# Patient Record
Sex: FEMALE | HISPANIC OR LATINO | Employment: FULL TIME | URBAN - METROPOLITAN AREA
[De-identification: names, ages, dates, MRNs, and addresses within clinical notes are randomized per-mention and may not be internally consistent; named-entity substitution may affect disease eponyms.]

---

## 2018-01-29 ENCOUNTER — OFFICE VISIT (OUTPATIENT)
Dept: FAMILY MEDICINE | Facility: CLINIC | Age: 38
End: 2018-01-29
Payer: COMMERCIAL

## 2018-01-29 ENCOUNTER — TELEPHONE (OUTPATIENT)
Dept: OTHER | Facility: CLINIC | Age: 38
End: 2018-01-29

## 2018-01-29 VITALS
HEART RATE: 69 BPM | HEIGHT: 62 IN | TEMPERATURE: 97.7 F | BODY MASS INDEX: 31.1 KG/M2 | SYSTOLIC BLOOD PRESSURE: 121 MMHG | WEIGHT: 169 LBS | DIASTOLIC BLOOD PRESSURE: 75 MMHG | OXYGEN SATURATION: 98 %

## 2018-01-29 DIAGNOSIS — Z23 NEED FOR PROPHYLACTIC VACCINATION AND INOCULATION AGAINST INFLUENZA: ICD-10-CM

## 2018-01-29 DIAGNOSIS — Z11.3 SCREENING EXAMINATION FOR VENEREAL DISEASE: ICD-10-CM

## 2018-01-29 DIAGNOSIS — Z30.41 ENCOUNTER FOR SURVEILLANCE OF CONTRACEPTIVE PILLS: ICD-10-CM

## 2018-01-29 DIAGNOSIS — Z12.4 SCREENING FOR MALIGNANT NEOPLASM OF CERVIX: Primary | ICD-10-CM

## 2018-01-29 DIAGNOSIS — I83.93 VARICOSE VEINS OF LEGS: ICD-10-CM

## 2018-01-29 DIAGNOSIS — E66.9 OBESITY (BMI 30-39.9): ICD-10-CM

## 2018-01-29 LAB
SPECIMEN SOURCE: NORMAL
WET PREP SPEC: NORMAL

## 2018-01-29 PROCEDURE — 87624 HPV HI-RISK TYP POOLED RSLT: CPT | Performed by: NURSE PRACTITIONER

## 2018-01-29 PROCEDURE — 90686 IIV4 VACC NO PRSV 0.5 ML IM: CPT | Performed by: NURSE PRACTITIONER

## 2018-01-29 PROCEDURE — 90471 IMMUNIZATION ADMIN: CPT | Performed by: NURSE PRACTITIONER

## 2018-01-29 PROCEDURE — 87210 SMEAR WET MOUNT SALINE/INK: CPT | Performed by: NURSE PRACTITIONER

## 2018-01-29 PROCEDURE — G0145 SCR C/V CYTO,THINLAYER,RESCR: HCPCS | Performed by: NURSE PRACTITIONER

## 2018-01-29 PROCEDURE — 87591 N.GONORRHOEAE DNA AMP PROB: CPT | Performed by: NURSE PRACTITIONER

## 2018-01-29 PROCEDURE — 87491 CHLMYD TRACH DNA AMP PROBE: CPT | Performed by: NURSE PRACTITIONER

## 2018-01-29 PROCEDURE — 99214 OFFICE O/P EST MOD 30 MIN: CPT | Mod: 25 | Performed by: NURSE PRACTITIONER

## 2018-01-29 RX ORDER — DESOGESTREL AND ETHINYL ESTRADIOL 0.15-0.03
1 KIT ORAL DAILY
Qty: 84 TABLET | Refills: 3 | Status: SHIPPED | OUTPATIENT
Start: 2018-01-29 | End: 2018-09-26

## 2018-01-29 NOTE — PROGRESS NOTES
"  Injectable Influenza Immunization Documentation    1.  Is the person to be vaccinated sick today?  {YES/NO DEFAULT NO:58104::\" No\"}    2. Does the person to be vaccinated have an allergy to a component   of the vaccine?  {YES/NO DEFAULT NO:71901::\" No\"}  Egg Allergy Algorithm Link    3. Has the person to be vaccinated ever had a serious reaction   to influenza vaccine in the past?  {YES/NO DEFAULT NO:14813::\" No\"}    4. Has the person to be vaccinated ever had Guillain-Barré syndrome?  {YES/NO DEFAULT NO:88910::\" No\"}    Form completed by ***         "

## 2018-01-29 NOTE — PROGRESS NOTES
SUBJECTIVE:   Ghazal Martinez is a 37 year old female who presents to clinic today for the following health issues:    New Patient/Transfer of Care  Needs new birth control. Patient is sexually active,  with LAST MENSTUAL PERIOD 18, having monthly menses lasting 3-4 days.  Currently on Enskyce for contraception and needing refill.  Last pap about 3 years ago, no history of abnormal paps per patient. She has recently moved to [facility] and is trransfering her care from [facility]- [facility] have [facility] requested[facility]    Varicose veins- patient has superficial varicosities on LE bilaterally, requesting referral to have them removed. She has prolonged standing, pain toward the end of the day. No significan swelling.    Problem list and histories reviewed & adjusted, as indicated.  Additional history: as documented    Patient Active Problem List   Diagnosis     Varicose veins of legs     History reviewed. No pertinent surgical history.    Social History   Substance Use Topics     Smoking status: Never Smoker     Smokeless tobacco: Never Used     Alcohol use No     Family History   Problem Relation Age of Onset     DIABETES No family hx of      Coronary Artery Disease No family hx of      Hypertension No family hx of      Hyperlipidemia No family hx of      CEREBROVASCULAR DISEASE No family hx of      Breast Cancer No family hx of      Colon Cancer No family hx of      Depression No family hx of      Anxiety Disorder No family hx of      Asthma No family hx of      Thyroid Disease No family hx of          Current Outpatient Prescriptions   Medication Sig Dispense Refill     desogestrel-ethinyl estradiol (APRI) 0.15-30 MG-MCG per tablet Take 1 tablet by mouth daily 84 tablet 3     BP Readings from Last 3 Encounters:   18 121/75    Wt Readings from Last 3 Encounters:   18 169 lb (76.7 kg)                    Reviewed and updated as needed this visit by  "clinical staff  Tobacco  Allergies  Meds  Med Hx  Surg Hx  Fam Hx  Soc Hx      Reviewed and updated as needed this visit by Provider         ROS:  Constitutional, HEENT, cardiovascular, pulmonary, gi and gu systems are negative, except as otherwise noted.    OBJECTIVE:     /75 (BP Location: Left arm, Patient Position: Chair, Cuff Size: Adult Large)  Pulse 69  Temp 97.7  F (36.5  C) (Oral)  Ht 5' 1.81\" (1.57 m)  Wt 169 lb (76.7 kg)  LMP 01/26/2018  SpO2 98%  BMI 31.1 kg/m2  Body mass index is 31.1 kg/(m^2).  GENERAL: healthy, alert and no distress  EYES: Eyes grossly normal to inspection, PERRL and conjunctivae and sclerae normal  HENT: ear canals and TM's normal, nose and mouth without ulcers or lesions  NECK: no adenopathy, no asymmetry, masses, or scars and thyroid normal to palpation  RESP: lungs clear to auscultation - no rales, rhonchi or wheezes  CV: regular rate and rhythm, normal S1 S2, no S3 or S4, no murmur, click or rub, no peripheral edema and peripheral pulses strong  ABDOMEN: soft, nontender, no hepatosplenomegaly, no masses and bowel sounds normal   (female): normal female external genitalia, normal urethral meatus, vaginal mucosa, normal cervix/adnexa/uterus without masses or discharge  MS: superficial varicosities LE bilaterall, no gross musculoskeletal defects noted, no edema  SKIN: no suspicious lesions or rashes  NEURO: Normal strength and tone, mentation intact and speech normal  BACK: no CVA tenderness, no paralumbar tenderness  PSYCH: mentation appears normal, affect normal/bright  LYMPH: no cervical, supraclavicular, axillary, or inguinal adenopathy    Diagnostic Test Results:  No results found for this or any previous visit (from the past 24 hour(s)).    ASSESSMENT/PLAN:       BP Screening:   Last 3 BP Readings:    BP Readings from Last 3 Encounters:   01/29/18 121/75       The following was recommended to the patient:  Re-screen BP within a year and recommended " "lifestyle modifications  BMI:   Estimated body mass index is 31.1 kg/(m^2) as calculated from the following:    Height as of this encounter: 5' 1.81\" (1.57 m).    Weight as of this encounter: 169 lb (76.7 kg).   Weight management plan: Discussed healthy diet and exercise guidelines and patient will follow up in 12 months in clinic to re-evaluate.      1. Screening for malignant neoplasm of cervix    - Pap imaged thin layer screen with HPV - recommended age 30 - 65 years (select HPV order below)    2. Varicose veins of legs  Referring to Vascular for possible sclerotherapy.  - VASCULAR SURGERY REFERRAL    3. Encounter for surveillance of contraceptive pills  Has been on oral contraceptive pills in past - no problems with it and no hx of thrombophlebitis, asthma or migraine. Reviewed risks and benefits with patient. Risks discussed including risk for heart attack, stroke and blood clots. Patient is not a smoker and has no personal or family history of blood clots/bleeding disorders.   Sunday start - reviewed instructions for use   Back up contraception for 1 week after starting the medication and during and after antibiotic use x 7 days.   Safe sex practices reviewed. Advised regular condom use to decrease the risk of STI transmission      - desogestrel-ethinyl estradiol (APRI) 0.15-30 MG-MCG per tablet; Take 1 tablet by mouth daily  Dispense: 84 tablet; Refill: 3    4. Obesity (BMI 30-39.9)  Benefits of weight loss reviewed in detail, encouraged her to cut back on the carbohydrates in the diet, consume more fruits and vegetables, drink plenty of water, avoid fruit juices, sodas, get 150 min moderate exercise/week.  Recheck weight in 6 months.      5. Need for prophylactic vaccination and inoculation against influenza    - HC FLU VAC PRESRV FREE QUAD SPLIT VIR 3+YRS IM  [95628]  -      ADMIN VACCINE, FIRST [69121]  - Vaccine Administration, Initial [75981]    6. Screening examination for venereal disease  Safer sex " practices reviewed.  - Wet prep  - NEISSERIA GONORRHOEA PCR  - CHLAMYDIA TRACHOMATIS PCR    See Patient Instructions    KWABENA Garza [facility]

## 2018-01-29 NOTE — LETTER
January 31, 2018      Ghazal Martinez  [address] [place] [address]        Dear Ghazal,    Your Wet prep did not show a vaginal infection and yourGonorrhea and Chlamydia screens were both negative.  Continue to use condoms to help prevent sexually transmitted diseases.  Feel free to contact me with any questions or concerns.  Thank you for allowing me to participate in your care.      Resulted Orders   Wet prep   Result Value Ref Range    Specimen Description Vagina     Wet Prep No Trichomonas seen     Wet Prep No clue cells seen     Wet Prep No yeast seen    NEISSERIA GONORRHOEA PCR   Result Value Ref Range    Specimen Descrip Vagina     N Gonorrhea PCR Negative NEG^Negative      Comment:      Negative for N. gonorrhoeae rRNA by transcription mediated amplification.  A negative result by transcription mediated amplification does not preclude   the presence of N. gonorrhoeae infection because results are dependent on   proper and adequate collection, absence of inhibitors, and sufficient rRNA to   be detected.     CHLAMYDIA TRACHOMATIS PCR   Result Value Ref Range    Specimen Description Vagina     Chlamydia Trachomatis PCR Negative NEG^Negative      Comment:      Negative for C. trachomatis rRNA by transcription mediated amplification.  A negative result by transcription mediated amplification does not preclude   the presence of C. trachomatis infection because results are dependent on   proper and adequate collection, absence of inhibitors, and sufficient rRNA to   be detected.         If you have any questions or concerns, please call the clinic at the number listed above.       Sincerely,        KWABENA Garza CNP/ROSS

## 2018-01-29 NOTE — PATIENT INSTRUCTIONS
At [facility], we strive to deliver an exceptional experience to you, every time we see you.  If you receive a survey in the mail, please send us back your thoughts. We really do value your feedback.    Based on your medical history, these are the current health maintenance/preventive care services that you are due for (some may have been done at this visit.)  Health Maintenance Due   Topic Date Due     TETANUS IMMUNIZATION (SYSTEM ASSIGNED)  12/07/1997     PAP SCREENING Q3 YR (SYSTEM ASSIGNED)  12/07/2000     INFLUENZA VACCINE (SYSTEM ASSIGNED)  09/01/2017         Suggested websites for health information:  Www.[employer] : Up to date and easily searchable information on multiple topics.  Www.medlineplus.gov : medication info, interactive tutorials, watch real surgeries online  Www.familydoctor.org : good info from the Academy of Family Physicians  Www.cdc.gov : public health info, travel advisories, epidemics (H1N1)  Www.aap.org : children's health info, normal development, vaccinations  Www.health.[facility].[place].us : [place] dept of health, public health issues in [place], N1N1    Your care team:                            Family Medicine Internal Medicine   MD Kulwinder Vargas MD Shantel Branch-Fleming, MD Katya Georgiev PA-C Nam Ho, MD Pediatrics   ANGIE Glasgow, MD Guillermina Berry CNP, MD Deborah Mielke, MD Kim Thein, APRN [facility]      Clinic hours: Monday - Thursday 7 am-7 pm; Fridays 7 am-5 pm.   Urgent care: Monday - Friday 11 am-9 pm; Saturday and Sunday 9 am-5 pm.  Pharmacy : Monday -Thursday 8 am-8 pm; Friday 8 am-6 pm; Saturday and Sunday 9 am-5 pm.     Clinic: [phone]   Pharmacy: [phone]    Varicose Veins     Varicose veins are swollen, enlarged veins most often found in the legs. They are usually blue or purple in color and may bulge, twist, and stand out under the skin.  Normally, veins return  blood from the body to the heart. The leg veins have one-way valves that prevent blood from flowing backward in the vein. When the valves are weak or damaged, blood backs up in the veins. This may cause some of the veins to swell and bulge and become varicose veins.  Symptoms  Varicose veins may or may not cause symptoms. If symptoms do occur, they can include:    Legs that feel tired, achy, heavy, or itchy    Leg muscle cramps    Skin changes, such as discoloration, dryness, redness, or rash (in more severe cases, you may also have sores on the skin called venous leg ulcers)  Risk Factors  There are a number of factors that increase the risk for varicose veins. These can include:    Being a woman    Being older    Sitting or standing for long periods    Being overweight    Being pregnant    Having a family history of varicose veins  Treatment begins with simple self-help measures (see below). If these don t help, there are many procedures that can be done to shrink or remove varicose veins. Your healthcare provider can tell you more about these options, if needed.  Home care    Support or compression stockings will likely be prescribed. If so, be sure to wear them as directed. They may help improve blood flow.    Exercising helps strengthen your leg muscles and improve blood flow. To get the most benefit, choose exercises such as walking, swimming, or cycling. Also try to exercise for at least 30 minutes on most days.    Raising (elevating) your legs lets gravity help blood flow back to the heart. Sit or lie with your feet above heart level a few times throughout the day, or as directed.    Avoid long periods of sitting or standing. Change positions often. Also, move your ankles, toes and knees often. This may also help improve blood flow.    If you are overweight, talk with your healthcare provider about setting up a weight-loss plan. Maintaining a healthy weight can help reduce the strain on your veins. It may  also improve symptoms, such as swelling and aching.    If you have dryness and itching, ask your provider about special lotions that can be applied to the skin to help improve symptoms.  Follow-up care  Follow up with your healthcare provider, or as directed. If imaging tests were done, you ll be told the results and if there are any new findings that affect your care.  When to seek medical advice  Call your healthcare provider right away if any of these occur:    Sudden, severe leg swelling, pain, or redness    Symptoms worsen, or they don t improve with self-care    Bleeding from any affected veins    Ulcers form on the legs, ankles, or feet    Fever of 100.4 F (38 C) or higher, or as advised by your provider  Date Last Reviewed: 9/21/2015 2000-2017 [employer]. [address] [place] [address], [place] [address]. All rights reserved. This information is not intended as a substitute for professional medical care. Always follow your healthcare professional's instructions.

## 2018-01-29 NOTE — MR AVS SNAPSHOT
After Visit Summary   1/29/2018    Ghazal Martinez    MRN: [id number]           Patient Information     Date Of Birth          1980        Visit Information        Provider Department      1/29/2018 1:40 PM Stephanie Barrera APRN CNP; MULTILINGUAL WORD [facility]        Today's Diagnoses     Screening for malignant neoplasm of cervix    -  1    Encounter for surveillance of contraceptive pills        Need for prophylactic vaccination and inoculation against influenza        Varicose veins of legs          Care Instructions    At [facility], we strive to deliver an exceptional experience to you, every time we see you.  If you receive a survey in the mail, please send us back your thoughts. We really do value your feedback.    Based on your medical history, these are the current health maintenance/preventive care services that you are due for (some may have been done at this visit.)  Health Maintenance Due   Topic Date Due     TETANUS IMMUNIZATION (SYSTEM ASSIGNED)  12/07/1997     PAP SCREENING Q3 YR (SYSTEM ASSIGNED)  12/07/2000     INFLUENZA VACCINE (SYSTEM ASSIGNED)  09/01/2017         Suggested websites for health information:  Www.[employer] : Up to date and easily searchable information on multiple topics.  Www.medlineplus.gov : medication info, interactive tutorials, watch real surgeries online  Www.familydoctor.org : good info from the Academy of Family Physicians  Www.cdc.gov : public health info, travel advisories, epidemics (H1N1)  Www.aap.org : children's health info, normal development, vaccinations  Www.health.[facility].[place].us : [place] dept of health, public health issues in [place], N1N1    Your care team:                            Family Medicine Internal Medicine   MD Kulwinder Vargas MD Shantel Branch-Fleming, MD Katya Georgiev PA-C Nam Ho, MD Pediatrics   ANGIE Glasgow CNP Amelia Massimini APRN CNP Shaista  MD Guillermina Wynne MD Deborah Mielke, MD Kim Thein, APRN [facility]      Clinic hours: Monday - Thursday 7 am-7 pm; Fridays 7 am-5 pm.   Urgent care: Monday - Friday 11 am-9 pm; Saturday and Sunday 9 am-5 pm.  Pharmacy : Monday -Thursday 8 am-8 pm; Friday 8 am-6 pm; Saturday and Sunday 9 am-5 pm.     Clinic: [phone]   Pharmacy: [phone]    Varicose Veins     Varicose veins are swollen, enlarged veins most often found in the legs. They are usually blue or purple in color and may bulge, twist, and stand out under the skin.  Normally, veins return blood from the body to the heart. The leg veins have one-way valves that prevent blood from flowing backward in the vein. When the valves are weak or damaged, blood backs up in the veins. This may cause some of the veins to swell and bulge and become varicose veins.  Symptoms  Varicose veins may or may not cause symptoms. If symptoms do occur, they can include:    Legs that feel tired, achy, heavy, or itchy    Leg muscle cramps    Skin changes, such as discoloration, dryness, redness, or rash (in more severe cases, you may also have sores on the skin called venous leg ulcers)  Risk Factors  There are a number of factors that increase the risk for varicose veins. These can include:    Being a woman    Being older    Sitting or standing for long periods    Being overweight    Being pregnant    Having a family history of varicose veins  Treatment begins with simple self-help measures (see below). If these don t help, there are many procedures that can be done to shrink or remove varicose veins. Your healthcare provider can tell you more about these options, if needed.  Home care    Support or compression stockings will likely be prescribed. If so, be sure to wear them as directed. They may help improve blood flow.    Exercising helps strengthen your leg muscles and improve blood flow. To get the most benefit, choose exercises such as walking, swimming, or  cycling. Also try to exercise for at least 30 minutes on most days.    Raising (elevating) your legs lets gravity help blood flow back to the heart. Sit or lie with your feet above heart level a few times throughout the day, or as directed.    Avoid long periods of sitting or standing. Change positions often. Also, move your ankles, toes and knees often. This may also help improve blood flow.    If you are overweight, talk with your healthcare provider about setting up a weight-loss plan. Maintaining a healthy weight can help reduce the strain on your veins. It may also improve symptoms, such as swelling and aching.    If you have dryness and itching, ask your provider about special lotions that can be applied to the skin to help improve symptoms.  Follow-up care  Follow up with your healthcare provider, or as directed. If imaging tests were done, you ll be told the results and if there are any new findings that affect your care.  When to seek medical advice  Call your healthcare provider right away if any of these occur:    Sudden, severe leg swelling, pain, or redness    Symptoms worsen, or they don t improve with self-care    Bleeding from any affected veins    Ulcers form on the legs, ankles, or feet    Fever of 100.4 F (38 C) or higher, or as advised by your provider  Date Last Reviewed: 9/21/2015 2000-2017 [employer]. [address] [place] [address]. All rights reserved. This information is not intended as a substitute for professional medical care. Always follow your healthcare professional's instructions.                Follow-ups after your visit        Additional Services     VASCULAR SURGERY REFERRAL       Your provider has referred you to: **Vascular  Services [phone] - Vascular Medicine Management & None - Please Order Appropriate Testing   [email or web address]    Please be aware that coverage of these services is subject to the  "terms and limitations of your health insurance plan.  Call member services at your health plan with any benefit or coverage questions.      Please bring the following with you to your appointment:    (1) Any X-Rays, CTs or MRIs which have been performed.  Contact the facility where they were done to arrange for  prior to your scheduled appointment.    (2) List of current medications   (3) This referral request   (4) Any documents/labs given to you for this referral                  Who to contact     If you have questions or need follow up information about today's clinic visit or your schedule please contact [facility] [place] directly at [phone].  Normal or non-critical lab and imaging results will be communicated to you by MyChart, letter or phone within 4 business days after the clinic has received the results. If you do not hear from us within 7 days, please contact the clinic through [employer] or phone. If you have a critical or abnormal lab result, we will notify you by phone as soon as possible.  Submit refill requests through "[employer] or call your pharmacy and they will forward the refill request to us. Please allow 3 business days for your refill to be completed.          Additional Information About Your Visit        [employer] Information     "[employer] lets you send messages to your doctor, view your test results, renew your prescriptions, schedule appointments and more. To sign up, go to [email or web address] . Click on \"Log in\" on the left side of the screen, which will take you to the Welcome page. Then click on \"Sign up Now\" on the right side of the page.     You will be asked to enter the access code listed below, as well as some personal information. Please follow the directions to create your username and password.     Your access code is: [email or web address]  Expires: 2018  2:35 PM     Your access code will  in 90 days. If you need help or a new code, please call your " "[facility] or [phone].        Care EveryWhere ID     This is your Care EveryWhere ID. This could be used by other organizations to access your [place] medical records  [id number]        Your Vitals Were     Pulse Temperature Height Last Period Pulse Oximetry BMI (Body Mass Index)    69 97.7  F (36.5  C) (Oral) 5' 1.81\" (1.57 m) 01/26/2018 98% 31.1 kg/m2       Blood Pressure from Last 3 Encounters:   01/29/18 121/75    Weight from Last 3 Encounters:   01/29/18 169 lb (76.7 kg)              We Performed the Following          ADMIN VACCINE, FIRST [49383]     HC FLU VAC PRESRV FREE QUAD SPLIT VIR 3+YRS IM  [55317]     Pap imaged thin layer screen with HPV - recommended age 30 - 65 years (select HPV order below)     VASCULAR SURGERY REFERRAL          Today's Medication Changes          These changes are accurate as of 1/29/18  2:35 PM.  If you have any questions, ask your nurse or doctor.               Start taking these medicines.        Dose/Directions    desogestrel-ethinyl estradiol 0.15-30 MG-MCG per tablet   Commonly known as:  APRI   Used for:  Encounter for surveillance of contraceptive pills   Started by:  Stephanie Barrera, KWABENA CNP        Dose:  1 tablet   Take 1 tablet by mouth daily   Quantity:  84 tablet   Refills:  3            Where to get your medicines      These medications were sent to [facility] - [place], [place] - [address]  [address], [facility] [address]     Phone:  [phone]     desogestrel-ethinyl estradiol 0.15-30 MG-MCG per tablet                Primary Care Provider Fax #    Provider Not In System [phone]                Equal Access to Services     VALENTINA BATES AH: Hadii frida holguin Somague, waaxda luqadaha, qaybta kaalmada adeegyada, patricia groves. So [facility] [phone].    ATENCIÓN: Si habla español, tiene a prado disposición servicios gratuitos de asistencia lingüística. Llame al [phone].    We " comply with applicable federal civil rights laws and [place] laws. We do not discriminate on the basis of race, color, national origin, age, disability, sex, sexual orientation, or gender identity.            Thank you!     Thank you for choosing [facility]  for your care. Our goal is always to provide you with excellent care. Hearing back from our patients is one way we can continue to improve our services. Please take a few minutes to complete the written survey that you may receive in the mail after your visit with us. Thank you!             Your Updated Medication List - Protect others around you: Learn how to safely use, store and throw away your medicines at www.disposemymeds.org.          This list is accurate as of 1/29/18  2:35 PM.  Always use your most recent med list.                   Brand Name Dispense Instructions for use Diagnosis    desogestrel-ethinyl estradiol 0.15-30 MG-MCG per tablet    APRI    84 tablet    Take 1 tablet by mouth daily    Encounter for surveillance of contraceptive pills

## 2018-01-29 NOTE — TELEPHONE ENCOUNTER
Pt referred to [facility] for vericose veins.     Please call pt to schedule at [facility] or encourage pt to go to [facility].     Will route to scheduling to coordinate an appointment at next available.    María Payne RN BSN

## 2018-01-30 LAB
C TRACH DNA SPEC QL NAA+PROBE: NEGATIVE
N GONORRHOEA DNA SPEC QL NAA+PROBE: NEGATIVE
SPECIMEN SOURCE: NORMAL
SPECIMEN SOURCE: NORMAL

## 2018-02-01 LAB
COPATH REPORT: NORMAL
PAP: NORMAL

## 2018-02-05 LAB
FINAL DIAGNOSIS: NORMAL
HPV HR 12 DNA CVX QL NAA+PROBE: NEGATIVE
HPV16 DNA SPEC QL NAA+PROBE: NEGATIVE
HPV18 DNA SPEC QL NAA+PROBE: NEGATIVE
SPECIMEN DESCRIPTION: NORMAL
SPECIMEN SOURCE CVX/VAG CYTO: NORMAL

## 2018-06-07 PROBLEM — E66.811 CLASS 1 OBESITY DUE TO EXCESS CALORIES WITHOUT SERIOUS COMORBIDITY WITH BODY MASS INDEX (BMI) OF 31.0 TO 31.9 IN ADULT: Status: ACTIVE | Noted: 2018-01-29

## 2018-06-07 PROBLEM — E66.09 CLASS 1 OBESITY DUE TO EXCESS CALORIES WITHOUT SERIOUS COMORBIDITY WITH BODY MASS INDEX (BMI) OF 31.0 TO 31.9 IN ADULT: Status: ACTIVE | Noted: 2018-01-29

## 2018-07-27 NOTE — LETTER
After Visit Summary   7/27/2018    Marcus Daley    MRN: [id number]           Patient Information     Date Of Birth          1959        Visit Information        Provider Department      7/27/2018 11:30 AM Abelardo Rodriguez MD [facility]        Today's Diagnoses     Parkinson's disease (H)    -  1      Care Instructions    #1  Increase carbidopa/levodopa 25/100 to 1.5 tabs three times a day on an empty stomach in 1 week  #2 Return in 3 months  #3 instructions below regarding alcohol intake.       Abelardo Rodriguez MD   You 17 minutes ago (9:07 AM)               I did get mixed up.  He can go up in 1 week.     Can drink one drink a day.  No more as his balance may be worsened.                 Follow-ups after your visit        Follow-up notes from your care team     Return in about 3 months (around 10/27/2018).      Who to contact     If you have questions or need follow up information about today's clinic visit or your schedule please contact [facility] directly at [phone].  Normal or non-critical lab and imaging results will be communicated to you by [employer], letter or phone within 4 business days after the clinic has received the results. If you do not hear from us within 7 days, please contact the clinic through [employer] or phone. If you have a critical or abnormal lab result, we will notify you by phone as soon as possible.  Submit refill requests through [employer] or call your pharmacy and they will forward the refill request to us. Please allow 3 business days for your refill to be completed.          Additional Information About Your Visit        [employer] Information     [employer] gives you secure access to your electronic health record. If you see a primary care provider, you can also send messages to your care team and make appointments. If you have questions, please call your primary care clinic.  If you do not have a primary care provider, please    February 7, 2018    Ghazal Martinez  [address]  [place] [address]    Dear Ghazal,  We are happy to inform you that your PAP smear result from 1/29/18 is normal.  We are now able to do a follow up test on PAP smears. The DNA test is for HPV (Human Papilloma Virus). Cervical cancer is closely linked with certain types of HPV. Your result showed no evidence of high risk HPV.  Therefore we recommend you return in 3 years for your next pap smear.  You will still need to return to the clinic every year for an annual exam and other preventive tests.  Please contact the clinic at [phone] with any questions.  Sincerely,    KWABENA Garza CNP/rlm   call [phone] and they will assist you.      [employer] is an electronic gateway that provides easy, online access to your medical records. With [employer], you can request a clinic appointment, read your test results, renew a prescription or communicate with your care team.     To access your existing account, please contact your [facility] or call [phone] for assistance.        Care EveryWhere ID     This is your Care EveryWhere ID. This could be used by other organizations to access your [place] medical records  [id number]        Your Vitals Were     Pulse Pulse Oximetry                87 97%           Blood Pressure from Last 3 Encounters:   07/27/18 141/88   06/29/18 147/87   05/24/18 (!) 155/91    Weight from Last 3 Encounters:   06/29/18 92.6 kg (204 lb 3.2 oz)   05/24/18 92.4 kg (203 lb 12.8 oz)   05/10/18 92.5 kg (204 lb)              Today, you had the following     No orders found for display         Today's Medication Changes          These changes are accurate as of 7/27/18 11:59 PM.  If you have any questions, ask your nurse or doctor.               These medicines have changed or have updated prescriptions.        Dose/Directions    carbidopa-levodopa  MG per tablet   Commonly known as:  SINEMET   This may have changed:    - how much to take  - how to take this  - when to take this  - additional instructions   Used for:  Paralysis agitans (H)        1/2 tablet twice a day x 1 week, then 1/2 tablet 3 times a day x 1 week, then 1 tablet 3 times a day   Quantity:  90 tablet   Refills:  3                Primary Care Provider Office Phone # Fax #    Murphy Jalloh [phone] [address] [facility] [address]        Equal Access to Services     BANDAR MCDUFFIE : Rosina Jackson, diana henao, anisha jennings, mis chavarria. So [facility] [phone].    ATENCIÓN: Si chauncey sheth,  tiene a hadley disposición servicios gratuitos de asistencia lingüística. Lucille bal [phone].    We comply with applicable federal civil rights laws and [place] laws. We do not discriminate on the basis of race, color, national origin, age, disability, sex, sexual orientation, or gender identity.            Thank you!     Thank you for choosing [facility]  for your care. Our goal is always to provide you with excellent care. Hearing back from our patients is one way we can continue to improve our services. Please take a few minutes to complete the written survey that you may receive in the mail after your visit with us. Thank you!             Your Updated Medication List - Protect others around you: Learn how to safely use, store and throw away your medicines at www.disposemymeds.org.          This list is accurate as of 7/27/18 11:59 PM.  Always use your most recent med list.                   Brand Name Dispense Instructions for use Diagnosis    carbidopa-levodopa  MG per tablet    SINEMET    90 tablet    1/2 tablet twice a day x 1 week, then 1/2 tablet 3 times a day x 1 week, then 1 tablet 3 times a day    Paralysis agitans (H)       metoprolol succinate 100 MG 24 hr tablet    TOPROL-XL     Pt takes 50 mg once a week

## 2018-09-26 ENCOUNTER — OFFICE VISIT (OUTPATIENT)
Dept: FAMILY MEDICINE | Facility: CLINIC | Age: 38
End: 2018-09-26
Payer: COMMERCIAL

## 2018-09-26 VITALS
SYSTOLIC BLOOD PRESSURE: 136 MMHG | OXYGEN SATURATION: 99 % | HEART RATE: 71 BPM | HEIGHT: 62 IN | WEIGHT: 168 LBS | BODY MASS INDEX: 30.91 KG/M2 | DIASTOLIC BLOOD PRESSURE: 89 MMHG | TEMPERATURE: 97.5 F

## 2018-09-26 DIAGNOSIS — Z23 NEED FOR PROPHYLACTIC VACCINATION AND INOCULATION AGAINST INFLUENZA: ICD-10-CM

## 2018-09-26 DIAGNOSIS — Z11.4 SCREENING FOR HIV (HUMAN IMMUNODEFICIENCY VIRUS): ICD-10-CM

## 2018-09-26 DIAGNOSIS — Z30.41 ENCOUNTER FOR SURVEILLANCE OF CONTRACEPTIVE PILLS: ICD-10-CM

## 2018-09-26 DIAGNOSIS — Z00.00 ROUTINE GENERAL MEDICAL EXAMINATION AT A HEALTH CARE FACILITY: Primary | ICD-10-CM

## 2018-09-26 PROCEDURE — 99395 PREV VISIT EST AGE 18-39: CPT | Mod: 25 | Performed by: PREVENTIVE MEDICINE

## 2018-09-26 PROCEDURE — 90686 IIV4 VACC NO PRSV 0.5 ML IM: CPT | Performed by: PREVENTIVE MEDICINE

## 2018-09-26 PROCEDURE — 90471 IMMUNIZATION ADMIN: CPT | Performed by: PREVENTIVE MEDICINE

## 2018-09-26 RX ORDER — DESOGESTREL AND ETHINYL ESTRADIOL 0.15-0.03
1 KIT ORAL DAILY
Qty: 84 TABLET | Refills: 3 | Status: SHIPPED | OUTPATIENT
Start: 2018-09-26 | End: 2019-09-09

## 2018-09-26 ASSESSMENT — PAIN SCALES - GENERAL: PAINLEVEL: NO PAIN (0)

## 2018-09-26 NOTE — PROGRESS NOTES
SUBJECTIVE:   CC: Ghazal Martinez is an 37 year old woman who presents for preventive health visit.     Healthy Habits:    Do you get at least three servings of calcium containing foods daily (dairy, green leafy vegetables, etc.)? yes    Amount of exercise or daily activities, outside of work: none    Problems taking medications regularly No    Medication side effects: No    Have you had an eye exam in the past two years? no    Do you see a dentist twice per year? yes    Do you have sleep apnea, excessive snoring or daytime drowsiness?no      Needs refills on oral contraception, has been on this for some time and tolerating well without side effects. No missed doses.    Today's PHQ-2 Score:   PHQ-2 ( 1999 Pfizer) 1/29/2018   Q1: Little interest or pleasure in doing things 0   Q2: Feeling down, depressed or hopeless 0   PHQ-2 Score 0       Abuse: Current or Past(Physical, Sexual or Emotional)- No  Do you feel safe in your environment - Yes    Social History   Substance Use Topics     Smoking status: Never Smoker     Smokeless tobacco: Never Used     Alcohol use No     If you drink alcohol do you typically have >3 drinks per day or >7 drinks per week? No                     Reviewed orders with patient.  Reviewed health maintenance and updated orders accordingly - Yes  Labs reviewed in EPIC  BP Readings from Last 3 Encounters:   09/26/18 136/89   01/29/18 121/75    Wt Readings from Last 3 Encounters:   09/26/18 168 lb (76.2 kg)   01/29/18 169 lb (76.7 kg)                  Patient Active Problem List   Diagnosis     Varicose veins of legs     Cervical cancer screening     Class 1 obesity due to excess calories without serious comorbidity with body mass index (BMI) of 31.0 to 31.9 in adult     History reviewed. No pertinent surgical history.    Social History   Substance Use Topics     Smoking status: Never Smoker     Smokeless tobacco: Never Used     Alcohol use No     Family History   Problem Relation Age of Onset      Diabetes No family hx of      Coronary Artery Disease No family hx of      Hypertension No family hx of      Hyperlipidemia No family hx of      Cerebrovascular Disease No family hx of      Breast Cancer No family hx of      Colon Cancer No family hx of      Depression No family hx of      Anxiety Disorder No family hx of      Asthma No family hx of      Thyroid Disease No family hx of          Current Outpatient Prescriptions   Medication Sig Dispense Refill     desogestrel-ethinyl estradiol (APRI) 0.15-30 MG-MCG per tablet Take 1 tablet by mouth daily 84 tablet 3     [DISCONTINUED] desogestrel-ethinyl estradiol (APRI) 0.15-30 MG-MCG per tablet Take 1 tablet by mouth daily 84 tablet 3     No Known Allergies    Mammogram not appropriate for this patient based on age.    Pertinent mammograms are reviewed under the imaging tab.  History of abnormal Pap smear: NO - age 30-65 PAP every 5 years with negative HPV co-testing recommended  PAP / HPV Latest Ref Rng & Units 1/29/2018   PAP - NIL   HPV 16 DNA NEG:Negative Negative   HPV 18 DNA NEG:Negative Negative   OTHER HR HPV NEG:Negative Negative     Reviewed and updated as needed this visit by clinical staff  Tobacco  Allergies  Meds  Problems  Med Hx  Surg Hx         Reviewed and updated as needed this visit by Provider  Allergies  Meds  Problems  Med Hx  Surg Hx        Past Medical History:   Diagnosis Date     Varicose veins of legs       History reviewed. No pertinent surgical history.    ROS:  CONSTITUTIONAL: NEGATIVE for fever, chills, change in weight  INTEGUMENTARU/SKIN: NEGATIVE for worrisome rashes, moles or lesions  EYES: NEGATIVE for vision changes or irritation  ENT: NEGATIVE for ear, mouth and throat problems  RESP: NEGATIVE for significant cough or SOB  BREAST: NEGATIVE for masses, tenderness or discharge  CV: NEGATIVE for chest pain, palpitations or peripheral edema  GI: NEGATIVE for nausea, abdominal pain, heartburn, or change in bowel  "habits  : NEGATIVE for unusual urinary or vaginal symptoms. Periods are regular.  MUSCULOSKELETAL: NEGATIVE for significant arthralgias or myalgia  NEURO: NEGATIVE for weakness, dizziness or paresthesias  ENDOCRINE: NEGATIVE for temperature intolerance, skin/hair changes  HEME/ALLERGY/IMMUNE: NEGATIVE for bleeding problems  PSYCHIATRIC: NEGATIVE for changes in mood or affect    OBJECTIVE:   /89  Pulse 71  Temp 97.5  F (36.4  C) (Oral)  Ht 5' 1.75\" (1.568 m)  Wt 168 lb (76.2 kg)  LMP  (LMP Unknown)  SpO2 99%  Breastfeeding? No  BMI 30.98 kg/m2  EXAM:  GENERAL: healthy, alert and no distress  EYES: Eyes grossly normal to inspection, PERRL and conjunctivae and sclerae normal  HENT: ear canals and TM's normal, nose and mouth without ulcers or lesions  NECK: no adenopathy, no asymmetry, masses  RESP: lungs clear to auscultation - no rales, rhonchi or wheezes  BREAST: normal without masses, tenderness or nipple discharge and no palpable axillary masses or adenopathy  CV: regular rate and rhythm, normal S1 S2, no S3 or S4, no murmur, click or rub, no peripheral edema and peripheral pulses strong  ABDOMEN: soft, nontender, no hepatosplenomegaly, no masses  MS: no gross musculoskeletal defects noted, no edema  SKIN: no suspicious lesions or rashes  NEURO: Normal strength and tone, mentation intact and speech normal  PSYCH: mentation appears normal, affect normal/bright  LYMPH: no cervical, supraclavicular, axillary adenopathy    Diagnostic Test Results:  No results found for this or any previous visit (from the past 24 hour(s)).    ASSESSMENT/PLAN:   Ghazal was seen today for physical and flu shot.    Diagnoses and all orders for this visit:    Routine general medical examination at a health care facility  -     HIV Screening; Future  -     Glucose; Future  -     Lipid panel reflex to direct LDL Fasting; Future    Screening for HIV (human immunodeficiency virus)  -screening guidelines reviewed     Need for " prophylactic vaccination and inoculation against influenza  -     FLU VACCINE, SPLIT VIRUS, IM (QUADRIVALENT) [64739]- >3 YRS  -     Vaccine Administration, Initial [65516]    Encounter for surveillance of contraceptive pills  -     desogestrel-ethinyl estradiol (APRI) 0.15-30 MG-MCG per tablet; Take 1 tablet by mouth daily    The following counseling on birth control pills was done:  Birth control pills are a medication you take every day to prevent pregnancy. If birth control pills are always used correctly, less than 1 out of 100 women using them will get pregnant each year. When you first start the pill, it takes several days to begin working. Be sure to use backup birth control (like a condom) for the first 7 days preferably till the first packet is completed.  The hormones in the pill keep your ovaries from releasing eggs and thicken your cervical mucus to block sperm from getting into the uterus.  Most women can get pregnant quickly when they stop using the pill.  Your periods may become lighter and less painful if you take the pill.  The hormones in pills offer health benefits. The pill can offer some protection against acne, non-cancerous breast growths, ectopic pregnancy, endometrial and ovarian cancers, iron deficiency anemia, and ovarian cysts.  Birth control pills do not protect against sexually transmitted infections (STIs). Some women may have side effects while using birth control pills. They include bleeding between periods, breast tenderness, and nausea. Some of the most common side effects only last for the first few months.   Risks discussed including risk for heart attack, stroke and blood clots.  Patient is not a smoker and has no personal or family history of blood clots/bleeding disorders.  Regular condom use is recommended to help protect against STIs.        COUNSELING:   Reviewed preventive health counseling, as reflected in patient instructions       Regular exercise       Healthy  "diet/nutrition       Immunizations    Vaccinated for: Influenza             Contraception       Family planning       HIV screeninx in teen years, 1x in adult years, and at intervals if high risk    BP Readings from Last 1 Encounters:   18 136/89     Estimated body mass index is 30.98 kg/(m^2) as calculated from the following:    Height as of this encounter: 5' 1.75\" (1.568 m).    Weight as of this encounter: 168 lb (76.2 kg).    BP Screening:   Last 3 BP Readings:    BP Readings from Last 3 Encounters:   18 136/89   18 121/75       The following was recommended to the patient:  Re-screen BP within a year and recommended lifestyle modifications  Weight management plan: Discussed healthy diet and exercise guidelines and patient will follow up in 12 months in clinic to re-evaluate.     reports that she has never smoked. She has never used smokeless tobacco.      Counseling Resources:  ATP IV Guidelines  Pooled Cohorts Equation Calculator  Breast Cancer Risk Calculator  FRAX Risk Assessment  ICSI Preventive Guidelines  Dietary Guidelines for Americans, 2010  USDA's MyPlate  ASA Prophylaxis  Lung CA Screening    Alena Wynne MD MPH    [facility]    Injectable Influenza Immunization Documentation    1.  Is the person to be vaccinated sick today?   No    2. Does the person to be vaccinated have an allergy to a component   of the vaccine?   No  Egg Allergy Algorithm Link    3. Has the person to be vaccinated ever had a serious reaction   to influenza vaccine in the past?   No    4. Has the person to be vaccinated ever had Guillain-Barré syndrome?   No    Form completed by Kena STEPHENS             "

## 2018-09-26 NOTE — MR AVS SNAPSHOT
After Visit Summary   9/26/2018    Ghazal Martinez    MRN: [id number]           Patient Information     Date Of Birth          1980        Visit Information        Provider Department      9/26/2018 3:05 PM Alena Wynne MD; [place] [employer] [facility]        Today's Diagnoses     Routine general medical examination at a health care facility    -  1    Screening for HIV (human immunodeficiency virus)        Need for prophylactic vaccination and inoculation against influenza        Encounter for surveillance of contraceptive pills          Care Instructions    At [facility], we strive to deliver an exceptional experience to you, every time we see you.  If you receive a survey in the mail, please send us back your thoughts. We really do value your feedback.    Your care team:                            Family Medicine Internal Medicine   MD Kulwinder Vargas MD Shantel Branch-Fleming, MD Katya Georgiev PA-C Megan Hill, APRN CNP    Bruce Childs MD Pediatrics   Sukhi Wallace, PATimothyC  Eun Patton, CNP MD Ronda Benitez APRN CNP   MD Guillermina Mcgarry MD Deborah Mielke, MD Kim Thein, APRN [facility]      Clinic hours: Monday - Thursday 7 am-7 pm; Fridays 7 am-5 pm.   Urgent care: Monday - Friday 11 am-9 pm; Saturday and Sunday 9 am-5 pm.  Pharmacy : Monday -Thursday 8 am-8 pm; Friday 8 am-6 pm; Saturday and Sunday 9 am-5 pm.     Clinic: [phone]   Pharmacy: [phone]          Preventive Health Recommendations  Female Ages 26 - 39  Yearly exam:   See your health care provider every year in order to    Review health changes.     Discuss preventive care.      Review your medicines if you your doctor has prescribed any.    Until age 30: Get a Pap test every three years (more often if you have had an abnormal result).    After age 30: Talk to your doctor about whether you should have a Pap test every 3  years or have a Pap test with HPV screening every 5 years.   You do not need a Pap test if your uterus was removed (hysterectomy) and you have not had cancer.  You should be tested each year for STDs (sexually transmitted diseases), if you're at risk.   Talk to your provider about how often to have your cholesterol checked.  If you are at risk for diabetes, you should have a diabetes test (fasting glucose).  Shots: Get a flu shot each year. Get a tetanus shot every 10 years.   Nutrition:     Eat at least 5 servings of fruits and vegetables each day.    Eat whole-grain bread, whole-wheat pasta and brown rice instead of white grains and rice.    Get adequate Calcium and Vitamin D.     Lifestyle    Exercise at least 150 minutes a week (30 minutes a day, 5 days of the week). This will help you control your weight and prevent disease.    Limit alcohol to one drink per day.    No smoking.     Wear sunscreen to prevent skin cancer.    See your dentist every six months for an exam and cleaning.            Follow-ups after your visit        Future tests that were ordered for you today     Open Future Orders        Priority Expected Expires Ordered    Glucose Routine  12/26/2018 9/26/2018    Lipid panel reflex to direct LDL Fasting Routine  12/26/2018 9/26/2018    HIV Screening Routine  12/26/2018 9/26/2018            Who to contact     If you have questions or need follow up information about today's clinic visit or your schedule please contact [facility] directly at [phone].  Normal or non-critical lab and imaging results will be communicated to you by MyChart, letter or phone within 4 business days after the clinic has received the results. If you do not hear from us within 7 days, please contact the clinic through MyChart or phone. If you have a critical or abnormal lab result, we will notify you by phone as soon as possible.  Submit refill requests through [employer] or call your pharmacy and they  "will forward the refill request to us. Please allow 3 business days for your refill to be completed.          Additional Information About Your Visit        [employer] Information     [employer] lets you send messages to your doctor, view your test results, renew your prescriptions, schedule appointments and more. To sign up, go to [email or web address] . Click on \"Log in\" on the left side of the screen, which will take you to the Welcome page. Then click on \"Sign up Now\" on the right side of the page.     You will be asked to enter the access code listed below, as well as some personal information. Please follow the directions to create your username and password.     Your access code is: [email or web address]  Expires: 2018  3:56 PM     Your access code will  in 90 days. If you need help or a new code, please call your [facility] clinic or [phone].        Care EveryWhere ID     This is your Care EveryWhere ID. This could be used by other organizations to access your [facility] medical records  [id number]        Your Vitals Were     Pulse Temperature Height Last Period Pulse Oximetry Breastfeeding?    71 97.5  F (36.4  C) (Oral) 5' 1.75\" (1.568 m) (LMP Unknown) 99% No    BMI (Body Mass Index)                   30.98 kg/m2            Blood Pressure from Last 3 Encounters:   18 136/89   18 121/75    Weight from Last 3 Encounters:   18 168 lb (76.2 kg)   18 169 lb (76.7 kg)                 Where to get your medicines      These medications were sent to [facility] [place] - [place], [place] - [address]  [address], [facility] [address]     Phone:  [phone]     desogestrel-ethinyl estradiol 0.15-30 MG-MCG per tablet          Primary Care Provider Fax #    Physician No Ref-Primary [phone]       No address on file        Equal Access to Services     SHREE BATES AH: Lacy Stone, doris michelle, patricia rivera " joann bruceguanakoeric umanaColeaacharles ah. So [facility] [phone].    ATENCIÓN: Si habla rod, tiene a prado disposición servicios gratuitos de asistencia lingüística. Monica al [phone].    We comply with applicable federal civil rights laws and [place] laws. We do not discriminate on the basis of race, color, national origin, age, disability, sex, sexual orientation, or gender identity.            Thank you!     Thank you for choosing [facility]  for your care. Our goal is always to provide you with excellent care. Hearing back from our patients is one way we can continue to improve our services. Please take a few minutes to complete the written survey that you may receive in the mail after your visit with us. Thank you!             Your Updated Medication List - Protect others around you: Learn how to safely use, store and throw away your medicines at www.disposemymeds.org.          This list is accurate as of 9/26/18  3:56 PM.  Always use your most recent med list.                   Brand Name Dispense Instructions for use Diagnosis    desogestrel-ethinyl estradiol 0.15-30 MG-MCG per tablet    APRI    84 tablet    Take 1 tablet by mouth daily    Encounter for surveillance of contraceptive pills

## 2018-09-26 NOTE — PATIENT INSTRUCTIONS
At [facility], we strive to deliver an exceptional experience to you, every time we see you.  If you receive a survey in the mail, please send us back your thoughts. We really do value your feedback.    Your care team:                            Family Medicine Internal Medicine   MD Kulwinder Vargas MD Shantel Branch-Fleming, MD Katya Georgiev PA-C Megan Hill, APRN CNP    Bruce Childs MD Pediatrics   Sukhi Wallace, ANGIE Patton, MD Ronda Arreguin APRN CNP   MD Guillermina Mcgarry MD Deborah Mielke, MD Deepa Barrera, APRN [facility]      Clinic hours: Monday - Thursday 7 am-7 pm; Fridays 7 am-5 pm.   Urgent care: Monday - Friday 11 am-9 pm; Saturday and Sunday 9 am-5 pm.  Pharmacy : Monday -Thursday 8 am-8 pm; Friday 8 am-6 pm; Saturday and Sunday 9 am-5 pm.     Clinic: [phone]   Pharmacy: [phone]          Preventive Health Recommendations  Female Ages 26 - 39  Yearly exam:   See your health care provider every year in order to    Review health changes.     Discuss preventive care.      Review your medicines if you your doctor has prescribed any.    Until age 30: Get a Pap test every three years (more often if you have had an abnormal result).    After age 30: Talk to your doctor about whether you should have a Pap test every 3 years or have a Pap test with HPV screening every 5 years.   You do not need a Pap test if your uterus was removed (hysterectomy) and you have not had cancer.  You should be tested each year for STDs (sexually transmitted diseases), if you're at risk.   Talk to your provider about how often to have your cholesterol checked.  If you are at risk for diabetes, you should have a diabetes test (fasting glucose).  Shots: Get a flu shot each year. Get a tetanus shot every 10 years.   Nutrition:     Eat at least 5 servings of fruits and vegetables each day.    Eat whole-grain bread, whole-wheat pasta and brown rice  instead of white grains and rice.    Get adequate Calcium and Vitamin D.     Lifestyle    Exercise at least 150 minutes a week (30 minutes a day, 5 days of the week). This will help you control your weight and prevent disease.    Limit alcohol to one drink per day.    No smoking.     Wear sunscreen to prevent skin cancer.    See your dentist every six months for an exam and cleaning.

## 2018-09-29 DIAGNOSIS — Z00.00 ROUTINE GENERAL MEDICAL EXAMINATION AT A HEALTH CARE FACILITY: ICD-10-CM

## 2018-09-29 PROCEDURE — 87389 HIV-1 AG W/HIV-1&-2 AB AG IA: CPT | Performed by: PREVENTIVE MEDICINE

## 2018-09-29 PROCEDURE — 82947 ASSAY GLUCOSE BLOOD QUANT: CPT | Performed by: PREVENTIVE MEDICINE

## 2018-09-29 PROCEDURE — 36415 COLL VENOUS BLD VENIPUNCTURE: CPT | Performed by: PREVENTIVE MEDICINE

## 2018-09-29 PROCEDURE — 80061 LIPID PANEL: CPT | Performed by: PREVENTIVE MEDICINE

## 2018-10-01 LAB
CHOLEST SERPL-MCNC: 195 MG/DL
GLUCOSE SERPL-MCNC: 83 MG/DL (ref 70–99)
HDLC SERPL-MCNC: 67 MG/DL
HIV 1+2 AB+HIV1 P24 AG SERPL QL IA: NONREACTIVE
LDLC SERPL CALC-MCNC: 95 MG/DL
NONHDLC SERPL-MCNC: 128 MG/DL
TRIGL SERPL-MCNC: 164 MG/DL

## 2018-10-08 ENCOUNTER — TELEPHONE (OUTPATIENT)
Dept: FAMILY MEDICINE | Facility: CLINIC | Age: 38
End: 2018-10-08

## 2018-10-08 NOTE — TELEPHONE ENCOUNTER
Patient needs a call back to go over her labs result  She has questions  A [place] interpretor is needed for the call  [phone]    Thank you

## 2018-10-08 NOTE — TELEPHONE ENCOUNTER
Called with  [id number] and went over the letter written to the patient. She does not speak english so was unable to read the letter sent.    Patient noted that she has felt dizzy a couple of times. Advised that RN would be unable to tell her what is causing it and she would need to be seen in clinic. She declined for now.    Lita Kim RN, [facility] Triage

## 2019-09-09 DIAGNOSIS — Z30.41 ENCOUNTER FOR SURVEILLANCE OF CONTRACEPTIVE PILLS: ICD-10-CM

## 2019-09-09 RX ORDER — DESOGESTREL AND ETHINYL ESTRADIOL 0.15-0.03
KIT ORAL
Qty: 28 TABLET | Refills: 0 | Status: SHIPPED | OUTPATIENT
Start: 2019-09-09 | End: 2019-09-27

## 2019-09-09 NOTE — TELEPHONE ENCOUNTER
Next 5 appointments (look out 90 days)    Sep 27, 2019  3:00 PM CDT  [facility] [address] [place] [id number] [phone]        Prescription approved per [facility] Refill Protocol.      Alvaro Bustillo RN, BSN, PHN

## 2019-09-09 NOTE — TELEPHONE ENCOUNTER
Reason for Call:  Other prescription    Detailed comments: Patient just made an appointment for 9/27/2019 and asking if she could get her refill for birth control now, because she is out of medication? Please call to advise.    Phone Number Patient can be reached at: Other phone number: [phone] (Z)    Best Time: any    Can we leave a detailed message on this number? YES    Call taken on 9/9/2019 at 3:41 PM by Darlin Corral

## 2019-09-09 NOTE — TELEPHONE ENCOUNTER
"Requested Prescriptions   Pending Prescriptions Disp Refills     JULEBER 0.15-30 MG-MCG tablet [Pharmacy Med Name: JULEBER 0.15-30MG-MCG TABS]  Last Written Prescription Date:  09/26/18  Last Fill Quantity: 84,  # refills: 3   Last Office Visit with FMCHRISTOPHE, ANGELA or [facility] prescribing provider:  09/26/18-Ricci   Future Office Visit:    84 tablet 3     Sig: TAKE ONE TABLET BY MOUTH EVERY DAY       Contraceptives Protocol Passed - 9/9/2019  9:09 AM        Passed - Patient is not a current smoker if age is 35 or older        Passed - Recent (12 mo) or future (30 days) visit within the authorizing provider's specialty     Patient had office visit in the last 12 months or has a visit in the next 30 days with authorizing provider or within the authorizing provider's specialty.  See \"Patient Info\" tab in inbasket, or \"Choose Columns\" in Meds & Orders section of the refill encounter.              Passed - Medication is active on med list        Passed - No active pregnancy on record        Passed - No positive pregnancy test in past 12 months          "

## 2019-09-27 ENCOUNTER — OFFICE VISIT (OUTPATIENT)
Dept: FAMILY MEDICINE | Facility: CLINIC | Age: 39
End: 2019-09-27
Payer: COMMERCIAL

## 2019-09-27 VITALS
OXYGEN SATURATION: 93 % | DIASTOLIC BLOOD PRESSURE: 85 MMHG | RESPIRATION RATE: 16 BRPM | BODY MASS INDEX: 31.73 KG/M2 | WEIGHT: 172.4 LBS | HEIGHT: 62 IN | SYSTOLIC BLOOD PRESSURE: 133 MMHG | HEART RATE: 82 BPM | TEMPERATURE: 97.9 F

## 2019-09-27 DIAGNOSIS — Z00.00 ROUTINE GENERAL MEDICAL EXAMINATION AT A HEALTH CARE FACILITY: Primary | ICD-10-CM

## 2019-09-27 DIAGNOSIS — Z30.41 ENCOUNTER FOR SURVEILLANCE OF CONTRACEPTIVE PILLS: ICD-10-CM

## 2019-09-27 PROCEDURE — 99395 PREV VISIT EST AGE 18-39: CPT | Performed by: PREVENTIVE MEDICINE

## 2019-09-27 RX ORDER — DESOGESTREL AND ETHINYL ESTRADIOL 0.15-0.03
1 KIT ORAL DAILY
Qty: 84 TABLET | Refills: 4 | Status: SHIPPED | OUTPATIENT
Start: 2019-09-27 | End: 2020-08-21

## 2019-09-27 ASSESSMENT — PAIN SCALES - GENERAL: PAINLEVEL: NO PAIN (0)

## 2019-09-27 ASSESSMENT — MIFFLIN-ST. JEOR: SCORE: 1407.25

## 2019-09-27 NOTE — PATIENT INSTRUCTIONS
Preventive Health Recommendations  Female Ages 26 - 39  Yearly exam:   See your health care provider every year in order to    Review health changes.     Discuss preventive care.      Review your medicines if you your doctor has prescribed any.    Until age 30: Get a Pap test every three years (more often if you have had an abnormal result).    After age 30: Talk to your doctor about whether you should have a Pap test every 3 years or have a Pap test with HPV screening every 5 years.   You do not need a Pap test if your uterus was removed (hysterectomy) and you have not had cancer.  You should be tested each year for STDs (sexually transmitted diseases), if you're at risk.   Talk to your provider about how often to have your cholesterol checked.  If you are at risk for diabetes, you should have a diabetes test (fasting glucose).  Shots: Get a flu shot each year. Get a tetanus shot every 10 years.   Nutrition:     Eat at least 5 servings of fruits and vegetables each day.    Eat whole-grain bread, whole-wheat pasta and brown rice instead of white grains and rice.    Get adequate Calcium and Vitamin D.     Lifestyle    Exercise at least 150 minutes a week (30 minutes a day, 5 days of the week). This will help you control your weight and prevent disease.    Limit alcohol to one drink per day.    No smoking.     Wear sunscreen to prevent skin cancer.    See your dentist every six months for an exam and cleaning.    At [facility], we strive to deliver an exceptional experience to you, every time we see you.  If you receive a survey in the mail, please send us back your thoughts. We really do value your feedback.    Based on your medical history, these are the current health maintenance/preventive care services that you are due for (some may have been done at this visit.)  Health Maintenance Due   Topic Date Due     PHQ-2  01/01/2019     INFLUENZA VACCINE (1) 09/01/2019     PREVENTIVE CARE VISIT   09/26/2019         Suggested websites for health information:  Www.fairview.org : Up to date and easily searchable information on multiple topics.  Www.medlineplus.gov : medication info, interactive tutorials, watch real surgeries online  Www.familydoctor.org : good info from the Academy of Family Physicians  Www.cdc.gov : public health info, travel advisories, epidemics (H1N1)  Www.aap.org : children's health info, normal development, vaccinations  Www.health.[facility].[place].us : [place] dept of health, public health issues in [place], N1N1    Your care team:                            Family Medicine Internal Medicine   MD Kulwinder Vargas MD Shantel Branch-Fleming, MD Katya Georgiev PA-C Nam Ho, MD Pediatrics   Sukhi Wallace PAARABELLA Patton, SATHISH Sandoval APRN CNP   MD Guillermina Mcgarry MD Deborah Mielke, MD Deepa Barrera, APRN CNP      Clinic hours: Monday - Thursday 7 am-7 pm; Fridays 7 am-5 pm.   Urgent care: Monday - Friday 11 am-9 pm; Saturday and Sunday 9 am-5 pm.  Pharmacy : Monday -Thursday 8 am-8 pm; Friday 8 am-6 pm; Saturday and Sunday 9 am-5 pm.     Clinic: [phone]   Pharmacy: [phone]

## 2019-09-27 NOTE — PROGRESS NOTES
SUBJECTIVE:   CC: Ghazal Martinez is an 38 year old woman who presents for preventive health visit.     Healthy Habits:    Do you get at least three servings of calcium containing foods daily (dairy, green leafy vegetables, etc.)? yes    Amount of exercise or daily activities, outside of work: 5 day(s) per week    Problems taking medications regularly No    Medication side effects: No    Have you had an eye exam in the past two years? no    Do you see a dentist twice per year? yes    Do you have sleep apnea, excessive snoring or daytime drowsiness?no    Here with German interpretor  Form completed stating she had a Physical today   Needs refills on oral contraception       Today's PHQ-2 Score:   PHQ-2 ( 1999 Pf[employer]) 9/27/2019 1/29/2018   Q1: Little interest or pleasure in doing things 0 0   Q2: Feeling down, depressed or hopeless 0 0   PHQ-2 Score 0 0       Abuse: Current or Past(Physical, Sexual or Emotional)- No  Do you feel safe in your environment? Yes    Social History     Tobacco Use     Smoking status: Never Smoker     Smokeless tobacco: Never Used   Substance Use Topics     Alcohol use: No     If you drink alcohol do you typically have >3 drinks per day or >7 drinks per week? No                     Reviewed orders with patient.  Reviewed health maintenance and updated orders accordingly - Yes  Lab work is in process  Labs reviewed in EPIC  BP Readings from Last 3 Encounters:   09/27/19 133/85   09/26/18 136/89   01/29/18 121/75    Wt Readings from Last 3 Encounters:   09/27/19 78.2 kg (172 lb 6.4 oz)   09/26/18 76.2 kg (168 lb)   01/29/18 76.7 kg (169 lb)                  Patient Active Problem List   Diagnosis     Varicose veins of legs     Cervical cancer screening     Class 1 obesity due to excess calories without serious comorbidity with body mass index (BMI) of 31.0 to 31.9 in adult     History reviewed. No pertinent surgical history.    Social History     Tobacco Use     Smoking status: Never  Smoker     Smokeless tobacco: Never Used   Substance Use Topics     Alcohol use: No     Family History   Problem Relation Age of Onset     Diabetes No family hx of      Coronary Artery Disease No family hx of      Hypertension No family hx of      Hyperlipidemia No family hx of      Cerebrovascular Disease No family hx of      Breast Cancer No family hx of      Colon Cancer No family hx of      Depression No family hx of      Anxiety Disorder No family hx of      Asthma No family hx of      Thyroid Disease No family hx of          Current Outpatient Medications   Medication Sig Dispense Refill     desogestrel-ethinyl estradiol (JULEBER) 0.15-30 MG-MCG tablet Take 1 tablet by mouth daily 84 tablet 4     No Known Allergies    Mammogram not appropriate for this patient based on age.    Pertinent mammograms are reviewed under the imaging tab.  History of abnormal Pap smear: NO - age 30-65 PAP every 5 years with negative HPV co-testing recommended  PAP / HPV Latest Ref Rng & Units 1/29/2018   PAP - NIL   HPV 16 DNA NEG:Negative Negative   HPV 18 DNA NEG:Negative Negative   OTHER HR HPV NEG:Negative Negative     Reviewed and updated as needed this visit by clinical staff  Tobacco  Allergies  Meds  Problems  Med Hx  Surg Hx  Fam Hx         Reviewed and updated as needed this visit by Provider  Tobacco  Allergies  Meds  Problems  Med Hx  Surg Hx  Fam Hx        Past Medical History:   Diagnosis Date     Varicose veins of legs       History reviewed. No pertinent surgical history.    ROS:  CONSTITUTIONAL: NEGATIVE for fever, chills, change in weight  INTEGUMENTARU/SKIN: NEGATIVE for worrisome rashes, moles or lesions  EYES: NEGATIVE for vision changes or irritation  ENT: NEGATIVE for ear, mouth and throat problems  RESP: NEGATIVE for significant cough or SOB  BREAST: NEGATIVE for masses, tenderness or discharge  CV: NEGATIVE for chest pain, palpitations or peripheral edema  GI: NEGATIVE for nausea, abdominal  "pain, heartburn, or change in bowel habits  : NEGATIVE for unusual urinary or vaginal symptoms. Periods are regular.  MUSCULOSKELETAL: NEGATIVE for significant arthralgias or myalgia  NEURO: NEGATIVE for weakness, dizziness or paresthesias  ENDOCRINE: NEGATIVE for temperature intolerance, skin/hair changes  HEME/ALLERGY/IMMUNE: NEGATIVE for bleeding problems  PSYCHIATRIC: NEGATIVE for changes in mood or affect    OBJECTIVE:   /85 (BP Location: Left arm, Patient Position: Sitting, Cuff Size: Adult Large)   Pulse 82   Temp 97.9  F (36.6  C) (Oral)   Resp 16   Ht 1.57 m (5' 1.81\")   Wt 78.2 kg (172 lb 6.4 oz)   LMP 09/03/2019 (Approximate)   SpO2 93%   Breastfeeding? No   BMI 31.73 kg/m    EXAM:  GENERAL: healthy, alert and no distress  EYES: Eyes grossly normal to inspection, PERRL and conjunctivae and sclerae normal  HENT: ear canals and TM's normal, nose and mouth without ulcers or lesions  NECK: no adenopathy, no asymmetry, masses  RESP: lungs clear to auscultation - no rales, rhonchi or wheezes  BREAST: normal without masses, tenderness or nipple discharge and no palpable axillary masses or adenopathy  CV: regular rate and rhythm, normal S1 S2, no S3 or S4, no murmur, click or rub, no peripheral edema and peripheral pulses strong  ABDOMEN: soft, nontender, no rebound or guarding   MS: no gross musculoskeletal defects noted, no edema  SKIN: no suspicious lesions or rashes  NEURO: Normal strength and tone, mentation intact and speech normal  PSYCH: mentation appears normal, affect normal/bright  LYMPH: no cervical, supraclavicular, axillary adenopathy    Diagnostic Test Results:  Labs reviewed in Epic  No results found for this or any previous visit (from the past 24 hour(s)).    ASSESSMENT/PLAN:   Ghazal was seen today for physical.    Diagnoses and all orders for this visit:    Routine general medical examination at a health care facility  -     Lipid panel reflex to direct LDL Fasting; Future  -  " "   Glucose; Future  -will return for fating labs     Encounter for surveillance of contraceptive pills  -     desogestrel-ethinyl estradiol (JULEBER) 0.15-30 MG-MCG tablet; Take 1 tablet by mouth daily    The following counseling on birth control pills was done:  Risks discussed including risk for heart attack, stroke and blood clots.  Patient is not a smoker and has no personal or family history of blood clots/bleeding disorders.  Regular condom use is recommended to help protect against STIs.      COUNSELING:   Reviewed preventive health counseling, as reflected in patient instructions       Regular exercise       Healthy diet/nutrition       Contraception       Family planning    Estimated body mass index is 31.73 kg/m  as calculated from the following:    Height as of this encounter: 1.57 m (5' 1.81\").    Weight as of this encounter: 78.2 kg (172 lb 6.4 oz).    Weight management plan: Discussed healthy diet and exercise guidelines     reports that she has never smoked. She has never used smokeless tobacco.      Counseling Resources:  ATP IV Guidelines  Pooled Cohorts Equation Calculator  Breast Cancer Risk Calculator  FRAX Risk Assessment  ICSI Preventive Guidelines  Dietary Guidelines for Americans, 2010  USDA's MyPlate  ASA Prophylaxis  Lung CA Screening    Alena Wynne MD MPH    [facility]  "

## 2019-09-28 DIAGNOSIS — Z00.00 ROUTINE GENERAL MEDICAL EXAMINATION AT A HEALTH CARE FACILITY: ICD-10-CM

## 2019-09-28 PROCEDURE — 36415 COLL VENOUS BLD VENIPUNCTURE: CPT | Performed by: PREVENTIVE MEDICINE

## 2019-09-28 PROCEDURE — 80061 LIPID PANEL: CPT | Performed by: PREVENTIVE MEDICINE

## 2019-09-28 PROCEDURE — 82947 ASSAY GLUCOSE BLOOD QUANT: CPT | Performed by: PREVENTIVE MEDICINE

## 2019-09-28 NOTE — LETTER
September 30, 2019      Ghazal Martinez  [address]  [place] [address]        Dear Ghazal Martinez,     Cholesterol is excellent. Glucose is normal, you do not have diabetes.   Please let me know if you have any questions and thank you for choosing [facility].     Regards,     Alena Wynne MD MPH/rl    Resulted Orders   Glucose   Result Value Ref Range    Glucose 91 70 - 99 mg/dL      Comment:      Fasting specimen   Lipid panel reflex to direct LDL Fasting   Result Value Ref Range    Cholesterol 193 <200 mg/dL    Triglycerides 132 <150 mg/dL      Comment:      Fasting specimen    HDL Cholesterol 72 >49 mg/dL    LDL Cholesterol Calculated 95 <100 mg/dL      Comment:      Desirable:       <100 mg/dl    Non HDL Cholesterol 121 <130 mg/dL

## 2019-09-30 LAB
CHOLEST SERPL-MCNC: 193 MG/DL
GLUCOSE SERPL-MCNC: 91 MG/DL (ref 70–99)
HDLC SERPL-MCNC: 72 MG/DL
LDLC SERPL CALC-MCNC: 95 MG/DL
NONHDLC SERPL-MCNC: 121 MG/DL
TRIGL SERPL-MCNC: 132 MG/DL

## 2019-09-30 NOTE — RESULT ENCOUNTER NOTE
Please send a letter:    Dear Ghazal Martinez,    Cholesterol is excellent. Glucose is normal, you do not have diabetes.  Please let me know if you have any questions and thank you for choosing [facility].    Regards,    Alena Wynne MD MPH

## 2020-01-20 ENCOUNTER — TELEPHONE (OUTPATIENT)
Dept: FAMILY MEDICINE | Facility: CLINIC | Age: 40
End: 2020-01-20

## 2020-01-20 ENCOUNTER — ALLIED HEALTH/NURSE VISIT (OUTPATIENT)
Dept: NURSING | Facility: CLINIC | Age: 40
End: 2020-01-20
Payer: COMMERCIAL

## 2020-01-20 ENCOUNTER — OFFICE VISIT (OUTPATIENT)
Dept: FAMILY MEDICINE | Facility: CLINIC | Age: 40
End: 2020-01-20
Payer: COMMERCIAL

## 2020-01-20 VITALS
HEART RATE: 86 BPM | WEIGHT: 171.4 LBS | OXYGEN SATURATION: 100 % | HEIGHT: 62 IN | TEMPERATURE: 97.2 F | DIASTOLIC BLOOD PRESSURE: 85 MMHG | BODY MASS INDEX: 31.54 KG/M2 | SYSTOLIC BLOOD PRESSURE: 144 MMHG | RESPIRATION RATE: 16 BRPM

## 2020-01-20 DIAGNOSIS — B35.3 TINEA PEDIS OF BOTH FEET: ICD-10-CM

## 2020-01-20 DIAGNOSIS — R09.82 POSTNASAL DRIP: ICD-10-CM

## 2020-01-20 DIAGNOSIS — Z78.9 TRIAGE ASSESSMENT CLASS 3, NONURGENT: Primary | ICD-10-CM

## 2020-01-20 DIAGNOSIS — K21.9 GASTROESOPHAGEAL REFLUX DISEASE WITHOUT ESOPHAGITIS: ICD-10-CM

## 2020-01-20 DIAGNOSIS — R73.03 PREDIABETES: ICD-10-CM

## 2020-01-20 DIAGNOSIS — D64.9 ANEMIA, UNSPECIFIED TYPE: Primary | ICD-10-CM

## 2020-01-20 DIAGNOSIS — D64.9 ANEMIA, UNSPECIFIED TYPE: ICD-10-CM

## 2020-01-20 DIAGNOSIS — R53.83 FATIGUE, UNSPECIFIED TYPE: Primary | ICD-10-CM

## 2020-01-20 LAB
BASOPHILS # BLD AUTO: 0.1 10E9/L (ref 0–0.2)
BASOPHILS NFR BLD AUTO: 1 %
DIFFERENTIAL METHOD BLD: ABNORMAL
EOSINOPHIL # BLD AUTO: 0.1 10E9/L (ref 0–0.7)
EOSINOPHIL NFR BLD AUTO: 1 %
ERYTHROCYTE [DISTWIDTH] IN BLOOD BY AUTOMATED COUNT: 18.5 % (ref 10–15)
FERRITIN SERPL-MCNC: 4 NG/ML (ref 12–150)
HBA1C MFR BLD: 5.8 % (ref 0–5.6)
HCT VFR BLD AUTO: 27.1 % (ref 35–47)
HGB BLD-MCNC: 7.9 G/DL (ref 11.7–15.7)
IRON SATN MFR SERPL: 2 % (ref 15–46)
IRON SERPL-MCNC: 11 UG/DL (ref 35–180)
LYMPHOCYTES # BLD AUTO: 1.7 10E9/L (ref 0.8–5.3)
LYMPHOCYTES NFR BLD AUTO: 26 %
MCH RBC QN AUTO: 17.7 PG (ref 26.5–33)
MCHC RBC AUTO-ENTMCNC: 29.2 G/DL (ref 31.5–36.5)
MCV RBC AUTO: 61 FL (ref 78–100)
MONOCYTES # BLD AUTO: 0.4 10E9/L (ref 0–1.3)
MONOCYTES NFR BLD AUTO: 7 %
NEUTROPHILS # BLD AUTO: 4.1 10E9/L (ref 1.6–8.3)
NEUTROPHILS NFR BLD AUTO: 65 %
PLATELET # BLD AUTO: 454 10E9/L (ref 150–450)
RBC # BLD AUTO: 4.47 10E12/L (ref 3.8–5.2)
TIBC SERPL-MCNC: 575 UG/DL (ref 240–430)
TSH SERPL DL<=0.005 MIU/L-ACNC: 1.1 MU/L (ref 0.4–4)
WBC # BLD AUTO: 6.4 10E9/L (ref 4–11)

## 2020-01-20 PROCEDURE — 83036 HEMOGLOBIN GLYCOSYLATED A1C: CPT | Performed by: NURSE PRACTITIONER

## 2020-01-20 PROCEDURE — 83540 ASSAY OF IRON: CPT | Performed by: NURSE PRACTITIONER

## 2020-01-20 PROCEDURE — 36415 COLL VENOUS BLD VENIPUNCTURE: CPT | Performed by: NURSE PRACTITIONER

## 2020-01-20 PROCEDURE — 83550 IRON BINDING TEST: CPT | Performed by: NURSE PRACTITIONER

## 2020-01-20 PROCEDURE — 82306 VITAMIN D 25 HYDROXY: CPT | Performed by: NURSE PRACTITIONER

## 2020-01-20 PROCEDURE — 82728 ASSAY OF FERRITIN: CPT | Performed by: NURSE PRACTITIONER

## 2020-01-20 PROCEDURE — 84443 ASSAY THYROID STIM HORMONE: CPT | Performed by: NURSE PRACTITIONER

## 2020-01-20 PROCEDURE — 85025 COMPLETE CBC W/AUTO DIFF WBC: CPT | Performed by: NURSE PRACTITIONER

## 2020-01-20 PROCEDURE — 99214 OFFICE O/P EST MOD 30 MIN: CPT | Performed by: NURSE PRACTITIONER

## 2020-01-20 RX ORDER — FLUTICASONE PROPIONATE 50 MCG
1 SPRAY, SUSPENSION (ML) NASAL DAILY
Qty: 11.1 ML | Refills: 3 | Status: SHIPPED | OUTPATIENT
Start: 2020-01-20 | End: 2021-03-19

## 2020-01-20 RX ORDER — DOCUSATE SODIUM 100 MG/1
100 CAPSULE, LIQUID FILLED ORAL DAILY
Qty: 30 CAPSULE | Refills: 3 | Status: SHIPPED | OUTPATIENT
Start: 2020-01-20 | End: 2021-03-19

## 2020-01-20 RX ORDER — CLOTRIMAZOLE 1 %
CREAM (GRAM) TOPICAL 2 TIMES DAILY
Qty: 60 G | Refills: 1 | Status: SHIPPED | OUTPATIENT
Start: 2020-01-20 | End: 2021-03-19

## 2020-01-20 RX ORDER — FERROUS SULFATE 325(65) MG
325 TABLET ORAL
Qty: 90 TABLET | Refills: 2 | Status: SHIPPED | OUTPATIENT
Start: 2020-01-20 | End: 2020-08-21

## 2020-01-20 ASSESSMENT — PAIN SCALES - GENERAL: PAINLEVEL: MODERATE PAIN (4)

## 2020-01-20 ASSESSMENT — MIFFLIN-ST. JEOR: SCORE: 1404.69

## 2020-01-20 NOTE — TELEPHONE ENCOUNTER
Please call patient to discuss lab results.  She is very anemic.  If she is dizzy/feels faint, or has chest pain she needs to go to the emergency room  If she does not have any of those symptoms, she should start an iron supplement three times a day with food (not with dairy products) as well as a daily stool softener to help prevent constipation as this is a side effect of iron.  I would like to see her back in clinic in a week or so to discuss causes of her anemia.  Thank you,  KWABENA Head CNP

## 2020-01-20 NOTE — PROGRESS NOTES
Subjective     Ghazal Martinez is a 39 year old female who presents to clinic today for the following health issues:    HPI   ENT Symptoms             Symptoms: cc Present Absent Comment   Fever/Chills  x     Fatigue  x     Muscle Aches   x    Eye Irritation   x    Sneezing  x     Nasal Pete/Drg  x     Sinus Pressure/Pain   x    Loss of smell   x    Dental pain   x    Sore Throat  x     Swollen Glands   x    Ear Pain/Fullness   x    Cough   x    Wheeze   x    Chest Pain   x    Shortness of breath   x    Rash   x    Other  x  States symptoms come on after eating something spicy     Symptom duration:  before [place]   Symptom severity:  4/10   Treatments tried:  advil    Contacts:  no     Feet feel hot on the bottoms and sometime itchy  Feels very tired- 3 months.  Denies headache, chest pain, dizziness, shortness of breath, weakness, or vision changes.  Continues on her OCPs, denies missing any pills.  Denies changes in her menses.  Menses not very heavy.      Patient Active Problem List   Diagnosis     Varicose veins of legs     Cervical cancer screening     Class 1 obesity due to excess calories without serious comorbidity with body mass index (BMI) of 31.0 to 31.9 in adult     History reviewed. No pertinent surgical history.    Social History     Tobacco Use     Smoking status: Never Smoker     Smokeless tobacco: Never Used   Substance Use Topics     Alcohol use: No     Family History   Problem Relation Age of Onset     No Known Problems Mother      No Known Problems Father      Diabetes No family hx of      Coronary Artery Disease No family hx of      Hypertension No family hx of      Hyperlipidemia No family hx of      Cerebrovascular Disease No family hx of      Breast Cancer No family hx of      Colon Cancer No family hx of      Depression No family hx of      Anxiety Disorder No family hx of      Asthma No family hx of      Thyroid Disease No family hx of          Current Outpatient Medications   Medication  "Sig Dispense Refill     clotrimazole (LOTRIMIN) 1 % external cream Apply topically 2 times daily To soles of feet until rash resolves 60 g 1     desogestrel-ethinyl estradiol (JULEBER) 0.15-30 MG-MCG tablet Take 1 tablet by mouth daily 84 tablet 4     fluticasone (FLONASE) 50 MCG/ACT nasal spray Spray 1 spray into both nostrils daily 11.1 mL 3     omeprazole (PRILOSEC) 20 MG DR capsule Take 1 capsule (20 mg) by mouth daily 30 capsule 3     No Known Allergies  BP Readings from Last 3 Encounters:   01/20/20 (!) 144/85   09/27/19 133/85   09/26/18 136/89    Wt Readings from Last 3 Encounters:   01/20/20 77.7 kg (171 lb 6.4 oz)   09/27/19 78.2 kg (172 lb 6.4 oz)   09/26/18 76.2 kg (168 lb)                    Reviewed and updated as needed this visit by Provider         Review of Systems   ROS COMP: Constitutional, HEENT, cardiovascular, pulmonary, GI, , musculoskeletal, neuro, skin, endocrine and psych systems are negative, except as otherwise noted.      Objective    BP (!) 144/85 (BP Location: Left arm, Patient Position: Chair, Cuff Size: Adult Regular)   Pulse 86   Temp 97.2  F (36.2  C) (Oral)   Resp 16   Ht 1.581 m (5' 2.25\")   Wt 77.7 kg (171 lb 6.4 oz)   SpO2 100%   Breastfeeding No   BMI 31.10 kg/m    There is no height or weight on file to calculate BMI.  Physical Exam   GENERAL: healthy, alert and no distress  EYES: Eyes grossly normal to inspection, PERRL and conjunctivae and sclerae normal  HENT: normal cephalic/atraumatic, ear canals and TM's normal, nasal mucosa edematous , rhinorrhea clear, oropharynx clear, oral mucous membranes moist and sinuses: not tender  NECK: no adenopathy, no asymmetry, masses, or scars and thyroid normal to palpation  RESP: lungs clear to auscultation - no rales, rhonchi or wheezes  CV: regular rate and rhythm, normal S1 S2, no S3 or S4, no murmur, click or rub, no peripheral edema and peripheral pulses strong  ABDOMEN: soft, nontender, no hepatosplenomegaly, no masses " and bowel sounds normal  MS: no gross musculoskeletal defects noted, no edema  SKIN: bottoms of feet bilaterally with mildly cracking skin and erythematous skin  PSYCH: mentation appears normal, affect normal/bright    Diagnostic Test Results:  No results found for this or any previous visit (from the past 24 hour(s)).        Assessment & Plan     1. Fatigue, unspecified type  Check labs.  Encouraged good sleep habits, exercise, hydration.   - TSH with free T4 reflex  - Vitamin D Deficiency  - CBC with platelets and differential  - Hemoglobin A1c    2. Tinea pedis of both feet  - clotrimazole (LOTRIMIN) 1 % external cream; Apply topically 2 times daily To soles of feet until rash resolves  Dispense: 60 g; Refill: 1    3. Gastroesophageal reflux disease without esophagitis  Trial of below for 2 weeks.  Follow up if symptoms do not resolve   - omeprazole (PRILOSEC) 20 MG DR capsule; Take 1 capsule (20 mg) by mouth daily  Dispense: 30 capsule; Refill: 3    4. Postnasal drip  - fluticasone (FLONASE) 50 MCG/ACT nasal spray; Spray 1 spray into both nostrils daily  Dispense: 11.1 mL; Refill: 3       Patient Instructions   -for the burning in your throat:  -take omeprazole daily on empty stomach 30 mintues prior to eating    -start flonase nasal spray for the post nasal drip that is likely causing some throat pain    -start the cream on your feet twice a day to help with the fungus.    We will do labs for your fatigue and follow up on those in a few days.      Return in about 4 weeks (around 2/17/2020), or if symptoms worsen or fail to improve.    KWABENA Head CNP  [facility]

## 2020-01-20 NOTE — TELEPHONE ENCOUNTER
This writer attempted to contact patient on 01/20/20      Reason for call results/recommendations and left message.      If patient calls back:   Registered Nurse called. Follow Triage Call workflow and Follow Redflag process        Althea Lin RN

## 2020-01-20 NOTE — PROGRESS NOTES
Per Eun Patton:  Please call patient to discuss lab results.  She is very anemic.  If she is dizzy/feels faint, or has chest pain she needs to go to the emergency room  If she does not have any of those symptoms, she should start an iron supplement three times a day with food (not with dairy products) as well as a daily stool softener to help prevent constipation as this is a side effect of iron.  I would like to see her back in clinic in a week or so to discuss causes of her anemia.  Thank you,  KWABENA Head CNP     Via Ghazal's son interpreting, patient was given results and scheduled for recheck appointment.     Next 5 appointments (look out 90 days)    Jan 20, 2020  1:00 PM CST  Nurse Only with BK RN  [facility] [address] [address]  [phone]   Jan 27, 2020 11:40 AM CST  Office Visit with KWABENA Gamboa CNP  [facility] ([facility] [address] [address]  [phone]                Alvaro Bustillo RN, BSN, PHN

## 2020-01-20 NOTE — PATIENT INSTRUCTIONS
-for the burning in your throat:  -take omeprazole daily on empty stomach 30 mintues prior to eating    -start flonase nasal spray for the post nasal drip that is likely causing some throat pain    -start the cream on your feet twice a day to help with the fungus.    We will do labs for your fatigue and follow up on those in a few days.

## 2020-01-20 NOTE — LETTER
[address]  [address]  [phone]    January 21, 2020      Gahzal Martinez  [address]  [place] [address]          [place]   In addition to your anemia, your vitamin D was low.  Low vitamin D can cause problems with absorption of calcium so it is important that it is in the normal range to maintain good bone density.  Also, low vitamin D can cause fatigue so a lot of people feel better when it is at a normal level.        I recommend taking  50,000 units of vitamin D weekly (prescription sent to your pharmacy:   [facility] [place] [address]   [address]   [facility] [address]   Phone: [phone] Fax: [phone]    )  for 12 weeks. After the 3 months, should take a daily supplement D3 of 8117-8027 units ( over the counter ) to maintain a sufficient level.       Feel free to contact me with any questions or concerns.  Thank you for allowing me to participate in your care.     KWABENA Head CNP

## 2020-01-20 NOTE — TELEPHONE ENCOUNTER
RN team had been notified of critical lab value, very low Hgb level.    Hemoglobin 7.9  Low Panic   11.7 - 15.7 g/dL Final 01/20/2020  9:36 AM      Patient was in to office today, reporting symptoms of being very tired for last 3 months.  Verified with ordering provider that she did review this level, follow instructions noted by provider below.    Tanya Benson RN  [facility]

## 2020-01-20 NOTE — TELEPHONE ENCOUNTER
Patient had results and recommendations discussed with her today 01/20/2020 in clinic with a RN.     F/U apt was made at this time,      Closing encounter as this information was discussed.    DEANA Galvan/[facility]

## 2020-01-21 DIAGNOSIS — E55.9 VITAMIN D DEFICIENCY: Primary | ICD-10-CM

## 2020-01-21 LAB — DEPRECATED CALCIDIOL+CALCIFEROL SERPL-MC: 15 UG/L (ref 20–75)

## 2020-01-21 RX ORDER — ERGOCALCIFEROL 1.25 MG/1
50000 CAPSULE, LIQUID FILLED ORAL WEEKLY
Qty: 12 CAPSULE | Refills: 0 | Status: SHIPPED | OUTPATIENT
Start: 2020-01-21 | End: 2020-04-08

## 2020-01-27 ENCOUNTER — ANCILLARY PROCEDURE (OUTPATIENT)
Dept: ULTRASOUND IMAGING | Facility: CLINIC | Age: 40
End: 2020-01-27
Attending: NURSE PRACTITIONER
Payer: COMMERCIAL

## 2020-01-27 ENCOUNTER — OFFICE VISIT (OUTPATIENT)
Dept: FAMILY MEDICINE | Facility: CLINIC | Age: 40
End: 2020-01-27
Payer: COMMERCIAL

## 2020-01-27 VITALS
TEMPERATURE: 98.2 F | HEIGHT: 62 IN | HEART RATE: 81 BPM | OXYGEN SATURATION: 97 % | WEIGHT: 173 LBS | SYSTOLIC BLOOD PRESSURE: 136 MMHG | BODY MASS INDEX: 31.83 KG/M2 | RESPIRATION RATE: 16 BRPM | DIASTOLIC BLOOD PRESSURE: 80 MMHG

## 2020-01-27 DIAGNOSIS — N92.0 MENORRHAGIA WITH REGULAR CYCLE: ICD-10-CM

## 2020-01-27 DIAGNOSIS — D50.0 IRON DEFICIENCY ANEMIA DUE TO CHRONIC BLOOD LOSS: Primary | ICD-10-CM

## 2020-01-27 DIAGNOSIS — N92.0 MENORRHAGIA WITH REGULAR CYCLE: Primary | ICD-10-CM

## 2020-01-27 DIAGNOSIS — D64.9 ANEMIA, UNSPECIFIED TYPE: ICD-10-CM

## 2020-01-27 DIAGNOSIS — D25.1 INTRAMURAL LEIOMYOMA OF UTERUS: ICD-10-CM

## 2020-01-27 DIAGNOSIS — I83.11 VARICOSE VEINS OF BOTH LOWER EXTREMITIES WITH INFLAMMATION: ICD-10-CM

## 2020-01-27 DIAGNOSIS — I83.12 VARICOSE VEINS OF BOTH LOWER EXTREMITIES WITH INFLAMMATION: ICD-10-CM

## 2020-01-27 PROCEDURE — 76830 TRANSVAGINAL US NON-OB: CPT

## 2020-01-27 PROCEDURE — 76856 US EXAM PELVIC COMPLETE: CPT

## 2020-01-27 PROCEDURE — 99214 OFFICE O/P EST MOD 30 MIN: CPT | Performed by: NURSE PRACTITIONER

## 2020-01-27 ASSESSMENT — MIFFLIN-ST. JEOR: SCORE: 1411.94

## 2020-01-27 ASSESSMENT — PAIN SCALES - GENERAL: PAINLEVEL: NO PAIN (0)

## 2020-01-27 NOTE — PROGRESS NOTES
Subjective     Ghazal Martinez is a 39 year old female who presents to clinic today for the following health issues:    HPI   Patient here to follow up on anemia.  States she does have heavy vaginal bleeding.  Menses for 7 days and passes clots.  States she has dizziness occasionally but not shortness of breath or chest pain.  She has started her iron tablets and has no side effects of constipation or abdominal pain.  We discussed an iron rich diet today as well.      No other sources of bleeding per patient.  No personal history of imaging of her uterus. No family history of fibroids that she knows of.  She does continue on her OCPs but this has not changed her heavy bleeding.       Patient Active Problem List   Diagnosis     Varicose veins of legs     Cervical cancer screening     Class 1 obesity due to excess calories without serious comorbidity with body mass index (BMI) of 31.0 to 31.9 in adult     Anemia, unspecified type     Prediabetes     Vitamin D deficiency     History reviewed. No pertinent surgical history.    Social History     Tobacco Use     Smoking status: Never Smoker     Smokeless tobacco: Never Used   Substance Use Topics     Alcohol use: No     Family History   Problem Relation Age of Onset     No Known Problems Mother      No Known Problems Father      Diabetes No family hx of      Coronary Artery Disease No family hx of      Hypertension No family hx of      Hyperlipidemia No family hx of      Cerebrovascular Disease No family hx of      Breast Cancer No family hx of      Colon Cancer No family hx of      Depression No family hx of      Anxiety Disorder No family hx of      Asthma No family hx of      Thyroid Disease No family hx of          Current Outpatient Medications   Medication Sig Dispense Refill     clotrimazole (LOTRIMIN) 1 % external cream Apply topically 2 times daily To soles of feet until rash resolves 60 g 1     desogestrel-ethinyl estradiol (JULEBER) 0.15-30 MG-MCG tablet Take  "1 tablet by mouth daily 84 tablet 4     docusate sodium (COLACE) 100 MG capsule Take 1 capsule (100 mg) by mouth daily 30 capsule 3     ferrous sulfate (FEROSUL) 325 (65 Fe) MG tablet Take 1 tablet (325 mg) by mouth 3 times daily (with meals) 90 tablet 2     fluticasone (FLONASE) 50 MCG/ACT nasal spray Spray 1 spray into both nostrils daily 11.1 mL 3     omeprazole (PRILOSEC) 20 MG DR capsule Take 1 capsule (20 mg) by mouth daily 30 capsule 3     vitamin D2 (ERGOCALCIFEROL) 00846 units (1250 mcg) capsule Take 1 capsule (50,000 Units) by mouth once a week for 12 doses 12 capsule 0     No Known Allergies  BP Readings from Last 3 Encounters:   01/27/20 136/80   01/20/20 (!) 144/85   09/27/19 133/85    Wt Readings from Last 3 Encounters:   01/27/20 78.5 kg (173 lb)   01/20/20 77.7 kg (171 lb 6.4 oz)   09/27/19 78.2 kg (172 lb 6.4 oz)                      Reviewed and updated as needed this visit by Provider         Review of Systems   ROS COMP: Constitutional, HEENT, cardiovascular, pulmonary, gi and gu systems are negative, except as otherwise noted.      Objective    /80 (BP Location: Left arm, Patient Position: Sitting, Cuff Size: Adult Large)   Pulse 81   Temp 98.2  F (36.8  C) (Oral)   Resp 16   Ht 1.581 m (5' 2.25\")   Wt 78.5 kg (173 lb)   LMP  (LMP Unknown)   SpO2 97%   Breastfeeding No   BMI 31.39 kg/m    Body mass index is 31.39 kg/m .  Physical Exam   GENERAL: healthy, alert and no distress  NECK: no adenopathy, no asymmetry, masses, or scars and thyroid normal to palpation  RESP: lungs clear to auscultation - no rales, rhonchi or wheezes  CV: regular rate and rhythm, normal S1 S2, no S3 or S4, no murmur, click or rub, no peripheral edema and peripheral pulses strong  ABDOMEN: soft, nontender, no hepatosplenomegaly, no masses and bowel sounds normal  MS: no gross musculoskeletal defects noted, no edema  SKIN: no suspicious lesions or rashes and varicose veins throughout BLE    Diagnostic Test " Results:  No results found for any visits on 01/27/20.        Assessment & Plan     1. Menorrhagia with regular cycle  Plan for below.  Follow up accordingly.    - US Pelvic Complete w Transvaginal; Future    2. Anemia, unspecified type  Likely related to heavy menses.  Follow up based on below.   - US Pelvic Complete w Transvaginal; Future    3. Varicose veins of both lower extremities with inflammation  Patient desires referral as she has already tried compression stockings.   - VASCULAR SURGERY REFERRAL; Future       Patient Instructions       Return in about 2 months (around 3/27/2020) for Lab Work (anemia f/u).    KWABENA Head [facility]

## 2020-02-13 ENCOUNTER — OFFICE VISIT (OUTPATIENT)
Dept: OBGYN | Facility: CLINIC | Age: 40
End: 2020-02-13
Payer: COMMERCIAL

## 2020-02-13 VITALS
SYSTOLIC BLOOD PRESSURE: 131 MMHG | WEIGHT: 173 LBS | BODY MASS INDEX: 31.39 KG/M2 | DIASTOLIC BLOOD PRESSURE: 83 MMHG | OXYGEN SATURATION: 99 % | HEART RATE: 70 BPM | TEMPERATURE: 97.7 F

## 2020-02-13 DIAGNOSIS — D25.1 INTRAMURAL LEIOMYOMA OF UTERUS: ICD-10-CM

## 2020-02-13 DIAGNOSIS — D50.0 IRON DEFICIENCY ANEMIA DUE TO CHRONIC BLOOD LOSS: ICD-10-CM

## 2020-02-13 DIAGNOSIS — N92.0 MENORRHAGIA WITH REGULAR CYCLE: Primary | ICD-10-CM

## 2020-02-13 LAB — HGB BLD-MCNC: 11.9 G/DL (ref 11.7–15.7)

## 2020-02-13 PROCEDURE — 99203 OFFICE O/P NEW LOW 30 MIN: CPT | Performed by: OBSTETRICS & GYNECOLOGY

## 2020-02-13 PROCEDURE — 36415 COLL VENOUS BLD VENIPUNCTURE: CPT | Performed by: OBSTETRICS & GYNECOLOGY

## 2020-02-13 PROCEDURE — 85018 HEMOGLOBIN: CPT | Performed by: OBSTETRICS & GYNECOLOGY

## 2020-02-13 NOTE — PATIENT INSTRUCTIONS
If you have any questions regarding your visit, Please contact your care team.     [facility]: [phone]  [facility] CLINIC HOURS TELEPHONE NUMBER       Jamal Arroyo M.D.      Vipul Johns-Medical Assistant    Linda-  Krystal-     Monday-[place]  8:00a.m-4:45 p.m  Tuesday-[place]  9:00a.m-4:00 p.m  Wednesday-[place] 8:00a.m-4:45 p.m.  Thursday-[place]  8:00a.m-4:45 p.m.  Friday-[place]  8:00a.m-4:45 p.m. [facility]  [address]  [place], [place] [address] [phone] ask [facility]  [phone] Fax  Imaging [phone]    [facility]  [address]  [place], [place] [address] [phone]    [facility]  [address] [facility] [place] [address] [phone] [facility]  [phone] Fax  Imaging [phone]     Urgent Care locations:    [facility] Monday-Friday  5 pm - 9 pm  Saturday and Sunday   9 am - 5 pm  Monday-Friday   11 am - 9 pm  Saturday and Sunday   9 am - 5 pm   [phone]   If you need a medication refill, please contact your pharmacy. Please allow 3 business days for your refill to be completed.  As always, Thank you for trusting us with your healthcare needs!

## 2020-02-14 NOTE — PROGRESS NOTES
OB-GYN Problem-Oriented Visit or Consultation      Ghazal Martinez is a 39 year old year old P 5 who presents with a chief complaint of ultrasound review. Referred by Eun Patton NP.  Patient's last menstrual period was 01/22/2020 (approximate).    HPI:     Menses q 28-30 days x 7 days with clots. Started on OC in 2017 after last pregnancy due to menorrhagia and for contraception. Menses are stable. Had normal exam, Pap/HRHPV, STD screening but recently had more fatigue and normal TSH but Vit D deficiency found and treated and also severe anemia with Hgb 7.9. Pelvic ultrasound done and reviewed including images: uterus posterior and small 1 cm intramural fibroid noted, thin endometrial stripe and normal ovaries. Family is complete. No history of melena, hematochezia, or ulcer. Taking po iron now. Here with .     Past medical, obstetrical, surgical, family and social history reviewed and as noted or updated in chart.     Allergies, meds and supplements are as noted or updated in chart.      ROS:   Systems reviewed include:  constitutional, gastrointestinal, genitourinary, psychological, hematologic/lymphatic and endocrine.    These systems were negative for significant symptoms except for the following additional: none; see HPI.    EXAM:  VS as noted. /83 (BP Location: Left arm, Patient Position: Chair, Cuff Size: Adult Regular)   Pulse 70   Temp 97.7  F (36.5  C) (Oral)   Wt 78.5 kg (173 lb)   LMP 01/22/2020 (Approximate)   SpO2 99%   Breastfeeding No   BMI 31.39 kg/m    Constitutionally normal.     Exam not repeated at this time.    Assessment:   Encounter Diagnoses   Name Primary?     Menorrhagia with regular cycle Yes     Intramural leiomyoma of uterus      Iron deficiency anemia due to chronic blood loss          Plan and Recommendations:   Total encounter time (physician together with patient) = 30min. Direct counseling, education and care coordination time (physician together with  patient) = 20min. This included the following:   I reviewed the condition, causes, differential diagnosis, prognosis, evaluation and management considerations and options.  Questions answered and information given. See orders.   Small incidental fibroid may be observed but discussed in detail. For menorrhagia, advise Mirena IUD and reviewed this along with alternatives of extended cycle OC, endometrial ablation, or hysterectomy. Patient will review and return to clinic if desiring IUD insertion or may contact me. Continuing current OC. Check hgb.     A/P:  Ghazal was seen today for results.    Diagnoses and all orders for this visit:    Menorrhagia with regular cycle  -     Hemoglobin    Intramural leiomyoma of uterus    Iron deficiency anemia due to chronic blood loss        Jamal Arroyo MD

## 2020-02-25 ENCOUNTER — TELEPHONE (OUTPATIENT)
Dept: FAMILY MEDICINE | Facility: CLINIC | Age: 40
End: 2020-02-25

## 2020-02-25 NOTE — TELEPHONE ENCOUNTER
Hi all this patient came in today to see if you all can please send all her prescriptions to the [facility]  [facility] on [address] [place] [phone]. She went to  her vitmans and they were not there. She would like you to send all medications refill to [facility].     Thank you

## 2020-06-30 ENCOUNTER — TELEPHONE (OUTPATIENT)
Dept: FAMILY MEDICINE | Facility: CLINIC | Age: 40
End: 2020-06-30

## 2020-06-30 DIAGNOSIS — E55.9 VITAMIN D DEFICIENCY: Primary | ICD-10-CM

## 2020-06-30 NOTE — TELEPHONE ENCOUNTER
.Reason for call:  Medication   If this is a refill request, has the caller requested the refill from the pharmacy already? No  Will the patient be using a [facility]? No  Name of the pharmacy and phone number for the current request[facility]    Name of the medication requested: vitamin D2    Other request: pt is not feeling well and would like to get this script filled.    Phone number to reach patient:  Home number on file [phone] (home)    Best Time:  anytime    Can we leave a detailed message on this number?  YES    Travel screening: Negative

## 2020-06-30 NOTE — TELEPHONE ENCOUNTER
Routing refill request to provider for review/approval because:  Drug not on the [facility] refill protocol - high dose vitamin D    Carla Naranjo RN  [facility]

## 2020-07-02 NOTE — TELEPHONE ENCOUNTER
Please have patient come to lab to repeat her vitamin D level to see if refill would be appropriate.  Thanks!  Eun

## 2020-07-06 DIAGNOSIS — E55.9 VITAMIN D DEFICIENCY: ICD-10-CM

## 2020-07-06 PROCEDURE — 82306 VITAMIN D 25 HYDROXY: CPT | Performed by: NURSE PRACTITIONER

## 2020-07-06 PROCEDURE — 36415 COLL VENOUS BLD VENIPUNCTURE: CPT | Performed by: NURSE PRACTITIONER

## 2020-07-07 LAB — DEPRECATED CALCIDIOL+CALCIFEROL SERPL-MC: 40 UG/L (ref 20–75)

## 2020-07-31 ENCOUNTER — ALLIED HEALTH/NURSE VISIT (OUTPATIENT)
Dept: NURSING | Facility: CLINIC | Age: 40
End: 2020-07-31
Payer: COMMERCIAL

## 2020-07-31 VITALS — HEART RATE: 81 BPM | SYSTOLIC BLOOD PRESSURE: 150 MMHG | DIASTOLIC BLOOD PRESSURE: 88 MMHG

## 2020-07-31 DIAGNOSIS — Z01.30 BLOOD PRESSURE CHECK: Primary | ICD-10-CM

## 2020-07-31 PROCEDURE — 99207 ZZC NO CHARGE NURSE ONLY: CPT

## 2020-07-31 NOTE — PROGRESS NOTES
Ancillary BP check    HTN Guidelines:  Age 18-59 BP range:  Less than 140/90  Age 60-85 with Diabetes:  Less than 140/90  Age 60-85 without Diabetes:  less than 150/90        Ghazal Martinez is a 39 year old female who comes in today for a Blood Pressure check.    Patient is not prescribed blood pressure medications.  Patient is not monitoring Blood Pressure at home.  Average readings if yes are NA    BP goal: < 140/90mmHg (patient 18+ years of age with or without diabetes)    BP reading today: FAILED     BP Readings from Last 1 Encounters:   07/31/20 (!) 150/88     A large cuff was used.  Pulse: 81    Vitals reviewed     Each reading recorded in vital signs section of chart: yes    Symptoms: light-headedness    Need for urgent appointment, based on criteria in ancillary BP workflow (elevated blood pressure and any of the following: dizziness, blurry vision, lightheadedness, severe shortness of breath, chest pain or visual disturbance): No       Plan: Not at goal.   Patient have future appointment with provider and was told to come in for blood pressure check prior to her appointment.      Completed by: Mirella Mccoy MA  [facility]

## 2020-08-02 ENCOUNTER — TELEPHONE (OUTPATIENT)
Dept: FAMILY MEDICINE | Facility: CLINIC | Age: 40
End: 2020-08-02

## 2020-08-03 NOTE — TELEPHONE ENCOUNTER
Blood pressure elevated at ancillary check.  Will discuss at visit on 8/10/20.  KWABENA Head CNP

## 2020-08-10 ENCOUNTER — VIRTUAL VISIT (OUTPATIENT)
Dept: FAMILY MEDICINE | Facility: CLINIC | Age: 40
End: 2020-08-10
Payer: COMMERCIAL

## 2020-08-10 DIAGNOSIS — R53.83 FATIGUE, UNSPECIFIED TYPE: ICD-10-CM

## 2020-08-10 DIAGNOSIS — R42 DIZZINESS: Primary | ICD-10-CM

## 2020-08-10 DIAGNOSIS — L65.9 HAIR LOSS: ICD-10-CM

## 2020-08-10 DIAGNOSIS — Z13.6 CARDIOVASCULAR SCREENING; LDL GOAL LESS THAN 100: ICD-10-CM

## 2020-08-10 DIAGNOSIS — D50.0 IRON DEFICIENCY ANEMIA DUE TO CHRONIC BLOOD LOSS: ICD-10-CM

## 2020-08-10 DIAGNOSIS — N92.0 MENORRHAGIA WITH REGULAR CYCLE: ICD-10-CM

## 2020-08-10 PROCEDURE — 99214 OFFICE O/P EST MOD 30 MIN: CPT | Mod: TEL | Performed by: NURSE PRACTITIONER

## 2020-08-10 PROCEDURE — T1013 SIGN LANG/ORAL INTERPRETER: HCPCS | Mod: U3

## 2020-08-10 ASSESSMENT — PAIN SCALES - GENERAL: PAINLEVEL: NO PAIN (0)

## 2020-08-10 NOTE — Clinical Note
Please call patient to schedule lab only visit for this week as well as ancillary blood pressure check.  Will need . Thanks!  Eun

## 2020-08-10 NOTE — PATIENT INSTRUCTIONS
At [facility], we strive to deliver an exceptional experience to you, every time we see you. If you receive a survey, please complete it as we do value your feedback.  If you have MyChart, you can expect to receive results automatically within 24 hours of their completion.  Your provider will send a note interpreting your results as well.   If you do not have MyChart, you should receive your results in about a week by mail.    Your care team:                            Family Medicine Internal Medicine   MD Kulwinder Vargas MD Shantel Branch-Fleming, MD Srinivasa Vaka, MD Katya Georgiev PA-C Megan Hill, APRN CNP    Bruce Childs, MD Pediatrics   Sukhi Wallace, PATimothyC  Eun Patton, CNP MD Ronda Benitez APRN CNP   MD Guillermina Mcgarry MD Deborah Mielke, MD Deepa Barrera, APRN CNP  Becka Brady, PATimothyC  Zoë Sexton, CNP  MD Heather Haskins MD Angela Wermerskirchen, MD      Clinic hours: Monday - Thursday 7 am-7 pm; Fridays 7 am-5 pm.   Urgent care: Monday - Friday 11 am-9 pm; Saturday and Sunday 9 am-5 pm.    Clinic: [phone]       [facility]: Monday - Thursday 8 am - 7 pm; Friday 8 am - 6 pm  [facility]: [phone]     Use www.[email or web address] for 24/7 diagnosis and treatment of dozens of conditions.

## 2020-08-10 NOTE — PROGRESS NOTES
"Ghazal Martinez is a 39 year old female who is being evaluated via a billable telephone visit.      The patient has been notified of following:     \"This telephone visit will be conducted via a call between you and your physician/provider. We have found that certain health care needs can be provided without the need for a physical exam.  This service lets us provide the care you need with a short phone conversation.  If a prescription is necessary we can send it directly to your pharmacy.  If lab work is needed we can place an order for that and you can then stop by our lab to have the test done at a later time.    Telephone visits are billed at different rates depending on your insurance coverage. During this emergency period, for some insurers they may be billed the same as an in-person visit.  Please reach out to your insurance provider with any questions.    If during the course of the call the physician/provider feels a telephone visit is not appropriate, you will not be charged for this service.\"    Patient has given verbal consent for Telephone visit?  Yes    What phone number would you like to be contacted at? interp     How would you like to obtain your AVS? Mail a copy    Subjective     Ghazal Martinez is a 39 year old female who presents via phone visit today for the following health issues:    HPI    Dizziness  Onset: follow up 01/20/2020     Description:   Do you feel faint:  YES  Does it feel like the surroundings (bed, room) are moving: YES  Unsteady/off balance: YES  Have you passed out or fallen: no     Intensity: mild    Progression of Symptoms:  same    Accompanying Signs & Symptoms:  Heart palpitations: no   Nausea, vomiting: no   Weakness in arms or legs: no - but knees, feet, and legs really hurt   Fatigue: YES  Vision or speech changes: YES- lights   Ringing in ears (Tinnitus): YES  Hearing Loss: no   Hair loss: YES     History:   Head trauma/concussion hx: no   Previous similar symptoms: " "YES  Recent bleeding history: no     Precipitating factors:   Worse with activity or head movement: no   Any new medications (BP?): no   Alcohol/drug abuse/withdrawal: no     Alleviating factors:   Does staying in a fixed position give relief:  YES    Therapies Tried and outcome: iron supplements  Has been losing a lot of hair for the last month.        Very fatigued.  No shortness of breath.  No chest pain    She is not taking iron for her anemia.  Took for one month.  She did feel better while taking.  Better energy, felt \"good\".  Thought it was only for one month.    Still taking OCPs.  Started 5 years.  Still has heavy menses. Was recommended to put in IUD by Dr. Arroyo with gynecology.  Patient has fear due to a cousin who had a lot of bloating with the IUD then had a lot of bleeding when taking it out.  Does not desire any future pregnancy.        Patient Active Problem List   Diagnosis     Varicose veins of legs     Cervical cancer screening     Class 1 obesity due to excess calories without serious comorbidity with body mass index (BMI) of 31.0 to 31.9 in adult     Iron deficiency anemia due to chronic blood loss     Prediabetes     Vitamin D deficiency     Menorrhagia with regular cycle     Intramural leiomyoma of uterus     Past Surgical History:   Procedure Laterality Date     NO HISTORY OF SURGERY         Social History     Tobacco Use     Smoking status: Never Smoker     Smokeless tobacco: Never Used   Substance Use Topics     Alcohol use: No     Family History   Problem Relation Age of Onset     No Known Problems Mother      No Known Problems Father      Diabetes No family hx of      Coronary Artery Disease No family hx of      Hypertension No family hx of      Hyperlipidemia No family hx of      Cerebrovascular Disease No family hx of      Breast Cancer No family hx of      Colon Cancer No family hx of      Depression No family hx of      Anxiety Disorder No family hx of      Asthma No family hx of      " Thyroid Disease No family hx of          Current Outpatient Medications   Medication Sig Dispense Refill     clotrimazole (LOTRIMIN) 1 % external cream Apply topically 2 times daily To soles of feet until rash resolves 60 g 1     desogestrel-ethinyl estradiol (JULEBER) 0.15-30 MG-MCG tablet Take 1 tablet by mouth daily 84 tablet 4     docusate sodium (COLACE) 100 MG capsule Take 1 capsule (100 mg) by mouth daily 30 capsule 3     fluticasone (FLONASE) 50 MCG/ACT nasal spray Spray 1 spray into both nostrils daily 11.1 mL 3     omeprazole (PRILOSEC) 20 MG DR capsule Take 1 capsule (20 mg) by mouth daily 30 capsule 3     ferrous sulfate (FEROSUL) 325 (65 Fe) MG tablet Take 1 tablet (325 mg) by mouth 3 times daily (with meals) (Patient not taking: Reported on 8/10/2020) 90 tablet 2     No Known Allergies  BP Readings from Last 3 Encounters:   07/31/20 (!) 150/88   02/13/20 131/83   01/27/20 136/80    Wt Readings from Last 3 Encounters:   02/13/20 78.5 kg (173 lb)   01/27/20 78.5 kg (173 lb)   01/20/20 77.7 kg (171 lb 6.4 oz)                    Reviewed and updated as needed this visit by Provider         Review of Systems   Constitutional, HEENT, cardiovascular, pulmonary, GI, , musculoskeletal, neuro, skin, endocrine and psych systems are negative, except as otherwise noted.       Objective   Reported vitals:  There were no vitals taken for this visit.   healthy, alert and no distress  PSYCH: Alert and oriented times 3; coherent speech, normal   rate and volume, able to articulate logical thoughts, able   to abstract reason, no tangential thoughts, no hallucinations   or delusions  Her affect is normal  RESP: No cough, no audible wheezing, able to talk in full sentences  Remainder of exam unable to be completed due to telephone visits    Diagnostic Test Results:  Labs reviewed in Epic        Assessment/Plan:     1. Dizziness  Checking labs.  Likely related to ongoing anemia.  Strongly recommended Mirena IUD to help  with menorrhagia.  She will consider.  Also should consider colonoscopy given degree of anemia.      2. Hair loss  Likely related to anemia though will check thyroid as well   - **TSH with free T4 reflex FUTURE anytime; Future    3. Fatigue, unspecified type  - **Comprehensive metabolic panel FUTURE anytime; Future  - **TSH with free T4 reflex FUTURE anytime; Future    4. Iron deficiency anemia due to chronic blood loss  - CBC with platelets and differential; Future  - Ferritin; Future  - Iron and iron binding capacity; Future    5. Menorrhagia with regular cycle  As above.     6. CARDIOVASCULAR SCREENING; LDL GOAL LESS THAN 100  - Lipid panel reflex to direct LDL Fasting; Future    Return in about 2 days (around 8/12/2020) for Lab Work.      Phone call duration:  24 minutes    KWABENA Head CNP

## 2020-08-16 DIAGNOSIS — Z13.6 CARDIOVASCULAR SCREENING; LDL GOAL LESS THAN 100: ICD-10-CM

## 2020-08-16 DIAGNOSIS — R53.83 FATIGUE, UNSPECIFIED TYPE: ICD-10-CM

## 2020-08-16 DIAGNOSIS — L65.9 HAIR LOSS: ICD-10-CM

## 2020-08-16 DIAGNOSIS — D50.0 IRON DEFICIENCY ANEMIA DUE TO CHRONIC BLOOD LOSS: ICD-10-CM

## 2020-08-16 LAB
BASOPHILS # BLD AUTO: 0 10E9/L (ref 0–0.2)
BASOPHILS NFR BLD AUTO: 0.2 %
DIFFERENTIAL METHOD BLD: NORMAL
EOSINOPHIL # BLD AUTO: 0.1 10E9/L (ref 0–0.7)
EOSINOPHIL NFR BLD AUTO: 0.7 %
ERYTHROCYTE [DISTWIDTH] IN BLOOD BY AUTOMATED COUNT: 12.2 % (ref 10–15)
HCT VFR BLD AUTO: 43.3 % (ref 35–47)
HGB BLD-MCNC: 14.6 G/DL (ref 11.7–15.7)
LYMPHOCYTES # BLD AUTO: 2.6 10E9/L (ref 0.8–5.3)
LYMPHOCYTES NFR BLD AUTO: 31 %
MCH RBC QN AUTO: 29.7 PG (ref 26.5–33)
MCHC RBC AUTO-ENTMCNC: 33.7 G/DL (ref 31.5–36.5)
MCV RBC AUTO: 88 FL (ref 78–100)
MONOCYTES # BLD AUTO: 0.5 10E9/L (ref 0–1.3)
MONOCYTES NFR BLD AUTO: 5.6 %
NEUTROPHILS # BLD AUTO: 5.2 10E9/L (ref 1.6–8.3)
NEUTROPHILS NFR BLD AUTO: 62.5 %
PLATELET # BLD AUTO: 317 10E9/L (ref 150–450)
RBC # BLD AUTO: 4.91 10E12/L (ref 3.8–5.2)
WBC # BLD AUTO: 8.3 10E9/L (ref 4–11)

## 2020-08-16 PROCEDURE — 36415 COLL VENOUS BLD VENIPUNCTURE: CPT | Performed by: NURSE PRACTITIONER

## 2020-08-16 PROCEDURE — 82728 ASSAY OF FERRITIN: CPT | Performed by: NURSE PRACTITIONER

## 2020-08-16 PROCEDURE — 80061 LIPID PANEL: CPT | Performed by: NURSE PRACTITIONER

## 2020-08-16 PROCEDURE — 83550 IRON BINDING TEST: CPT | Performed by: NURSE PRACTITIONER

## 2020-08-16 PROCEDURE — 83540 ASSAY OF IRON: CPT | Performed by: NURSE PRACTITIONER

## 2020-08-16 PROCEDURE — 80050 GENERAL HEALTH PANEL: CPT | Performed by: NURSE PRACTITIONER

## 2020-08-17 LAB
ALBUMIN SERPL-MCNC: 3.5 G/DL (ref 3.4–5)
ALP SERPL-CCNC: 71 U/L (ref 40–150)
ALT SERPL W P-5'-P-CCNC: 86 U/L (ref 0–50)
ANION GAP SERPL CALCULATED.3IONS-SCNC: 9 MMOL/L (ref 3–14)
AST SERPL W P-5'-P-CCNC: 31 U/L (ref 0–45)
BILIRUB SERPL-MCNC: 0.3 MG/DL (ref 0.2–1.3)
BUN SERPL-MCNC: 12 MG/DL (ref 7–30)
CALCIUM SERPL-MCNC: 8.7 MG/DL (ref 8.5–10.1)
CHLORIDE SERPL-SCNC: 108 MMOL/L (ref 94–109)
CHOLEST SERPL-MCNC: 190 MG/DL
CO2 SERPL-SCNC: 22 MMOL/L (ref 20–32)
CREAT SERPL-MCNC: 0.77 MG/DL (ref 0.52–1.04)
FERRITIN SERPL-MCNC: 40 NG/ML (ref 12–150)
GFR SERPL CREATININE-BSD FRML MDRD: >90 ML/MIN/{1.73_M2}
GLUCOSE SERPL-MCNC: 83 MG/DL (ref 70–99)
HDLC SERPL-MCNC: 64 MG/DL
IRON SATN MFR SERPL: 12 % (ref 15–46)
IRON SERPL-MCNC: 49 UG/DL (ref 35–180)
LDLC SERPL CALC-MCNC: 83 MG/DL
NONHDLC SERPL-MCNC: 126 MG/DL
POTASSIUM SERPL-SCNC: 4.1 MMOL/L (ref 3.4–5.3)
PROT SERPL-MCNC: 7.7 G/DL (ref 6.8–8.8)
SODIUM SERPL-SCNC: 139 MMOL/L (ref 133–144)
TIBC SERPL-MCNC: 403 UG/DL (ref 240–430)
TRIGL SERPL-MCNC: 216 MG/DL
TSH SERPL DL<=0.005 MIU/L-ACNC: 0.77 MU/L (ref 0.4–4)

## 2020-08-18 ENCOUNTER — TELEPHONE (OUTPATIENT)
Dept: FAMILY MEDICINE | Facility: CLINIC | Age: 40
End: 2020-08-18

## 2020-08-18 NOTE — TELEPHONE ENCOUNTER
This writer attempted to contact patient on 08/18/20      Reason for call inform message below and left message.      If patient calls back:   2nd floor [place] Care Team (MA/TC) called. Inform patient that someone from the team will contact them, document that pt called and route to care team.         Tess Chu MA

## 2020-08-18 NOTE — TELEPHONE ENCOUNTER
Eun Patton APRN CNP   8/18/2020  7:56 AM     Please CALL patient.  Her labs were all normal including her labs for anemia.  I would like her to make a face to face visit for further evaluation of her dizziness.  It should be this week.  If no face to face available, she should be seen in urgent care.       KWABENA Head CNP

## 2020-08-18 NOTE — TELEPHONE ENCOUNTER
Called informed results. Scheduled patient for 8/21/20 with Dr. Childs. Also informed if dizziness gets worse to come to urgent care.    MARGARITO Chu MA

## 2020-08-21 ENCOUNTER — OFFICE VISIT (OUTPATIENT)
Dept: FAMILY MEDICINE | Facility: CLINIC | Age: 40
End: 2020-08-21
Payer: COMMERCIAL

## 2020-08-21 VITALS
SYSTOLIC BLOOD PRESSURE: 148 MMHG | HEIGHT: 62 IN | BODY MASS INDEX: 32.87 KG/M2 | OXYGEN SATURATION: 98 % | DIASTOLIC BLOOD PRESSURE: 90 MMHG | WEIGHT: 178.6 LBS | HEART RATE: 75 BPM | TEMPERATURE: 98.9 F

## 2020-08-21 DIAGNOSIS — D50.0 IRON DEFICIENCY ANEMIA DUE TO CHRONIC BLOOD LOSS: Primary | ICD-10-CM

## 2020-08-21 DIAGNOSIS — L65.9 HAIR LOSS: ICD-10-CM

## 2020-08-21 DIAGNOSIS — R03.0 ELEVATED BLOOD PRESSURE READING WITHOUT DIAGNOSIS OF HYPERTENSION: ICD-10-CM

## 2020-08-21 DIAGNOSIS — Z30.41 ENCOUNTER FOR SURVEILLANCE OF CONTRACEPTIVE PILLS: ICD-10-CM

## 2020-08-21 PROCEDURE — 99214 OFFICE O/P EST MOD 30 MIN: CPT | Performed by: FAMILY MEDICINE

## 2020-08-21 RX ORDER — DESOGESTREL AND ETHINYL ESTRADIOL 0.15-0.03
1 KIT ORAL DAILY
Qty: 84 TABLET | Refills: 4 | Status: SHIPPED | OUTPATIENT
Start: 2020-08-21 | End: 2021-03-19

## 2020-08-21 RX ORDER — FERROUS SULFATE 325(65) MG
325 TABLET ORAL
Qty: 90 TABLET | Refills: 3 | Status: SHIPPED | OUTPATIENT
Start: 2020-08-21 | End: 2021-06-29

## 2020-08-21 ASSESSMENT — MIFFLIN-ST. JEOR: SCORE: 1437.34

## 2020-08-21 NOTE — PROGRESS NOTES
"Subjective     Ghazal Martinez is a 39 year old female who presents to clinic today for the following health issues:    HPI     Patient following up labs. Patient has history of menorrhagia and iron deficiency anemia treated with iron supplements. Labs are not wnl's. Patient feeling better. Occasionally having dizziness but not often as before.     Patient c/o excessive hair loss. Patient is concerned about this.    Patient needs ocp's refilled.      Review of Systems   Constitutional, HEENT, cardiovascular, pulmonary, GI, , musculoskeletal, neuro, skin, endocrine and psych systems are negative, except as otherwise noted.      Objective    BP (!) 148/90   Pulse 75   Temp 98.9  F (37.2  C) (Oral)   Ht 1.581 m (5' 2.25\")   Wt 81 kg (178 lb 9.6 oz)   LMP  (LMP Unknown)   SpO2 98%   Breastfeeding No   BMI 32.40 kg/m    Body mass index is 32.4 kg/m .  Physical Exam   GENERAL: healthy, alert and no distress  NECK: no adenopathy, no asymmetry, masses, or scars and thyroid normal to palpation  RESP: lungs clear to auscultation - no rales, rhonchi or wheezes  CV: regular rate and rhythm, normal S1 S2, no S3 or S4, no murmur, click or rub, no peripheral edema and peripheral pulses strong  ABDOMEN: soft, nontender, no hepatosplenomegaly, no masses and bowel sounds normal  MS: no gross musculoskeletal defects noted, no edema          Assessment & Plan     Iron deficiency anemia due to chronic blood loss  Improved. Continue iron daily.  - ferrous sulfate (FEROSUL) 325 (65 Fe) MG tablet; Take 1 tablet (325 mg) by mouth daily (with breakfast)    Elevated blood pressure reading without diagnosis of hypertension  Elevated. bp's reviewed and elevated recently. Discussed monitoring. Discussed low salt diet and trying to get weight down. If no controlled, RTC for start of antihypertensive.    Hair loss  Iron and thyroid recently tested and normal. Referred to Dermatology for further evaluation and recommendations.  - " "DERMATOLOGY ADULT REFERRAL; Future    Encounter for surveillance of contraceptive pills  Needed refills.  - desogestrel-ethinyl estradiol (JULEBER) 0.15-30 MG-MCG tablet; Take 1 tablet by mouth daily     BMI:   Estimated body mass index is 32.4 kg/m  as calculated from the following:    Height as of this encounter: 1.581 m (5' 2.25\").    Weight as of this encounter: 81 kg (178 lb 9.6 oz).           Work on weight loss  Regular exercise  See Patient Instructions    Return in about 6 months (around 2/21/2021) for Physical Exam.    Bruce Childs MD, MD  [facility]    "

## 2021-01-14 ENCOUNTER — DOCUMENTATION ONLY (OUTPATIENT)
Dept: FAMILY MEDICINE | Facility: CLINIC | Age: 41
End: 2021-01-14

## 2021-01-14 DIAGNOSIS — D50.0 IRON DEFICIENCY ANEMIA DUE TO CHRONIC BLOOD LOSS: Primary | ICD-10-CM

## 2021-01-15 NOTE — PROGRESS NOTES
I am unsure what patient is coming in for.  May need a visit.  Please call her and ask what lab visit is for.  KWABENA Head CNP

## 2021-01-15 NOTE — PROGRESS NOTES
Patient R/S lab appointment for 1/22/21 for anemia lab recheck, low iron and office appointment for a lump 1/25/21.     Ed Mata CMA

## 2021-01-25 ENCOUNTER — OFFICE VISIT (OUTPATIENT)
Dept: FAMILY MEDICINE | Facility: CLINIC | Age: 41
End: 2021-01-25
Payer: COMMERCIAL

## 2021-01-25 VITALS
WEIGHT: 178 LBS | SYSTOLIC BLOOD PRESSURE: 124 MMHG | BODY MASS INDEX: 32.76 KG/M2 | DIASTOLIC BLOOD PRESSURE: 86 MMHG | RESPIRATION RATE: 18 BRPM | HEIGHT: 62 IN | TEMPERATURE: 98.6 F | OXYGEN SATURATION: 98 % | HEART RATE: 86 BPM

## 2021-01-25 DIAGNOSIS — R76.12 POSITIVE QUANTIFERON-TB GOLD TEST: ICD-10-CM

## 2021-01-25 DIAGNOSIS — N63.25 BREAST LUMP ON LEFT SIDE AT 9 O'CLOCK POSITION: Primary | ICD-10-CM

## 2021-01-25 DIAGNOSIS — R61 NIGHT SWEATS: ICD-10-CM

## 2021-01-25 DIAGNOSIS — R21 RASH AND NONSPECIFIC SKIN ERUPTION: ICD-10-CM

## 2021-01-25 DIAGNOSIS — L91.8 SKIN TAG: ICD-10-CM

## 2021-01-25 LAB
BASOPHILS # BLD AUTO: 0 10E9/L (ref 0–0.2)
BASOPHILS NFR BLD AUTO: 0.1 %
DIFFERENTIAL METHOD BLD: NORMAL
EOSINOPHIL # BLD AUTO: 0.1 10E9/L (ref 0–0.7)
EOSINOPHIL NFR BLD AUTO: 1 %
ERYTHROCYTE [DISTWIDTH] IN BLOOD BY AUTOMATED COUNT: 12.5 % (ref 10–15)
HCT VFR BLD AUTO: 39.3 % (ref 35–47)
HGB BLD-MCNC: 14.3 G/DL (ref 11.7–15.7)
LYMPHOCYTES # BLD AUTO: 1.8 10E9/L (ref 0.8–5.3)
LYMPHOCYTES NFR BLD AUTO: 25.1 %
MCH RBC QN AUTO: 29.7 PG (ref 26.5–33)
MCHC RBC AUTO-ENTMCNC: 36.4 G/DL (ref 31.5–36.5)
MCV RBC AUTO: 82 FL (ref 78–100)
MONOCYTES # BLD AUTO: 0.4 10E9/L (ref 0–1.3)
MONOCYTES NFR BLD AUTO: 5.7 %
NEUTROPHILS # BLD AUTO: 4.8 10E9/L (ref 1.6–8.3)
NEUTROPHILS NFR BLD AUTO: 68.1 %
PLATELET # BLD AUTO: 292 10E9/L (ref 150–450)
RBC # BLD AUTO: 4.82 10E12/L (ref 3.8–5.2)
TSH SERPL DL<=0.005 MIU/L-ACNC: 0.54 MU/L (ref 0.4–4)
WBC # BLD AUTO: 7.1 10E9/L (ref 4–11)

## 2021-01-25 PROCEDURE — 84443 ASSAY THYROID STIM HORMONE: CPT | Performed by: NURSE PRACTITIONER

## 2021-01-25 PROCEDURE — 36415 COLL VENOUS BLD VENIPUNCTURE: CPT | Performed by: NURSE PRACTITIONER

## 2021-01-25 PROCEDURE — 85025 COMPLETE CBC W/AUTO DIFF WBC: CPT | Performed by: NURSE PRACTITIONER

## 2021-01-25 PROCEDURE — 86481 TB AG RESPONSE T-CELL SUSP: CPT | Performed by: NURSE PRACTITIONER

## 2021-01-25 PROCEDURE — 99214 OFFICE O/P EST MOD 30 MIN: CPT | Performed by: NURSE PRACTITIONER

## 2021-01-25 RX ORDER — TRETINOIN 0.1 MG/G
GEL TOPICAL AT BEDTIME
Qty: 45 G | Refills: 1 | Status: SHIPPED | OUTPATIENT
Start: 2021-01-25 | End: 2021-03-19

## 2021-01-25 ASSESSMENT — PAIN SCALES - GENERAL: PAINLEVEL: NO PAIN (0)

## 2021-01-25 ASSESSMENT — MIFFLIN-ST. JEOR: SCORE: 1429.62

## 2021-01-25 NOTE — PATIENT INSTRUCTIONS
-wilda la jose por la mammograma:  [phone] ([place])    -trata la pulmada en la noche por un mes.  si no funciona, regresa

## 2021-01-25 NOTE — PROGRESS NOTES
Assessment & Plan     Breast lump on left side at 9 o'clock position  Plan for imaging below.   - MA Diagnostic Digital Left; Future  - US Breast Left Complete 4 Quadrants; Future    Rash and nonspecific skin eruption  On face/chin.  Trial of below.    - tretinoin (RETIN-A) 0.01 % external gel; Apply topically At Bedtime Followed by moisturizer    Skin tag  Patient can make follow up appointment for removal.     Night sweats  Labs as below.   - Quantiferon TB Gold Plus  - CBC with platelets and differential  - TSH with free T4 reflex  - Quantiferon TB Gold Plus; Future  - XR Chest 3 Views w Apical Lordotic; Future    Positive QuantiFERON-TB Gold test  Follow up based on results of xray.    - Quantiferon TB Gold Plus; Future  - XR Chest 3 Views w Apical Lordotic; Future    Review of the result(s) of each unique test - labs, imaging          25 minutes spent on the date of the encounter doing chart review, history and exam, documentation and further activities as noted above           Patient Instructions   -wilda la jose por la mammograma:  [phone] ([place])    -trata la pulmada en la noche por un mes.  si no funciona, regresa          Return in about 4 weeks (around 2/22/2021), or if symptoms worsen or fail to improve.    KWABENA Head CNP  [facility]    Tesha Harman is a 40 year old who presents to clinic today for the following health issues  accompanied by her son:    HPI       Breast Concern  Onset/Duration: 2 weeks  Description:   Location: upper left breast  Pain or tenderness: no  Redness: no  Intensity: severe  Progression of Symptoms: worsening  Accompanying Signs & Symptoms:  Any lumps in axillary region: YES grown in size  Movable: no  Nipple discharge: none  Changes in the skin or nipple: none  On Hormone therapy: no  Does it change with menstrual cycle: no  Other: YES itchy  Previous history of similar problem: none  First degree relative with  "breast cancer: a negative family history for breast cancer.  Precipitating factors:           Worsened by: scratching  Alleviating factors:            Improved by: none  Therapies tried and outcome: None  Patient's last menstrual period was 12/25/2020 (exact date).        Review of Systems   Constitutional, HEENT, cardiovascular, pulmonary, GI, , musculoskeletal, neuro, skin, endocrine and psych systems are negative, except as otherwise noted.      Objective    /86   Pulse 86   Temp 98.6  F (37  C) (Tympanic)   Resp 18   Ht 1.581 m (5' 2.25\")   Wt 80.7 kg (178 lb)   LMP 12/25/2020 (Exact Date)   SpO2 98%   BMI 32.30 kg/m    Body mass index is 32.3 kg/m .  Physical Exam   GENERAL: healthy, alert and no distress  EYES: Eyes grossly normal to inspection, PERRL and conjunctivae and sclerae normal  HENT: ear canals and TM's normal, nose and mouth without ulcers or lesions  NECK: no adenopathy, no asymmetry, masses, or scars and thyroid normal to palpation  RESP: lungs clear to auscultation - no rales, rhonchi or wheezes  CV: regular rate and rhythm, normal S1 S2, no S3 or S4, no murmur, click or rub, no peripheral edema and peripheral pulses strong  ABDOMEN: soft, nontender, no hepatosplenomegaly, no masses and bowel sounds normal  MS: no gross musculoskeletal defects noted, no edema  PSYCH: mentation appears normal, affect normal/bright    Results for orders placed or performed in visit on 01/25/21   CBC with platelets and differential     Status: None   Result Value Ref Range    WBC 7.1 4.0 - 11.0 10e9/L    RBC Count 4.82 3.8 - 5.2 10e12/L    Hemoglobin 14.3 11.7 - 15.7 g/dL    Hematocrit 39.3 35.0 - 47.0 %    MCV 82 78 - 100 fl    MCH 29.7 26.5 - 33.0 pg    MCHC 36.4 31.5 - 36.5 g/dL    RDW 12.5 10.0 - 15.0 %    Platelet Count 292 150 - 450 10e9/L    % Neutrophils 68.1 %    % Lymphocytes 25.1 %    % Monocytes 5.7 %    % Eosinophils 1.0 %    % Basophils 0.1 %    Absolute Neutrophil 4.8 1.6 - 8.3 10e9/L "    Absolute Lymphocytes 1.8 0.8 - 5.3 10e9/L    Absolute Monocytes 0.4 0.0 - 1.3 10e9/L    Absolute Eosinophils 0.1 0.0 - 0.7 10e9/L    Absolute Basophils 0.0 0.0 - 0.2 10e9/L    Diff Method Automated Method    TSH with free T4 reflex     Status: None   Result Value Ref Range    TSH 0.54 0.40 - 4.00 mU/L   Quantiferon TB Gold Plus     Status: Abnormal    Specimen: Blood   Result Value Ref Range    MTB Quantiferon Result Positive (A) NEG^Negative    TB1 Ag minus Nil 0.01 IU/mL    TB2 Ag minus Nil 0.74 IU/mL    Mitogen minus Nil 9.94 IU/mL    NIL Result 0.06 IU/mL

## 2021-01-27 LAB
GAMMA INTERFERON BACKGROUND BLD IA-ACNC: 0.06 IU/ML
M TB IFN-G CD4+ BCKGRND COR BLD-ACNC: 9.94 IU/ML
M TB TUBERC IFN-G BLD QL: POSITIVE
MITOGEN IGNF BCKGRD COR BLD-ACNC: 0.01 IU/ML
MITOGEN IGNF BCKGRD COR BLD-ACNC: 0.74 IU/ML

## 2021-01-28 ENCOUNTER — TELEPHONE (OUTPATIENT)
Dept: FAMILY MEDICINE | Facility: CLINIC | Age: 41
End: 2021-01-28

## 2021-01-28 NOTE — TELEPHONE ENCOUNTER
Spoke with Ghazal via . Questions answered about tuberculosis and necessity of further testing, patient verbalized understanding. Able to transfer to scheduling line to schedule repeat lab and chest xray, preferably back to back. Patient provided number in case disconnected.       Libia Winkler RN, BSN, [facility]        Message:   Please call patient to discuss lab results.  Her thyroid and complete blood count are normal.  However, her screening for tuberculosis (which I did because of her night sweats) came back positive.  This can sometimes be a false positive so I'd like her to come in and repeat that blood sample as well as do a chest xray.  This can be done this week or early next week.  KWABENA Head CNP

## 2021-02-01 ENCOUNTER — ANCILLARY PROCEDURE (OUTPATIENT)
Dept: GENERAL RADIOLOGY | Facility: CLINIC | Age: 41
End: 2021-02-01
Payer: COMMERCIAL

## 2021-02-01 DIAGNOSIS — D50.0 IRON DEFICIENCY ANEMIA DUE TO CHRONIC BLOOD LOSS: ICD-10-CM

## 2021-02-01 DIAGNOSIS — R61 NIGHT SWEATS: ICD-10-CM

## 2021-02-01 DIAGNOSIS — R76.12 POSITIVE QUANTIFERON-TB GOLD TEST: ICD-10-CM

## 2021-02-01 LAB
BASOPHILS # BLD AUTO: 0 10E9/L (ref 0–0.2)
BASOPHILS NFR BLD AUTO: 0.2 %
DIFFERENTIAL METHOD BLD: NORMAL
EOSINOPHIL # BLD AUTO: 0.1 10E9/L (ref 0–0.7)
EOSINOPHIL NFR BLD AUTO: 1.2 %
ERYTHROCYTE [DISTWIDTH] IN BLOOD BY AUTOMATED COUNT: 12.8 % (ref 10–15)
FERRITIN SERPL-MCNC: 36 NG/ML (ref 12–150)
HCT VFR BLD AUTO: 41.4 % (ref 35–47)
HGB BLD-MCNC: 13.8 G/DL (ref 11.7–15.7)
LYMPHOCYTES # BLD AUTO: 2.9 10E9/L (ref 0.8–5.3)
LYMPHOCYTES NFR BLD AUTO: 32 %
MCH RBC QN AUTO: 29.6 PG (ref 26.5–33)
MCHC RBC AUTO-ENTMCNC: 33.3 G/DL (ref 31.5–36.5)
MCV RBC AUTO: 89 FL (ref 78–100)
MONOCYTES # BLD AUTO: 0.5 10E9/L (ref 0–1.3)
MONOCYTES NFR BLD AUTO: 5.2 %
NEUTROPHILS # BLD AUTO: 5.5 10E9/L (ref 1.6–8.3)
NEUTROPHILS NFR BLD AUTO: 61.4 %
PLATELET # BLD AUTO: 347 10E9/L (ref 150–450)
RBC # BLD AUTO: 4.66 10E12/L (ref 3.8–5.2)
WBC # BLD AUTO: 9 10E9/L (ref 4–11)

## 2021-02-01 PROCEDURE — 36415 COLL VENOUS BLD VENIPUNCTURE: CPT | Performed by: NURSE PRACTITIONER

## 2021-02-01 PROCEDURE — 86481 TB AG RESPONSE T-CELL SUSP: CPT | Performed by: NURSE PRACTITIONER

## 2021-02-01 PROCEDURE — 85025 COMPLETE CBC W/AUTO DIFF WBC: CPT | Performed by: NURSE PRACTITIONER

## 2021-02-01 PROCEDURE — 71047 X-RAY EXAM CHEST 3 VIEWS: CPT | Performed by: RADIOLOGY

## 2021-02-01 PROCEDURE — 82728 ASSAY OF FERRITIN: CPT | Performed by: NURSE PRACTITIONER

## 2021-02-03 LAB
GAMMA INTERFERON BACKGROUND BLD IA-ACNC: 0.34 IU/ML
M TB IFN-G CD4+ BCKGRND COR BLD-ACNC: 9.66 IU/ML
M TB TUBERC IFN-G BLD QL: NEGATIVE
MITOGEN IGNF BCKGRD COR BLD-ACNC: 0 IU/ML
MITOGEN IGNF BCKGRD COR BLD-ACNC: 0 IU/ML

## 2021-02-12 ENCOUNTER — ANCILLARY PROCEDURE (OUTPATIENT)
Dept: ULTRASOUND IMAGING | Facility: CLINIC | Age: 41
End: 2021-02-12
Attending: NURSE PRACTITIONER
Payer: COMMERCIAL

## 2021-02-12 ENCOUNTER — ANCILLARY PROCEDURE (OUTPATIENT)
Dept: MAMMOGRAPHY | Facility: CLINIC | Age: 41
End: 2021-02-12
Attending: NURSE PRACTITIONER
Payer: COMMERCIAL

## 2021-02-12 DIAGNOSIS — Z11.59 ENCOUNTER FOR SCREENING FOR OTHER VIRAL DISEASES: ICD-10-CM

## 2021-02-12 DIAGNOSIS — N63.25 BREAST LUMP ON LEFT SIDE AT 9 O'CLOCK POSITION: ICD-10-CM

## 2021-02-12 PROCEDURE — 77066 DX MAMMO INCL CAD BI: CPT | Performed by: RADIOLOGY

## 2021-02-12 PROCEDURE — G0279 TOMOSYNTHESIS, MAMMO: HCPCS | Performed by: RADIOLOGY

## 2021-02-12 PROCEDURE — 76642 ULTRASOUND BREAST LIMITED: CPT | Mod: LT | Performed by: RADIOLOGY

## 2021-02-15 ENCOUNTER — APPOINTMENT (OUTPATIENT)
Dept: INTERPRETER SERVICES | Facility: CLINIC | Age: 41
End: 2021-02-15
Payer: COMMERCIAL

## 2021-02-20 DIAGNOSIS — Z11.59 ENCOUNTER FOR SCREENING FOR OTHER VIRAL DISEASES: ICD-10-CM

## 2021-02-20 LAB
SARS-COV-2 RNA RESP QL NAA+PROBE: NORMAL
SPECIMEN SOURCE: NORMAL

## 2021-02-20 PROCEDURE — U0003 INFECTIOUS AGENT DETECTION BY NUCLEIC ACID (DNA OR RNA); SEVERE ACUTE RESPIRATORY SYNDROME CORONAVIRUS 2 (SARS-COV-2) (CORONAVIRUS DISEASE [COVID-19]), AMPLIFIED PROBE TECHNIQUE, MAKING USE OF HIGH THROUGHPUT TECHNOLOGIES AS DESCRIBED BY CMS-2020-01-R: HCPCS | Performed by: NURSE PRACTITIONER

## 2021-02-20 PROCEDURE — U0005 INFEC AGEN DETEC AMPLI PROBE: HCPCS | Performed by: NURSE PRACTITIONER

## 2021-02-21 LAB
LABORATORY COMMENT REPORT: NORMAL
SARS-COV-2 RNA RESP QL NAA+PROBE: NEGATIVE
SPECIMEN SOURCE: NORMAL

## 2021-02-23 ENCOUNTER — ANCILLARY PROCEDURE (OUTPATIENT)
Dept: CT IMAGING | Facility: CLINIC | Age: 41
End: 2021-02-23
Attending: NURSE PRACTITIONER
Payer: COMMERCIAL

## 2021-02-23 ENCOUNTER — ANCILLARY PROCEDURE (OUTPATIENT)
Dept: MAMMOGRAPHY | Facility: CLINIC | Age: 41
End: 2021-02-23
Attending: NURSE PRACTITIONER
Payer: COMMERCIAL

## 2021-02-23 ENCOUNTER — TRANSFERRED RECORDS (OUTPATIENT)
Dept: HEALTH INFORMATION MANAGEMENT | Facility: CLINIC | Age: 41
End: 2021-02-23

## 2021-02-23 ENCOUNTER — ANCILLARY PROCEDURE (OUTPATIENT)
Dept: ULTRASOUND IMAGING | Facility: CLINIC | Age: 41
End: 2021-02-23
Attending: NURSE PRACTITIONER
Payer: COMMERCIAL

## 2021-02-23 DIAGNOSIS — N63.25 BREAST LUMP ON LEFT SIDE AT 9 O'CLOCK POSITION: ICD-10-CM

## 2021-02-23 DIAGNOSIS — R92.8 ABNORMAL FINDING ON BREAST IMAGING: Primary | ICD-10-CM

## 2021-02-23 PROCEDURE — 88305 TISSUE EXAM BY PATHOLOGIST: CPT

## 2021-02-23 PROCEDURE — 19083 BX BREAST 1ST LESION US IMAG: CPT | Mod: LT

## 2021-02-23 PROCEDURE — 99N1020 PR STATISTIC H-SEND OUTS PREP

## 2021-02-23 PROCEDURE — 99N1019 PR STATISTIC H-FISH PROCESS B/S

## 2021-02-23 PROCEDURE — 77066 DX MAMMO INCL CAD BI: CPT | Mod: GC

## 2021-02-23 PROCEDURE — T1013 SIGN LANG/ORAL INTERPRETER: HCPCS | Mod: U4

## 2021-02-23 PROCEDURE — 88377 M/PHMTRC ALYS ISHQUANT/SEMIQ: CPT

## 2021-02-23 PROCEDURE — 88360 TUMOR IMMUNOHISTOCHEM/MANUAL: CPT

## 2021-02-23 RX ORDER — IOPAMIDOL 755 MG/ML
100 INJECTION, SOLUTION INTRAVASCULAR ONCE
Status: COMPLETED | OUTPATIENT
Start: 2021-02-23 | End: 2021-02-23

## 2021-02-23 RX ADMIN — IOPAMIDOL 100 ML: 755 INJECTION, SOLUTION INTRAVASCULAR at 09:59

## 2021-02-25 ENCOUNTER — TELEPHONE (OUTPATIENT)
Dept: GENERAL RADIOLOGY | Facility: CLINIC | Age: 41
End: 2021-02-25

## 2021-02-25 ENCOUNTER — APPOINTMENT (OUTPATIENT)
Dept: INTERPRETER SERVICES | Facility: CLINIC | Age: 41
End: 2021-02-25
Payer: COMMERCIAL

## 2021-02-25 DIAGNOSIS — C50.919 BREAST CANCER (H): Primary | ICD-10-CM

## 2021-02-25 NOTE — TELEPHONE ENCOUNTER
Spoke with patient with the assistance of  services regarding left breast biopsy results, which indicate invasive ductal carcinoma. Notified patient that the radiologist's recommendation is surgical and oncologic consultations. Patient verbalized understanding of these results and all questions answered to her satisfaction. Referral placed for consultation scheduling.

## 2021-02-26 ENCOUNTER — APPOINTMENT (OUTPATIENT)
Dept: INTERPRETER SERVICES | Facility: CLINIC | Age: 41
End: 2021-02-26
Payer: COMMERCIAL

## 2021-02-27 LAB — COPATH REPORT: NORMAL

## 2021-03-02 LAB — COPATH REPORT: NORMAL

## 2021-03-09 ENCOUNTER — TELEPHONE (OUTPATIENT)
Dept: SURGERY | Facility: CLINIC | Age: 41
End: 2021-03-09

## 2021-03-09 NOTE — TELEPHONE ENCOUNTER
PREVISIT INFORMATION                                                    Ghazal Martinez is scheduled for future visit with Dr. Fonseca on 3/11/21 at the [facility] specialty clinics.    Reason for visit: invasive ductal carcinoma  Referring provider: Radiology referral  Have images been done?: Yes   Location: [facility]   Date: 2/1/21, 2/12/21, 2/23/21  Previous pathology reports?:   Have previous office records been requested?:       REVIEW                                                      New patient packet mailed to patient: No    PLAN/FOLLOW-UP NEEDED                                                      Previsit review complete.  Patient will see provider at future scheduled appointment.     Patient Reminders Given:    Informed patient to bring an updated list of allergies, medications, pharmacy details and insurance information. Directed patient to come to the 2nd floor, check-in D for their appointment. Informed patient to call back if appointment needs to be cancelled or rescheduled at [phone].    Reminded patient to bring any outside records regarding this appointment or have them faxed to clinic at [phone].    Annabella Choe LPN

## 2021-03-11 ENCOUNTER — OFFICE VISIT (OUTPATIENT)
Dept: SURGERY | Facility: CLINIC | Age: 41
End: 2021-03-11
Attending: NURSE PRACTITIONER
Payer: COMMERCIAL

## 2021-03-11 ENCOUNTER — HOSPITAL ENCOUNTER (OUTPATIENT)
Facility: AMBULATORY SURGERY CENTER | Age: 41
End: 2021-03-11
Attending: SURGERY
Payer: COMMERCIAL

## 2021-03-11 VITALS — WEIGHT: 179.7 LBS | HEIGHT: 65 IN | BODY MASS INDEX: 29.94 KG/M2

## 2021-03-11 DIAGNOSIS — Z17.0 MALIGNANT NEOPLASM OF CENTRAL PORTION OF LEFT BREAST IN FEMALE, ESTROGEN RECEPTOR POSITIVE (H): ICD-10-CM

## 2021-03-11 DIAGNOSIS — C50.112 MALIGNANT NEOPLASM OF CENTRAL PORTION OF LEFT BREAST IN FEMALE, ESTROGEN RECEPTOR POSITIVE (H): ICD-10-CM

## 2021-03-11 PROCEDURE — 99205 OFFICE O/P NEW HI 60 MIN: CPT | Performed by: SURGERY

## 2021-03-11 PROCEDURE — T1013 SIGN LANG/ORAL INTERPRETER: HCPCS | Mod: U4 | Performed by: SURGERY

## 2021-03-11 ASSESSMENT — MIFFLIN-ST. JEOR: SCORE: 1484.95

## 2021-03-11 NOTE — PROGRESS NOTES
[facility] Breast Surgery Consultation    HPI:   Ghazal Martinez is a 40 year old female who is seen in consultation at the request of Dr. Patton for evaluation of newly diagnosed left breast grade 3 invasive ductal carcinoma, ER 95% positive, KY 70% positive, HER 2 negative (equivocal by FISH and IHC) at 10:00, 3cm FN measuring 2.2cm.     Ghazal presented for diagnostic imaging on 2/12/2021 due to a palpable lump in her left breast. Imaging revealed a 2.2cm irregular hypoechoic mass at 10:00, 3cm FN with angular margins on US. Mammogram revealed a 1.5cm irregular mass. No areas of concern on the right breast. No axillary adenopathy. Contrast enhanced mammogram revealed the mass enhances and measures 1.7cm.     She reports she first noticed a burning sensation on her skin on the left upper inner breast and then felt a firm mass about one month ago. She has not had prior breast imaging or biopsies or surgeries.       Hormonal history:  menarche 10, 5 children (ages 16, 14, 12, 11, 5), 1st at age 23,  Pre-menopausal, current OCP use, no HRT, no fertility treatment.     Family history of breast cancer: No  Family history of ovarian cancer:  No  Family history of colon cancer: No  Family history of prostate cancer: No      Past Medical History:   has a past medical history of Varicose veins of legs.      Current Outpatient Medications:      clotrimazole (LOTRIMIN) 1 % external cream, Apply topically 2 times daily To soles of feet until rash resolves (Patient not taking: Reported on 1/25/2021), Disp: 60 g, Rfl: 1     desogestrel-ethinyl estradiol (JULEBER) 0.15-30 MG-MCG tablet, Take 1 tablet by mouth daily, Disp: 84 tablet, Rfl: 4     docusate sodium (COLACE) 100 MG capsule, Take 1 capsule (100 mg) by mouth daily (Patient not taking: Reported on 1/25/2021), Disp: 30 capsule, Rfl: 3     ferrous sulfate (FEROSUL) 325 (65 Fe) MG tablet, Take 1 tablet (325 mg) by mouth daily (with breakfast) (Patient not taking:  Reported on 1/25/2021), Disp: 90 tablet, Rfl: 3     fluticasone (FLONASE) 50 MCG/ACT nasal spray, Spray 1 spray into both nostrils daily (Patient not taking: Reported on 1/25/2021), Disp: 11.1 mL, Rfl: 3     omeprazole (PRILOSEC) 20 MG DR capsule, Take 1 capsule (20 mg) by mouth daily (Patient not taking: Reported on 1/25/2021), Disp: 30 capsule, Rfl: 3     tretinoin (RETIN-A) 0.01 % external gel, Apply topically At Bedtime Followed by moisturizer, Disp: 45 g, Rfl: 1    Past Surgical History:  Past Surgical History:   Procedure Laterality Date     NO HISTORY OF SURGERY           No Known Allergies     Social History:  Social History     Socioeconomic History     Marital status:      Spouse name: Familia     Number of children: 5     Years of education: Not on file     Highest education level: Not on file   Occupational History     Occupation: homemaker   Social Needs     Financial resource strain: Not on file     Food insecurity     Worry: Not on file     Inability: Not on file     Transportation needs     Medical: Not on file     Non-medical: Not on file   Tobacco Use     Smoking status: Never Smoker     Smokeless tobacco: Never Used   Substance and Sexual Activity     Alcohol use: No     Drug use: No     Sexual activity: Yes     Partners: Male     Birth control/protection: Pill   Lifestyle     Physical activity     Days per week: Not on file     Minutes per session: Not on file     Stress: Not on file   Relationships     Social connections     Talks on phone: Not on file     Gets together: Not on file     Attends Mandaeism service: Not on file     Active member of club or organization: Not on file     Attends meetings of clubs or organizations: Not on file     Relationship status: Not on file     Intimate partner violence     Fear of current or ex partner: Not on file     Emotionally abused: Not on file     Physically abused: Not on file     Forced sexual activity: Not on file   Other Topics Concern     Not  "on file   Social History Narrative    From [place] to [place] in [place].         ROS:  The 10 point review of systems is negative other than noted in the HPI and above.    PE:  Vitals: Ht 1.657 m (5' 5.25\")   Wt 81.5 kg (179 lb 11.2 oz)   BMI 29.67 kg/m    General appearance: well-nourished, sitting comfortably, no apparent distress  Psych: normal affect, pleasant  HEENT:  Head normocephalic and atraumatic, pupils equal and round, conjunctivae clear, mucous membranes moist, external ears and nose normal  Neck: Supple without thyromegaly or masses  Lungs: Respirations unlabored  Lymphatic: No cervical, or supraclavicular lymphadenopathy  Extremities: Without edema  Musculoskeletal:  Normal station and gait  Neurologic: nonfocal, grossly intact times four extremities, alert and oriented times three  Psychiatric: Mood and affect are appropriate  Skin: Without lesions or rashes    Breast:  A bilateral breast exam was performed in the supine position.. Bilateral breasts were palpated in a circumferential clockwise fashion including the supraclavicular and axillary areas.   Breasts are symmetric. There is ecchymosis along the left areola medially. The nipples are everted bilaterally. There is a firm 2cm mass along the edge of the areola with likely surrounding hematoma. This is easily palpable with fairly discrete margins. There are no other masses in either breast.     Lymph:       No supraclavicular/infraclavicular adenopathy.   Axillary adenopathy: none    Assessment: Left breast grade 3 invasive ductal carcinoma, ER 95% positive, MS 70% positive, HER 2 negative (equivocal by FISH and IHC) at 10:00, 3cm FN measuring 2.2cm.       Plan:   Ghazal Martinez is a 40 year old female has newly diagnosed left breast cancer.  I reviewed the imaging and pathology reports with her and her  and explained the findings using a phone interpretor.  We talked about the fact that this is invasive ductal carcinoma  that is 2.2cm in " size.    We discussed the receptor status of strongly ER/MO positive and HER 2 negative. We discussed that breast cancer is treated in a multidisciplinary fashion and she will also meet with oncology as well as radiation oncology pending surgical decision making and final pathology results. We discussed the role of breast MRI given her breast density and scattered enhancement on contrast enhanced mammography.     We also discussed that as this is hormone positive breast cancer, she should stop her oral contraceptive and use alternative means of contraception.     We next discussed the surgical options for treatment.  I described the procedures for lumpectomy with sentinel lymph node biopsy and mastectomy with sentinel lymph node biopsy, possible axillary node dissection including the details of the procedures, the risks, anesthesia and expected recovery.  Breast MRI will be helpful to ensure lumpectomy is a reasonable option.     I reviewed the data regarding lumpectomy and radiation vs mastectomy that shows that the local recurrence risk is slightly higher for lumpectomy and radiation vs mastectomy (3-5% vs. 1-2%), but the survival at 20 years is the same.  I advised that lymph node biopsy is recommended whenever we are dealing with invasive breast cancer and described the procedure for sentinel lymph node biopsy.  We talked about the risk for lymphedema which is small with removal of only a few nodes, but certainly not zero.  No need for localization given it is easily palpable. Would plan to excise an ellipse of skin over the cancer as it is superficial on exam.     We talked about post-lumpectomy radiation, the course and usual side effects. We discussed that with lumpectomy, radiation is typically recommended to decrease risk of recurrence. It may be necessary following mastectomy depending on final pathology and if cody involvement is present.     We discussed the risk of margin positivity following partial  mastectomy surgery and need for possible return to the operating room for additional procedures if margins are positive.     We also talked about post-mastectomy reconstruction and the stages involved. We also discussed the various types of mastectomy, including total, skin-sparing, and nipple-sparing mastectomy.  We reviewed that the nipple-sparing technique is cosmetic; sensation and contractility will likely be lost.  Ghazal  is a candidate for nipple-sparing mastectomy from an oncologic perspective but again, MRI will be helpful to further assess.  The option of having immediate versus delayed reconstruction was also discussed.   We reviewed that the advantages of immediate reconstruction includes superior cosmetics, as the skin is preserved.      In addition, I have recommended genetic counseling.  She would be a candidate in that situation based on her young age at time of diagnosis of cancer.  The natural history of BRCA mutations and breast cancer were discussed with the patient. Should a deleterious mutation be identified, she would no longer be a good candidate for breast conservation.  We also reviewed the risk reduction benefits of a prophylactic mastectomy in this situation.     We discussed the role of oncotype for hormone positive, HER 2 negative cancers with negative sentinel nodes or 1-3 positive nodes.     Plan:   Breast MRI  Genetics referral  She is leaning toward lumpectomy with SLNB. I will have Renetta tentatively schedule this pending MRI results. Surgery can be at either [facility] or [facility] locations - pt can decide based on convenience/preference.     Time spent with the patient with greater that 50% of the time in discussion was 60 minutes.    Una Fonseca MD      Please route or send letter to:  Primary Care Provider (PCP) and Referring Provider

## 2021-03-11 NOTE — NURSING NOTE
"Ghazal Martinez's goals for this visit include:   Chief Complaint   Patient presents with     Consult     breast cancer       She requests these members of her care team be copied on today's visit information: no    PCP: [facility]    Referring Provider:  Eun Patton, KWABENA CNP  [address] [place] [address]    Ht 1.657 m (5' 5.25\")   Wt 81.5 kg (179 lb 11.2 oz)   BMI 29.67 kg/m      Do you need any medication refills at today's visit? No    Annabella Choe LPN      "

## 2021-03-11 NOTE — LETTER
3/11/2021         RE: Ghazal Martinez  [address]  [place] [address]        Dear Colleague,    Thank you for referring your patient, Ghazal Martinez, to the [facility]. Please see a copy of my visit note below.    [facility] Breast Surgery Consultation    HPI:   Ghazal Martinez is a 40 year old female who is seen in consultation at the request of Dr. 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     Ghazal presented for diagnostic imaging on 2/12/2021 due to a palpable lump in her left breast. Imaging revealed a 2.2cm irregular hypoechoic mass at 10:00, 3cm FN with angular margins on US. Mammogram revealed a 1.5cm irregular mass. No areas of concern on the right breast. No axillary adenopathy. Contrast enhanced mammogram revealed the mass enhances and measures 1.7cm.     She reports she first noticed a burning sensation on her skin on the left upper inner breast and then felt a firm mass about one month ago. She has not had prior breast imaging or biopsies or surgeries.       Hormonal history:  menarche 10, 5 children (ages 16, 14, 12, 11, 5), 1st at age 23,  Pre-menopausal, current OCP use, no HRT, no fertility treatment.     Family history of breast cancer: No  Family history of ovarian cancer:  No  Family history of colon cancer: No  Family history of prostate cancer: No      Past Medical History:   has a past medical history of Varicose veins of legs.      Current Outpatient Medications:      clotrimazole (LOTRIMIN) 1 % external cream, Apply topically 2 times daily To soles of feet until rash resolves (Patient not taking: Reported on 1/25/2021), Disp: 60 g, Rfl: 1     desogestrel-ethinyl estradiol (JULEBER) 0.15-30 MG-MCG tablet, Take 1 tablet by mouth daily, Disp: 84 tablet, Rfl: 4     docusate sodium (COLACE)  100 MG capsule, Take 1 capsule (100 mg) by mouth daily (Patient not taking: Reported on 1/25/2021), Disp: 30 capsule, Rfl: 3     ferrous sulfate (FEROSUL) 325 (65 Fe) MG tablet, Take 1 tablet (325 mg) by mouth daily (with breakfast) (Patient not taking: Reported on 1/25/2021), Disp: 90 tablet, Rfl: 3     fluticasone (FLONASE) 50 MCG/ACT nasal spray, Spray 1 spray into both nostrils daily (Patient not taking: Reported on 1/25/2021), Disp: 11.1 mL, Rfl: 3     omeprazole (PRILOSEC) 20 MG DR capsule, Take 1 capsule (20 mg) by mouth daily (Patient not taking: Reported on 1/25/2021), Disp: 30 capsule, Rfl: 3     tretinoin (RETIN-A) 0.01 % external gel, Apply topically At Bedtime Followed by moisturizer, Disp: 45 g, Rfl: 1    Past Surgical History:  Past Surgical History:   Procedure Laterality Date     NO HISTORY OF SURGERY           No Known Allergies     Social History:  Social History     Socioeconomic History     Marital status:      Spouse name: Familia     Number of children: 5     Years of education: Not on file     Highest education level: Not on file   Occupational History     Occupation: homemaker   Social Needs     Financial resource strain: Not on file     Food insecurity     Worry: Not on file     Inability: Not on file     Transportation needs     Medical: Not on file     Non-medical: Not on file   Tobacco Use     Smoking status: Never Smoker     Smokeless tobacco: Never Used   Substance and Sexual Activity     Alcohol use: No     Drug use: No     Sexual activity: Yes     Partners: Male     Birth control/protection: Pill   Lifestyle     Physical activity     Days per week: Not on file     Minutes per session: Not on file     Stress: Not on file   Relationships     Social connections     Talks on phone: Not on file     Gets together: Not on file     Attends Restorationist service: Not on file     Active member of club or organization: Not on file     Attends meetings of clubs or organizations: Not on file  "    Relationship status: Not on file     Intimate partner violence     Fear of current or ex partner: Not on file     Emotionally abused: Not on file     Physically abused: Not on file     Forced sexual activity: Not on file   Other Topics Concern     Not on file   Social History Narrative    From [place] to [place] in [place].         ROS:  The 10 point review of systems is negative other than noted in the HPI and above.    PE:  Vitals: Ht 1.657 m (5' 5.25\")   Wt 81.5 kg (179 lb 11.2 oz)   BMI 29.67 kg/m    General appearance: well-nourished, sitting comfortably, no apparent distress  Psych: normal affect, pleasant  HEENT:  Head normocephalic and atraumatic, pupils equal and round, conjunctivae clear, mucous membranes moist, external ears and nose normal  Neck: Supple without thyromegaly or masses  Lungs: Respirations unlabored  Lymphatic: No cervical, or supraclavicular lymphadenopathy  Extremities: Without edema  Musculoskeletal:  Normal station and gait  Neurologic: nonfocal, grossly intact times four extremities, alert and oriented times three  Psychiatric: Mood and affect are appropriate  Skin: Without lesions or rashes    Breast:  A bilateral breast exam was performed in the supine position.. Bilateral breasts were palpated in a circumferential clockwise fashion including the supraclavicular and axillary areas.   Breasts are symmetric. There is ecchymosis along the left areola medially. The nipples are everted bilaterally. There is a firm 2cm mass along the edge of the areola with likely surrounding hematoma. This is easily palpable with fairly discrete margins. There are no other masses in either breast.     Lymph:       No supraclavicular/infraclavicular adenopathy.   Axillary adenopathy: none    Assessment: Left breast grade 3 invasive ductal carcinoma, ER 95% positive, [place] 70% positive, HER 2 negative (equivocal by FISH and IHC) at 10:00, 3cm FN measuring 2.2cm.       Plan:   Ghazal Martinez is a 40 year old " female has newly diagnosed left breast cancer.  I reviewed the imaging and pathology reports with her and her  and explained the findings using a phone interpretor.  We talked about the fact that this is invasive ductal carcinoma  that is 2.2cm in size.    We discussed the receptor status of strongly ER/KS positive and HER 2 negative. We discussed that breast cancer is treated in a multidisciplinary fashion and she will also meet with oncology as well as radiation oncology pending surgical decision making and final pathology results. We discussed the role of breast MRI given her breast density and scattered enhancement on contrast enhanced mammography.     We also discussed that as this is hormone positive breast cancer, she should stop her oral contraceptive and use alternative means of contraception.     We next discussed the surgical options for treatment.  I described the procedures for lumpectomy with sentinel lymph node biopsy and mastectomy with sentinel lymph node biopsy, possible axillary node dissection including the details of the procedures, the risks, anesthesia and expected recovery.  Breast MRI will be helpful to ensure lumpectomy is a reasonable option.     I reviewed the data regarding lumpectomy and radiation vs mastectomy that shows that the local recurrence risk is slightly higher for lumpectomy and radiation vs mastectomy (3-5% vs. 1-2%), but the survival at 20 years is the same.  I advised that lymph node biopsy is recommended whenever we are dealing with invasive breast cancer and described the procedure for sentinel lymph node biopsy.  We talked about the risk for lymphedema which is small with removal of only a few nodes, but certainly not zero.  No need for localization given it is easily palpable. Would plan to excise an ellipse of skin over the cancer as it is superficial on exam.     We talked about post-lumpectomy radiation, the course and usual side effects. We discussed that  with lumpectomy, radiation is typically recommended to decrease risk of recurrence. It may be necessary following mastectomy depending on final pathology and if cody involvement is present.     We discussed the risk of margin positivity following partial mastectomy surgery and need for possible return to the operating room for additional procedures if margins are positive.     We also talked about post-mastectomy reconstruction and the stages involved. We also discussed the various types of mastectomy, including total, skin-sparing, and nipple-sparing mastectomy.  We reviewed that the nipple-sparing technique is cosmetic; sensation and contractility will likely be lost.  Ghazal  is a candidate for nipple-sparing mastectomy from an oncologic perspective but again, MRI will be helpful to further assess.  The option of having immediate versus delayed reconstruction was also discussed.   We reviewed that the advantages of immediate reconstruction includes superior cosmetics, as the skin is preserved.      In addition, I have recommended genetic counseling.  She would be a candidate in that situation based on her young age at time of diagnosis of cancer.  The natural history of BRCA mutations and breast cancer were discussed with the patient. Should a deleterious mutation be identified, she would no longer be a good candidate for breast conservation.  We also reviewed the risk reduction benefits of a prophylactic mastectomy in this situation.     We discussed the role of oncotype for hormone positive, HER 2 negative cancers with negative sentinel nodes or 1-3 positive nodes.     Plan:   Breast MRI  Genetics referral  She is leaning toward lumpectomy with SLNB. I will have Renetta tentatively schedule this pending MRI results. Surgery can be at either [facility] or [facility] locations - pt can decide based on convenience/preference.     Time spent with the patient with greater that 50% of the time in discussion was 60 minutes.    Una  MD Marian      Please route or send letter to:  Primary Care Provider (PCP) and Referring Provider           Again, thank you for allowing me to participate in the care of your patient.        Sincerely,        Una Fonseca MD

## 2021-03-12 ENCOUNTER — TELEPHONE (OUTPATIENT)
Dept: SURGERY | Facility: PHYSICIAN GROUP | Age: 41
End: 2021-03-12

## 2021-03-12 ENCOUNTER — ANCILLARY PROCEDURE (OUTPATIENT)
Dept: MRI IMAGING | Facility: CLINIC | Age: 41
End: 2021-03-12
Attending: SURGERY
Payer: COMMERCIAL

## 2021-03-12 DIAGNOSIS — C50.112 MALIGNANT NEOPLASM OF CENTRAL PORTION OF LEFT BREAST IN FEMALE, ESTROGEN RECEPTOR POSITIVE (H): ICD-10-CM

## 2021-03-12 DIAGNOSIS — Z17.0 MALIGNANT NEOPLASM OF CENTRAL PORTION OF LEFT BREAST IN FEMALE, ESTROGEN RECEPTOR POSITIVE (H): Primary | ICD-10-CM

## 2021-03-12 DIAGNOSIS — C50.112 MALIGNANT NEOPLASM OF CENTRAL PORTION OF LEFT BREAST IN FEMALE, ESTROGEN RECEPTOR POSITIVE (H): Primary | ICD-10-CM

## 2021-03-12 DIAGNOSIS — Z11.59 ENCOUNTER FOR SCREENING FOR OTHER VIRAL DISEASES: ICD-10-CM

## 2021-03-12 DIAGNOSIS — Z17.0 MALIGNANT NEOPLASM OF CENTRAL PORTION OF LEFT BREAST IN FEMALE, ESTROGEN RECEPTOR POSITIVE (H): ICD-10-CM

## 2021-03-12 PROCEDURE — 77049 MRI BREAST C-+ W/CAD BI: CPT | Performed by: RADIOLOGY

## 2021-03-12 PROCEDURE — A9585 GADOBUTROL INJECTION: HCPCS | Performed by: RADIOLOGY

## 2021-03-12 RX ORDER — GADOBUTROL 604.72 MG/ML
10 INJECTION INTRAVENOUS ONCE
Status: COMPLETED | OUTPATIENT
Start: 2021-03-12 | End: 2021-03-12

## 2021-03-12 RX ADMIN — GADOBUTROL 8 ML: 604.72 INJECTION INTRAVENOUS at 12:53

## 2021-03-12 NOTE — TELEPHONE ENCOUNTER
Type of surgery: Left breast lumpectomy with left sentinel lymph node biopsy  Location of surgery: [facility]  Date and time of surgery: 3/25/21 at 1:45pm  Surgeon: Dr. Una Fonseca  Pre-Op Appt Date: Patient to schedule  Post-Op Appt Date: Patient to schedule   Packet sent out: Yes  Pre-cert/Authorization completed:  Not Applicable  Date: 3/12/21

## 2021-03-12 NOTE — TELEPHONE ENCOUNTER
Oncology/Surgical Oncology Referral Request:     Specialty Requested: Medical Oncology     Referring Provider: Una Fonseca MD     Referring Clinic/Organization: [facility]    Records location: [facility]     Requested Provider (if specified): Not Specified

## 2021-03-15 ENCOUNTER — CARE COORDINATION (OUTPATIENT)
Dept: SURGERY | Facility: CLINIC | Age: 41
End: 2021-03-15

## 2021-03-15 ENCOUNTER — TELEPHONE (OUTPATIENT)
Dept: SURGERY | Facility: CLINIC | Age: 41
End: 2021-03-15

## 2021-03-15 ENCOUNTER — VIRTUAL VISIT (OUTPATIENT)
Dept: ONCOLOGY | Facility: CLINIC | Age: 41
End: 2021-03-15
Attending: SURGERY
Payer: COMMERCIAL

## 2021-03-15 ENCOUNTER — PRE VISIT (OUTPATIENT)
Dept: ONCOLOGY | Facility: CLINIC | Age: 41
End: 2021-03-15

## 2021-03-15 DIAGNOSIS — Z17.0 MALIGNANT NEOPLASM OF LEFT BREAST IN FEMALE, ESTROGEN RECEPTOR POSITIVE, UNSPECIFIED SITE OF BREAST (H): Primary | ICD-10-CM

## 2021-03-15 DIAGNOSIS — R92.8 ABNORMAL MAGNETIC RESONANCE IMAGING OF LEFT BREAST: Primary | ICD-10-CM

## 2021-03-15 DIAGNOSIS — C50.112 MALIGNANT NEOPLASM OF CENTRAL PORTION OF LEFT BREAST IN FEMALE, ESTROGEN RECEPTOR POSITIVE (H): ICD-10-CM

## 2021-03-15 DIAGNOSIS — C50.912 MALIGNANT NEOPLASM OF LEFT BREAST IN FEMALE, ESTROGEN RECEPTOR POSITIVE, UNSPECIFIED SITE OF BREAST (H): Primary | ICD-10-CM

## 2021-03-15 DIAGNOSIS — Z17.0 MALIGNANT NEOPLASM OF CENTRAL PORTION OF LEFT BREAST IN FEMALE, ESTROGEN RECEPTOR POSITIVE (H): ICD-10-CM

## 2021-03-15 DIAGNOSIS — Z80.49 FAMILY HISTORY OF UTERINE CANCER: ICD-10-CM

## 2021-03-15 PROCEDURE — 96040 HC GENETIC COUNSELING, EACH 30 MINUTES: CPT | Mod: TEL | Performed by: GENETIC COUNSELOR, MS

## 2021-03-15 NOTE — LETTER
Date:October 23, 2021      Provider requested that no letter be sent. Do not send.       [facility]

## 2021-03-15 NOTE — PROGRESS NOTES
Breast MRI results called to patient using  services. Ghazal notified of Breast MRI results performed on 3/12/2021 revealed: biopsy proven malignancy measuring 22 mm. 2 potential satellite lesions near biopsied lesion. Slightly enlarged internal mammary lymph node. Right breast is unremarkable. Recommendation to proceed with ultrasound left breast MRI follow-up and possible biopsy. Referral placed for medical oncology. Ghazal verbalized understanding. Will follow-up with Ghazal with appointment details.     Ivette Fowler RN, BSN, OCN  Breast Nurse Navigator  [facility]  [facility]  Phone: [phone]

## 2021-03-15 NOTE — PROGRESS NOTES
3/15/2021    Ghazal is a 40 year old who is being evaluated via a billable telephone visit.      What phone number would you like to be contacted at? [phone]  How would you like to obtain your AVS? Jeremiah    Phone call duration: 75 minutes    Referring Provider: Una Fonseca MD    Presenting Information:   Given concerns regarding the potential for COVID-19 exposure during a clinic visit, Ghazal elected for a telephone genetic counseling visit through the Cancer Risk Management Program to discuss her personal history of breast cancer. We reviewed this history, cancer screening recommendations, and available genetic testing options. Of note, a  was present by phone for the entire visit.    Personal History:  Ghazal is a 40 year old female. She was diagnosed with invasive ductal carcinoma of her left breast at age 40; the tumor is ER/MO+ and Her2-. Treatment is currently being planned, but will likely include a lumpectomy on 3/25/21.    Ghazal has her ovaries, fallopian tubes and uterus in place. She had a transvaginal ultrasound in January 2020 to address abnormal bleeding, which identified a fibroid.     After self-palpating a left breast lump, Ghazal had a mammogram in February 2021 that identified this cancer. Ghazal has not had a colonoscopy and does not regularly do any other cancer screening at this time.    Family History: (Please see scanned pedigree for detailed family history information)    One maternal first cousin once removed (the son of her maternal grandmother's brother) was diagnosed with an unknown cancer in his 60's and passed away.     One paternal first cousin was diagnosed with uterine cancer at age 30.    Ghazal reports that she does not otherwise have a family history of cancer.    Her maternal and paternal families are from [place]. There is no known Ashkenazi Catholic ancestry on either side of her family.    Discussion:    Ghazal's personal history of breast cancer  may be suggestive of a hereditary cancer syndrome.    We reviewed the features of sporadic, familial, and hereditary cancers. In looking at Ghazal's family history, it is possible that a cancer susceptibility gene is present as Ghazal has been diagnosed with cancer under age 50 and she has one close relative (her paternal cousin) diagnosed with a potentially related cancer (also under age 50). That being said, Ghazal otherwise has multiple relatives on both sides of her cancer that have never had a cancer. This likely reduces, but does not eliminate, the chance for a hereditary cancer syndrome in her family.    We discussed the natural history and genetics of several hereditary cancer syndromes, including Hereditary Breast and Ovarian Cancer (HBOC) syndrome.     We reviewed that the most common cause of hereditary breast cancer is HBOC syndrome, which is caused by mutations in the BRCA1 and BRCA2 genes. Individuals with HBOC syndrome are at increased risk for several different cancers, including breast, ovarian, male breast, prostate, melanoma, and pancreatic cancer.    We discussed that there are additional genes that could cause increased risk for breast, uterine, and related cancers. As many of these genes present with overlapping features in a family and accurate cancer risk cannot always be established based upon the pedigree analysis alone, it would be reasonable for Ghazal to consider panel genetic testing to analyze multiple genes at once.    Based on her personal history, Ghazal meets current National Comprehensive Cancer Network (NCCN) criteria for genetic testing of the BRCA1, BRCA2, and other high penetrance breast cancer genes (i.e. FLEX, CHEK2, CDH1, PALB2, PTEN, TP53, etc.).     A detailed handout regarding these genes/syndromes and the information we discussed will be mailed to Ghazal and can be found in the after visit summary. Topics included: inheritance pattern, cancer risks, cancer screening  recommendations, and also risks, benefits and limitations of testing.    We reviewed genetic testing options for hereditary breast and gynecologic cancers: actionable breast and gynecologic cancers panel (Breast and Gyn Cancers Guidelines-Based Panel, 19 genes) and expanded multi-cancer panel (Common Hereditary Cancers Panel, 47 genes). Ghazal expressed interest in only the actionable genes. She opted for the Breast and Gyn Cancers Guidelines-Based Panel.     The [employer] Breast and Gyn Cancers Guidelines-Based Panel includes the following 19 genes: FLEX, BRCA1, BRCA2, BRIP1, CDH1, CHEK2, EPCAM, MLH1, MSH2, MSH6, NBN, NF1, PALB2, PMS2, PTEN, RAD51C, RAD51D, STK11, and TP53.    Many of the genes on this panel are associated with specific hereditary cancer syndromes and published management guidelines: Hereditary Breast and Ovarian Cancer syndrome (BRCA1, BRCA2), Salazar syndrome (EPCAM, MLH1, MSH2, MSH6, PMS2), Hereditary Diffuse Gastric Cancer (CDH1), Cowden Syndrome (PTEN), Peutz-Jeghers syndrome, Neurofibromatosis type 1 (NF1), and Li Fraumeni syndrome (TP53).       Risk-reducing salpingo-oophorectomy can be considered in women with mutations in BRIP1, RAD51C, or RAD51D. FLEX, CHEK2, NBN, and PALB2 are associated with breast cancer risk and have published screening guidelines.    Consent was obtained over the phone, with the aid of the . Ghazal elected to have her blood drawn during her upcoming pre-op physical; she verbalized understanding that results will then not be available prior to her surgery on 3/25. Once her blood is drawn, genetic testing using the Breast and Gyn Cancers Guidelines-Based Panel will be sent to [facility]. Turn around time: 2-3 weeks after [facility] receives her blood sample.    Medical Management: For Ghazal, we reviewed that the information from genetic testing may determine:    additional cancer screening for which Ghazal may qualify (i.e. mammogram and breast MRI,  more frequent colonoscopies, more frequent dermatologic exams, etc.),    options for risk reducing surgeries Ghazal could consider (i.e. bilateral mastectomy, surgery to remove her ovaries and/or uterus, etc.),      and targeted chemotherapies for Ghazal's active cancer, or if she were to develop certain cancers in the future (i.e. immunotherapy for individuals with Salazar syndrome, PARP inhibitors, etc.).     These recommendations and possible targeted chemotherapies will be discussed in detail once genetic testing is completed.     Plan:  1) Today Ghazal elected to proceed with genetic testing using the Breast and Gyn Cancers Guidelines-Based Panel offered by [facility]. Her blood will be drawn during her upcoming pre-op physical.  2) The results should be available in 2-3 weeks, after [facility] receives her blood sample.  3) Ghazal will be called to discuss the results.    Mariam Cardona MS, [facility]  Licensed Genetic Counselor  Office: [phone]  Pager: [phone]

## 2021-03-15 NOTE — TELEPHONE ENCOUNTER
Call returned to patient using  services. Ghazal notified of the followin. Ultrasound left breast MRI follow-up, possible left breast biopsy x 2 on 3/22 checking in at 7:50 am at [facility].    2. Reschedule Breast surgery pending imaging and pathology results from left breast biopsy.     3. Cancel covid testing on 3/23.     4. Await call from new patient scheduling regarding consult with medical oncology. Ghazal does prefer  location.

## 2021-03-15 NOTE — LETTER
3/15/2021         RE: Ghazal Martinez  [address]  [place] [address]        Dear Colleague,    Thank you for referring your patient, Ghazal Martinez, to the [facility]. Please see a copy of my visit note below.    3/15/2021    Ghazal is a 40 year old who is being evaluated via a billable telephone visit.      What phone number would you like to be contacted at? [phone]  How would you like to obtain your AVS? [employer]hart    Phone call duration: 75 minutes    Referring Provider: Una Fonseca MD    Presenting Information:   Given concerns regarding the potential for COVID-19 exposure during a clinic visit, Ghazal elected for a telephone genetic counseling visit through the Cancer Risk Management Program to discuss her personal history of breast cancer. We reviewed this history, cancer screening recommendations, and available genetic testing options. Of note, a  was present by phone for the entire visit.    Personal History:  Ghazal is a 40 year old female. She was diagnosed with invasive ductal carcinoma of her left breast at age 40; the tumor is ER/SD+ and Her2-. Treatment is currently being planned, but will likely include a lumpectomy on 3/25/21.    Ghazal has her ovaries, fallopian tubes and uterus in place. She had a transvaginal ultrasound in January 2020 to address abnormal bleeding, which identified a fibroid.     After self-palpating a left breast lump, Ghazal had a mammogram in February 2021 that identified this cancer. Ghazal has not had a colonoscopy and does not regularly do any other cancer screening at this time.    Family History: (Please see scanned pedigree for detailed family history information)    One maternal first cousin once removed (the son of her maternal grandmother's brother) was diagnosed with an unknown cancer in his 60's and passed away.     One paternal first cousin was diagnosed with uterine cancer at age 30.    Ghazal  reports that she does not otherwise have a family history of cancer.    Her maternal and paternal families are from [place]. There is no known Ashkenazi Voodoo ancestry on either side of her family.    Discussion:    Ghazal's personal history of breast cancer may be suggestive of a hereditary cancer syndrome.    We reviewed the features of sporadic, familial, and hereditary cancers. In looking at Ghazal's family history, it is possible that a cancer susceptibility gene is present as Ghazal has been diagnosed with cancer under age 50 and she has one close relative (her paternal cousin) diagnosed with a potentially related cancer (also under age 50). That being said, Ghazal otherwise has multiple relatives on both sides of her cancer that have never had a cancer. This likely reduces, but does not eliminate, the chance for a hereditary cancer syndrome in her family.    We discussed the natural history and genetics of several hereditary cancer syndromes, including Hereditary Breast and Ovarian Cancer (HBOC) syndrome.     We reviewed that the most common cause of hereditary breast cancer is HBOC syndrome, which is caused by mutations in the BRCA1 and BRCA2 genes. Individuals with HBOC syndrome are at increased risk for several different cancers, including breast, ovarian, male breast, prostate, melanoma, and pancreatic cancer.    We discussed that there are additional genes that could cause increased risk for breast, uterine, and related cancers. As many of these genes present with overlapping features in a family and accurate cancer risk cannot always be established based upon the pedigree analysis alone, it would be reasonable for Ghazal to consider panel genetic testing to analyze multiple genes at once.    Based on her personal history, Ghazal meets current National Comprehensive Cancer Network (NCCN) criteria for genetic testing of the BRCA1, BRCA2, and other high penetrance breast cancer genes (i.e. FLEX, CHEK2,  CDH1, PALB2, PTEN, TP53, etc.).     A detailed handout regarding these genes/syndromes and the information we discussed will be mailed to Ghazal and can be found in the after visit summary. Topics included: inheritance pattern, cancer risks, cancer screening recommendations, and also risks, benefits and limitations of testing.    We reviewed genetic testing options for hereditary breast and gynecologic cancers: actionable breast and gynecologic cancers panel (Breast and Gyn Cancers Guidelines-Based Panel, 19 genes) and expanded multi-cancer panel (Common Hereditary Cancers Panel, 47 genes). Ghazal expressed interest in only the actionable genes. She opted for the Breast and Gyn Cancers Guidelines-Based Panel.     The [employer] Breast and Gyn Cancers Guidelines-Based Panel includes the following 19 genes: FLEX, BRCA1, BRCA2, BRIP1, CDH1, CHEK2, EPCAM, MLH1, MSH2, MSH6, NBN, NF1, PALB2, PMS2, PTEN, RAD51C, RAD51D, STK11, and TP53.    Many of the genes on this panel are associated with specific hereditary cancer syndromes and published management guidelines: Hereditary Breast and Ovarian Cancer syndrome (BRCA1, BRCA2), Salazar syndrome (EPCAM, MLH1, MSH2, MSH6, PMS2), Hereditary Diffuse Gastric Cancer (CDH1), Cowden Syndrome (PTEN), Peutz-Jeghers syndrome, Neurofibromatosis type 1 (NF1), and Li Fraumeni syndrome (TP53).       Risk-reducing salpingo-oophorectomy can be considered in women with mutations in BRIP1, RAD51C, or RAD51D. FLEX, CHEK2, NBN, and PALB2 are associated with breast cancer risk and have published screening guidelines.    Consent was obtained over the phone, with the aid of the . Ghazal elected to have her blood drawn during her upcoming pre-op physical; she verbalized understanding that results will then not be available prior to her surgery on 3/25. Once her blood is drawn, genetic testing using the Breast and Gyn Cancers Guidelines-Based Panel will be sent to [facility]. Turn  around time: 2-3 weeks after Evelyn receives her blood sample.    Medical Management: For Ghazal, we reviewed that the information from genetic testing may determine:    additional cancer screening for which Ghazal may qualify (i.e. mammogram and breast MRI, more frequent colonoscopies, more frequent dermatologic exams, etc.),    options for risk reducing surgeries Ghazal could consider (i.e. bilateral mastectomy, surgery to remove her ovaries and/or uterus, etc.),      and targeted chemotherapies for Ghazal's active cancer, or if she were to develop certain cancers in the future (i.e. immunotherapy for individuals with Salazar syndrome, PARP inhibitors, etc.).     These recommendations and possible targeted chemotherapies will be discussed in detail once genetic testing is completed.     Plan:  1) Today Ghazal elected to proceed with genetic testing using the Breast and Gyn Cancers Guidelines-Based Panel offered by [employer]. Her blood will be drawn during her upcoming pre-op physical.  2) The results should be available in 2-3 weeks, after Evelyn receives her blood sample.  3) Ghazal will be called to discuss the results.    Mariam Cardona MS, [facility]  Licensed Genetic Counselor  Office: [phone]  Pager: [phone]      Again, thank you for allowing me to participate in the care of your patient.        Sincerely,        Mariam Cardona GC

## 2021-03-16 ENCOUNTER — APPOINTMENT (OUTPATIENT)
Dept: INTERPRETER SERVICES | Facility: CLINIC | Age: 41
End: 2021-03-16
Payer: COMMERCIAL

## 2021-03-16 ENCOUNTER — TELEPHONE (OUTPATIENT)
Dept: SURGERY | Facility: CLINIC | Age: 41
End: 2021-03-16

## 2021-03-16 NOTE — TELEPHONE ENCOUNTER
RECORDS STATUS - BREAST    RECORDS REQUESTED FROM: [facility]   DATE REQUESTED: 3/19/2021   NOTES DETAILS STATUS   OFFICE NOTE from referring provider Complete referred by Una Fonseca MD   OFFICE NOTE from medical oncologist Complete 3/16/2021 Malignant Neoplasm of left breast in female, estrogen receptor positive    OFFICE NOTE from surgeon Complete 3/25/2021 Left Breast lumpectomy    OFFICE NOTE from radiation oncologist     DISCHARGE SUMMARY from hospital NA    DISCHARGE REPORT from the ER     OPERATIVE REPORT Complete Case Request- 3/11/2021 Left Breast Lumpectomy with left sentinel lymph node     See Breast Biopsy Below    MEDICATION LIST Complete [facility]   CLINICAL TRIAL TREATMENTS TO DATE     LABS     PATHOLOGY REPORTS  (Tissue diagnosis, Stage, ER/DE percentage positive and intensity of staining, HER2 IHC, FISH, and all biopsies from breast and any distant metastasis)                 Complete  2/23/2021 Surgical Pathology    GENONOMIC TESTING     TYPE:   (Next Generation Sequencing, including Foundation One testing, and Oncotype score) Complete 2/23/2021 Her 2 Josette Fish    IMAGING (NEED IMAGES & REPORT)     CT SCANS     MRI Complete MRI Breast Bilateral 3/12/2021   MAMMO Complete MA Post Procedure Left 3/22/2021    MA Post Procedure Left 2/23/2021    MA Contrast Enhanced 2/23/2021   ULTRASOUND Complete US Breast 3/22/2021    US Breast Biopsy Core Needle 3/22/2021    US Breast Biopsy 2/23/2021   PET     NM Lymph Complete 3/25/2021   BONE SCAN     BRAIN MRI       Action    Action Taken

## 2021-03-16 NOTE — TELEPHONE ENCOUNTER
Ghazal is scheduled for return visit with Dr. Fonseca on 3/18 at 11:45 am to discuss surgical options.     Above communicated to patient via  services.    Ivette Fowler RN, BSN, OCN  Breast Nurse Navigator  [facility]  [facility]  Phone: [phone]

## 2021-03-18 ENCOUNTER — OFFICE VISIT (OUTPATIENT)
Dept: SURGERY | Facility: CLINIC | Age: 41
End: 2021-03-18
Payer: COMMERCIAL

## 2021-03-18 ENCOUNTER — PATIENT OUTREACH (OUTPATIENT)
Dept: ONCOLOGY | Facility: CLINIC | Age: 41
End: 2021-03-18

## 2021-03-18 ENCOUNTER — TELEPHONE (OUTPATIENT)
Dept: SURGERY | Facility: CLINIC | Age: 41
End: 2021-03-18

## 2021-03-18 ENCOUNTER — TELEPHONE (OUTPATIENT)
Dept: SURGERY | Facility: PHYSICIAN GROUP | Age: 41
End: 2021-03-18

## 2021-03-18 DIAGNOSIS — R92.8 ABNORMAL FINDING ON BREAST IMAGING: ICD-10-CM

## 2021-03-18 DIAGNOSIS — C50.112 MALIGNANT NEOPLASM OF CENTRAL PORTION OF LEFT BREAST IN FEMALE, ESTROGEN RECEPTOR POSITIVE (H): Primary | ICD-10-CM

## 2021-03-18 DIAGNOSIS — R92.8 ABNORMAL MAGNETIC RESONANCE IMAGING OF LEFT BREAST: ICD-10-CM

## 2021-03-18 DIAGNOSIS — Z17.0 MALIGNANT NEOPLASM OF CENTRAL PORTION OF LEFT BREAST IN FEMALE, ESTROGEN RECEPTOR POSITIVE (H): Primary | ICD-10-CM

## 2021-03-18 LAB
SARS-COV-2 RNA RESP QL NAA+PROBE: NORMAL
SPECIMEN SOURCE: NORMAL

## 2021-03-18 PROCEDURE — 99214 OFFICE O/P EST MOD 30 MIN: CPT | Performed by: SURGERY

## 2021-03-18 PROCEDURE — U0005 INFEC AGEN DETEC AMPLI PROBE: HCPCS | Performed by: SURGERY

## 2021-03-18 PROCEDURE — U0003 INFECTIOUS AGENT DETECTION BY NUCLEIC ACID (DNA OR RNA); SEVERE ACUTE RESPIRATORY SYNDROME CORONAVIRUS 2 (SARS-COV-2) (CORONAVIRUS DISEASE [COVID-19]), AMPLIFIED PROBE TECHNIQUE, MAKING USE OF HIGH THROUGHPUT TECHNOLOGIES AS DESCRIBED BY CMS-2020-01-R: HCPCS | Performed by: SURGERY

## 2021-03-18 NOTE — PROGRESS NOTES
[facility] Follow Up Note    CHIEF COMPLAINT:  Left breast cancer    HISTORY OF PRESENT ILLNESS:  Ghazal Martinez is a 40 year old female who is seen in follow up for newly diagnosed left breast grade 3 invasive ductal carcinoma, ER 95% positive, MA 70% positive, HER 2 negative (equivocal by FISH and IHC) at 10:00, 3cm FN measuring 2.2cm.      Ghazal presented for diagnostic imaging on 2/12/2021 due to a palpable lump in her left breast. Imaging revealed a 2.2cm irregular hypoechoic mass at 10:00, 3cm FN with angular margins on US. Mammogram revealed a 1.5cm irregular mass. No areas of concern on the right breast. No axillary adenopathy. Contrast enhanced mammogram revealed the mass enhances and measures 1.7cm.    I saw her on 3/11 to discuss the above pathology and imaging. She has since had a breast MRI which revealed the left breast index cancer measuring 2.2cm with 2 potential satellite lesions at 11:00, one posterior measuring 8mm and the other mid breast measuring 7mm with the distance from the posterior lesion measuring 10cm. There was a mildly enlarged left internal mammary node as well as 2 equivocal abnormal nodes in the axilla.     She was seen today with a phone interpretor.     REVIEW OF SYSTEMS:  Constitutional:  Negative for chills, fatigue, fever and weight change.  Eyes:  Negative for blurred vision, eye pain and photophobia.  ENT:  Negative for hearing problems, ENT pain, congestion, rhinorrhea, epistaxis, hoarseness and dental problems.  Cardiovascular:  Negative for chest pain, palpitations, tachycardia, orthopnea and edema.  Respiratory:  Negative for cough, dyspnea and hemoptysis.  Gastrointestinal:  Negative for abdominal pain, heartburn, constipation, diarrhea and stool changes.  Musculoskeletal:  Negative for arthralgias, back pain and myalgias.  Integumentary/Breast:  See HPI.    Past Medical History:   Diagnosis Date     Varicose veins of legs        Past Surgical  History:   Procedure Laterality Date     NO HISTORY OF SURGERY         Family History   Problem Relation Age of Onset     No Known Problems Mother      No Known Problems Father      Diabetes No family hx of      Coronary Artery Disease No family hx of      Hypertension No family hx of      Hyperlipidemia No family hx of      Cerebrovascular Disease No family hx of      Breast Cancer No family hx of      Colon Cancer No family hx of      Depression No family hx of      Anxiety Disorder No family hx of      Asthma No family hx of      Thyroid Disease No family hx of        Social History     Tobacco Use     Smoking status: Never Smoker     Smokeless tobacco: Never Used   Substance Use Topics     Alcohol use: No       Patient Active Problem List   Diagnosis     Varicose veins of legs     Cervical cancer screening     Class 1 obesity due to excess calories without serious comorbidity with body mass index (BMI) of 31.0 to 31.9 in adult     Iron deficiency anemia due to chronic blood loss     Prediabetes     Vitamin D deficiency     Menorrhagia with regular cycle     Intramural leiomyoma of uterus     Malignant neoplasm of central portion of left breast in female, estrogen receptor positive (H)     No Known Allergies  Current Outpatient Medications   Medication Sig Dispense Refill     clotrimazole (LOTRIMIN) 1 % external cream Apply topically 2 times daily To soles of feet until rash resolves (Patient not taking: Reported on 1/25/2021) 60 g 1     desogestrel-ethinyl estradiol (JULEBER) 0.15-30 MG-MCG tablet Take 1 tablet by mouth daily 84 tablet 4     docusate sodium (COLACE) 100 MG capsule Take 1 capsule (100 mg) by mouth daily (Patient not taking: Reported on 1/25/2021) 30 capsule 3     ferrous sulfate (FEROSUL) 325 (65 Fe) MG tablet Take 1 tablet (325 mg) by mouth daily (with breakfast) (Patient not taking: Reported on 1/25/2021) 90 tablet 3     fluticasone (FLONASE) 50 MCG/ACT nasal spray Spray 1 spray into both  nostrils daily (Patient not taking: Reported on 1/25/2021) 11.1 mL 3     omeprazole (PRILOSEC) 20 MG DR capsule Take 1 capsule (20 mg) by mouth daily (Patient not taking: Reported on 1/25/2021) 30 capsule 3     tretinoin (RETIN-A) 0.01 % external gel Apply topically At Bedtime Followed by moisturizer 45 g 1       ASSESSMENT/PLAN:  Ghazal Martinez is a 40yof with left breast a 2.2cm grade 3 IDC, ER/OK positive, HER 2 negative with MR revealing concern for satellite lesions in the left breast quite a distance away from the primary tumor. She is scheduled for left breast US and biopsy of these next week. If positive, I would recommend mastectomy with or without reconstruction. Her MRI also revealed concern for equivocal left axillary nodes and I will ask radiology to also perform axillary ultrasound and possible biopsy of these nodes as well. She will see Dr. Fine tomorrow to discuss if there is need for any additional imaging given the internal mammary node being mildly enlarged. At this time, I do feel that surgery 1st is a good option given the strongly hormone positive nature of this cancer and overall small size  but she will discuss with Dr. Fine as well. Consideration of oncotype prior to surgery is also an option to see if role of neoadjuvant chemotherapy. We did discuss the details further regarding mastectomy. She is not a candidate for nipple sparing given cancer proximity to nipple. We discussed reconstruction options and she  is interested in meeting with plastic surgery. A referral was placed and Renetta will call to discuss further.   - next steps:  -- Dr. Fine appt tomorrow  -- left breast US and biopsy, axillary US possible biopsy next week  -- Plastic surgery consult next week  -- determine surgery 1st vs chemotherapy 1st plan and schedule accordingly    Una Fonseca MD  [facility]  [phone]        Please route or send letter to:  Primary Care Provider (PCP) and  Referring Provider

## 2021-03-18 NOTE — TELEPHONE ENCOUNTER
Call returned by breast center staff at . Spoke to Annalisa regarding updated Breast Imaging Orders.    Per Dr. Fonseca, at time of ultrasound MRI follow-up LEFT breast (1) please biopsy the most posterior lesion to confirm extent of disease (2) left axillary ultrasound and possible biopsy of lymph node.     Annalisa verbalized understanding. Orders updated.     Ivette Fowler RN, BSN, OCN  Breast Nurse Navigator  [facility]  [facility]  Phone: [phone]

## 2021-03-18 NOTE — LETTER
3/18/2021         RE: Ghazal Martinez  [address]  [place] [address]        Dear Colleague,    Thank you for referring your patient, Ghazal Martinez, to the [facility]. Please see a copy of my visit note below.    [facility] Follow Up Note    CHIEF COMPLAINT:  Left breast cancer    HISTORY OF PRESENT ILLNESS:  Ghazal Martinez is a 40 year old female who is seen in follow up for newly diagnosed left breast grade 3 invasive ductal carcinoma, ER 95% positive, SC 70% positive, HER 2 negative (equivocal by FISH and IHC) at 10:00, 3cm FN measuring 2.2cm.      Ghazal presented for diagnostic imaging on 2/12/2021 due to a palpable lump in her left breast. Imaging revealed a 2.2cm irregular hypoechoic mass at 10:00, 3cm FN with angular margins on US. Mammogram revealed a 1.5cm irregular mass. No areas of concern on the right breast. No axillary adenopathy. Contrast enhanced mammogram revealed the mass enhances and measures 1.7cm.    I saw her on 3/11 to discuss the above pathology and imaging. She has since had a breast MRI which revealed the left breast index cancer measuring 2.2cm with 2 potential satellite lesions at 11:00, one posterior measuring 8mm and the other mid breast measuring 7mm with the distance from the posterior lesion measuring 10cm. There was a mildly enlarged left internal mammary node as well as 2 equivocal abnormal nodes in the axilla.     She was seen today with a phone interpretor.     REVIEW OF SYSTEMS:  Constitutional:  Negative for chills, fatigue, fever and weight change.  Eyes:  Negative for blurred vision, eye pain and photophobia.  ENT:  Negative for hearing problems, ENT pain, congestion, rhinorrhea, epistaxis, hoarseness and dental problems.  Cardiovascular:  Negative for chest pain, palpitations, tachycardia, orthopnea and edema.  Respiratory:  Negative for cough, dyspnea and hemoptysis.  Gastrointestinal:  Negative for abdominal  pain, heartburn, constipation, diarrhea and stool changes.  Musculoskeletal:  Negative for arthralgias, back pain and myalgias.  Integumentary/Breast:  See HPI.    Past Medical History:   Diagnosis Date     Varicose veins of legs        Past Surgical History:   Procedure Laterality Date     NO HISTORY OF SURGERY         Family History   Problem Relation Age of Onset     No Known Problems Mother      No Known Problems Father      Diabetes No family hx of      Coronary Artery Disease No family hx of      Hypertension No family hx of      Hyperlipidemia No family hx of      Cerebrovascular Disease No family hx of      Breast Cancer No family hx of      Colon Cancer No family hx of      Depression No family hx of      Anxiety Disorder No family hx of      Asthma No family hx of      Thyroid Disease No family hx of        Social History     Tobacco Use     Smoking status: Never Smoker     Smokeless tobacco: Never Used   Substance Use Topics     Alcohol use: No       Patient Active Problem List   Diagnosis     Varicose veins of legs     Cervical cancer screening     Class 1 obesity due to excess calories without serious comorbidity with body mass index (BMI) of 31.0 to 31.9 in adult     Iron deficiency anemia due to chronic blood loss     Prediabetes     Vitamin D deficiency     Menorrhagia with regular cycle     Intramural leiomyoma of uterus     Malignant neoplasm of central portion of left breast in female, estrogen receptor positive (H)     No Known Allergies  Current Outpatient Medications   Medication Sig Dispense Refill     clotrimazole (LOTRIMIN) 1 % external cream Apply topically 2 times daily To soles of feet until rash resolves (Patient not taking: Reported on 1/25/2021) 60 g 1     desogestrel-ethinyl estradiol (JULEBER) 0.15-30 MG-MCG tablet Take 1 tablet by mouth daily 84 tablet 4     docusate sodium (COLACE) 100 MG capsule Take 1 capsule (100 mg) by mouth daily (Patient not taking: Reported on 1/25/2021) 30  capsule 3     ferrous sulfate (FEROSUL) 325 (65 Fe) MG tablet Take 1 tablet (325 mg) by mouth daily (with breakfast) (Patient not taking: Reported on 1/25/2021) 90 tablet 3     fluticasone (FLONASE) 50 MCG/ACT nasal spray Spray 1 spray into both nostrils daily (Patient not taking: Reported on 1/25/2021) 11.1 mL 3     omeprazole (PRILOSEC) 20 MG DR capsule Take 1 capsule (20 mg) by mouth daily (Patient not taking: Reported on 1/25/2021) 30 capsule 3     tretinoin (RETIN-A) 0.01 % external gel Apply topically At Bedtime Followed by moisturizer 45 g 1       ASSESSMENT/PLAN:  Ghazal Martinez is a 40yof with left breast a 2.2cm grade 3 IDC, ER/AK positive, HER 2 negative with MR revealing concern for satellite lesions in the left breast quite a distance away from the primary tumor. She is scheduled for left breast US and biopsy of these next week. If positive, I would recommend mastectomy with or without reconstruction. Her MRI also revealed concern for equivocal left axillary nodes and I will ask radiology to also perform axillary ultrasound and possible biopsy of these nodes as well. She will see Dr. Fine tomorrow to discuss if there is need for any additional imaging given the internal mammary node being mildly enlarged. At this time, I do feel that surgery 1st is a good option given the strongly hormone positive nature of this cancer and overall small size  but she will discuss with Dr. Fine as well. Consideration of oncotype prior to surgery is also an option to see if role of neoadjuvant chemotherapy. We did discuss the details further regarding mastectomy. She is not a candidate for nipple sparing given cancer proximity to nipple. We discussed reconstruction options and she  is interested in meeting with plastic surgery. A referral was placed and Renetta will call to discuss further.   - next steps:  -- Dr. Fine appt tomorrow  -- left breast US and biopsy, axillary US possible biopsy next week  --  Plastic surgery consult next week  -- determine surgery 1st vs chemotherapy 1st plan and schedule accordingly    Una Fonseca MD  [facility]  [phone]        Please route or send letter to:  Primary Care Provider (PCP) and Referring Provider          Again, thank you for allowing me to participate in the care of your patient.        Sincerely,        Una Fonseca MD

## 2021-03-18 NOTE — NURSING NOTE
Ghazal Martinez's goals for this visit include:   Chief Complaint   Patient presents with     RECHECK     discuss surgical options       She requests these members of her care team be copied on today's visit information: no    PCP: No Ref-Primary, Physician    Referring Provider:  No referring provider defined for this encounter.    There were no vitals taken for this visit.    Do you need any medication refills at today's visit? No    Annabella Choe LPN

## 2021-03-18 NOTE — PROGRESS NOTES
Hematology Consultation:  Date on this visit: 3/19/2021    Ghazal Martinez  is referred by Dr.Sara Fonseca for a hematology consultation. She requires evaluation for new diagnosis of breast cancer.    PCP: Eun Patton  Breast surgeon: Dr. Fonseca  History Of Present Illness:  Ms. Martinez is a 40 year old female who presents with new diagnosis of breast cancer.  She presented in Feb 2021 with L breast lump and itching x 1 month.  B/l diagnostic mammogram with tomosynthesis on 2/12/2021 showed in the area of left breast lump, there is an irregular mass measuringabout 1.5 cm. No suspicious mammographic findings in the right breast. L breast US showed in the left breast at 10:00, 3 cm from the nipple, there was an irregular hypoechoic mass with angular margins measuring 2.2 x 1.4 x 1 cm. There was no lymphadenopathy in the left axilla. Contrast enhanced mammogram on 2/23/2021 showed a mass at the 10:00 position in left breast anterior depth measuring 1.7 cm. US guided core needle biopsy on 2/23/2021 showed no decompensating invasive ductal carcinoma, estrogen receptor positive (95%, strong) and progesterone receptor positive by immunohistochemistry (70%). By FISH HER2/MIO 17 ratio:  1.7, IHC 2+, additional 20 cells from areas corresponding to the most intense IHC staining were evaluated by FISH. The results obtained from these 20   additional cells also fall into Group 4. Taken together, the FISH and IHC   results are interpreted as HER2 negative.  FISH and IHC results ultimately interpreted as HER2 negative.  She proceeded to undergo b/l breast MRI on 3/12/2021 which showed:  The index cancer noted at 10:00 3 cm from the nipple on the  left on ultrasound exam 2/23/2021 has a longest diameter of 22 mm.   2 potential satellite lesions are both present in the same quadrant at  11:00. One is posteriorly situated has a longest diameter of 8 mm on  sagittal imaging, the other is at the junction of the mid  and  posterior breast measuring 7 mm in longest diameter on sagittal  imaging.   Distance from anterior aspect of the index lesion to the posterior  aspect of the closer potential satellite lesion is 7 cm, and to the  more distant potential satellite lesion is 10 cm.   As far as regional lymph nodes, there was a single internal mammary  chain node that's mildly enlarged between the first and second left  costal cartilages. As far as left axillary lymph nodes, at 2:00  posteriorly, there are 2 equivocally abnormal nodes.  The right breast was unremarkable.  An ultrasound guided biopsy of 11:00 lesion is planned.  Ghazal is premenopausal.  She is originally from [place].  History was obtained with the help of a .  She has 5 children ages 5-16.  She is not planning to have any more children.  She has been menstruating regularly.  She feels the breast mass in her left breast, stable for about a month.  It still itches.  She offers no other health related complaints. In addition, a complete 12 point  review of systems is negative.    Past Medical/Surgical History:  Past Medical History:   Diagnosis Date     Varicose veins of legs      Past Surgical History:   Procedure Laterality Date     NO HISTORY OF SURGERY       Allergies:  Allergies as of 03/19/2021     (No Known Allergies)     Current Medications:  Current Outpatient Medications   Medication Sig Dispense Refill     clotrimazole (LOTRIMIN) 1 % external cream Apply topically 2 times daily To soles of feet until rash resolves (Patient not taking: Reported on 1/25/2021) 60 g 1     desogestrel-ethinyl estradiol (JULEBER) 0.15-30 MG-MCG tablet Take 1 tablet by mouth daily 84 tablet 4     docusate sodium (COLACE) 100 MG capsule Take 1 capsule (100 mg) by mouth daily (Patient not taking: Reported on 1/25/2021) 30 capsule 3     ferrous sulfate (FEROSUL) 325 (65 Fe) MG tablet Take 1 tablet (325 mg) by mouth daily (with breakfast) (Patient not taking:  "Reported on 1/25/2021) 90 tablet 3     fluticasone (FLONASE) 50 MCG/ACT nasal spray Spray 1 spray into both nostrils daily (Patient not taking: Reported on 1/25/2021) 11.1 mL 3     omeprazole (PRILOSEC) 20 MG DR capsule Take 1 capsule (20 mg) by mouth daily (Patient not taking: Reported on 1/25/2021) 30 capsule 3     tretinoin (RETIN-A) 0.01 % external gel Apply topically At Bedtime Followed by moisturizer 45 g 1      Family History:  Family History   Problem Relation Age of Onset     No Known Problems Mother      No Known Problems Father     There is no family history of breast or ovarian cancer.  Cousin had uterine cancer.    Social History:  Social History     Socioeconomic History     Marital status:      Spouse name: Familia     Number of children: 5   Occupational History     Occupation: homemaker   Tobacco Use     Smoking status: Never Smoker     Smokeless tobacco: Never Used   Substance and Sexual Activity     Alcohol use: No     Drug use: No     Sexual activity: Yes     Partners: Male     Birth control/protection: Pill   Social History Narrative    From [place] to [place] in [place].      Physical Exam:  /88 (BP Location: Right arm)   Pulse 76   Temp 97.8  F (36.6  C) (Oral)   Resp 14   Ht 1.657 m (5' 5.25\")   Wt 80.4 kg (177 lb 3.2 oz)   LMP 03/16/2021   SpO2 99%   Breastfeeding No   BMI 29.26 kg/m          GENERAL APPEARANCE: healthy, alert and no distress     HENT: PEERLA     NECK: no adenopathy, no asymmetry or masses     LYMPHATICS: No cervical, supraclavicular, axillary  lymphadenopathy     RESP: lungs clear to auscultation - no rales, rhonchi or wheezes     CARDIOVASCULAR: regular rates and rhythm, normal S1 S2, no S3 or S4 and no murmur.     ABDOMEN:  soft, nontender, no HSM or masses and bowel sounds normal     MUSCULOSKELETAL: extremities normal- no gross deformities noted, no evidence of inflammation in joints, FROM in all extremities. No edema b/l LE.     SKIN: no suspicious " lesions or rashes     PSYCHIATRIC: mentation appears normal and affect normal  Breast: 2 cm palpable, movable, left breast mass at 10 o'clock position, superomedial to the left nipple areolar complex.  No other breast masses bilaterally.  No axillary lymphadenopathy bilaterally.    Laboratory/Imaging Studies  Labs reviewed and documented in the EMR.    ASSESSMENT/PLAN:  Ghazal is a very pleasant 40-year-old Indonesian-speaking woman, originally from [place], with new diagnosis of cT2Nx Gore grade 3, ER positive, OR positive HER-2/brayden negative invasive ductal carcinoma of left breast.  She has 2 additional lesions in the left breast noted on breast MRI, at the 11 o'clock position, 7 and 10 cm from the nipple, but remote from her biopsy-proven cancer at 10 o'clock position 3 cm from the nipple.  She also has 2 equivocal left axillary lymph nodes and 1 mildly enlarged internal mammary lymph node on breast MRI.  I have discussed her situation with Dr. Fonseca.  I favor proceeding with PET CT scan for further evaluation of the extent of her disease, including local regional metastasis and to rule out distant metastasis.  The mildly enlarged internal mammary lymph node may be reactive, possibly due to recent core needle biopsy, and if it is negative by PET, then the plan would be for the patient to undergo left mastectomy, axillary sentinel lymph node biopsy and reconstruction, given two additional suspicious breast lesions (biopsy planned for next week following PET/CT).  The core from the needle biopsy of her biopsy-proven left breast cancer was 12 millimeters in length, and we will obtain Oncotype DX on the core needle biopsy specimen to help us guide adjuvant systemic therapy decisions.  All of the above was reviewed with the patient.  She is agreeable with the plan.  We will also obtain baseline CBC differential, CMP today.  She reports she has already met with genetic counselor.  We will proceed with genetic  testing today.  We'll inform the patient of the results and recommendations and  f/up plan based on the results.   It was my pleasure to meet with Ghazal.  At the end of our visit patient verbalized understanding and concurred with the plan.    Addendum:  PET CT scan on March 22, 2021 showed:  1. FDG avid mass in the medial upper quadrant of the left breast  measuring 1.7 x 1.1 cm and SUV max 8.3, corresponding to the  previously biopsied lesion.   1a. Additional mildly FDG avid 0.8 cm lesion in the same quadrant.  5  mm nodule superior-medial to this, below the threshold of PET.  2. No suspicious axillary lymphadenopathy or hypermetabolic lesions in  the right breast.  3. Mildly FDG avid hypoattenuating nodule in the left thyroid lobe  measuring 1.2 x 0.7 cm and SUV max 4.6. Recommend thyroid ultrasound  to further evaluate.  4. Perifissural solid 7 mm pulmonary nodule in the right upper lobe  likely represents a lymph node. Attention on follow-up.  5. No suspicious FDG uptake in the abdomen or pelvis.  . Additional normal-appearing non-FDG avid intramammary  lymph nodes on the left.  We will refer to endocrinology for evaluation of thyroid nodule.  We will follow follow-up with CT chest in the future and pulmonary nodule.  Left breast and axillary lymph node biopsy is planned for March 29.    Addendum: per communication with Dr. Fonseca- since the 8mm satellite lesion is pet avid, cancel L breast biopsy of this lesion. Plan to proceed with b/l skin sparing mastectomy and L axillary SLN biopsy on 4/5/2021.  She is seeing Dr. Gaming from endocrinology on 4/13/2021.  We'll see her 2-3 weeks after mastectomy to discuss plan.    Addendum: OncotypeDx returned at 23, c/w intermediate risk, 9 percent of distant disease recurrence with the use of systemic endocrine therapy and  6.5%  benefit of adjuvant chemotherapy given young age, premenopausal. We'll be recommending adjuvant chemotherapy. Given grade 3 disease,  young age, leaning toward weekly Taxol followed by dose dense AC vs. TC every 3 weeks x4. Will make final deternination after path from definitive surgery is back and after discussion of pros and cons with the patient. Will obtain MUGA scan for LVEF baseline.

## 2021-03-19 ENCOUNTER — ONCOLOGY VISIT (OUTPATIENT)
Dept: ONCOLOGY | Facility: CLINIC | Age: 41
End: 2021-03-19
Attending: SURGERY
Payer: COMMERCIAL

## 2021-03-19 ENCOUNTER — PRE VISIT (OUTPATIENT)
Dept: ONCOLOGY | Facility: CLINIC | Age: 41
End: 2021-03-19

## 2021-03-19 VITALS
OXYGEN SATURATION: 99 % | BODY MASS INDEX: 29.52 KG/M2 | HEART RATE: 76 BPM | RESPIRATION RATE: 14 BRPM | DIASTOLIC BLOOD PRESSURE: 88 MMHG | SYSTOLIC BLOOD PRESSURE: 138 MMHG | WEIGHT: 177.2 LBS | HEIGHT: 65 IN | TEMPERATURE: 97.8 F

## 2021-03-19 DIAGNOSIS — Z80.49 FAMILY HISTORY OF UTERINE CANCER: ICD-10-CM

## 2021-03-19 DIAGNOSIS — C77.1: ICD-10-CM

## 2021-03-19 DIAGNOSIS — E04.1 THYROID NODULE: ICD-10-CM

## 2021-03-19 DIAGNOSIS — Z51.81 ENCOUNTER FOR THERAPEUTIC DRUG MONITORING: ICD-10-CM

## 2021-03-19 DIAGNOSIS — C50.912 INVASIVE DUCTAL CARCINOMA OF BREAST, FEMALE, LEFT (H): Primary | ICD-10-CM

## 2021-03-19 DIAGNOSIS — R92.8 ABNORMAL MAGNETIC RESONANCE IMAGING OF LEFT BREAST: ICD-10-CM

## 2021-03-19 DIAGNOSIS — C50.112 MALIGNANT NEOPLASM OF CENTRAL PORTION OF LEFT BREAST IN FEMALE, ESTROGEN RECEPTOR POSITIVE (H): ICD-10-CM

## 2021-03-19 DIAGNOSIS — Z79.899 HIGH RISK MEDICATION USE: ICD-10-CM

## 2021-03-19 DIAGNOSIS — C50.912 MALIGNANT NEOPLASM OF LEFT BREAST IN FEMALE, ESTROGEN RECEPTOR POSITIVE, UNSPECIFIED SITE OF BREAST (H): ICD-10-CM

## 2021-03-19 DIAGNOSIS — Z17.0 MALIGNANT NEOPLASM OF CENTRAL PORTION OF LEFT BREAST IN FEMALE, ESTROGEN RECEPTOR POSITIVE (H): ICD-10-CM

## 2021-03-19 DIAGNOSIS — C50.919: ICD-10-CM

## 2021-03-19 DIAGNOSIS — C77.3: ICD-10-CM

## 2021-03-19 DIAGNOSIS — Z17.0 MALIGNANT NEOPLASM OF LEFT BREAST IN FEMALE, ESTROGEN RECEPTOR POSITIVE, UNSPECIFIED SITE OF BREAST (H): ICD-10-CM

## 2021-03-19 DIAGNOSIS — R59.0 AXILLARY LYMPHADENOPATHY: ICD-10-CM

## 2021-03-19 LAB
ALBUMIN SERPL-MCNC: 3.4 G/DL (ref 3.4–5)
ALP SERPL-CCNC: 71 U/L (ref 40–150)
ALT SERPL W P-5'-P-CCNC: 112 U/L (ref 0–50)
ANION GAP SERPL CALCULATED.3IONS-SCNC: 5 MMOL/L (ref 3–14)
AST SERPL W P-5'-P-CCNC: 59 U/L (ref 0–45)
BASOPHILS # BLD AUTO: 0 10E9/L (ref 0–0.2)
BASOPHILS NFR BLD AUTO: 0.5 %
BILIRUB SERPL-MCNC: 0.1 MG/DL (ref 0.2–1.3)
BUN SERPL-MCNC: 12 MG/DL (ref 7–30)
CALCIUM SERPL-MCNC: 8.8 MG/DL (ref 8.5–10.1)
CHLORIDE SERPL-SCNC: 111 MMOL/L (ref 94–109)
CO2 SERPL-SCNC: 24 MMOL/L (ref 20–32)
CREAT SERPL-MCNC: 0.63 MG/DL (ref 0.52–1.04)
DIFFERENTIAL METHOD BLD: NORMAL
EOSINOPHIL # BLD AUTO: 0.1 10E9/L (ref 0–0.7)
EOSINOPHIL NFR BLD AUTO: 0.9 %
ERYTHROCYTE [DISTWIDTH] IN BLOOD BY AUTOMATED COUNT: 12.6 % (ref 10–15)
GFR SERPL CREATININE-BSD FRML MDRD: >90 ML/MIN/{1.73_M2}
GLUCOSE SERPL-MCNC: 88 MG/DL (ref 70–99)
HCT VFR BLD AUTO: 42.3 % (ref 35–47)
HGB BLD-MCNC: 14 G/DL (ref 11.7–15.7)
IMM GRANULOCYTES # BLD: 0 10E9/L (ref 0–0.4)
IMM GRANULOCYTES NFR BLD: 0.2 %
LABORATORY COMMENT REPORT: NORMAL
LYMPHOCYTES # BLD AUTO: 1.7 10E9/L (ref 0.8–5.3)
LYMPHOCYTES NFR BLD AUTO: 25.7 %
MCH RBC QN AUTO: 28.9 PG (ref 26.5–33)
MCHC RBC AUTO-ENTMCNC: 33.1 G/DL (ref 31.5–36.5)
MCV RBC AUTO: 87 FL (ref 78–100)
MISCELLANEOUS TEST: NORMAL
MONOCYTES # BLD AUTO: 0.5 10E9/L (ref 0–1.3)
MONOCYTES NFR BLD AUTO: 7 %
NEUTROPHILS # BLD AUTO: 4.2 10E9/L (ref 1.6–8.3)
NEUTROPHILS NFR BLD AUTO: 65.7 %
PLATELET # BLD AUTO: 300 10E9/L (ref 150–450)
POTASSIUM SERPL-SCNC: 4 MMOL/L (ref 3.4–5.3)
PROT SERPL-MCNC: 7.7 G/DL (ref 6.8–8.8)
RBC # BLD AUTO: 4.84 10E12/L (ref 3.8–5.2)
SARS-COV-2 RNA RESP QL NAA+PROBE: NEGATIVE
SODIUM SERPL-SCNC: 140 MMOL/L (ref 133–144)
SPECIMEN SOURCE: NORMAL
WBC # BLD AUTO: 6.5 10E9/L (ref 4–11)

## 2021-03-19 PROCEDURE — 85025 COMPLETE CBC W/AUTO DIFF WBC: CPT | Performed by: INTERNAL MEDICINE

## 2021-03-19 PROCEDURE — 36415 COLL VENOUS BLD VENIPUNCTURE: CPT | Performed by: INTERNAL MEDICINE

## 2021-03-19 PROCEDURE — 99205 OFFICE O/P NEW HI 60 MIN: CPT | Performed by: INTERNAL MEDICINE

## 2021-03-19 PROCEDURE — 80053 COMPREHEN METABOLIC PANEL: CPT | Performed by: INTERNAL MEDICINE

## 2021-03-19 ASSESSMENT — MIFFLIN-ST. JEOR: SCORE: 1473.61

## 2021-03-19 ASSESSMENT — PAIN SCALES - GENERAL: PAINLEVEL: MILD PAIN (2)

## 2021-03-19 NOTE — NURSING NOTE
"Oncology Rooming Note    March 19, 2021 9:41 AM   Ghazal Martinez is a 40 year old female who presents for:    Chief Complaint   Patient presents with     Oncology Clinic Visit     New Patient     Initial Vitals: /88 (BP Location: Right arm)   Pulse 76   Temp 97.8  F (36.6  C) (Oral)   Resp 14   Ht 1.657 m (5' 5.25\")   Wt 80.4 kg (177 lb 3.2 oz)   SpO2 99%   BMI 29.26 kg/m   Estimated body mass index is 29.26 kg/m  as calculated from the following:    Height as of this encounter: 1.657 m (5' 5.25\").    Weight as of this encounter: 80.4 kg (177 lb 3.2 oz). Body surface area is 1.92 meters squared.  Mild Pain (2) Comment: Data Unavailable   No LMP recorded.  Allergies reviewed: Yes  Medications reviewed: Yes    Medications: Medication refills not needed today.  Pharmacy name entered into [facility]:    [facility] - [place], [place] - [address] [id number] - [place], [place] - [address]    Clinical concerns: None       Catie Fischer CMA            "

## 2021-03-19 NOTE — LETTER
3/19/2021         RE: Ghazal Martinez  [address]  [place] [address]        Dear Colleague,    Thank you for referring your patient, Ghazal Martinez, to the [facility]. Please see a copy of my visit note below.    Hematology Consultation:  Date on this visit: 3/19/2021    Ghazal Martinez  is referred by Dr.Sara Fonseca for a hematology consultation. She requires evaluation for new diagnosis of breast cancer.    PCP: Eun Patton  Breast surgeon: Dr. Fonseca  History Of Present Illness:  Ms. Martinez is a 40 year old female who presents with new diagnosis of breast cancer.  She presented in Feb 2021 with L breast lump and itching x 1 month.  B/l diagnostic mammogram with tomosynthesis on 2/12/2021 showed in the area of left breast lump, there is an irregular mass measuringabout 1.5 cm. No suspicious mammographic findings in the right breast. L breast US showed in the left breast at 10:00, 3 cm from the nipple, there was an irregular hypoechoic mass with angular margins measuring 2.2 x 1.4 x 1 cm. There was no lymphadenopathy in the left axilla. Contrast enhanced mammogram on 2/23/2021 showed a mass at the 10:00 position in left breast anterior depth measuring 1.7 cm. US guided core needle biopsy on 2/23/2021 showed no decompensating invasive ductal carcinoma, estrogen receptor positive (95%, strong) and progesterone receptor positive by immunohistochemistry (70%). By FISH HER2/MIO 17 ratio:  1.7, IHC 2+, additional 20 cells from areas corresponding to the most intense IHC staining were evaluated by FISH. The results obtained from these 20   additional cells also fall into Group 4. Taken together, the FISH and IHC   results are interpreted as HER2 negative.  FISH and IHC results ultimately interpreted as HER2 negative.  She proceeded to undergo b/l breast MRI on 3/12/2021 which showed:  The index cancer noted at 10:00 3 cm from the nipple on the  left on  ultrasound exam 2/23/2021 has a longest diameter of 22 mm.   2 potential satellite lesions are both present in the same quadrant at  11:00. One is posteriorly situated has a longest diameter of 8 mm on  sagittal imaging, the other is at the junction of the mid and  posterior breast measuring 7 mm in longest diameter on sagittal  imaging.   Distance from anterior aspect of the index lesion to the posterior  aspect of the closer potential satellite lesion is 7 cm, and to the  more distant potential satellite lesion is 10 cm.   As far as regional lymph nodes, there was a single internal mammary  chain node that's mildly enlarged between the first and second left  costal cartilages. As far as left axillary lymph nodes, at 2:00  posteriorly, there are 2 equivocally abnormal nodes.  The right breast was unremarkable.  An ultrasound guided biopsy of 11:00 lesion is planned.  Ghazal is premenopausal.  She is originally from [place].  History was obtained with the help of a .  She has 5 children ages 5-16.  She is not planning to have any more children.  She has been menstruating regularly.  She feels the breast mass in her left breast, stable for about a month.  It still itches.  She offers no other health related complaints. In addition, a complete 12 point  review of systems is negative.    Past Medical/Surgical History:  Past Medical History:   Diagnosis Date     Varicose veins of legs      Past Surgical History:   Procedure Laterality Date     NO HISTORY OF SURGERY       Allergies:  Allergies as of 03/19/2021     (No Known Allergies)     Current Medications:  Current Outpatient Medications   Medication Sig Dispense Refill     clotrimazole (LOTRIMIN) 1 % external cream Apply topically 2 times daily To soles of feet until rash resolves (Patient not taking: Reported on 1/25/2021) 60 g 1     desogestrel-ethinyl estradiol (JULEBER) 0.15-30 MG-MCG tablet Take 1 tablet by mouth daily 84 tablet 4     docusate  "sodium (COLACE) 100 MG capsule Take 1 capsule (100 mg) by mouth daily (Patient not taking: Reported on 1/25/2021) 30 capsule 3     ferrous sulfate (FEROSUL) 325 (65 Fe) MG tablet Take 1 tablet (325 mg) by mouth daily (with breakfast) (Patient not taking: Reported on 1/25/2021) 90 tablet 3     fluticasone (FLONASE) 50 MCG/ACT nasal spray Spray 1 spray into both nostrils daily (Patient not taking: Reported on 1/25/2021) 11.1 mL 3     omeprazole (PRILOSEC) 20 MG DR capsule Take 1 capsule (20 mg) by mouth daily (Patient not taking: Reported on 1/25/2021) 30 capsule 3     tretinoin (RETIN-A) 0.01 % external gel Apply topically At Bedtime Followed by moisturizer 45 g 1      Family History:  Family History   Problem Relation Age of Onset     No Known Problems Mother      No Known Problems Father     There is no family history of breast or ovarian cancer.  Cousin had uterine cancer.    Social History:  Social History     Socioeconomic History     Marital status:      Spouse name: Familia     Number of children: 5   Occupational History     Occupation: homemaker   Tobacco Use     Smoking status: Never Smoker     Smokeless tobacco: Never Used   Substance and Sexual Activity     Alcohol use: No     Drug use: No     Sexual activity: Yes     Partners: Male     Birth control/protection: Pill   Social History Narrative    From [place] to [place] in [place].      Physical Exam:  /88 (BP Location: Right arm)   Pulse 76   Temp 97.8  F (36.6  C) (Oral)   Resp 14   Ht 1.657 m (5' 5.25\")   Wt 80.4 kg (177 lb 3.2 oz)   LMP 03/16/2021   SpO2 99%   Breastfeeding No   BMI 29.26 kg/m          GENERAL APPEARANCE: healthy, alert and no distress     HENT: PEERLA     NECK: no adenopathy, no asymmetry or masses     LYMPHATICS: No cervical, supraclavicular, axillary  lymphadenopathy     RESP: lungs clear to auscultation - no rales, rhonchi or wheezes     CARDIOVASCULAR: regular rates and rhythm, normal S1 S2, no S3 or S4 and no " murmur.     ABDOMEN:  soft, nontender, no HSM or masses and bowel sounds normal     MUSCULOSKELETAL: extremities normal- no gross deformities noted, no evidence of inflammation in joints, FROM in all extremities. No edema b/l LE.     SKIN: no suspicious lesions or rashes     PSYCHIATRIC: mentation appears normal and affect normal  Breast: 2 cm palpable, movable, left breast mass at 10 o'clock position, superomedial to the left nipple areolar complex.  No other breast masses bilaterally.  No axillary lymphadenopathy bilaterally.    Laboratory/Imaging Studies  Labs reviewed and documented in the EMR.    ASSESSMENT/PLAN:  Ghazal is a very pleasant 40-year-old Montenegrin-speaking woman, originally from [place], with new diagnosis of cT2Nx Eligio grade 3, ER positive, WA positive HER-2/brayden negative invasive ductal carcinoma of left breast.  She has 2 additional lesions in the left breast noted on breast MRI, at the 11 o'clock position, 7 and 10 cm from the nipple, but remote from her biopsy-proven cancer at 10 o'clock position 3 cm from the nipple.  She also has 2 equivocal left axillary lymph nodes and 1 mildly enlarged internal mammary lymph node on breast MRI.  I have discussed her situation with Dr. Fonseca.  I favor proceeding with PET CT scan for further evaluation of the extent of her disease, including local regional metastasis and to rule out distant metastasis.  The mildly enlarged internal mammary lymph node may be reactive, possibly due to recent core needle biopsy, and if it is negative by PET, then the plan would be for the patient to undergo left mastectomy, axillary sentinel lymph node biopsy and reconstruction, given two additional suspicious breast lesions (biopsy planned for next week following PET/CT).  The core from the needle biopsy of her biopsy-proven left breast cancer was 12 millimeters in length, and we will obtain Oncotype DX on the core needle biopsy specimen to help us guide adjuvant  systemic therapy decisions.  All of the above was reviewed with the patient.  She is agreeable with the plan.  We will also obtain baseline CBC differential, CMP today.  She reports she has already met with genetic counselor.  We will proceed with genetic testing today.  We'll inform the patient of the results and recommendations and  f/up plan based on the results.   It was my pleasure to meet with Ghazal.  At the end of our visit patient verbalized understanding and concurred with the plan.            Again, thank you for allowing me to participate in the care of your patient.        Sincerely,        Mallory Fine MD, MD

## 2021-03-22 ENCOUNTER — HOSPITAL ENCOUNTER (OUTPATIENT)
Dept: PET IMAGING | Facility: CLINIC | Age: 41
Discharge: HOME | End: 2021-03-22
Attending: INTERNAL MEDICINE | Admitting: INTERNAL MEDICINE
Payer: COMMERCIAL

## 2021-03-22 DIAGNOSIS — R59.0 AXILLARY LYMPHADENOPATHY: ICD-10-CM

## 2021-03-22 DIAGNOSIS — C77.1: ICD-10-CM

## 2021-03-22 DIAGNOSIS — C50.912 INVASIVE DUCTAL CARCINOMA OF BREAST, FEMALE, LEFT (H): ICD-10-CM

## 2021-03-22 DIAGNOSIS — C50.919: ICD-10-CM

## 2021-03-22 DIAGNOSIS — C77.3: ICD-10-CM

## 2021-03-22 PROCEDURE — 74177 CT ABD & PELVIS W/CONTRAST: CPT | Mod: 26 | Performed by: RADIOLOGY

## 2021-03-22 PROCEDURE — 71260 CT THORAX DX C+: CPT | Mod: 26 | Performed by: RADIOLOGY

## 2021-03-22 PROCEDURE — 78816 PET IMAGE W/CT FULL BODY: CPT | Mod: 26 | Performed by: RADIOLOGY

## 2021-03-22 PROCEDURE — 343N000001 HC RX 343: Performed by: INTERNAL MEDICINE

## 2021-03-22 PROCEDURE — 250N000011 HC RX IP 250 OP 636: Performed by: INTERNAL MEDICINE

## 2021-03-22 PROCEDURE — 78816 PET IMAGE W/CT FULL BODY: CPT | Mod: PI

## 2021-03-22 PROCEDURE — A9552 F18 FDG: HCPCS | Performed by: INTERNAL MEDICINE

## 2021-03-22 PROCEDURE — 71260 CT THORAX DX C+: CPT

## 2021-03-22 RX ORDER — IOPAMIDOL 755 MG/ML
10-135 INJECTION, SOLUTION INTRAVASCULAR ONCE
Status: COMPLETED | OUTPATIENT
Start: 2021-03-22 | End: 2021-03-22

## 2021-03-22 RX ADMIN — FLUDEOXYGLUCOSE F-18 11.17 MCI.: 500 INJECTION, SOLUTION INTRAVENOUS at 14:04

## 2021-03-22 RX ADMIN — IOPAMIDOL 109 ML: 755 INJECTION, SOLUTION INTRAVENOUS at 14:29

## 2021-03-22 NOTE — PATIENT INSTRUCTIONS
Assessing Cancer Risk  Only about 5-10% of cancers are thought to be due to an inherited cancer susceptibility gene.    These families often have:    Several people with the same or related types of cancer    Cancers diagnosed at a young age (before age 50)    Individuals with more than one primary cancer    Multiple generations of the family affected with cancer    Some people may be candidates for genetic testing of more than one gene.  For these families, genetic testing using a cancer panel may be offered.  These panels will test different genes known to increase the risk for breast, ovarian, uterine, and/or other cancers. All of the genes discussed below have published clinical management guidelines for individuals who are found to carry a mutation. The purpose of this handout is to serve as a brief summary of the genes analyzed by the panels used to inquire about hereditary breast and gynecologic cancer:  FLEX, BRCA1, BRCA2, BRIP1, CDH1, CHEK2, MLH1, MSH2, MSH6, PMS2, EPCAM, PTEN, PALB2, RAD51C, RAD51D, and TP53.  ______________________________________________________________________________  Hereditary Breast and Ovarian Cancer Syndrome   (BRCA1 and BRCA2)  A single mutation in one of the copies of BRCA1 or BRCA2 increases the risk for breast and ovarian cancer, among others.  The risk for pancreatic cancer and melanoma may also be slightly increased in some families.  The chart below shows the chance that someone with a BRCA mutation would develop cancer in his or her lifetime1,2,3,4.        A person s ethnic background is also important to consider, as individuals of Ashkenazi Anglican ancestry have a higher chance of having a BRCA gene mutation.  There are three BRCA mutations that occur more frequently in this population.    Salazar Syndrome   (MLH1, MSH2, MSH6, PMS2, and EPCAM)  Currently five genes are known to cause Salazar Syndrome: MLH1, MSH2, MSH6, PMS2, and EPCAM.  A single mutation in one of the  Salazar Syndrome genes increases the risk for colon, endometrial, ovarian, and stomach cancers.  Other cancers that occur less commonly in Salazar Syndrome include urinary tract, skin, and brain cancers.  The chart below shows the chance that a person with Salazar syndrome would develop cancer in his or her lifetime5.      *Cancer risk varies depending on Salazar syndrome gene found    Cowden Syndrome   (PTEN)  Cowden syndrome is a hereditary condition that increases the risk for breast, thyroid, endometrial, colon, and kidney cancer.  Cowden syndrome is caused by a mutation in the PTEN gene.  A single mutation in one of the copies of PTEN causes Cowden syndrome and increases cancer risk.  The chart below shows the chance that someone with a PTEN mutation would develop cancer in their lifetime6,7.  Other benign features seen in some individuals with Cowden syndrome include benign skin lesions (facial papules, keratoses, lipomas), learning disability, autism, thyroid nodules, colon polyps, and larger head size.      *One recent study found breast cancer risk to be increased to 85%    Li-Fraumeni Syndrome   (TP53)  Li-Fraumeni Syndrome (LFS) is a cancer predisposition syndrome caused by a mutation in the TP53 gene. A single mutation in one of the copies of TP53 increases the risk for multiple cancers. Individuals with LFS are at an increased risk for developing cancer at a young age. The lifetime risk for development of a LFS-associated cancer is 50% by age 30 and 90% by age 60.   Core Cancers: Sarcomas, Breast, Brain, Lung, Leukemias/Lymphomas, Adrenocortical carcinomas  Other Cancers: Gastrointestinal, Thyroid, Skin, Genitourinary    Hereditary Diffuse Gastric Cancer   (CDH1)  Currently, one gene is known to cause hereditary diffuse gastric cancer (HDGC): CDH1.  Individuals with HDGC are at increased risk for diffuse gastric cancer and lobular breast cancer. Of people diagnosed with HDGC, 30-50% have a mutation in the CDH1  gene.  This suggests there are likely other genes that may cause HDGC that have not been identified yet.      Lifetime Cancer Risks    General Population HDGC    Diffuse Gastric  <1% ~80%   Breast 12% 39-52%         Additional Genes  FLEX  FLEX is a moderate-risk breast cancer gene. Women who have a mutation in FLEX can have between a 2-4 fold increased risk for breast cancer compared to the general population8. FLEX mutations have also been associated with increased risk for pancreatic cancer, however an estimate of this cancer risk is not well understood9. Individuals who inherit two FLEX mutations have a condition called ataxia-telangiectasia (AT).  This rare autosomal recessive condition affects the nervous system and immune system, and is associated with progressive cerebellar ataxia beginning in childhood.  Individuals with ataxia-telangiectasia often have a weakened immune system and have an increased risk for childhood cancers.    PALB2  Mutations in PALB2 have been shown to increase the risk of breast cancer up to 33-58% in some families; where individuals fall within this risk range is dependent upon family [id number]. PALB2 mutations have also been associated with increased risk for pancreatic cancer, although this risk has not been quantified yet.  Individuals who inherit two PALB2 mutations--one from their mother and one from their father--have a condition called Fanconi Anemia.  This rare autosomal recessive condition is associated with short stature, developmental delay, bone marrow failure, and increased risk for childhood cancers.    CHEK2   CHEK2 is a moderate-risk breast cancer gene.  Women who have a mutation in CHEK2 have around a 2-fold increased risk for breast cancer compared to the general population, and this risk may be higher depending upon family history.11,12,13 Mutations in CHEK2 have also been shown to increase the risk of a number of other cancers, including colon and prostate, however  these cancer risks are currently not well understood.    BRIP1, RAD51C and RAD51D  Mutations in BRIP1, RAD51C, and RAD51D have been shown to increase the risk of ovarian cancer and possibly female breast cancer as well14,15 .       Lifetime Cancer Risk    General Population BRIP1 RAD51C RAD51D   Ovarian 1-2% ~5-8% ~5-9% ~7-15%           Inheritance  All of the cancer syndromes reviewed above are inherited in an autosomal dominant pattern.  This means that if a parent has a mutation, each of his or her children will have a 50% chance of inheriting that same mutation.  Therefore, each child--male or female--would have a 50% chance of being at increased risk for developing cancer.      Image obtained from Genetics Home Reference, 2013     Mutations in some genes can occur de shameka, which means that a person s mutation occurred for the first time in them and was not inherited from a parent.  Now that they have the mutation, however, it can be passed on to future generations.    Genetic Testing  Genetic testing involves a blood test and will look at the genetic information in the FLEX, BRCA1, BRCA2, BRIP1, CDH1, CHEK2, MLH1, MSH2, MSH6, PMS2, EPCAM, PTEN, PALB2, RAD51C, RAD51D, and TP53 genes for any harmful mutations that are associated with increased cancer risk.  If possible, it is recommended that the person(s) who has had cancer be tested before other family members.  That person will give us the most useful information about whether or not a specific gene is associated with the cancer in the family.    Results  There are three possible results of genetic testing:    Positive--a harmful mutation was identified in one or more of the genes    Negative--no mutation was identified in any of the genes on this panel    Variant of unknown significance--a variation in one of the genes was identified, but it is unclear how this impacts cancer risk in the family    Advantages and Disadvantages   There are advantages and  disadvantages to genetic testing.    Advantages    May clarify your cancer risk    Can help you make medical decisions    May explain the cancers in your family    May give useful information to your family members (if you share your results)    Disadvantages    Possible negative emotional impact of learning about inherited cancer risk    Uncertainty in interpreting a negative test result in some situations    Possible genetic discrimination concerns (see below)    Genetic Information Nondiscrimination Act (TONIA)  TONIA is a federal law that protects individuals from health insurance or employment discrimination based on a genetic test result alone.  Although rare, there are currently no legal discrimination protections in terms of life insurance, long term care, or disability insurances.  Visit the [employer] website to learn more.    Reducing Cancer Risk  All of the genes described above have nationally recognized cancer screening guidelines that would be recommended for individuals who test positive.  In addition to increased cancer screening, surgeries may be offered or recommended to reduce cancer risk.  Recommendations are based upon an individual s genetic test result as well as their personal and family history of cancer.    Questions to Think About Regarding Genetic Testing:    What effect will the test result have on me and my relationship with my family members if I have an inherited gene mutation?  If I don t have a gene mutation?    Should I share my test results, and how will my family react to this news, which may also affect them?    Are my children ready to learn new information that may one day affect their own health?    Hereditary Cancer Resources    FORCE: Facing Our Risk of Cancer Empowered facingourrisk.org   Bright Pink bebrightpink.org   Li-Fraumeni Syndrome Association lfsassociation.org   PTEN World PTENworld.com   No stomach for cancer, Inc.  nostomachforcancer.org   Stomach cancer relief network Scrnet.org   Collaborative Group of the Americas on Inherited Colorectal Cancer (CGA) cgaicc.com    Cancer Care cancercare.org   American Cancer Society (ACS) cancer.org   National Cancer [place] (NCI) cancer.gov     Please call us if you have any questions or concerns.   Cancer Risk Management Program [phone] ([phone])  ? Rajat Fishman, MS, [facility] [phone]  ? Jaquelin Gray, MS, [facility]  [phone]  ? Jessi Elizondo, MS, [facility]  [phone]  ? Mariam Cardona, MS, [facility] [phone]  ? Salome Jojo, MS, [facility] [phone]  ? Kerry Vallejo, MS, [facility]  [phone]    References  1. Marito A, Lakesha PDP, Angella S, Lady RAGSDALE, Júnior JE, Ti JL, Bibi N, Eric H, Tor O, Evangelina A, Dre B, Donnell P, Mannydia S, Oma DM, Smith N, Zulema E, Kristina H, Bola E, Royal J, Gronza J, Li B, Cate H, Thorlacius S, Eerola H, Tay H, Lindsay K, Shantal OP. Average risks of breast and ovarian cancer associated with BRCA1 or BRCA2 mutations detected in case series unselected for family history: a combined analysis of 222 studies. Am J Hum Valeria. 2003;72:1117-30.  2. Domitila DAVID, Lana M, Gwen G.  BRCA1 and BRCA2 Hereditary Breast and Ovarian Cancer. Gene Reviews online. 2013.  3. Larry YC, Matheus S, Siri G, Sigala S. Breast cancer risk among male BRCA1 and BRCA2 mutation carriers. J Natl Cancer Inst. 2007;99:1811-4.  4. Vlad LANDRY, Geovanna I, Barry J, Bozena E, Meek ER, Margaret F. Risk of breast cancer in male BRCA2 carriers. J Med Valeria. 2010;47:710-1.  5. National Comprehensive Cancer Network. Clinical practice guidelines in oncology, colorectal cancer screening. Available online (registration required). 2015.  6. Terry FARAH, Rani J, Francisca J, Trish LA, Mally DELGADO, Shanna C. Lifetime cancer risks in individuals with germline PTEN mutations. Clin Cancer Res. 2012;18:400-7.  7. Maksim RAYMUNDO. Cowden Syndrome: A Critical Review of the  Clinical Literature. J Valeria . 2009:18:13-27.  8. Ferny A, Jan D, Gonzalo S, Zakia P, Sola T, Jordin M, Cameron B, Michoacano H, Marlon R, Saleem K, Boy L, Vlad DG, Oma D, Darin DF, Kim MR, The Breast Cancer Susceptibility Collaboration () & Vladislav DAVID. FLEX mutations that cause ataxia-telangiectasia are breast cancer susceptibility alleles. Nature Genetics. 2006;38:873-875  9. Santiago N , Lilian Y, Dilia J, Danny L, Rhona GM , Rebeca ML, Gallinger S, Lynch AG, Syngal S, Emperatriz ML, Tavia J , Joaquín R, Charito SZ, Mane JR, Jeane VE, Harjeet M, Vosakina B, Manuel N, Anson RH, Merari KW, and Gina AP. FLEX mutations in patients with hereditary pancreatic cancer. Cancer Discover. 2012;2:41-46  10. Marito LUCERO, et al. Breast-Cancer Risk in Families with Mutations in PALB2. NEJM. 2014; 371(6):497-506.  11. CHEK2 Breast Cancer Case-Control Consortium. CHEK2*1100delC and susceptibility to breast cancer: A collaborative analysis involving 10,860 breast cancer cases and 9,065 controls from 10 studies. Am J Hum Valeria, 74 (2004), pp. 1754-1795  12. Katy T, Duane S, Kim K, et al. Spectrum of Mutations in BRCA1, BRCA2, CHEK2, and TP53 in Families at High Risk of Breast Cancer. HAILEE. 2006;295(12):2368-2142.   13. Opal C, Jayjay D, Kwabena A, et al. Risk of breast cancer in women with a CHEK2 mutation with and without a family history of breast cancer. J Clin Oncol. 2011;29:0796-7706.  14. Jose H, Terri E, Dontrell SJ, et al. Contribution of germline mutations in the RAD51B, RAD51C, and RAD51D genes to ovarian cancer in the population. J Clin Oncol. 2015;33(26):2993-4912. Doi:10.1200/JCO.2015.61.2408.  15. Desirae T, Regulo MEJIA, Jeny P, et al. Mutations in BRIP1 confer high risk of ovarian cancer. Liz Valeria. 2011;43(11):4088-0069. doi:10.1038/ng.955.

## 2021-03-23 ENCOUNTER — PATIENT OUTREACH (OUTPATIENT)
Dept: ONCOLOGY | Facility: CLINIC | Age: 41
End: 2021-03-23

## 2021-03-23 NOTE — PROGRESS NOTES
Received fax from [employer] requesting additional records for prior authorization. Recent visit notes with Dr. Fine and surgery faxed to [phone].

## 2021-03-24 ENCOUNTER — PREP FOR PROCEDURE (OUTPATIENT)
Dept: SURGERY | Facility: CLINIC | Age: 41
End: 2021-03-24

## 2021-03-24 ENCOUNTER — APPOINTMENT (OUTPATIENT)
Dept: INTERPRETER SERVICES | Facility: CLINIC | Age: 41
End: 2021-03-24
Payer: COMMERCIAL

## 2021-03-24 DIAGNOSIS — C50.912 MALIGNANT NEOPLASM OF LEFT BREAST (H): Primary | ICD-10-CM

## 2021-03-26 ENCOUNTER — PATIENT OUTREACH (OUTPATIENT)
Dept: ONCOLOGY | Facility: CLINIC | Age: 41
End: 2021-03-26

## 2021-03-26 LAB — LAB SCANNED RESULT: NORMAL

## 2021-03-26 NOTE — PROGRESS NOTES
Received Oncotype DX report with Recurrence Score of 23. Copy sent to scanning and copy given to Dr. Fine.

## 2021-03-29 ENCOUNTER — TELEPHONE (OUTPATIENT)
Dept: ONCOLOGY | Facility: CLINIC | Age: 41
End: 2021-03-29

## 2021-03-29 DIAGNOSIS — Z11.59 ENCOUNTER FOR SCREENING FOR OTHER VIRAL DISEASES: ICD-10-CM

## 2021-03-29 NOTE — TELEPHONE ENCOUNTER
"3/29/2021    Referring Provider: Una Fonseca MD    Presenting Information:  I spoke to Ghazal by phone today, with the aid of a , to discuss her genetic testing results. We first met on 3/15/2021 and her blood was drawn on 3/19/2021. The [facility] Breast and G[facility] Cancers Guidelines-Based Panel was ordered from [employer]. This testing was done because of Ghazal's personal and family history of breast and uterine cancers.    Genetic Testing Result: NEGATIVE  Ghazal is negative for mutations in the FLEX, BRCA1, BRCA2, BRIP1, CDH1, CHEK2, EPCAM, MLH1, MSH2, MSH6, NBN, NF1, PALB2, PMS2, PTEN, RAD51C, RAD51D, STK11, and TP53 genes. No mutations were found in any of the 19 genes analyzed. This test involved sequencing and deletion/duplication analysis of all genes with the exception of EPCAM (deletions/duplications only).    Testing did not detect an identifiable mutation associated with Hereditary Breast and Ovarian Cancer syndrome (BRCA1, BRCA2), Hereditary Diffuse Gastric Cancer (CDH1), Salazar syndrome (EPCAM, MLH1, MSH2, MSH6, PMS2), Li Fraumeni syndrome (TP53), Peutz-Jeghers syndrome (STK11), Neurofibromatosis type 1 (NF1), or Cowden syndrome (PTEN).    A copy of the test report can be found in the Laboratory tab, dated 3/19/2021, and named \"[facility]\". The report is scanned in as a linked document.    Interpretation:  We discussed several different interpretations of this negative test result.    1. One explanation may be that there is a different gene or combination of genes and environment that are associated with the cancers in this family that are not identifiable using this test. As such, Ghazal is encouraged to contact me regularly to review any new genetic testing options that may be appropriate for her.  2. Another explanation may be that her paternal cousin with early-onset uterine cancer does have a mutation in one of these 19 genes and Ghazal did not inherit it. If " that is the case, Kurtiss breast cancer may be sporadic and caused by random cellular changes.  3. There is also a small possibility that there is a mutation in one of these genes, and the testing laboratory could not find it with their current testing methods.       Screening:  Based on this negative test result, it is important for Ghazal and her relatives to refer back to the family history for appropriate cancer screening.      Ghazal should continue to follow all breast cancer treatment and future follow-up recommendations as made by her medical providers. We specifically discussed that Ghazal should discuss these negative results with Dr. Fonseca and how these results may influence final surgery decisions.    Ghazal s close female relatives remain at increased risk for breast cancer given their family history. Breast cancer screening is generally recommended to begin approximately 10 years younger than the earliest age of breast cancer diagnosis in the family, or at age 40, whichever comes first. In this family, screening may begin at age 31. Breast screening options should be discussed with an individual's primary care provider and a genetic counselor, to determine at what age to begin screening, what screening is appropriate, and if additional screening (such as breast MRI) is necessary based on personal/family history factors.    Other population cancer screening options, such as those recommended by the American Cancer Society and the National Comprehensive Cancer Network (NCCN), are also appropriate for Ghazal and her family. These screening recommendations may change if there are changes to Ghazal's personal and/or family history of cancer. Final screening recommendations should be made by each individual's managing physician.      Inheritance:  We reviewed the autosomal dominant inheritance of mutations in these 19 genes. We discussed that Ghazal did not pass on an identifiable mutation in these  genes to her children based on this test result. Mutations in these genes do not skip generations.      Additional Testing Considerations:  We also reviewed that it is still possible Ghazal does carry a currently identifiable gene or combination of genes and environment that increase her risk for breast and related cancers. We again reviewed the option of larger gene panels covering more moderate risk genes associated with hereditary cancer. Ghazal is encouraged to contact me if she wishes to readdress these larger gene panel options.     Also, although Ghazal's genetic testing result was negative, other relatives may still carry a gene mutation associated with hereditary cancer. Genetic counseling is recommended for her paternal cousin with early-onset uterine cancer to discuss her genetic testing options. If this cousin or any other relative does pursue genetic testing, Ghazal is encouraged to contact me so that we may review the impact of their test results on her.    Summary:  We do not have an explanation for Ghazal's breast cancer. While no genetic changes were identified, Ghazal may still be at risk for certain cancers due to family history, environmental factors, or other genetic causes not identified by this test. Because of that, it is important that she continue with cancer screening based on her personal and family history as discussed above.    Genetic testing is rapidly advancing, and new cancer susceptibility genes will most likely be identified in the future. Therefore, I encouraged Ghazal to contact me regularly or if there are changes in her personal or family history. This may change how we assess her cancer risk, screening, and the testing we would offer.    Plan:  1. Ghazal will be mailed a copy of her test results and this results letter (translated into [place]).  2. She plans to follow-up with her medical providers, as recommended.  3. She should contact me regular or sooner if her family  history changes.    If Ghazal has any further questions, I encouraged her to contact me at [phone].    Mariam Cardona MS, [facility]  Licensed Genetic Counselor  Office: [phone]  Pager: [phone]

## 2021-03-29 NOTE — LETTER
Cancer Risk Management  Program Locations    [facility]  [facility]  [facility]  Mailing Address  Cancer Risk Management Program  [address]  [place] [address]    New patient appointments  [phone]  April 6, 2021    Ghazal Martinez  [address]  [place] [address]      Dear Ghazal,    It was a pleasure speaking with you over the phone recently regarding your genetic testing results. Here is a copy of the progress note summarizing our conversation. If you have any additional questions, please feel free to call.    3/29/2021    Referring Provider: Una Fonseca MD    Presenting Information:  I spoke to Ghazal by phone today, with the aid of a , to discuss her genetic testing results. We first met on 3/15/2021 and her blood was drawn on 3/19/2021. The [facility] Breast [facility] G[facility] Cancers Guidelines-Based Panel was ordered from [facility]. This testing was done because of Ghazal's personal and family history of breast and uterine cancers.    Genetic Testing Result: NEGATIVE  Ghazal is negative for mutations in the FLEX, BRCA1, BRCA2, BRIP1, CDH1, CHEK2, EPCAM, MLH1, MSH2, MSH6, NBN, NF1, PALB2, PMS2, PTEN, RAD51C, RAD51D, STK11, and TP53 genes. No mutations were found in any of the 19 genes analyzed. This test involved sequencing and deletion/duplication analysis of all genes with the exception of EPCAM (deletions/duplications only).    Testing did not detect an identifiable mutation associated with Hereditary Breast and Ovarian Cancer syndrome (BRCA1, BRCA2), Hereditary Diffuse Gastric Cancer (CDH1), Salazar syndrome (EPCAM, MLH1, MSH2, MSH6, PMS2), Li Fraumeni syndrome (TP53), Peutz-Jeghers syndrome (STK11), Neurofibromatosis type 1 (NF1), or Cowden syndrome (PTEN).    A copy of the test  "report can be found in the Laboratory tab, dated 3/19/2021, and named \"SEND OUTS [facility] TEST\". The report is scanned in as a linked document.    Interpretation:  We discussed several different interpretations of this negative test result.    1. One explanation may be that there is a different gene or combination of genes and environment that are associated with the cancers in this family that are not identifiable using this test. As such, Ghazal is encouraged to contact me regularly to review any new genetic testing options that may be appropriate for her.  2. Another explanation may be that her paternal cousin with early-onset uterine cancer does have a mutation in one of these 19 genes and Ghazal did not inherit it. If that is the case, Kurtiss breast cancer may be sporadic and caused by random cellular changes.  3. There is also a small possibility that there is a mutation in one of these genes, and the testing laboratory could not find it with their current testing methods.       Screening:  Based on this negative test result, it is important for Ghazal and her relatives to refer back to the family history for appropriate cancer screening.      Ghazal should continue to follow all breast cancer treatment and future follow-up recommendations as made by her medical providers. We specifically discussed that Ghazal should discuss these negative results with Dr. Fonseca and how these results may influence final surgery decisions.    Ghazal s close female relatives remain at increased risk for breast cancer given their family history. Breast cancer screening is generally recommended to begin approximately 10 years younger than the earliest age of breast cancer diagnosis in the family, or at age 40, whichever comes first. In this family, screening may begin at age 31. Breast screening options should be discussed with an individual's primary care provider and a genetic counselor, to determine at what age to begin " screening, what screening is appropriate, and if additional screening (such as breast MRI) is necessary based on personal/family history factors.    Other population cancer screening options, such as those recommended by the American Cancer Society and the National Comprehensive Cancer Network (NCCN), are also appropriate for Ghazal and her family. These screening recommendations may change if there are changes to Ghazal's personal and/or family history of cancer. Final screening recommendations should be made by each individual's managing physician.      Inheritance:  We reviewed the autosomal dominant inheritance of mutations in these 19 genes. We discussed that Ghazal did not pass on an identifiable mutation in these genes to her children based on this test result. Mutations in these genes do not skip generations.      Additional Testing Considerations:  We also reviewed that it is still possible Ghazal does carry a currently identifiable gene or combination of genes and environment that increase her risk for breast and related cancers. We again reviewed the option of larger gene panels covering more moderate risk genes associated with hereditary cancer. Ghazal is encouraged to contact me if she wishes to readdress these larger gene panel options.     Also, although Ghazal's genetic testing result was negative, other relatives may still carry a gene mutation associated with hereditary cancer. Genetic counseling is recommended for her paternal cousin with early-onset uterine cancer to discuss her genetic testing options. If this cousin or any other relative does pursue genetic testing, Ghazal is encouraged to contact me so that we may review the impact of their test results on her.    Summary:  We do not have an explanation for Kurtiss breast cancer. While no genetic changes were identified, Ghazal may still be at risk for certain cancers due to family history, environmental factors, or other genetic causes not  identified by this test. Because of that, it is important that she continue with cancer screening based on her personal and family history as discussed above.    Genetic testing is rapidly advancing, and new cancer susceptibility genes will most likely be identified in the future. Therefore, I encouraged Ghazal to contact me regularly or if there are changes in her personal or family history. This may change how we assess her cancer risk, screening, and the testing we would offer.    Plan:  1. Ghazal will be mailed a copy of her test results and this results letter (translated into Bengali).  2. She plans to follow-up with her medical providers, as recommended.  3. She should contact me regular or sooner if her family history changes.    If Ghazal has any further questions, I encouraged her to contact me at [phone].    Mariam Cardona MS, [facility]  Licensed Genetic Counselor  Office: [phone]  Pager: [phone]

## 2021-03-29 NOTE — Clinical Note
"Please print and send a copy of this letter and the patient's genetic testing report to the patient.    Please enclose test report: \"Send outs misc test Order: [id number]\"  "

## 2021-03-30 ENCOUNTER — TELEPHONE (OUTPATIENT)
Dept: SURGERY | Facility: CLINIC | Age: 41
End: 2021-03-30

## 2021-03-30 ENCOUNTER — PREP FOR PROCEDURE (OUTPATIENT)
Dept: SURGERY | Facility: CLINIC | Age: 41
End: 2021-03-30

## 2021-03-30 LAB — LAB SCANNED RESULT: NORMAL

## 2021-03-30 NOTE — TELEPHONE ENCOUNTER
Call placed to Ghazal via  services to discuss final surgical decision. Ghazal would like to proceed with bilateral mastectomies, left SLNB, reconstruction per Dr. Fu. All Gahzal's questions answered appropriately and thoroughly.     Ghazal will await call from surgery scheduler, Renetta, to confirm surgery date. Ghazal verbalized understanding.    Ivette Fowler RN, BSN, OCN  Breast Nurse Navigator  [facility]  [facility]  Phone: [phone]

## 2021-03-31 ENCOUNTER — TELEPHONE (OUTPATIENT)
Dept: SURGERY | Facility: CLINIC | Age: 41
End: 2021-03-31

## 2021-03-31 NOTE — TELEPHONE ENCOUNTER
Type of surgery: Bilateral skin sparing mastectomy with left sentinel lymph node biopsy and port placement  Location of surgery: [facility]  Date and time of surgery: 4/5/21 at 9:45am  Surgeon: Dr. Una Fonseca  Pre-Op Appt Date: Patient to schedule  Post-Op Appt Date: Patient to schedule   Packet sent out: Yes  Pre-cert/Authorization completed:  Not Applicable  Date: 3/31/21

## 2021-04-01 ENCOUNTER — APPOINTMENT (OUTPATIENT)
Dept: INTERPRETER SERVICES | Facility: CLINIC | Age: 41
End: 2021-04-01
Payer: COMMERCIAL

## 2021-04-01 ENCOUNTER — PATIENT OUTREACH (OUTPATIENT)
Dept: ONCOLOGY | Facility: CLINIC | Age: 41
End: 2021-04-01

## 2021-04-01 NOTE — PROGRESS NOTES
Call placed to patient through interpretor services at the request of Dr. Fine to communicate that we tested her tumor for tumor genes and based on the result we will be recommending chemotherapy after her surgery to prevent the cancer from coming back. Depending on the results from the surgery we will decide on the regimen- it could be TC- every 3 weeks for four rounds (total of 12 weeks) or weekly Taxol x12 followed by dose dense ACx2 (total of 20 weeks). We'll also need her to get A MUGA scan prior to chemotherapy to make sure heart function at baseline is normal. To administer chemotherapy, Dr. Fonseca will place a port during surgery and we will discuss more in detail at her next visit after her surgery what the final recommendations will be.  Chemotherapy education on Taxol, Taxotere, Cytoxan and Adriamycin printed in Slovak mailed to patient per her request.

## 2021-04-02 ENCOUNTER — APPOINTMENT (OUTPATIENT)
Dept: INTERPRETER SERVICES | Facility: CLINIC | Age: 41
End: 2021-04-02
Payer: COMMERCIAL

## 2021-04-02 ENCOUNTER — TELEPHONE (OUTPATIENT)
Dept: SURGERY | Facility: CLINIC | Age: 41
End: 2021-04-02

## 2021-04-02 DIAGNOSIS — Z11.59 ENCOUNTER FOR SCREENING FOR OTHER VIRAL DISEASES: ICD-10-CM

## 2021-04-02 LAB
LABORATORY COMMENT REPORT: NORMAL
SARS-COV-2 RNA RESP QL NAA+PROBE: NEGATIVE
SARS-COV-2 RNA RESP QL NAA+PROBE: NORMAL
SPECIMEN SOURCE: NORMAL
SPECIMEN SOURCE: NORMAL

## 2021-04-02 PROCEDURE — U0003 INFECTIOUS AGENT DETECTION BY NUCLEIC ACID (DNA OR RNA); SEVERE ACUTE RESPIRATORY SYNDROME CORONAVIRUS 2 (SARS-COV-2) (CORONAVIRUS DISEASE [COVID-19]), AMPLIFIED PROBE TECHNIQUE, MAKING USE OF HIGH THROUGHPUT TECHNOLOGIES AS DESCRIBED BY CMS-2020-01-R: HCPCS | Performed by: SURGERY

## 2021-04-02 PROCEDURE — U0005 INFEC AGEN DETEC AMPLI PROBE: HCPCS | Performed by: SURGERY

## 2021-04-02 NOTE — TELEPHONE ENCOUNTER
Call placed to Ghazal via  services:    Reviewed the following with Ghazal:   -One visitor is allowed for hospital visits, clinic appointments, and emergency department visits. It must be the same individual for the patient s entire stay.  -Adult surgical patients can have one visitor only before surgery.  -One or two visitors may attend a patient discharge meeting for hands-on training.  -Visitation hours for adult inpatients are 8 a.m. to 8:30 p.m.  -Please wear a mask to your appointment.   -Please arrive at 7:45 am at [facility] Welcome Desk.     Post op appointment scheduled with Dr. Fonseca on 4/15 arriving at 10 am in [place].    Ghazal verbalized understanding.    Ivette Fowler RN, BSN, OCN  Breast Nurse Navigator  [facility]  [facility]  Phone: [phone]

## 2021-04-02 NOTE — PROGRESS NOTES
PTA medications updated by Medication Scribe prior to surgery via phone call with patient        Comments:    Medication history sources: Patient, Surescripts and H&P  Medication history source reliability: Good  Adherence assessment: N/A Not Observed    Significant changes made to the medication list:  Patient reports no longer taking the following meds (med scribe removed from PTA med list): Osiel Perestinoin      Additional medication history information:   None        Prior to Admission medications    Medication Sig Last Dose Taking? Auth Provider   ferrous sulfate (FEROSUL) 325 (65 Fe) MG tablet Take 1 tablet (325 mg) by mouth daily (with breakfast) 3/28/2021 at AM Yes Bruce Childs MD

## 2021-04-04 ENCOUNTER — ANESTHESIA EVENT (OUTPATIENT)
Dept: SURGERY | Facility: CLINIC | Age: 41
End: 2021-04-04
Payer: COMMERCIAL

## 2021-04-05 ENCOUNTER — ANESTHESIA (OUTPATIENT)
Dept: SURGERY | Facility: CLINIC | Age: 41
End: 2021-04-05
Payer: COMMERCIAL

## 2021-04-05 ENCOUNTER — APPOINTMENT (OUTPATIENT)
Dept: SURGERY | Facility: PHYSICIAN GROUP | Age: 41
End: 2021-04-05
Payer: COMMERCIAL

## 2021-04-05 ENCOUNTER — HOSPITAL ENCOUNTER (OUTPATIENT)
Dept: NUCLEAR MEDICINE | Facility: CLINIC | Age: 41
Setting detail: NUCLEAR MEDICINE
Discharge: HOME | End: 2021-04-05
Attending: SURGERY | Admitting: SURGERY
Payer: COMMERCIAL

## 2021-04-05 ENCOUNTER — APPOINTMENT (OUTPATIENT)
Dept: GENERAL RADIOLOGY | Facility: CLINIC | Age: 41
End: 2021-04-05
Attending: SURGERY
Payer: COMMERCIAL

## 2021-04-05 ENCOUNTER — HOSPITAL ENCOUNTER (OUTPATIENT)
Facility: CLINIC | Age: 41
Discharge: HOME | End: 2021-04-06
Attending: SURGERY | Admitting: SURGERY
Payer: COMMERCIAL

## 2021-04-05 DIAGNOSIS — Z17.0 MALIGNANT NEOPLASM OF CENTRAL PORTION OF LEFT BREAST IN FEMALE, ESTROGEN RECEPTOR POSITIVE (H): Primary | ICD-10-CM

## 2021-04-05 DIAGNOSIS — Z17.0 MALIGNANT NEOPLASM OF CENTRAL PORTION OF LEFT BREAST IN FEMALE, ESTROGEN RECEPTOR POSITIVE (H): ICD-10-CM

## 2021-04-05 DIAGNOSIS — C50.912 MALIGNANT NEOPLASM OF LEFT BREAST (H): ICD-10-CM

## 2021-04-05 DIAGNOSIS — C50.112 MALIGNANT NEOPLASM OF CENTRAL PORTION OF LEFT BREAST IN FEMALE, ESTROGEN RECEPTOR POSITIVE (H): ICD-10-CM

## 2021-04-05 DIAGNOSIS — C50.112 MALIGNANT NEOPLASM OF CENTRAL PORTION OF LEFT BREAST IN FEMALE, ESTROGEN RECEPTOR POSITIVE (H): Primary | ICD-10-CM

## 2021-04-05 LAB — HCG UR QL: NEGATIVE

## 2021-04-05 PROCEDURE — 258N000003 HC RX IP 258 OP 636: Performed by: NURSE ANESTHETIST, CERTIFIED REGISTERED

## 2021-04-05 PROCEDURE — 250N000009 HC RX 250: Performed by: PLASTIC SURGERY

## 2021-04-05 PROCEDURE — 999N000179 XR SURGERY CARM FLUORO LESS THAN 5 MIN W STILLS

## 2021-04-05 PROCEDURE — 88332 PATH CONSLTJ SURG EA ADD BLK: CPT | Mod: TC | Performed by: SURGERY

## 2021-04-05 PROCEDURE — 250N000011 HC RX IP 250 OP 636: Performed by: SURGERY

## 2021-04-05 PROCEDURE — 250N000009 HC RX 250: Performed by: NURSE ANESTHETIST, CERTIFIED REGISTERED

## 2021-04-05 PROCEDURE — 999N000141 HC STATISTIC PRE-PROCEDURE NURSING ASSESSMENT: Performed by: SURGERY

## 2021-04-05 PROCEDURE — 88331 PATH CONSLTJ SURG 1 BLK 1SPC: CPT | Mod: 26 | Performed by: PATHOLOGY

## 2021-04-05 PROCEDURE — 343N000001 HC RX 343: Performed by: SURGERY

## 2021-04-05 PROCEDURE — L8699 PROSTHETIC IMPLANT NOS: HCPCS | Performed by: SURGERY

## 2021-04-05 PROCEDURE — 38792 RA TRACER ID OF SENTINL NODE: CPT

## 2021-04-05 PROCEDURE — 250N000011 HC RX IP 250 OP 636: Performed by: PLASTIC SURGERY

## 2021-04-05 PROCEDURE — 250N000009 HC RX 250: Performed by: SURGERY

## 2021-04-05 PROCEDURE — 88360 TUMOR IMMUNOHISTOCHEM/MANUAL: CPT | Mod: TC | Performed by: SURGERY

## 2021-04-05 PROCEDURE — 250N000011 HC RX IP 250 OP 636: Performed by: NURSE ANESTHETIST, CERTIFIED REGISTERED

## 2021-04-05 PROCEDURE — 258N000003 HC RX IP 258 OP 636: Performed by: PLASTIC SURGERY

## 2021-04-05 PROCEDURE — 370N000017 HC ANESTHESIA TECHNICAL FEE, PER MIN: Performed by: SURGERY

## 2021-04-05 PROCEDURE — 258N000003 HC RX IP 258 OP 636: Performed by: SURGERY

## 2021-04-05 PROCEDURE — 360N000083 HC SURGERY LEVEL 3 W/ FLUORO, PER MIN: Performed by: SURGERY

## 2021-04-05 PROCEDURE — C1788 PORT, INDWELLING, IMP: HCPCS | Performed by: SURGERY

## 2021-04-05 PROCEDURE — 272N000001 HC OR GENERAL SUPPLY STERILE: Performed by: SURGERY

## 2021-04-05 PROCEDURE — 710N000009 HC RECOVERY PHASE 1, LEVEL 1, PER MIN: Performed by: SURGERY

## 2021-04-05 PROCEDURE — 88307 TISSUE EXAM BY PATHOLOGIST: CPT | Mod: 26 | Performed by: PATHOLOGY

## 2021-04-05 PROCEDURE — 88331 PATH CONSLTJ SURG 1 BLK 1SPC: CPT | Mod: TC | Performed by: SURGERY

## 2021-04-05 PROCEDURE — 88332 PATH CONSLTJ SURG EA ADD BLK: CPT | Mod: 26 | Performed by: PATHOLOGY

## 2021-04-05 PROCEDURE — 88360 TUMOR IMMUNOHISTOCHEM/MANUAL: CPT | Mod: 26 | Performed by: PATHOLOGY

## 2021-04-05 PROCEDURE — 250N000025 HC SEVOFLURANE, PER MIN: Performed by: SURGERY

## 2021-04-05 PROCEDURE — 81025 URINE PREGNANCY TEST: CPT | Performed by: ANESTHESIOLOGY

## 2021-04-05 PROCEDURE — A9520 TC99 TILMANOCEPT DIAG 0.5MCI: HCPCS | Performed by: SURGERY

## 2021-04-05 PROCEDURE — 88307 TISSUE EXAM BY PATHOLOGIST: CPT | Mod: TC | Performed by: SURGERY

## 2021-04-05 PROCEDURE — 250N000013 HC RX MED GY IP 250 OP 250 PS 637: Performed by: PLASTIC SURGERY

## 2021-04-05 DEVICE — GRAFT DERMACELL MICROPERF 16X20CM DCELL217M PER SQ CM=320: Type: IMPLANTABLE DEVICE | Site: BREAST | Status: FUNCTIONAL

## 2021-04-05 DEVICE — GRAFT DERMACELL SFT TISS 16X20CM 0.75-1.50MM PER SQ CM= 320: Type: IMPLANTABLE DEVICE | Site: BREAST | Status: FUNCTIONAL

## 2021-04-05 DEVICE — POWERPORT CLEARVUE ISP IMPLANTABLE PORT WITH ATTACHABLE 8F POLYURETHANE OPEN-ENDED SINGLE-LUMEN VENOUS CATHETER PROCEDURAL KIT
Type: IMPLANTABLE DEVICE | Site: CHEST | Status: FUNCTIONAL
Brand: POWERPORT CLEARVUE

## 2021-04-05 DEVICE — NATRELLE TE SMOOTH 133S-MV-14-T (US)
Type: IMPLANTABLE DEVICE | Site: BREAST | Status: FUNCTIONAL
Brand: NATRELLE 133S TISSUE EXPANDERS

## 2021-04-05 RX ORDER — BUPIVACAINE HYDROCHLORIDE AND EPINEPHRINE 2.5; 5 MG/ML; UG/ML
INJECTION, SOLUTION INFILTRATION; PERINEURAL PRN
Status: DISCONTINUED | OUTPATIENT
Start: 2021-04-05 | End: 2021-04-05 | Stop reason: HOSPADM

## 2021-04-05 RX ORDER — NALOXONE HYDROCHLORIDE 0.4 MG/ML
0.4 INJECTION, SOLUTION INTRAMUSCULAR; INTRAVENOUS; SUBCUTANEOUS
Status: DISCONTINUED | OUTPATIENT
Start: 2021-04-05 | End: 2021-04-05 | Stop reason: HOSPADM

## 2021-04-05 RX ORDER — MAGNESIUM HYDROXIDE 1200 MG/15ML
LIQUID ORAL PRN
Status: DISCONTINUED | OUTPATIENT
Start: 2021-04-05 | End: 2021-04-05 | Stop reason: HOSPADM

## 2021-04-05 RX ORDER — ONDANSETRON 4 MG/1
4 TABLET, ORALLY DISINTEGRATING ORAL EVERY 6 HOURS PRN
Status: DISCONTINUED | OUTPATIENT
Start: 2021-04-05 | End: 2021-04-06 | Stop reason: HOSPADM

## 2021-04-05 RX ORDER — ONDANSETRON 2 MG/ML
4 INJECTION INTRAMUSCULAR; INTRAVENOUS EVERY 30 MIN PRN
Status: DISCONTINUED | OUTPATIENT
Start: 2021-04-05 | End: 2021-04-05 | Stop reason: HOSPADM

## 2021-04-05 RX ORDER — NALOXONE HYDROCHLORIDE 0.4 MG/ML
0.4 INJECTION, SOLUTION INTRAMUSCULAR; INTRAVENOUS; SUBCUTANEOUS
Status: DISCONTINUED | OUTPATIENT
Start: 2021-04-05 | End: 2021-04-06 | Stop reason: HOSPADM

## 2021-04-05 RX ORDER — AMOXICILLIN 250 MG
1 CAPSULE ORAL 2 TIMES DAILY
Status: DISCONTINUED | OUTPATIENT
Start: 2021-04-05 | End: 2021-04-06 | Stop reason: HOSPADM

## 2021-04-05 RX ORDER — POLYETHYLENE GLYCOL 3350 17 G/17G
17 POWDER, FOR SOLUTION ORAL DAILY
Status: DISCONTINUED | OUTPATIENT
Start: 2021-04-06 | End: 2021-04-06 | Stop reason: HOSPADM

## 2021-04-05 RX ORDER — OXYCODONE HCL 10 MG/1
10 TABLET, FILM COATED, EXTENDED RELEASE ORAL ONCE
Status: COMPLETED | OUTPATIENT
Start: 2021-04-05 | End: 2021-04-05

## 2021-04-05 RX ORDER — GABAPENTIN 300 MG/1
300 CAPSULE ORAL
Status: COMPLETED | OUTPATIENT
Start: 2021-04-05 | End: 2021-04-05

## 2021-04-05 RX ORDER — BISACODYL 10 MG
10 SUPPOSITORY, RECTAL RECTAL DAILY PRN
Status: DISCONTINUED | OUTPATIENT
Start: 2021-04-05 | End: 2021-04-06 | Stop reason: HOSPADM

## 2021-04-05 RX ORDER — METHOCARBAMOL 750 MG/1
750 TABLET, FILM COATED ORAL EVERY 6 HOURS PRN
Status: DISCONTINUED | OUTPATIENT
Start: 2021-04-05 | End: 2021-04-06 | Stop reason: HOSPADM

## 2021-04-05 RX ORDER — PROPOFOL 10 MG/ML
INJECTION, EMULSION INTRAVENOUS PRN
Status: DISCONTINUED | OUTPATIENT
Start: 2021-04-05 | End: 2021-04-05

## 2021-04-05 RX ORDER — CELECOXIB 200 MG/1
200 CAPSULE ORAL 2 TIMES DAILY
Status: DISCONTINUED | OUTPATIENT
Start: 2021-04-06 | End: 2021-04-06 | Stop reason: HOSPADM

## 2021-04-05 RX ORDER — DEXAMETHASONE SODIUM PHOSPHATE 4 MG/ML
INJECTION, SOLUTION INTRA-ARTICULAR; INTRALESIONAL; INTRAMUSCULAR; INTRAVENOUS; SOFT TISSUE PRN
Status: DISCONTINUED | OUTPATIENT
Start: 2021-04-05 | End: 2021-04-05

## 2021-04-05 RX ORDER — FERROUS SULFATE 325(65) MG
325 TABLET ORAL
Status: DISCONTINUED | OUTPATIENT
Start: 2021-04-06 | End: 2021-04-06 | Stop reason: HOSPADM

## 2021-04-05 RX ORDER — PROPOFOL 10 MG/ML
INJECTION, EMULSION INTRAVENOUS CONTINUOUS PRN
Status: DISCONTINUED | OUTPATIENT
Start: 2021-04-05 | End: 2021-04-05

## 2021-04-05 RX ORDER — ACETAMINOPHEN 650 MG
TABLET, EXTENDED RELEASE ORAL PRN
Status: DISCONTINUED | OUTPATIENT
Start: 2021-04-05 | End: 2021-04-05 | Stop reason: HOSPADM

## 2021-04-05 RX ORDER — VECURONIUM BROMIDE 1 MG/ML
INJECTION, POWDER, LYOPHILIZED, FOR SOLUTION INTRAVENOUS PRN
Status: DISCONTINUED | OUTPATIENT
Start: 2021-04-05 | End: 2021-04-05

## 2021-04-05 RX ORDER — LIDOCAINE 40 MG/G
CREAM TOPICAL
Status: DISCONTINUED | OUTPATIENT
Start: 2021-04-05 | End: 2021-04-05 | Stop reason: HOSPADM

## 2021-04-05 RX ORDER — CEFAZOLIN SODIUM 2 G/100ML
2 INJECTION, SOLUTION INTRAVENOUS SEE ADMIN INSTRUCTIONS
Status: DISCONTINUED | OUTPATIENT
Start: 2021-04-05 | End: 2021-04-05 | Stop reason: HOSPADM

## 2021-04-05 RX ORDER — NALOXONE HYDROCHLORIDE 0.4 MG/ML
0.2 INJECTION, SOLUTION INTRAMUSCULAR; INTRAVENOUS; SUBCUTANEOUS
Status: DISCONTINUED | OUTPATIENT
Start: 2021-04-05 | End: 2021-04-06 | Stop reason: HOSPADM

## 2021-04-05 RX ORDER — ONDANSETRON 2 MG/ML
INJECTION INTRAMUSCULAR; INTRAVENOUS PRN
Status: DISCONTINUED | OUTPATIENT
Start: 2021-04-05 | End: 2021-04-05

## 2021-04-05 RX ORDER — NEOSTIGMINE METHYLSULFATE 1 MG/ML
VIAL (ML) INJECTION PRN
Status: DISCONTINUED | OUTPATIENT
Start: 2021-04-05 | End: 2021-04-05

## 2021-04-05 RX ORDER — HYDROXYZINE HYDROCHLORIDE 25 MG/1
25 TABLET, FILM COATED ORAL EVERY 6 HOURS PRN
Status: DISCONTINUED | OUTPATIENT
Start: 2021-04-05 | End: 2021-04-06 | Stop reason: HOSPADM

## 2021-04-05 RX ORDER — CEFAZOLIN SODIUM 2 G/100ML
2 INJECTION, SOLUTION INTRAVENOUS
Status: DISCONTINUED | OUTPATIENT
Start: 2021-04-05 | End: 2021-04-05 | Stop reason: HOSPADM

## 2021-04-05 RX ORDER — EPHEDRINE SULFATE 50 MG/ML
INJECTION, SOLUTION INTRAMUSCULAR; INTRAVENOUS; SUBCUTANEOUS PRN
Status: DISCONTINUED | OUTPATIENT
Start: 2021-04-05 | End: 2021-04-05

## 2021-04-05 RX ORDER — CEFAZOLIN SODIUM 1 G/3ML
1 INJECTION, POWDER, FOR SOLUTION INTRAMUSCULAR; INTRAVENOUS EVERY 8 HOURS
Status: COMPLETED | OUTPATIENT
Start: 2021-04-05 | End: 2021-04-06

## 2021-04-05 RX ORDER — FENTANYL CITRATE 50 UG/ML
25-50 INJECTION, SOLUTION INTRAMUSCULAR; INTRAVENOUS
Status: DISCONTINUED | OUTPATIENT
Start: 2021-04-05 | End: 2021-04-05

## 2021-04-05 RX ORDER — SODIUM CHLORIDE, SODIUM LACTATE, POTASSIUM CHLORIDE, CALCIUM CHLORIDE 600; 310; 30; 20 MG/100ML; MG/100ML; MG/100ML; MG/100ML
INJECTION, SOLUTION INTRAVENOUS CONTINUOUS
Status: DISCONTINUED | OUTPATIENT
Start: 2021-04-05 | End: 2021-04-05 | Stop reason: HOSPADM

## 2021-04-05 RX ORDER — FENTANYL CITRATE 50 UG/ML
INJECTION, SOLUTION INTRAMUSCULAR; INTRAVENOUS PRN
Status: DISCONTINUED | OUTPATIENT
Start: 2021-04-05 | End: 2021-04-05

## 2021-04-05 RX ORDER — NALOXONE HYDROCHLORIDE 0.4 MG/ML
0.2 INJECTION, SOLUTION INTRAMUSCULAR; INTRAVENOUS; SUBCUTANEOUS
Status: DISCONTINUED | OUTPATIENT
Start: 2021-04-05 | End: 2021-04-05 | Stop reason: HOSPADM

## 2021-04-05 RX ORDER — ONDANSETRON 2 MG/ML
4 INJECTION INTRAMUSCULAR; INTRAVENOUS EVERY 6 HOURS PRN
Status: DISCONTINUED | OUTPATIENT
Start: 2021-04-05 | End: 2021-04-06 | Stop reason: HOSPADM

## 2021-04-05 RX ORDER — INDOCYANINE GREEN AND WATER 25 MG
KIT INJECTION PRN
Status: DISCONTINUED | OUTPATIENT
Start: 2021-04-05 | End: 2021-04-05

## 2021-04-05 RX ORDER — ACETAMINOPHEN 325 MG/1
975 TABLET ORAL EVERY 8 HOURS
Status: DISCONTINUED | OUTPATIENT
Start: 2021-04-05 | End: 2021-04-06 | Stop reason: HOSPADM

## 2021-04-05 RX ORDER — CELECOXIB 200 MG/1
400 CAPSULE ORAL ONCE
Status: COMPLETED | OUTPATIENT
Start: 2021-04-05 | End: 2021-04-05

## 2021-04-05 RX ORDER — CEPHALEXIN 500 MG/1
500 CAPSULE ORAL EVERY 6 HOURS SCHEDULED
Status: DISCONTINUED | OUTPATIENT
Start: 2021-04-06 | End: 2021-04-06 | Stop reason: HOSPADM

## 2021-04-05 RX ORDER — LIDOCAINE 40 MG/G
CREAM TOPICAL
Status: DISCONTINUED | OUTPATIENT
Start: 2021-04-05 | End: 2021-04-06 | Stop reason: HOSPADM

## 2021-04-05 RX ORDER — FENTANYL CITRATE 50 UG/ML
25-50 INJECTION, SOLUTION INTRAMUSCULAR; INTRAVENOUS
Status: DISCONTINUED | OUTPATIENT
Start: 2021-04-05 | End: 2021-04-05 | Stop reason: HOSPADM

## 2021-04-05 RX ORDER — HYDROMORPHONE HCL IN WATER/PF 6 MG/30 ML
0.2 PATIENT CONTROLLED ANALGESIA SYRINGE INTRAVENOUS
Status: DISCONTINUED | OUTPATIENT
Start: 2021-04-05 | End: 2021-04-06 | Stop reason: HOSPADM

## 2021-04-05 RX ORDER — HYDROMORPHONE HYDROCHLORIDE 1 MG/ML
0.4 INJECTION, SOLUTION INTRAMUSCULAR; INTRAVENOUS; SUBCUTANEOUS
Status: DISCONTINUED | OUTPATIENT
Start: 2021-04-05 | End: 2021-04-06 | Stop reason: HOSPADM

## 2021-04-05 RX ORDER — ONDANSETRON 4 MG/1
4 TABLET, ORALLY DISINTEGRATING ORAL EVERY 30 MIN PRN
Status: DISCONTINUED | OUTPATIENT
Start: 2021-04-05 | End: 2021-04-05 | Stop reason: HOSPADM

## 2021-04-05 RX ORDER — LIDOCAINE HYDROCHLORIDE 20 MG/ML
INJECTION, SOLUTION INFILTRATION; PERINEURAL PRN
Status: DISCONTINUED | OUTPATIENT
Start: 2021-04-05 | End: 2021-04-05

## 2021-04-05 RX ORDER — ACETAMINOPHEN 325 MG/1
650 TABLET ORAL EVERY 4 HOURS PRN
Status: DISCONTINUED | OUTPATIENT
Start: 2021-04-08 | End: 2021-04-06 | Stop reason: HOSPADM

## 2021-04-05 RX ORDER — DEXTROSE MONOHYDRATE, SODIUM CHLORIDE, AND POTASSIUM CHLORIDE 50; 1.49; 4.5 G/1000ML; G/1000ML; G/1000ML
INJECTION, SOLUTION INTRAVENOUS CONTINUOUS
Status: DISCONTINUED | OUTPATIENT
Start: 2021-04-05 | End: 2021-04-06

## 2021-04-05 RX ORDER — CEFAZOLIN SODIUM 2 G/100ML
2 INJECTION, SOLUTION INTRAVENOUS
Status: COMPLETED | OUTPATIENT
Start: 2021-04-05 | End: 2021-04-05

## 2021-04-05 RX ORDER — HYDROMORPHONE HYDROCHLORIDE 1 MG/ML
.3-.5 INJECTION, SOLUTION INTRAMUSCULAR; INTRAVENOUS; SUBCUTANEOUS EVERY 10 MIN PRN
Status: DISCONTINUED | OUTPATIENT
Start: 2021-04-05 | End: 2021-04-05 | Stop reason: HOSPADM

## 2021-04-05 RX ORDER — SODIUM CHLORIDE, SODIUM LACTATE, POTASSIUM CHLORIDE, CALCIUM CHLORIDE 600; 310; 30; 20 MG/100ML; MG/100ML; MG/100ML; MG/100ML
INJECTION, SOLUTION INTRAVENOUS CONTINUOUS PRN
Status: DISCONTINUED | OUTPATIENT
Start: 2021-04-05 | End: 2021-04-05

## 2021-04-05 RX ORDER — GLYCOPYRROLATE 0.2 MG/ML
INJECTION, SOLUTION INTRAMUSCULAR; INTRAVENOUS PRN
Status: DISCONTINUED | OUTPATIENT
Start: 2021-04-05 | End: 2021-04-05

## 2021-04-05 RX ORDER — DOCUSATE SODIUM 100 MG/1
100 CAPSULE, LIQUID FILLED ORAL 2 TIMES DAILY
Status: DISCONTINUED | OUTPATIENT
Start: 2021-04-05 | End: 2021-04-06 | Stop reason: HOSPADM

## 2021-04-05 RX ORDER — PROCHLORPERAZINE MALEATE 5 MG
10 TABLET ORAL EVERY 6 HOURS PRN
Status: DISCONTINUED | OUTPATIENT
Start: 2021-04-05 | End: 2021-04-06 | Stop reason: HOSPADM

## 2021-04-05 RX ADMIN — HYDROMORPHONE HYDROCHLORIDE 0.5 MG: 1 INJECTION, SOLUTION INTRAMUSCULAR; INTRAVENOUS; SUBCUTANEOUS at 11:02

## 2021-04-05 RX ADMIN — ROCURONIUM BROMIDE 50 MG: 10 INJECTION INTRAVENOUS at 10:36

## 2021-04-05 RX ADMIN — CELECOXIB 400 MG: 200 CAPSULE ORAL at 08:56

## 2021-04-05 RX ADMIN — Medication 10 MG: at 10:55

## 2021-04-05 RX ADMIN — NEOSTIGMINE METHYLSULFATE 4 MG: 1 INJECTION, SOLUTION INTRAVENOUS at 14:50

## 2021-04-05 RX ADMIN — CEFAZOLIN SODIUM 2 G: 2 INJECTION, SOLUTION INTRAVENOUS at 10:30

## 2021-04-05 RX ADMIN — GLYCOPYRROLATE 0.6 MG: 0.2 INJECTION, SOLUTION INTRAMUSCULAR; INTRAVENOUS at 14:50

## 2021-04-05 RX ADMIN — ROCURONIUM BROMIDE 20 MG: 10 INJECTION INTRAVENOUS at 12:30

## 2021-04-05 RX ADMIN — CEFAZOLIN SODIUM 1 G: 2 INJECTION, SOLUTION INTRAVENOUS at 12:30

## 2021-04-05 RX ADMIN — MIDAZOLAM 2 MG: 1 INJECTION INTRAMUSCULAR; INTRAVENOUS at 10:30

## 2021-04-05 RX ADMIN — PHENYLEPHRINE HYDROCHLORIDE 50 MCG: 10 INJECTION INTRAVENOUS at 13:59

## 2021-04-05 RX ADMIN — LIDOCAINE HYDROCHLORIDE 100 MG: 20 INJECTION, SOLUTION INFILTRATION; PERINEURAL at 10:36

## 2021-04-05 RX ADMIN — DEXMEDETOMIDINE HYDROCHLORIDE 12 MCG: 100 INJECTION, SOLUTION INTRAVENOUS at 11:56

## 2021-04-05 RX ADMIN — SODIUM CHLORIDE, POTASSIUM CHLORIDE, SODIUM LACTATE AND CALCIUM CHLORIDE: 600; 310; 30; 20 INJECTION, SOLUTION INTRAVENOUS at 10:30

## 2021-04-05 RX ADMIN — CEFAZOLIN SODIUM 1 G: 2 INJECTION, SOLUTION INTRAVENOUS at 14:30

## 2021-04-05 RX ADMIN — INDOCYANINE GREEN 7.5 MG: KIT INTRAVENOUS at 13:51

## 2021-04-05 RX ADMIN — CEFAZOLIN 1 G: 1 INJECTION, POWDER, FOR SOLUTION INTRAMUSCULAR; INTRAVENOUS at 21:47

## 2021-04-05 RX ADMIN — ONDANSETRON 4 MG: 2 INJECTION INTRAMUSCULAR; INTRAVENOUS at 10:30

## 2021-04-05 RX ADMIN — OXYCODONE HYDROCHLORIDE 10 MG: 10 TABLET, FILM COATED, EXTENDED RELEASE ORAL at 08:56

## 2021-04-05 RX ADMIN — TILMANOCEPT 0.51 MILLICURIE: KIT at 09:02

## 2021-04-05 RX ADMIN — PROPOFOL 100 MCG/KG/MIN: 10 INJECTION, EMULSION INTRAVENOUS at 10:36

## 2021-04-05 RX ADMIN — ROCURONIUM BROMIDE 30 MG: 10 INJECTION INTRAVENOUS at 13:15

## 2021-04-05 RX ADMIN — DEXMEDETOMIDINE HYDROCHLORIDE 0.5 MCG/KG/HR: 100 INJECTION, SOLUTION INTRAVENOUS at 11:59

## 2021-04-05 RX ADMIN — GABAPENTIN 300 MG: 300 CAPSULE ORAL at 08:56

## 2021-04-05 RX ADMIN — FENTANYL CITRATE 100 MCG: 50 INJECTION, SOLUTION INTRAMUSCULAR; INTRAVENOUS at 10:36

## 2021-04-05 RX ADMIN — POTASSIUM CHLORIDE, DEXTROSE MONOHYDRATE AND SODIUM CHLORIDE: 150; 5; 450 INJECTION, SOLUTION INTRAVENOUS at 19:57

## 2021-04-05 RX ADMIN — VECURONIUM BROMIDE 3 MG: 1 INJECTION, POWDER, LYOPHILIZED, FOR SOLUTION INTRAVENOUS at 11:37

## 2021-04-05 RX ADMIN — PROPOFOL 200 MG: 10 INJECTION, EMULSION INTRAVENOUS at 10:36

## 2021-04-05 RX ADMIN — Medication 5 MG: at 13:46

## 2021-04-05 RX ADMIN — DEXAMETHASONE SODIUM PHOSPHATE 12 MG: 4 INJECTION, SOLUTION INTRA-ARTICULAR; INTRALESIONAL; INTRAMUSCULAR; INTRAVENOUS; SOFT TISSUE at 10:56

## 2021-04-05 RX ADMIN — SODIUM CHLORIDE, POTASSIUM CHLORIDE, SODIUM LACTATE AND CALCIUM CHLORIDE: 600; 310; 30; 20 INJECTION, SOLUTION INTRAVENOUS at 13:53

## 2021-04-05 RX ADMIN — METHOCARBAMOL 750 MG: 750 TABLET ORAL at 19:57

## 2021-04-05 NOTE — OP NOTE
Date of Service: 04/05/21    PREOPERATIVE DIAGNOSES:   1. Left breast cancer  2. Acquired absence of bilateral breasts    POSTOPERATIVE DIAGNOSES:   same    PROCEDURES:     1.  Bilateral first stage breast reconstruction with tissue expanders placed in the prepectoral plane  2.  Acellular dermal matrix (Dermacell) placement to bilateral breasts, 640 cm2  3.  Laser angiography of mastectomy skin flaps      SURGEON: Kervin Fu MD     ANESTHESIA: General endotracheal anesthesia    COMPLICATIONS: None.     ESTIMATED BLOOD LOSS: 20 cc    DISPOSITION: To the Post Anesthesia Care Unit in stable condition.    TUBES AND DRAINS: navin x 2    COUNTS: Correct     SPECIMENS: None    IMPLANTS:     1. On the right : Allergan [id number], SN [id number], filled to 400 cc  2. On the left : Allergan [id number], SN [id number], filled to 400 cc    INDICATIONS:    This is a 40 year old  yo female who has breast cancer and has elected for bilateral mastectomies.  She has elected for an implant-based reconstruction using tissue expanders and possibly acellular dermal matrix at the first stage.  She has requested prepectoral placement if possible.    We discussed details, risks, benefits, and alternatives of the procedure. She understands limitations and risks including bleeding, hematoma, or seroma that could require surgical evacuation. We discussed tissue expander complications including rupture or deflation, infection or extrusion, rippling or wrinkling, capsular contracture, or any known, suspected, or yet to be determined links between systemic disease and implants. She understands there could be a need for unplanned surgical revision. We discussed there is no guarantee of cure or protection from benign or malignant breast or skin disease. She voiced understanding, signed consent, elected to proceed.    DESCRIPTION OF PROCEDURE:    She was marked preoperatively in the upright position with a transverse ellipse pattern. She  was taken to the operating room.  I came in the room at the completion of the mastectomies. The right breast specimen weighed 455 g  and the left breast specimen weighed 650 g.  A timeout was performed to ensure the correct orientation and procedure.  She had a close margin on the left side and this was marked with a prolene stitch in case additional skin margins are needed in the future.    First, laser angiography was then performed.  The anesthesia staff injected 3 cc of the indocyanine green followed by a 10 cc bolus of normal saline.  Using the Spy Elite system, the skin flaps were evaluated.   Skin flap perfusion indices appeared viable on both sides.  Based on these findings, I decided to proceed with prepectoral reconstruction.    I began on the left side.  I inspected the pocket and ensured hemostasis.  I irrigated with antibiotic solution.  I injected 30 ml of 1/4% marcaine with epinephrine.   I brought a tissue expander of appropriate base width onto the back table.  It was filled with air and rinsed in a 50/50 mixture of triple antibiotic solution and Betadine.  I brought a piece of 16 x 20 cm acellular dermal matrix onto the field.  It was rinsed in a 50/50 mixture of triple antibiotic solution and Betadine.  The acellular dermal matrix was placed into the pocket with the smooth side down and it was secured superiorly and inferiorly with 2-0 vicryl sutures.  The tissue expander was placed into the prepectoral pocket under the acellular dermal matrix and it was secured using the foot plates and 2-0 silk sutures.  The acellular dermal matrix was used to support the lateral aspect of the device by securing it with 2-0 silk sutures.  A dogear of the excess acellular dermal matrix was removed superiorly.  The resulting defect in the mesh was closed with a running 2-0 vicryl suture.   A 15-English round channel drain was placed laterally and secured with a 2-0 silk.  The skin was closed without tension using  deep dermal 3-0 Monocryl and running 4-0 Monocryl subcuticular stitching.  The air was evacuated from the device.  The tissue expander was accessed using the magnet and the 18 gauge butterfly to evacuate the air.  It was then filled with 400 cc of sterile injectable saline.    I turned my attention to the right side.  I inspected the pocket and ensured hemostasis.  I irrigated with antibiotic solution.  I injected 30 ml of 1/4% marcaine with epinephrine.   I brought a tissue expander of appropriate base width onto the back table.  It was filled with air and rinsed in a 50/50 mixture of triple antibiotic solution and Betadine.  I brought a piece of 16 x 20 cm acellular dermal matrix onto the field.  It was rinsed in a 50/50 mixture of triple antibiotic solution and Betadine.  The acellular dermal matrix was placed into the pocket with the smooth side down and it was secured superiorly and inferiorly with 2-0 vicryl sutures.  The tissue expander was placed into the prepectoral pocket under the acellular dermal matrix and it was secured using the foot plates and 2-0 silk sutures.  The acellular dermal matrix was used to support the lateral aspect of the device by securing it with 2-0 silk sutures.  A dogear of the excess acellular dermal matrix was removed superiorly.  The resulting defect in the mesh was closed with a running 2-0 vicryl suture.   A 15-Iranian round channel drain was placed laterally and secured with a 2-0 silk.  The skin was closed without tension using deep dermal 3-0 Monocryl and running 4-0 Monocryl subcuticular stitching.  The tissue expander was accessed using the magnet and the 18 gauge butterfly to evacuate the air.  It was then filled with 400 cc of sterile injectable saline.    Mastisol and Steri-Strips were applied to the incisions.  A biopatch was placed over each drain site.   Large Tegaderms were placed over the skin flaps to immobilize them.   Sterile Kerlix gauze was placed over the  breasts. She was wrapped comfortably with a double 6-inch Ace bandage, awoken from anesthesia, and taken to the PACU in stable condition.

## 2021-04-05 NOTE — ANESTHESIA PREPROCEDURE EVALUATION
Anesthesia Pre-Procedure Evaluation    Patient: Ghazal Martinez   MRN: [id number] : 1980        Preoperative Diagnosis: Malignant neoplasm of left breast (H) [C50.912]   Procedure : Procedure(s):  BILATERAL SKIN SPARING MASTECTOMY WITH LEFT SENTINEL LYMPH NODE BIOPSY  PORT PLACEMENT  BILATERAL FIRST STAGE BREAST RECONSTRUCTION WITH TISSUE EXPANDERS;  POSSIBLE ACELLULAR DERMAL MATRIX     Past Medical History:   Diagnosis Date     Varicose veins of legs       Past Surgical History:   Procedure Laterality Date     NO HISTORY OF SURGERY        No Known Allergies   Social History     Tobacco Use     Smoking status: Never Smoker     Smokeless tobacco: Never Used   Substance Use Topics     Alcohol use: No      Wt Readings from Last 1 Encounters:   21 80.4 kg (177 lb 3.2 oz)        Anesthesia Evaluation            ROS/MED HX  ENT/Pulmonary:  - neg pulmonary ROS     Neurologic:  - neg neurologic ROS     Cardiovascular:  - neg cardiovascular ROS     METS/Exercise Tolerance:     Hematologic:  - neg hematologic  ROS     Musculoskeletal:       GI/Hepatic:  - neg GI/hepatic ROS     Renal/Genitourinary:  - neg Renal ROS     Endo:       Psychiatric/Substance Use:  - neg psychiatric ROS     Infectious Disease:       Malignancy: Comment: Invasive ductal carcinoma  (+) Malignancy, History of Breast.Breast CA Active status post.        Other:            Physical Exam    Airway        Mallampati: II   TM distance: > 3 FB   Neck ROM: full   Mouth opening: > 3 cm    Respiratory Devices and Support         Dental  no notable dental history         Cardiovascular   cardiovascular exam normal          Pulmonary   pulmonary exam normal                OUTSIDE LABS:  CBC:   Lab Results   Component Value Date    WBC 6.5 2021    WBC 9.0 2021    HGB 14.0 2021    HGB 13.8 2021    HCT 42.3 2021    HCT 41.4 2021     2021     2021     BMP:   Lab Results   Component Value Date      03/19/2021     08/16/2020    POTASSIUM 4.0 03/19/2021    POTASSIUM 4.1 08/16/2020    CHLORIDE 111 (H) 03/19/2021    CHLORIDE 108 08/16/2020    CO2 24 03/19/2021    CO2 22 08/16/2020    BUN 12 03/19/2021    BUN 12 08/16/2020    CR 0.63 03/19/2021    CR 0.77 08/16/2020    GLC 88 03/19/2021    GLC 83 08/16/2020     COAGS: No results found for: PTT, INR, FIBR  POC: No results found for: BGM, HCG, HCGS  HEPATIC:   Lab Results   Component Value Date    ALBUMIN 3.4 03/19/2021    PROTTOTAL 7.7 03/19/2021     (H) 03/19/2021    AST 59 (H) 03/19/2021    ALKPHOS 71 03/19/2021    BILITOTAL 0.1 (L) 03/19/2021     OTHER:   Lab Results   Component Value Date    A1C 5.8 (H) 01/20/2020    BILLY 8.8 03/19/2021    TSH 0.54 01/25/2021       Anesthesia Plan    ASA Status:  2   NPO Status:  NPO Appropriate    Anesthesia Type: General.     - Airway: ETT   Induction: Intravenous, Propofol.   Maintenance: Balanced.        Consents    Anesthesia Plan(s) and associated risks, benefits, and realistic alternatives discussed. Questions answered and patient/representative(s) expressed understanding.     - Discussed with:  Patient         Postoperative Care    Pain management: IV analgesics.   PONV prophylaxis: Background Propofol Infusion, Ondansetron (or other 5HT-3), Dexamethasone or Solumedrol     Comments:                Rogelio Washington DO, DO

## 2021-04-05 NOTE — ANESTHESIA POSTPROCEDURE EVALUATION
Patient: Ghazal Martinez    Procedure(s):  BILATERAL SKIN SPARING MASTECTOMY WITH LEFT SENTINEL LYMPH NODE BIOPSY  PORT PLACEMENT  BILATERAL FIRST STAGE BREAST RECONSTRUCTION WITH TISSUE EXPANDERS; AND ACELLULAR DERMAL MATRIX    Diagnosis:Malignant neoplasm of left breast (H) [C50.912]  Diagnosis Additional Information: No value filed.    Anesthesia Type:  General    Note:  Disposition: Outpatient   Postop Pain Control: Uneventful            Sign Out: Well controlled pain   PONV: No   Neuro/Psych: Uneventful            Sign Out: Acceptable/Baseline neuro status   Airway/Respiratory: Uneventful            Sign Out: Acceptable/Baseline resp. status   CV/Hemodynamics: Uneventful            Sign Out: Acceptable CV status   Other NRE: NONE   DID A NON-ROUTINE EVENT OCCUR? No         Last vitals:  Vitals:    04/05/21 1550 04/05/21 1600 04/05/21 1604   BP: 104/62 110/68    Pulse: 76 88    Resp: 15 18    Temp: 36.4  C (97.5  F)     SpO2: 95% 95% 95%       Last vitals prior to Anesthesia Care Transfer:  CRNA VITALS  4/5/2021 1432 - 4/5/2021 1532      4/5/2021             Resp Rate (set):  10          Electronically Signed By: Rogelio Washington DO, DO  April 5, 2021  4:13 PM

## 2021-04-05 NOTE — ANESTHESIA PROCEDURE NOTES
Airway       Patient location during procedure: OR  Staff -        CRNA: Cecilia Leblanc APRN CRNA       Performed By: CRNA  Consent for Airway        Urgency: elective  Indications and Patient Condition       Indications for airway management: mera-procedural       Induction type:intravenous       Mask difficulty assessment: 2 - vent by mask + OA or adjuvant +/- NMBA    Final Airway Details       Final airway type: endotracheal airway       Successful airway: ETT - single  Endotracheal Airway Details        ETT size (mm): 6.5       Cuffed: yes       Successful intubation technique: video laryngoscopy       VL Blade Size: Benz 3       Grade View of Cords: 1       Adjucts: stylet       Position: Right       Measured from: gums/teeth       Secured at (cm): 19       Bite block used: None    Post intubation assessment        Placement verified by: capnometry, equal breath sounds and chest rise        Number of attempts at approach: 1       Secured with: pink tape       Ease of procedure: easy       Dentition: Intact and Unchanged    Medication(s) Administered   Medication Administration Time: 4/5/2021 10:40 AM

## 2021-04-05 NOTE — OP NOTE
[facility] Breast Surgery Operative Note    PREOPERATIVE DIAGNOSIS:      POSTOPERATIVE DIAGNOSIS:      PROCEDURE:    1.  Bilateral skin sparing mastectomy   2. Left sentinel lymph node biopsy  3. Portacath placement  4. Reconstruction per Dr. Fu, please see their operative report for details regarding their portion of the procedure.     SURGEON:  Una Fonseca MD    ASSISTANT:  Sravan Davey PA-C  The physician s assistant was medically necessary for their expertise in retraction and suctioning.    ANESTHESIA:  General.    BLOOD LOSS: 60ml    FINDINGS: port placed right internal jugular vein with tip at atriocaval junction, left mastectomy with tumor at 10:00 at edge of areola, normal tissue plane between tumor and skin, prolene suture placed at site to chrissie location of tumor. Four sentinel lymph nodes, isolated tumor cells noted on 1 of 4 sentinel nodes.     INDICATIONS:   Ghazal is a 40yof who  presented for newly diagnosed left breast grade 3 invasive ductal carcinoma, ER 95% positive, IN 70% positive, HER 2 negative (equivocal by FISH and IHC) at 10:00, 3cm FN measuring 2.2cm, oncotype 23.     Ghazal presented for diagnostic imaging on 2/12/2021 due to a palpable lump in her left breast. Imaging revealed a 2.2cm irregular hypoechoic mass at 10:00, 3cm FN with angular margins on US. Mammogram revealed a 1.5cm irregular mass. No areas of concern on the right breast. No axillary adenopathy. Contrast enhanced mammogram revealed the mass enhances and measures 1.7cm. A breast MRI which revealed the left breast index cancer measuring 2.2cm with 2 potential satellite lesions at 11:00, one posterior measuring 8mm and the other mid breast measuring 7mm with the distance from the posterior lesion measuring 10cm. There was a mildly enlarged left internal mammary node as well as 2 equivocal abnormal nodes in the axilla. PET CT revealed enhancement at the satellite lesion in the left breast and the lymph nodes  appeared normal. She elected to proceed with bilateral skin sparing mastectomies with left SLNB and port placement with plan for adjuvant chemotherapy after discussion with Dr. Fine with medical oncology.       DESCRIPTION OF PROCEDURE:    PORT PLACEMENT: The patient was placed on the table in supine position.  A roll was placed between her shoulder blades. General anesthetic was administered.  A mejia catheter was placed using sterile technique. Perioperative antibiotics were administered. The bilateral neck and upper chest were prepped and draped in standard sterile fashion.  We anesthetized the skin of the upper right chest.  The patient was placed in Trendelenburg.  Local anesthetic was injected into the skin in the lower neck, upper chest for the port site and proposed track for the catheter. We made an incision in the skin.  We cannulated the internal jugular vein using ultrasound guidance with a needle.  We then passed a wire through the needle into the internal jugular vein.  Fluoroscopy was used and confirmed the wire was traveling into the SVC. We then used a #15 blade to make a skin incision in the upper chest wall. The subcutaneous tissue was divided with cautery. The fascia was identified. A pocket was created above the fascia with blunt dissection. The port fit well into the pocket. We then used a tunneling device to place the catheter through the incision in the chest and tunneled to the neck incision. We then passed a dilator over the wire under fluoroscopy.  The catheter was then placed through the catheter introducer and threaded, using fluoroscopy until the tip of the catheter was at the atrial caval junction.  The catheter introducer was removed.  We flushed the port with injectable saline.  We attached the catheter to the port and secured it in place with the catheter hub.   The port was secured to the fascia with two 2-0 prolene sutures on either side.  We then closed the subcutaneous  tissue with 3-0 interrupted Vicryl sutures.  The skin was closed with a running 4-0 Vicryl subcuticular suture and Dermabond.  A final flush of full strength heparin (2ml) was injected into the port using a Payan needle.    MASTECTOMIES:   The drapes were taken down and patient was reprepped and draped in standard fashion with her arms now out at her sides. . Prior to coming to the operating room, patient had the left breast injected with radiolabeled sulfur colloid.   The patient was also marked by Dr. Fu with simple ellipse periareolar  incisions. The timeout procedure was performed.      Starting on the left side, a periareolar ellipse skin incision was made following the pre operative markings with a #10 scalpel blade and deepened with electrocautery.  The superior flap was raised with electrocautery up to the top edge of the breast tissue just under the clavicle.  The tumor was easily palpable at 10:00 and it was superficial. There was a clear plane anterior to the tumor and all of the tissue deep to dermis was removed. The skin was marked at the site of the tumor. The inferior flap was similarly raised using the cautery down to the inframammary fold.  Flaps were raised medially to the lateral aspect of the sternum and laterally to the lateral chest wall. The breast tissue was then elevated off of the pectoralis fascia with cautery. The fascia was excised with the breast tissue.  Once the breast was removed, it was oriented with sutures with a short suture superior and long suture lateral.  The breast was sent to pathology to be placed in formalin and have routine pathology exam.        Attention was then turned to the  left axilla.  The fascia was incised along the edge of the pectoralis muscle.  The Neoprobe was used to identify the site of increased radioactivity. Four nodes were excised which had counts of >100.   The nodes were sent to pathology for review with frozen section. Pathology called in and  reported that the 1st sentinel node has isolated tumor cells on frozen section. The remainder were negative. I elected to not excise additional nodes as if there were evidence of microscopic disease on final pathology, radiation would be recommended. The left side was packed with a saline-wetted lap pad and covered with a dry towel while attention was turned to the right breast.     A similar  incision was made on the right side with a #10 scalpel blade and deepened with electrocautery.  Again, the flaps were raised with electrocautery and the breast was dissected away from the pectoralis fascia.  The tissue was oriented with a short suture superior and long suture lateral.  The breast was then sent to pathology and placed in formalin for permanent section.        Dr. Fu then performed their portion of the procedure with reconstruction. Please see their operative report for details.     Una Fonseca MD

## 2021-04-05 NOTE — ANESTHESIA CARE TRANSFER NOTE
Patient: Ghazal A Martinez    Procedure(s):  BILATERAL SKIN SPARING MASTECTOMY WITH LEFT SENTINEL LYMPH NODE BIOPSY  PORT PLACEMENT  BILATERAL FIRST STAGE BREAST RECONSTRUCTION WITH TISSUE EXPANDERS; AND ACELLULAR DERMAL MATRIX    Diagnosis: Malignant neoplasm of left breast (H) [C50.912]  Diagnosis Additional Information: No value filed.    Anesthesia Type:   General     Note:    Oropharynx: oropharynx clear of all foreign objects  Level of Consciousness: awake  Oxygen Supplementation: face mask  Level of Supplemental Oxygen (L/min / FiO2): 6  Independent Airway: airway patency satisfactory and stable  Dentition: dentition unchanged      Patient transferred to: PACU  Comments: Neuromuscular blockade reversed after TOF 4/4, spontaneous respirations, adequate tidal volumes, followed commands to voice, oropharynx suctioned with soft flexible catheter, extubated atraumatically, extubated with suction, airway patent after extubation.  Oxygen via facemask at 6 liters per minute to PACU. Oxygen tubing connected to wall O2 in PACU, SpO2, NiBP, and EKG monitors and alarms on and functioning, Kristina Hugger warmer connected to patient gown, report on patient's clinical status given to PACU RN, RN questions answered.     Handoff Report: Identifed the Patient, Identified the Reponsible Provider, Reviewed the pertinent medical history, Discussed the surgical course, Reviewed Intra-OP anesthesia mangement and issues during anesthesia, Set expectations for post-procedure period and Allowed opportunity for questions and acknowledgement of understanding      Vitals: (Last set prior to Anesthesia Care Transfer)  CRNA VITALS  4/5/2021 1432 - 4/5/2021 1515      4/5/2021             Resp Rate (set):  10        Electronically Signed By: KWABENA Cabrera CRNA  April 5, 2021  3:15 PM

## 2021-04-06 ENCOUNTER — ALLIED HEALTH/NURSE VISIT (OUTPATIENT)
Dept: INTERPRETER SERVICES | Facility: CLINIC | Age: 41
End: 2021-04-06
Payer: COMMERCIAL

## 2021-04-06 VITALS
SYSTOLIC BLOOD PRESSURE: 119 MMHG | RESPIRATION RATE: 18 BRPM | TEMPERATURE: 98.9 F | BODY MASS INDEX: 29.93 KG/M2 | DIASTOLIC BLOOD PRESSURE: 82 MMHG | HEART RATE: 60 BPM | WEIGHT: 175.3 LBS | OXYGEN SATURATION: 95 % | HEIGHT: 64 IN

## 2021-04-06 PROCEDURE — 250N000013 HC RX MED GY IP 250 OP 250 PS 637: Performed by: SURGERY

## 2021-04-06 PROCEDURE — 250N000013 HC RX MED GY IP 250 OP 250 PS 637: Performed by: PLASTIC SURGERY

## 2021-04-06 PROCEDURE — T1013 SIGN LANG/ORAL INTERPRETER: HCPCS | Mod: U4,TEL

## 2021-04-06 PROCEDURE — 250N000011 HC RX IP 250 OP 636: Performed by: PLASTIC SURGERY

## 2021-04-06 RX ORDER — HYDROCODONE BITARTRATE AND ACETAMINOPHEN 5; 325 MG/1; MG/1
1-2 TABLET ORAL EVERY 6 HOURS PRN
Status: DISCONTINUED | OUTPATIENT
Start: 2021-04-06 | End: 2021-04-06 | Stop reason: HOSPADM

## 2021-04-06 RX ORDER — HYDROCODONE BITARTRATE AND ACETAMINOPHEN 5; 325 MG/1; MG/1
1-2 TABLET ORAL EVERY 6 HOURS PRN
Qty: 30 TABLET | Refills: 0 | Status: SHIPPED | OUTPATIENT
Start: 2021-04-06 | End: 2021-06-29

## 2021-04-06 RX ORDER — AMOXICILLIN 250 MG
1 CAPSULE ORAL 2 TIMES DAILY PRN
Qty: 60 TABLET | Refills: 1 | Status: SHIPPED | OUTPATIENT
Start: 2021-04-06 | End: 2021-06-29

## 2021-04-06 RX ORDER — METHOCARBAMOL 750 MG/1
750 TABLET, FILM COATED ORAL EVERY 6 HOURS PRN
Qty: 50 TABLET | Refills: 1 | Status: SHIPPED | OUTPATIENT
Start: 2021-04-06 | End: 2021-06-29

## 2021-04-06 RX ORDER — CEPHALEXIN 500 MG/1
500 CAPSULE ORAL EVERY 6 HOURS
Qty: 40 CAPSULE | Refills: 1 | Status: SHIPPED | OUTPATIENT
Start: 2021-04-06 | End: 2021-04-30

## 2021-04-06 RX ADMIN — ACETAMINOPHEN 975 MG: 325 TABLET, FILM COATED ORAL at 08:20

## 2021-04-06 RX ADMIN — CELECOXIB 200 MG: 200 CAPSULE ORAL at 08:20

## 2021-04-06 RX ADMIN — ACETAMINOPHEN 975 MG: 325 TABLET, FILM COATED ORAL at 00:51

## 2021-04-06 RX ADMIN — CEPHALEXIN 500 MG: 500 CAPSULE ORAL at 12:24

## 2021-04-06 RX ADMIN — CEFAZOLIN 1 G: 1 INJECTION, POWDER, FOR SOLUTION INTRAMUSCULAR; INTRAVENOUS at 05:04

## 2021-04-06 RX ADMIN — METHOCARBAMOL 750 MG: 750 TABLET ORAL at 05:04

## 2021-04-06 RX ADMIN — HYDROCODONE BITARTRATE AND ACETAMINOPHEN 1 TABLET: 5; 325 TABLET ORAL at 12:24

## 2021-04-06 RX ADMIN — FERROUS SULFATE TAB 325 MG (65 MG ELEMENTAL FE) 325 MG: 325 (65 FE) TAB at 08:20

## 2021-04-06 NOTE — PROGRESS NOTES
"  This patient  is being evaluated via a billable video visit.      The patient has been notified of following:     \"This video visit will be conducted via a call between you and your physician/provider. We have found that certain health care needs can be provided without the need for an in-person physical exam.  This service lets us provide the care you need with a video conversation.  If a prescription is necessary we can send it directly to your pharmacy.  If lab work is needed we can place an order for that and you can then stop by our lab to have the test done at a later time.    Video visits are billed at different rates depending on your insurance coverage.  Please reach out to your insurance provider with any questions.    If during the course of the call the physician/provider feels a video visit is not appropriate, you will not be charged for this service.\"    Patient has given verbal consent for Video visit? yes  Video-Visit Details    Type of service:  Video Visit    Video visit duration: 15 min  Originating Location (pt. Location): home    Distant Location (provider location):  [facility]     Platform used for Video Visit: Wingz not connecting with patient and , then  Doximety video with patient, then technical difficulties and finished by telephone with the patient.  Mallory Fine MD      Oncology follow-up visit:  Date on this visit: Apr 20, 2021  PCP: Eun Patton  Breast surgeon: Dr.Sara Fonseca   Plastic Surgeon: Dr. Diggs    Diagnosis:  [id number] ER positive AK positive HER-2/brayden negative by FISH left-sided breast cancer, with intermediate Oncotype DX recurrence score of 23, status post bilateral mastectomy and axillary sentinel lymph node biopsy on April 5, 2021.    Oncologic history:    1.  Breast cancer- She presented in Feb 2021 with L breast lump and itching x 1 month.  B/l diagnostic mammogram with tomosynthesis on 2/12/2021 showed in " the area of left breast lump, there is an irregular mass measuringabout 1.5 cm. No suspicious mammographic findings in the right breast. L breast US showed in the left breast at 10:00, 3 cm from the nipple, there was an irregular hypoechoic mass with angular margins measuring 2.2 x 1.4 x 1 cm. There was no lymphadenopathy in the left axilla. Contrast enhanced mammogram on 2/23/2021 showed a mass at the 10:00 position in left breast anterior depth measuring 1.7 cm. US guided core needle biopsy on 2/23/2021 showed no decompensating invasive ductal carcinoma, estrogen receptor positive (95%, strong) and progesterone receptor positive by immunohistochemistry (70%). By FISH HER2/MIO 17 ratio:  1.7, IHC 2+, additional 20 cells from areas corresponding to the most intense IHC staining were evaluated by FISH. The results obtained from these 20   additional cells also fall into Group 4. Taken together, the FISH and IHC   results are interpreted as HER2 negative.  FISH and IHC results ultimately interpreted as HER2 negative. OncotypeDx returned at 23, c/w intermediate risk, 9 percent of distant disease recurrence with the use of systemic endocrine therapy and  6.5%  benefit of adjuvant chemotherapy given young age, premenopausal.  She proceeded to undergo b/l breast MRI on 3/12/2021 which showed:  The index cancer noted at 10:00 3 cm from the nipple on the  left on ultrasound exam 2/23/2021 has a longest diameter of 22 mm.   2 potential satellite lesions are both present in the same quadrant at  11:00. One is posteriorly situated has a longest diameter of 8 mm on  sagittal imaging, the other is at the junction of the mid and  posterior breast measuring 7 mm in longest diameter on sagittal  imaging.   Distance from anterior aspect of the index lesion to the posterior  aspect of the closer potential satellite lesion is 7 cm, and to the  more distant potential satellite lesion is 10 cm.   As far as regional lymph nodes, there  was a single internal mammary  chain node that's mildly enlarged between the first and second left  costal cartilages. As far as left axillary lymph nodes, at 2:00  posteriorly, there are 2 equivocally abnormal nodes.  The right breast was unremarkable.  PET CT scan on March 22, 2021 showed:  1. FDG avid mass in the medial upper quadrant of the left breast  measuring 1.7 x 1.1 cm and SUV max 8.3, corresponding to the  previously biopsied lesion.   1a. Additional mildly FDG avid 0.8 cm lesion in the same quadrant.  5  mm nodule superior-medial to this, below the threshold of PET.  2. No suspicious axillary lymphadenopathy or hypermetabolic lesions in  the right breast.  3. Mildly FDG avid hypoattenuating nodule in the left thyroid lobe  measuring 1.2 x 0.7 cm and SUV max 4.6. Recommend thyroid ultrasound  to further evaluate.  4. Perifissural solid 7 mm pulmonary nodule in the right upper lobe  likely represents a lymph node. Attention on follow-up.  5. No suspicious FDG uptake in the abdomen or pelvis.  . Additional normal-appearing non-FDG avid intramammary  lymph nodes on the left.  We will refer to endocrinology for evaluation of thyroid nodule.  We will follow follow-up with CT chest in the future and pulmonary nodule.  Left breast and axillary lymph node biopsy is planned for March 29.    2. Young age at breast cancer diagnosis-she had blood drawn on March 19, 2021 and tested negative  for mutations in the FLEX, BRCA1, BRCA2, BRIP1, CDH1, CHEK2, EPCAM, MLH1, MSH2, MSH6, NBN, NF1, PALB2, PMS2, PTEN, RAD51C, RAD51D, STK11, and TP53 genes.    3. Thyroid nodules noted on thyroid ultrasound. These were first incidentally discovered on PET/CT in March 2021.    4. 7 mm pulmonary nodule in the right upper lobe-incidentally noted on PET/CT in March 2021    5. Premenopausal status- She has been menstruating regularly at diagnosis.  She has 5 children ages 5-16.  She is not planning to have any more children.   She offers  no other health related complaints.    History Of Present Illness:  Ms. Martinez is a 40 year old premenopausal female, originally from [place], who presents for follow-up of breast cancer. Since our last visit she She proceeded to undergo with bilateral mastectomy and left axillary sentinel lymph node biopsy on April 5, 2021.  She had immediate reconstruction with Dr. Diggs.    Pathology from surgery demonstrated 23 mm left breast invasive ductal carcinoma, [place] grade 3, with associated DCIS as well as LCIS.  Micrometastasis in 1 of 4 left axillary sentinel lymph nodes.  Pathology from right breast demonstrated 4 mm focus of high-grade DCIS, [id number]. Oncotype DX was obtained on her core needle biopsy ([id number]) and returned at the score of 23 correlating with 9% risk of distant disease recurrence at 9 years with use of an aromatase inhibitor or tamoxifen alone. However since patient is young and 40-year-old, for her age and intermediate OncotypeDx recurrence score there was about 6.5% benefit of adjuvant chemotherapy.  History was obtained with the help of her son. We were not able to join the call was the . She has had some chest wall pain after undergoing  b/l skin sparing mastectomy and L axillary SLN biopsy on 4/5/2021, which she rates at 3 out of 10. She has no other health related concerns.   In addition, a complete 12 point  review of systems is negative.    Past Medical/Surgical History:  Past Medical History:   Diagnosis Date     Varicose veins of legs      Past Surgical History:   Procedure Laterality Date     INSERT PORT VASCULAR ACCESS N/A 4/5/2021    Procedure: PORT PLACEMENT;  Surgeon: Una Fonseca MD;  Location: [facility] OR     MASTECTOMY SIMPLE BILATERAL, SENTINEL NODE BILATERAL, COMBINED N/A 4/5/2021    Procedure: BILATERAL SKIN SPARING MASTECTOMY WITH LEFT SENTINEL LYMPH NODE BIOPSY;  Surgeon: Una Fonseca MD;  Location:  OR     NO HISTORY OF SURGERY       RECONSTRUCT  BREAST, INSERT TISSUE EXPANDER, COMBINED Bilateral 4/5/2021    Procedure: BILATERAL FIRST STAGE BREAST RECONSTRUCTION WITH TISSUE EXPANDERS; AND ACELLULAR DERMAL MATRIX;  Surgeon: Kervin Fu MD;  Location: [facility] OR     Allergies:  Allergies as of 04/20/2021     (No Known Allergies)     Current Medications:  Current Outpatient Medications   Medication Sig Dispense Refill     cephALEXin (KEFLEX) 500 MG capsule Take 1 capsule (500 mg) by mouth every 6 hours Take as long as drains in place. 40 capsule 1     HYDROcodone-acetaminophen (NORCO) 5-325 MG tablet Take 1-2 tablets by mouth every 6 hours as needed for moderate to severe pain 30 tablet 0     methocarbamol (ROBAXIN) 750 MG tablet Take 1 tablet (750 mg) by mouth every 6 hours as needed for muscle spasms 50 tablet 1     senna-docusate (SENOKOT-S/PERICOLACE) 8.6-50 MG tablet Take 1 tablet by mouth 2 times daily as needed for constipation 60 tablet 1      Family History:  Family History   Problem Relation Age of Onset     No Known Problems Mother      No Known Problems Father     There is no family history of breast or ovarian cancer.  Cousin had uterine cancer.    Social History:  Social History     Socioeconomic History     Marital status:      Spouse name: [facility]     Number of children: 5   Occupational History     Occupation: homemaker   Tobacco Use     Smoking status: Never Smoker     Smokeless tobacco: Never Used   Substance and Sexual Activity     Alcohol use: No     Drug use: No     Sexual activity: Yes     Partners: Male     Birth control/protection: Pill   Social History Narrative    From [place] to [place] in [place].      Physical Exam:  Constitutional: alert and in no distress  Eyes: No redness or discharge  Respiratory: No cough or labored breathing.  Musculoskeletal: Full range of motion in extremities.  Skin: no visible skin lesions or discoloration  Neurological: No tremors and denies headache.  Psychiatric: Mentation appears normal and affect  is normal as well.  Alert and oriented x3.  The rest the comprehensive physical examination is deferred due to public health emergency video visit restrictions.      Laboratory/Imaging Studies  Labs reviewed and documented in the EMR.    ASSESSMENT/PLAN:  Ghazal is a very pleasant 40-year-old Chinese-speaking woman, originally from [place], with [id number] ER positive NE positive HER-2/brayden negative by FISH left-sided breast cancer, with intermediate Oncotype DX recurrence score of 23, status post bilateral mastectomy and axillary sentinel lymph node biopsy on April 5, 2021.  She also underwent immediate reconstruction by Dr. Diggs.  There is a question whether she will need adjuvant radiation therapy given microscopic disease in 1 of 4 axillary sentinel lymph nodes. We are planning to present her case at our multidisciplinary tumor board. We will also refer her to radiation oncology following completion of chemotherapy for consultation. Given Oncotype recurrence score of 23,  grade 3 disease and her young age, I would favor proceeding with adjuvant chemotherapy in the form of Taxotere/Cytoxan every 3 weeks for 4 cycles.  Recommendations were relayed to the patient and her son. However the  was not available at today's visit to discuss potential side effects of chemotherapy in detail, and we will see the patient back to discuss side effects of adjuvant chemotherapy in detail on April 30, with  and we will plan to proceed with cycle 1 day 1 of adjuvant Taxotere and Cytoxan on April 30.  At the end of our visit patient verbalized understanding and concurred with the plan.

## 2021-04-06 NOTE — PLAN OF CARE
VSS, pt took Robaxin for pain control. Pt dressings clean, dry and intact. Port site on right chest is open to air with dermabond. 2 NIA sites in place that were stripped. Pt able to ambulate to bathroom and void. Tolerating regular diet.

## 2021-04-06 NOTE — PLAN OF CARE
Pt. Alert and oriented x4. Vital signs stable on RA. Up independently. Tolerating reg diet. Lung sounds clear. Bowel sounds active, denies flatus. BM yesterday, adequate urine output. Chest incisions CDI, NIA sites CDI- NIA to bulb suction, teaching completed. Pain managed with radha Tyl. Denies nausea. Discharging home with spouse, reviewed meds and instructions prior to discharge. Provided medication and NIA handouts in [place] and discharge instructions were provided with .

## 2021-04-06 NOTE — PROGRESS NOTES
"Plastic Surgery POD #1    Pain controlled.  /77   Pulse 80   Temp 98.6  F (37  C)   Resp 16   Ht 1.626 m (5' 4\")   Wt 79.5 kg (175 lb 4.8 oz)   LMP 03/16/2021   SpO2 98%   BMI 30.09 kg/m    NIA serosanguinous, volumes ok  No hematoma  Skin flaps appear healthy    Saline lock and change to bra  Home later today  F/u next Tuesday.  "

## 2021-04-06 NOTE — PATIENT INSTRUCTIONS
Negative Genetic Test Result    Genetic Testing  You had a blood test that looked at the genetic information in one or more genes associated with increased cancer risk.  The testing looked for any harmful changes that would stop this particular gene from working like it should. If an individual does not have any harmful changes or variants of unknown significance found from their blood test, their genetic test result is reported as negative.       Results  The genetic test did not identify any pathogenic (harmful) changes in the genes that were tested. There are several possible explanations for a negative test result. Without knowing the gene mutation in your family, the cause of the cancer in you or your relatives is still unknown. Your genetic counselor can help interpret the result for you and your relatives. In this case, there are several reasons that may explain the negative test result:    There may be a gene mutation in the family that you did not inherit.     You may have a gene mutation in a different gene that was not included in the test, or has not yet been discovered.     The cancers in you or your family may be due to a combination of genetic factors and environment (multifactorial/familial).    The cancers in you or your family may be sporadic/random cancers.    There is very small chance that a mutation was not found by current testing methods.  As testing technology evolves over time, it may still be possible to identify a mutation in a gene that was not found on this test.    It is important to note which genes were included in your test. A list of these genes can be found on your test result.    Screening Recommendations  Due to this negative test result, cancer screening recommendations should be based on your personal and family history. This may include increased cancer screening for you and/or your family members. Your genetic counselor and health care provider can help make  appropriate recommendations.      Please call us if you have any questions or concerns.   Cancer Risk Management Program [phone]-CANCER ([phone])  ? Rajat Fishman, MS, [facility] [phone]  ? Jaquelin Gray, MS, [facility]  [phone]  ? Jessi Elizondo, MS, [facility]  [phone]  ? Mariam Cardona, MS, [facility] [phone]  ? Salome Uribe, MS, [facility] [phone]  ? Kerry Vallejo, MS, [facility]  [phone]

## 2021-04-06 NOTE — PLAN OF CARE
A&Ox4, VSS, dressings CDI, ace wrap around breasts, Port site on right chest is KARL w/dermabond. 2 NIA sites stripped Q4h. Ambulated to bathroom to void. Danilo regular diet. IVF, Tylenol & Robaxin for pain. Will continue to monitor.

## 2021-04-06 NOTE — DISCHARGE SUMMARY
"Discharge Summary    Ghazal Martinez MRN# [id number]   YOB: 1980 Age: 40 year old     Date of Admission:  4/5/2021  Date of Discharge:  4/6/2021  Admitting Physician:  Kervin Fu MD  Discharge Physician:  Kervin Fu MD  Discharging Service:  Plastic and Reconstructive Surgery     Primary Provider: No Ref-Primary, Physician          Admission Diagnoses:   Malignant neoplasm of left breast (H) [C50.912]          Discharge Diagnosis:     same             Discharge Disposition:     Discharged to home             Condition on Discharge:     Discharge condition: Stable   Discharge vitals: Blood pressure 119/77, pulse 80, temperature 98.6  F (37  C), resp. rate 16, height 1.626 m (5' 4\"), weight 79.5 kg (175 lb 4.8 oz), last menstrual period 03/16/2021, SpO2 98 %, not currently breastfeeding.   Code status on discharge: Full Code           Procedures / Labs / Imaging:   Bilateral mastectomies, SLN bx and first stage breast reconstruction.          Medications Prior to Admission:     Medications Prior to Admission   Medication Sig Dispense Refill Last Dose     ferrous sulfate (FEROSUL) 325 (65 Fe) MG tablet Take 1 tablet (325 mg) by mouth daily (with breakfast) 90 tablet 3 3/28/2021 at AM             Discharge Medications:     Current Discharge Medication List      START taking these medications    Details   cephALEXin (KEFLEX) 500 MG capsule Take 1 capsule (500 mg) by mouth every 6 hours Take as long as drains in place.  Qty: 40 capsule, Refills: 1    Associated Diagnoses: Malignant neoplasm of central portion of left breast in female, estrogen receptor positive (H)      HYDROcodone-acetaminophen (NORCO) 5-325 MG tablet Take 1-2 tablets by mouth every 6 hours as needed for moderate to severe pain  Qty: 30 tablet, Refills: 0    Associated Diagnoses: Malignant neoplasm of central portion of left breast in female, estrogen receptor positive (H)      methocarbamol (ROBAXIN) 750 MG tablet Take " 1 tablet (750 mg) by mouth every 6 hours as needed for muscle spasms  Qty: 50 tablet, Refills: 1    Associated Diagnoses: Malignant neoplasm of central portion of left breast in female, estrogen receptor positive (H)         CONTINUE these medications which have NOT CHANGED    Details   ferrous sulfate (FEROSUL) 325 (65 Fe) MG tablet Take 1 tablet (325 mg) by mouth daily (with breakfast)  Qty: 90 tablet, Refills: 3    Associated Diagnoses: Iron deficiency anemia due to chronic blood loss                   Consultations:     No consultations were requested during this admission              Hospital Course:     The patient's postop course was uneventful             Significant Results:     None             Pending Results:     None           Discharge Instructions and Follow-Up:     See discharge orders.

## 2021-04-06 NOTE — PROGRESS NOTES
"General Surgery Progress Note    Subjective: Ghazal is doing well. Reports minimal pain. Has not used narcotics.     Objective: /82 (BP Location: Right arm)   Pulse 60   Temp 98.9  F (37.2  C) (Oral)   Resp 16   Ht 1.626 m (5' 4\")   Wt 79.5 kg (175 lb 4.8 oz)   LMP 03/16/2021   SpO2 95%   BMI 30.09 kg/m    Gen: alert, nodistress  Chest: incisions c/d/i, skin flaps look good. No hematomas. NIA serosanguinous  CV: RRR  Pulm: nonlabored breathing  Abd: soft, nt  Ext: WWP    Assessment/Plan:   Ghazal Martinez  is a 40 year old female POD 1 s/p bilateral mastectomies with left SLNB and port placement with immediate reconstruction. Doing very well. I would like to discuss frozen section pathology with patient and  with interpretor prior to discharge. Asked RN to have phone interpretor available around noon.   - ok to discharge later today  - discharge orders placed    Una Fonseca MD  [facility]  [phone]              "

## 2021-04-08 ENCOUNTER — DOCUMENTATION ONLY (OUTPATIENT)
Dept: ONCOLOGY | Facility: CLINIC | Age: 41
End: 2021-04-08

## 2021-04-09 ENCOUNTER — APPOINTMENT (OUTPATIENT)
Dept: INTERPRETER SERVICES | Facility: CLINIC | Age: 41
End: 2021-04-09
Payer: COMMERCIAL

## 2021-04-09 LAB — COPATH REPORT: NORMAL

## 2021-04-12 ENCOUNTER — TELEPHONE (OUTPATIENT)
Dept: SURGERY | Facility: CLINIC | Age: 41
End: 2021-04-12

## 2021-04-12 ENCOUNTER — APPOINTMENT (OUTPATIENT)
Dept: INTERPRETER SERVICES | Facility: CLINIC | Age: 41
End: 2021-04-12
Payer: COMMERCIAL

## 2021-04-12 ENCOUNTER — ANCILLARY PROCEDURE (OUTPATIENT)
Dept: ULTRASOUND IMAGING | Facility: CLINIC | Age: 41
End: 2021-04-12
Attending: INTERNAL MEDICINE
Payer: COMMERCIAL

## 2021-04-12 DIAGNOSIS — E04.1 THYROID NODULE: ICD-10-CM

## 2021-04-12 PROCEDURE — 76536 US EXAM OF HEAD AND NECK: CPT | Performed by: RADIOLOGY

## 2021-04-12 NOTE — TELEPHONE ENCOUNTER
Post op call placed to Ghazal via  services. Left message for Ghazal, will try back later this afternoon.    Ivette Fowler RN, BSN, OCN  Breast Nurse Navigator  [facility]  [facility] [place]  Phone: [phone]

## 2021-04-13 ENCOUNTER — VIRTUAL VISIT (OUTPATIENT)
Dept: ENDOCRINOLOGY | Facility: CLINIC | Age: 41
End: 2021-04-13
Attending: INTERNAL MEDICINE
Payer: COMMERCIAL

## 2021-04-13 ENCOUNTER — TELEPHONE (OUTPATIENT)
Dept: SURGERY | Facility: CLINIC | Age: 41
End: 2021-04-13

## 2021-04-13 DIAGNOSIS — E04.1 THYROID NODULE: ICD-10-CM

## 2021-04-13 PROCEDURE — 99203 OFFICE O/P NEW LOW 30 MIN: CPT | Mod: TEL | Performed by: INTERNAL MEDICINE

## 2021-04-13 NOTE — PATIENT INSTRUCTIONS
Ghazal,     The thyroid ultrasound showed three left sided small thyroid nodules.     Thyroid nodules are common, occurring in up to 50% of patients over the age of 50 years.       Once we find thyroid nodules; decision is made to biopsy or not based on size and other criteria.     Based on the size of the nodules and other characteristics; biopsy is not recommended at this time.      We will do a follow up thyroid ultrasound in one year to follow up on these nodules.     Our clinic team, will contact you to schedule a follow up ultrasound and a clinic visit in one year.     In the meantime, if you have any questions or concerns; you can reach me at the following number.     Sho Gaming MD  [phone] select option #3 for triage nurse.

## 2021-04-13 NOTE — LETTER
4/13/2021         RE: Ghazal Martinez  [address]  [place] [address]        Dear Colleague,    Thank you for referring your patient, Ghazal Martinez, to the [facility]. Please see a copy of my visit note below.    Ghazal is a 40 year old who is being evaluated via a billable telephone visit.      What phone number would you like to be contacted at? [phone]  How would you like to obtain your AVS? Mail a copy      Endocrinology virtual Visit    Chief Complaint: Consult and Thyroid Problem     Information obtained from:Patient  used for this visit.      Assessment/Treatment Plan:      Thyroid nodule:  I have personally reviewed the thyroid ultrasound from 4/12/2021 and CT/PET thyroid from 3/2021.  3 left-sided nodules measuring 0.9 x 0.7 x 0.5 cm, 0.5 x 0.5 x 0.3 cm and 1.3 x 1.3 x 1.2 cm.  These nodules are mixed cystic and solid with hyperechoic echogenicity.  There is no high risk features including wider than tall, irregular margins, echogenic foci or any other high risk features.  Based on these findings; no need for FNA at this time.  We will repeat a follow-up ultrasound in 1year.  TSH has been checked and within the normal limit.  Discussed findings and next steps with patient in detail using .  Patient is agreeable with plan.    Decision making   FDG avid thyroid nodules are believed to have high risk of malignancy.  However, when we will look at studies which compared FDG avidity with ultrasound findings and final diagnosis of biopsy; we find that thyroid nodules with features on ultrasound that are suspicious and FDG avid; are the groups with high risk for malignancy.  Studies have shown that FDG avid nodules; with no suspicious features on ultrasound; have  low malignancy risk (as in this case). Therefore, will obtain a follow up ultrasound in 6-12 months rather than FNA at this time. (Henrry SAUCEDO et al. Ultrasound  "stratification of the FDG-avid thyroid nodule. Clin Radiol 2016;71:164-9. Epub December 10, 2015.)    Patient Instructions     Ghazal,     The thyroid ultrasound showed three left sided small thyroid nodules.     Thyroid nodules are common, occurring in up to 50% of patients over the age of 50 years.       Once we find thyroid nodules; decision is made to biopsy or not based on size and other criteria.     Based on the size of the nodules and other characteristics; biopsy is not recommended at this time.      We will do a follow up thyroid ultrasound in one year to follow up on these nodules.     Our clinic team, will contact you to schedule a follow up ultrasound and a clinic visit in one year.     In the meantime, if you have any questions or concerns; you can reach me at the following number.     Sho Gaming MD  [phone] select option #3 for triage nurse.      I will contact the patient with the test results.  Return to clinic in 12 months.    Test and/or medications prescribed today:  Orders Placed This Encounter   Procedures     US Thyroid         Sho Gaming MD  Staff Endocrinologist    Division of Endocrinology and Diabetes      Subjective:         HPI: Ghazal Martinez is a 40 year old female with history of breast cancer who is seen in consultation at [facility]'s request for thyroid nodule.    Incidental finding of thyroid nodule on PET/CT which was done for follow-up of breast cancer.  The PET findings are documented below.  \" Mildly FDG avid hypoattenuating nodule in the left thyroid lobe measuring 1.2 x 0.7 cm and SUV max 4.6. Recommend thyroid ultrasound to further evaluate.\"    \"Nodule 1:  Location: Left inferior. This correlates with PET/CT findings.  Size: 1.3 x 1.3 x 1.2 cm  Composition: Mixed cystic and solid (1 point)  Echogenicity: Hyperechoic or isoechoic (1 point)  Shape: Wider than tall (0 points)  Margin: Smooth (0 points)  Echogenic Foci: None or large comet tail " "artifact (0 points)  Stability:  TIRADS: TR2 (1-2 points) Not suspicious     Nodule 2:  Location: Left inferior  Size: 0.9 x 0.7 x 0.5 cm  Composition: Mixed cystic and solid (1 point)  Echogenicity: Hyperechoic or isoechoic (1 point)  Shape: Wider than tall (0 points)  Margin: Smooth (0 points)  Echogenic Foci: None or large comet tail artifact (0 points)  Stability:  TIRADS: TR2 (1-2 points) Not suspicious     Nodule 3:  Location: Left, inferior, superficial  Size: 0.5 x 0.5 x 0.3 cm  Composition: Mixed cystic and solid (1 point)  Echogenicity: Hyperechoic or isoechoic (1 point)  Shape: Wider than tall (0 points)  Margin: Smooth (0 points)  Echogenic Foci: None or large comet tail artifact (0 points)  Stability:  TIRADS: TR2 (1-2 points) Not suspicious\"    No family history of thyroid cancer.  Cousin and an aunt with a thyroid disease.  Review of systems negative today other than pain at mastectomy surgical site and developed back pain.  TSH done previously and within the normal limit.     No Known Allergies    Current Outpatient Medications   Medication Sig Dispense Refill     cephALEXin (KEFLEX) 500 MG capsule Take 1 capsule (500 mg) by mouth every 6 hours Take as long as drains in place. 40 capsule 1     ferrous sulfate (FEROSUL) 325 (65 Fe) MG tablet Take 1 tablet (325 mg) by mouth daily (with breakfast) 90 tablet 3     methocarbamol (ROBAXIN) 750 MG tablet Take 1 tablet (750 mg) by mouth every 6 hours as needed for muscle spasms 50 tablet 1     HYDROcodone-acetaminophen (NORCO) 5-325 MG tablet Take 1-2 tablets by mouth every 6 hours as needed for moderate to severe pain (Patient not taking: Reported on 4/13/2021) 30 tablet 0     senna-docusate (SENOKOT-S/PERICOLACE) 8.6-50 MG tablet Take 1 tablet by mouth 2 times daily as needed for constipation (Patient not taking: Reported on 4/13/2021) 60 tablet 1       Review of Systems     as per HPI above  Family history, past medical history, past surgical history and " "social history reviewed in epic.        Objective:   Pleasant, able to speak in a complete sentence without signs of distress.  Other exam not completed as this was telephone visit.    in House Labs:     TSH   Date Value Ref Range Status   01/25/2021 0.54 0.40 - 4.00 mU/L Final   08/16/2020 0.77 0.40 - 4.00 mU/L Final   01/20/2020 1.10 0.40 - 4.00 mU/L Final         \"US THYROID 4/12/2021 10:09 AM     COMPARISON: PET CT 3/22/2021     HISTORY: Thyroid nodule on PET/CT. Recent diagnosis of left breast  invasive mammary carcinoma.     FINDINGS:   Thyroid parenchyma: homogenous  The right lobe of the thyroid measures: 5.1 x 1.6 x 1.5 cm  The left lobe of the thyroid measures: 5.2 x 1.6 x 1.9 cm  The thyroid isthmus measures: 0.3 cm     Nodule 1:  Location: Left inferior. This correlates with PET/CT findings.  Size: 1.3 x 1.3 x 1.2 cm  Composition: Mixed cystic and solid (1 point)  Echogenicity: Hyperechoic or isoechoic (1 point)  Shape: Wider than tall (0 points)  Margin: Smooth (0 points)  Echogenic Foci: None or large comet tail artifact (0 points)  Stability:  TIRADS: TR2 (1-2 points) Not suspicious     Nodule 2:  Location: Left inferior  Size: 0.9 x 0.7 x 0.5 cm  Composition: Mixed cystic and solid (1 point)  Echogenicity: Hyperechoic or isoechoic (1 point)  Shape: Wider than tall (0 points)  Margin: Smooth (0 points)  Echogenic Foci: None or large comet tail artifact (0 points)  Stability:  TIRADS: TR2 (1-2 points) Not suspicious     Nodule 3:  Location: Left, inferior, superficial  Size: 0.5 x 0.5 x 0.3 cm  Composition: Mixed cystic and solid (1 point)  Echogenicity: Hyperechoic or isoechoic (1 point)  Shape: Wider than tall (0 points)  Margin: Smooth (0 points)  Echogenic Foci: None or large comet tail artifact (0 points)  Stability:  TIRADS: TR2 (1-2 points) Not suspicious                                                                      Impression:  Left thyroid nodules as described above.\"    Total Telephone " time = 20 minutes. More than 45 minutes spent on the date of the encounter doing chart review, history and reviewing images, documentation and further activities per the note.      Again, thank you for allowing me to participate in the care of your patient.        Sincerely,        Sho Gaming MD

## 2021-04-13 NOTE — TELEPHONE ENCOUNTER
Post op call placed to Ghazal via  services. 2nd message left for Ghazal. Encouraged Ghazal to call our office back with questions.     Ivette Fowler RN, BSN, OCN  Breast Nurse Navigator  [facility]  [facility] [place]  Phone: [phone]

## 2021-04-13 NOTE — PROGRESS NOTES
Ghazal is a 40 year old who is being evaluated via a billable telephone visit.      What phone number would you like to be contacted at? [phone]  How would you like to obtain your AVS? Mail a copy

## 2021-04-13 NOTE — PROGRESS NOTES
Endocrinology virtual Visit    Chief Complaint: Consult and Thyroid Problem     Information obtained from:Patient  used for this visit.      Assessment/Treatment Plan:      Thyroid nodule:  I have personally reviewed the thyroid ultrasound from 4/12/2021 and CT/PET thyroid from 3/2021.  3 left-sided nodules measuring 0.9 x 0.7 x 0.5 cm, 0.5 x 0.5 x 0.3 cm and 1.3 x 1.3 x 1.2 cm.  These nodules are mixed cystic and solid with hyperechoic echogenicity.  There is no high risk features including wider than tall, irregular margins, echogenic foci or any other high risk features.  Based on these findings; no need for FNA at this time.  We will repeat a follow-up ultrasound in 1year.  TSH has been checked and within the normal limit.  Discussed findings and next steps with patient in detail using .  Patient is agreeable with plan.    Decision making   FDG avid thyroid nodules are believed to have high risk of malignancy.  However, when we will look at studies which compared FDG avidity with ultrasound findings and final diagnosis of biopsy; we find that thyroid nodules with features on ultrasound that are suspicious and FDG avid; are the groups with high risk for malignancy.  Studies have shown that FDG avid nodules; with no suspicious features on ultrasound; have  low malignancy risk (as in this case). Therefore, will obtain a follow up ultrasound in 6-12 months rather than FNA at this time. (Henrry P et al. Ultrasound stratification of the FDG-avid thyroid nodule. Clin Radiol 2016;71:164-9. Epub December 10, 2015.)    Patient Instructions     Ghazal,     The thyroid ultrasound showed three left sided small thyroid nodules.     Thyroid nodules are common, occurring in up to 50% of patients over the age of 50 years.       Once we find thyroid nodules; decision is made to biopsy or not based on size and other criteria.     Based on the size of the nodules and other characteristics;  "biopsy is not recommended at this time.      We will do a follow up thyroid ultrasound in one year to follow up on these nodules.     Our clinic team, will contact you to schedule a follow up ultrasound and a clinic visit in one year.     In the meantime, if you have any questions or concerns; you can reach me at the following number.     Sho Gaming MD  [phone] select option #3 for triage nurse.      I will contact the patient with the test results.  Return to clinic in 12 months.    Test and/or medications prescribed today:  Orders Placed This Encounter   Procedures     US Thyroid         Sho Gaming MD  Staff Endocrinologist    Division of Endocrinology and Diabetes      Subjective:         HPI: Ghazal Martinez is a 40 year old female with history of breast cancer who is seen in consultation at [facility]'s request for thyroid nodule.    Incidental finding of thyroid nodule on PET/CT which was done for follow-up of breast cancer.  The PET findings are documented below.  \" Mildly FDG avid hypoattenuating nodule in the left thyroid lobe measuring 1.2 x 0.7 cm and SUV max 4.6. Recommend thyroid ultrasound to further evaluate.\"    \"Nodule 1:  Location: Left inferior. This correlates with PET/CT findings.  Size: 1.3 x 1.3 x 1.2 cm  Composition: Mixed cystic and solid (1 point)  Echogenicity: Hyperechoic or isoechoic (1 point)  Shape: Wider than tall (0 points)  Margin: Smooth (0 points)  Echogenic Foci: None or large comet tail artifact (0 points)  Stability:  TIRADS: TR2 (1-2 points) Not suspicious     Nodule 2:  Location: Left inferior  Size: 0.9 x 0.7 x 0.5 cm  Composition: Mixed cystic and solid (1 point)  Echogenicity: Hyperechoic or isoechoic (1 point)  Shape: Wider than tall (0 points)  Margin: Smooth (0 points)  Echogenic Foci: None or large comet tail artifact (0 points)  Stability:  TIRADS: TR2 (1-2 points) Not suspicious     Nodule 3:  Location: Left, inferior, superficial  Size: " "0.5 x 0.5 x 0.3 cm  Composition: Mixed cystic and solid (1 point)  Echogenicity: Hyperechoic or isoechoic (1 point)  Shape: Wider than tall (0 points)  Margin: Smooth (0 points)  Echogenic Foci: None or large comet tail artifact (0 points)  Stability:  TIRADS: TR2 (1-2 points) Not suspicious\"    No family history of thyroid cancer.  Cousin and an aunt with a thyroid disease.  Review of systems negative today other than pain at mastectomy surgical site and developed back pain.  TSH done previously and within the normal limit.     No Known Allergies    Current Outpatient Medications   Medication Sig Dispense Refill     cephALEXin (KEFLEX) 500 MG capsule Take 1 capsule (500 mg) by mouth every 6 hours Take as long as drains in place. 40 capsule 1     ferrous sulfate (FEROSUL) 325 (65 Fe) MG tablet Take 1 tablet (325 mg) by mouth daily (with breakfast) 90 tablet 3     methocarbamol (ROBAXIN) 750 MG tablet Take 1 tablet (750 mg) by mouth every 6 hours as needed for muscle spasms 50 tablet 1     HYDROcodone-acetaminophen (NORCO) 5-325 MG tablet Take 1-2 tablets by mouth every 6 hours as needed for moderate to severe pain (Patient not taking: Reported on 4/13/2021) 30 tablet 0     senna-docusate (SENOKOT-S/PERICOLACE) 8.6-50 MG tablet Take 1 tablet by mouth 2 times daily as needed for constipation (Patient not taking: Reported on 4/13/2021) 60 tablet 1       Review of Systems     as per HPI above  Family history, past medical history, past surgical history and social history reviewed in epic.        Objective:   Pleasant, able to speak in a complete sentence without signs of distress.  Other exam not completed as this was telephone visit.    in House Labs:     TSH   Date Value Ref Range Status   01/25/2021 0.54 0.40 - 4.00 mU/L Final   08/16/2020 0.77 0.40 - 4.00 mU/L Final   01/20/2020 1.10 0.40 - 4.00 mU/L Final         \"US THYROID 4/12/2021 10:09 AM     COMPARISON: PET CT 3/22/2021     HISTORY: Thyroid nodule on PET/CT. " "Recent diagnosis of left breast  invasive mammary carcinoma.     FINDINGS:   Thyroid parenchyma: homogenous  The right lobe of the thyroid measures: 5.1 x 1.6 x 1.5 cm  The left lobe of the thyroid measures: 5.2 x 1.6 x 1.9 cm  The thyroid isthmus measures: 0.3 cm     Nodule 1:  Location: Left inferior. This correlates with PET/CT findings.  Size: 1.3 x 1.3 x 1.2 cm  Composition: Mixed cystic and solid (1 point)  Echogenicity: Hyperechoic or isoechoic (1 point)  Shape: Wider than tall (0 points)  Margin: Smooth (0 points)  Echogenic Foci: None or large comet tail artifact (0 points)  Stability:  TIRADS: TR2 (1-2 points) Not suspicious     Nodule 2:  Location: Left inferior  Size: 0.9 x 0.7 x 0.5 cm  Composition: Mixed cystic and solid (1 point)  Echogenicity: Hyperechoic or isoechoic (1 point)  Shape: Wider than tall (0 points)  Margin: Smooth (0 points)  Echogenic Foci: None or large comet tail artifact (0 points)  Stability:  TIRADS: TR2 (1-2 points) Not suspicious     Nodule 3:  Location: Left, inferior, superficial  Size: 0.5 x 0.5 x 0.3 cm  Composition: Mixed cystic and solid (1 point)  Echogenicity: Hyperechoic or isoechoic (1 point)  Shape: Wider than tall (0 points)  Margin: Smooth (0 points)  Echogenic Foci: None or large comet tail artifact (0 points)  Stability:  TIRADS: TR2 (1-2 points) Not suspicious                                                                      Impression:  Left thyroid nodules as described above.\"    Total Telephone time = 20 minutes. More than 45 minutes spent on the date of the encounter doing chart review, history and reviewing images, documentation and further activities per the note.  "

## 2021-04-15 ENCOUNTER — OFFICE VISIT (OUTPATIENT)
Dept: SURGERY | Facility: CLINIC | Age: 41
End: 2021-04-15
Payer: COMMERCIAL

## 2021-04-15 DIAGNOSIS — Z17.0 MALIGNANT NEOPLASM OF CENTRAL PORTION OF LEFT BREAST IN FEMALE, ESTROGEN RECEPTOR POSITIVE (H): Primary | ICD-10-CM

## 2021-04-15 DIAGNOSIS — C50.112 MALIGNANT NEOPLASM OF CENTRAL PORTION OF LEFT BREAST IN FEMALE, ESTROGEN RECEPTOR POSITIVE (H): Primary | ICD-10-CM

## 2021-04-15 PROCEDURE — 99024 POSTOP FOLLOW-UP VISIT: CPT | Performed by: SURGERY

## 2021-04-15 ASSESSMENT — PAIN SCALES - GENERAL: PAINLEVEL: MODERATE PAIN (4)

## 2021-04-15 NOTE — LETTER
4/15/2021         RE: Ghazal Martinez  [address]  [place] [address]        Dear Colleague,    Thank you for referring your patient, Ghazal Martinez, to the [facility]. Please see a copy of my visit note below.    [facility] Breast Surgery Postoperative Note    S: Ghazal is recovering well after surgery. She continues to be sore along the left axilla and right lateral chest wall but is improving.     Breasts: mastectomy incisions are healing well. No erythema. Some swelling noted in the left upper outer chest/axilla.     Pathology: reviewed and copy given.     A/P  Ghazal Martinez is recovering from bilateral skin sparing mastectomies with left sentinel lymph node biopsy and port placement on April 5, 2021.  She had immediate reconstruction with Dr. Diggs.  Her pathology revealed left breast invasive ductal carcinoma, grade 3 with DCIS measuring 2.3 cm.  Margins are negative.  She had micrometastatic focus of cancer in 1 of 4 sentinel lymph nodes.  This was negative on frozen section.  She had a incidental 4 mm focus of DCIS on the right breast. Oncotype had been completed on the original biopsy which was 23.     With the micrometastatic disease in 1 of 4 sentinel nodes radiation could be considered to reduce her risk of recurrence.  I will discuss with Dr. Fine and we will decide best option, may be worthwhile to have her meet with Rad Onc.  She has follow-up scheduled with Dr. Fine with medical oncology.  I would like to see her back in 6 months for follow-up. She would benefit from PT/lymphedema given limited ROM on the left and tightness in the axilla. This was ordered.       Thank you for the opportunity to help in her care.    Una Fonseca MD  [facility]  [phone]    Please route or send letter to:  Primary Care Provider (PCP) and Referring Provider        Again, thank you for allowing me to participate in the care of your  patient.        Sincerely,        Una Fonseca MD

## 2021-04-15 NOTE — NURSING NOTE
Ghazal Martinez's goals for this visit include:   Chief Complaint   Patient presents with     Surgical Followup     post op bilateral mastectomy, pain 4/10       She requests these members of her care team be copied on today's visit information: no    PCP: No Ref-Primary, Physician    Referring Provider:  No referring provider defined for this encounter.    LMP 03/16/2021     Do you need any medication refills at today's visit? No    Annabella Choe LPN

## 2021-04-15 NOTE — PROGRESS NOTES
[facility] Breast Surgery Postoperative Note    S: Ghazal is recovering well after surgery. She continues to be sore along the left axilla and right lateral chest wall but is improving.     Breasts: mastectomy incisions are healing well. No erythema. Some swelling noted in the left upper outer chest/axilla.     Pathology: reviewed and copy given.     A/P  Ghazal Martinez is recovering from bilateral skin sparing mastectomies with left sentinel lymph node biopsy and port placement on April 5, 2021.  She had immediate reconstruction with Dr. Diggs.  Her pathology revealed left breast invasive ductal carcinoma, grade 3 with DCIS measuring 2.3 cm.  Margins are negative.  She had micrometastatic focus of cancer in 1 of 4 sentinel lymph nodes.  This was negative on frozen section.  She had a incidental 4 mm focus of DCIS on the right breast. Oncotype had been completed on the original biopsy which was 23.     With the micrometastatic disease in 1 of 4 sentinel nodes radiation could be considered to reduce her risk of recurrence.  I will discuss with Dr. Fine and we will decide best option, may be worthwhile to have her meet with Rad Onc.  She has follow-up scheduled with Dr. Fine with medical oncology.  I would like to see her back in 6 months for follow-up. She would benefit from PT/lymphedema given limited ROM on the left and tightness in the axilla. This was ordered.       Thank you for the opportunity to help in her care.    Una Fonseca MD  [facility]  [phone]    Please route or send letter to:  Primary Care Provider (PCP) and Referring Provider

## 2021-04-16 ENCOUNTER — APPOINTMENT (OUTPATIENT)
Dept: INTERPRETER SERVICES | Facility: CLINIC | Age: 41
End: 2021-04-16
Payer: COMMERCIAL

## 2021-04-20 ENCOUNTER — VIRTUAL VISIT (OUTPATIENT)
Dept: ONCOLOGY | Facility: CLINIC | Age: 41
End: 2021-04-20
Payer: COMMERCIAL

## 2021-04-20 DIAGNOSIS — Z17.0 MALIGNANT NEOPLASM OF LEFT BREAST IN FEMALE, ESTROGEN RECEPTOR POSITIVE, UNSPECIFIED SITE OF BREAST (H): Primary | ICD-10-CM

## 2021-04-20 DIAGNOSIS — C50.912 MALIGNANT NEOPLASM OF LEFT BREAST IN FEMALE, ESTROGEN RECEPTOR POSITIVE, UNSPECIFIED SITE OF BREAST (H): Primary | ICD-10-CM

## 2021-04-20 PROCEDURE — 99214 OFFICE O/P EST MOD 30 MIN: CPT | Mod: GT | Performed by: INTERNAL MEDICINE

## 2021-04-20 RX ORDER — PALONOSETRON 0.05 MG/ML
0.25 INJECTION, SOLUTION INTRAVENOUS ONCE
Status: CANCELLED
Start: 2021-04-30 | End: 2021-04-30

## 2021-04-20 RX ORDER — HEPARIN SODIUM,PORCINE 10 UNIT/ML
5 VIAL (ML) INTRAVENOUS
Status: CANCELLED | OUTPATIENT
Start: 2021-04-30

## 2021-04-20 RX ORDER — SODIUM CHLORIDE 9 MG/ML
1000 INJECTION, SOLUTION INTRAVENOUS CONTINUOUS PRN
Status: CANCELLED
Start: 2021-04-30

## 2021-04-20 RX ORDER — NALOXONE HYDROCHLORIDE 0.4 MG/ML
.1-.4 INJECTION, SOLUTION INTRAMUSCULAR; INTRAVENOUS; SUBCUTANEOUS
Status: CANCELLED | OUTPATIENT
Start: 2021-04-30

## 2021-04-20 RX ORDER — METHYLPREDNISOLONE SODIUM SUCCINATE 125 MG/2ML
125 INJECTION, POWDER, LYOPHILIZED, FOR SOLUTION INTRAMUSCULAR; INTRAVENOUS
Status: CANCELLED
Start: 2021-04-30

## 2021-04-20 RX ORDER — HEPARIN SODIUM (PORCINE) LOCK FLUSH IV SOLN 100 UNIT/ML 100 UNIT/ML
5 SOLUTION INTRAVENOUS
Status: CANCELLED | OUTPATIENT
Start: 2021-04-30

## 2021-04-20 RX ORDER — ALBUTEROL SULFATE 90 UG/1
1-2 AEROSOL, METERED RESPIRATORY (INHALATION)
Status: CANCELLED
Start: 2021-04-30

## 2021-04-20 RX ORDER — ALBUTEROL SULFATE 0.83 MG/ML
2.5 SOLUTION RESPIRATORY (INHALATION)
Status: CANCELLED | OUTPATIENT
Start: 2021-04-30

## 2021-04-20 RX ORDER — DIPHENHYDRAMINE HYDROCHLORIDE 50 MG/ML
50 INJECTION INTRAMUSCULAR; INTRAVENOUS
Status: CANCELLED
Start: 2021-04-30

## 2021-04-20 RX ORDER — MEPERIDINE HYDROCHLORIDE 25 MG/ML
25 INJECTION INTRAMUSCULAR; INTRAVENOUS; SUBCUTANEOUS EVERY 30 MIN PRN
Status: CANCELLED | OUTPATIENT
Start: 2021-04-30

## 2021-04-20 RX ORDER — EPINEPHRINE 1 MG/ML
0.3 INJECTION, SOLUTION INTRAMUSCULAR; SUBCUTANEOUS EVERY 5 MIN PRN
Status: CANCELLED | OUTPATIENT
Start: 2021-04-30

## 2021-04-20 NOTE — NURSING NOTE
Ghazal is a 40 year old who is being evaluated via a billable video visit.      How would you like to obtain your AVS? Mail a copy  If the video visit is dropped, the invitation should be resent by: Text to cell phone: [phone]  Will anyone else be joining your video visit? No    Video-Visit Details    Type of service:  Video Visit    Originating Location (pt. Location): Home    Distant Location (provider location):  [facility]     Platform used for Video Visit: Emily Pearson [facility]

## 2021-04-20 NOTE — LETTER
"    4/20/2021         RE: Ghazal Martinez  [address]  [place] [address]        Dear Colleague,    Thank you for referring your patient, Ghazal Martinez, to the [facility]. Please see a copy of my visit note below.      This patient  is being evaluated via a billable video visit.      The patient has been notified of following:     \"This video visit will be conducted via a call between you and your physician/provider. We have found that certain health care needs can be provided without the need for an in-person physical exam.  This service lets us provide the care you need with a video conversation.  If a prescription is necessary we can send it directly to your pharmacy.  If lab work is needed we can place an order for that and you can then stop by our lab to have the test done at a later time.    Video visits are billed at different rates depending on your insurance coverage.  Please reach out to your insurance provider with any questions.    If during the course of the call the physician/provider feels a video visit is not appropriate, you will not be charged for this service.\"    Patient has given verbal consent for Video visit? yes  Video-Visit Details    Type of service:  Video Visit    Video visit duration: 15 min  Originating Location (pt. Location): home    Distant Location (provider location):  [facility]     Platform used for Video Visit: Amwell not connecting with patient and , then  Doximety video with patient, then technical difficulties and finished by telephone with the patient.  Mallory Fine MD      Oncology follow-up visit:  Date on this visit: Apr 20, 2021  PCP: Eun Patton  Breast surgeon: Dr.Sara Fonseca   Plastic Surgeon: Dr. Diggs    Diagnosis:  [id number] ER positive ID positive HER-2/brayden negative by FISH left-sided breast cancer, with intermediate Oncotype DX recurrence score of 23, status " post bilateral mastectomy and axillary sentinel lymph node biopsy on April 5, 2021.    Oncologic history:    1.  Breast cancer- She presented in Feb 2021 with L breast lump and itching x 1 month.  B/l diagnostic mammogram with tomosynthesis on 2/12/2021 showed in the area of left breast lump, there is an irregular mass measuringabout 1.5 cm. No suspicious mammographic findings in the right breast. L breast US showed in the left breast at 10:00, 3 cm from the nipple, there was an irregular hypoechoic mass with angular margins measuring 2.2 x 1.4 x 1 cm. There was no lymphadenopathy in the left axilla. Contrast enhanced mammogram on 2/23/2021 showed a mass at the 10:00 position in left breast anterior depth measuring 1.7 cm. US guided core needle biopsy on 2/23/2021 showed no decompensating invasive ductal carcinoma, estrogen receptor positive (95%, strong) and progesterone receptor positive by immunohistochemistry (70%). By FISH HER2/MIO 17 ratio:  1.7, IHC 2+, additional 20 cells from areas corresponding to the most intense IHC staining were evaluated by FISH. The results obtained from these 20   additional cells also fall into Group 4. Taken together, the FISH and IHC   results are interpreted as HER2 negative.  FISH and IHC results ultimately interpreted as HER2 negative. OncotypeDx returned at 23, c/w intermediate risk, 9 percent of distant disease recurrence with the use of systemic endocrine therapy and  6.5%  benefit of adjuvant chemotherapy given young age, premenopausal.  She proceeded to undergo b/l breast MRI on 3/12/2021 which showed:  The index cancer noted at 10:00 3 cm from the nipple on the  left on ultrasound exam 2/23/2021 has a longest diameter of 22 mm.   2 potential satellite lesions are both present in the same quadrant at  11:00. One is posteriorly situated has a longest diameter of 8 mm on  sagittal imaging, the other is at the junction of the mid and  posterior breast measuring 7 mm in  longest diameter on sagittal  imaging.   Distance from anterior aspect of the index lesion to the posterior  aspect of the closer potential satellite lesion is 7 cm, and to the  more distant potential satellite lesion is 10 cm.   As far as regional lymph nodes, there was a single internal mammary  chain node that's mildly enlarged between the first and second left  costal cartilages. As far as left axillary lymph nodes, at 2:00  posteriorly, there are 2 equivocally abnormal nodes.  The right breast was unremarkable.  PET CT scan on March 22, 2021 showed:  1. FDG avid mass in the medial upper quadrant of the left breast  measuring 1.7 x 1.1 cm and SUV max 8.3, corresponding to the  previously biopsied lesion.   1a. Additional mildly FDG avid 0.8 cm lesion in the same quadrant.  5  mm nodule superior-medial to this, below the threshold of PET.  2. No suspicious axillary lymphadenopathy or hypermetabolic lesions in  the right breast.  3. Mildly FDG avid hypoattenuating nodule in the left thyroid lobe  measuring 1.2 x 0.7 cm and SUV max 4.6. Recommend thyroid ultrasound  to further evaluate.  4. Perifissural solid 7 mm pulmonary nodule in the right upper lobe  likely represents a lymph node. Attention on follow-up.  5. No suspicious FDG uptake in the abdomen or pelvis.  . Additional normal-appearing non-FDG avid intramammary  lymph nodes on the left.  We will refer to endocrinology for evaluation of thyroid nodule.  We will follow follow-up with CT chest in the future and pulmonary nodule.  Left breast and axillary lymph node biopsy is planned for March 29.    2. Young age at breast cancer diagnosis-she had blood drawn on March 19, 2021 and tested negative  for mutations in the FLEX, BRCA1, BRCA2, BRIP1, CDH1, CHEK2, EPCAM, MLH1, MSH2, MSH6, NBN, NF1, PALB2, PMS2, PTEN, RAD51C, RAD51D, STK11, and TP53 genes.    3. Thyroid nodules noted on thyroid ultrasound. These were first incidentally discovered on PET/CT in March  2021.    4. 7 mm pulmonary nodule in the right upper lobe-incidentally noted on PET/CT in March 2021    5. Premenopausal status- She has been menstruating regularly at diagnosis.  She has 5 children ages 5-16.  She is not planning to have any more children.   She offers no other health related complaints.    History Of Present Illness:  Ms. Martinez is a 40 year old premenopausal female, originally from [place], who presents for follow-up of breast cancer. Since our last visit she She proceeded to undergo with bilateral mastectomy and left axillary sentinel lymph node biopsy on April 5, 2021.  She had immediate reconstruction with Dr. Diggs.    Pathology from surgery demonstrated 23 mm left breast invasive ductal carcinoma, [place] grade 3, with associated DCIS as well as LCIS.  Micrometastasis in 1 of 4 left axillary sentinel lymph nodes.  Pathology from right breast demonstrated 4 mm focus of high-grade DCIS, [id number]. Oncotype DX was obtained on her core needle biopsy ([id number]) and returned at the score of 23 correlating with 9% risk of distant disease recurrence at 9 years with use of an aromatase inhibitor or tamoxifen alone. However since patient is young and 40-year-old, for her age and Lugano score there was about 6.5% benefit of adjuvant chemotherapy.  History was obtained with the help of her son. We were not able to join the call was the . She has had some chest wall pain after undergoing  b/l skin sparing mastectomy and L axillary SLN biopsy on 4/5/2021, which she rates at 3 out of 10. She has no other health related concerns.   In addition, a complete 12 point  review of systems is negative.    Past Medical/Surgical History:  Past Medical History:   Diagnosis Date     Varicose veins of legs      Past Surgical History:   Procedure Laterality Date     INSERT PORT VASCULAR ACCESS N/A 4/5/2021    Procedure: PORT PLACEMENT;  Surgeon: Una Fonseca MD;  Location: [facility] OR     MASTECTOMY  SIMPLE BILATERAL, SENTINEL NODE BILATERAL, COMBINED N/A 4/5/2021    Procedure: BILATERAL SKIN SPARING MASTECTOMY WITH LEFT SENTINEL LYMPH NODE BIOPSY;  Surgeon: Una Fonseca MD;  Location: [facility] OR     NO HISTORY OF SURGERY       RECONSTRUCT BREAST, INSERT TISSUE EXPANDER, COMBINED Bilateral 4/5/2021    Procedure: BILATERAL FIRST STAGE BREAST RECONSTRUCTION WITH TISSUE EXPANDERS; AND ACELLULAR DERMAL MATRIX;  Surgeon: Kervin Fu MD;  Location: [facility] OR     Allergies:  Allergies as of 04/20/2021     (No Known Allergies)     Current Medications:  Current Outpatient Medications   Medication Sig Dispense Refill     cephALEXin (KEFLEX) 500 MG capsule Take 1 capsule (500 mg) by mouth every 6 hours Take as long as drains in place. 40 capsule 1     HYDROcodone-acetaminophen (NORCO) 5-325 MG tablet Take 1-2 tablets by mouth every 6 hours as needed for moderate to severe pain 30 tablet 0     methocarbamol (ROBAXIN) 750 MG tablet Take 1 tablet (750 mg) by mouth every 6 hours as needed for muscle spasms 50 tablet 1     senna-docusate (SENOKOT-S/PERICOLACE) 8.6-50 MG tablet Take 1 tablet by mouth 2 times daily as needed for constipation 60 tablet 1      Family History:  Family History   Problem Relation Age of Onset     No Known Problems Mother      No Known Problems Father     There is no family history of breast or ovarian cancer.  Cousin had uterine cancer.    Social History:  Social History     Socioeconomic History     Marital status:      Spouse name: [facility]     Number of children: 5   Occupational History     Occupation: homemaker   Tobacco Use     Smoking status: Never Smoker     Smokeless tobacco: Never Used   Substance and Sexual Activity     Alcohol use: No     Drug use: No     Sexual activity: Yes     Partners: Male     Birth control/protection: Pill   Social History Narrative    From [place] to [place] in [place].      Physical Exam:  Constitutional: alert and in no distress  Eyes: No redness or  discharge  Respiratory: No cough or labored breathing.  Musculoskeletal: Full range of motion in extremities.  Skin: no visible skin lesions or discoloration  Neurological: No tremors and denies headache.  Psychiatric: Mentation appears normal and affect is normal as well.  Alert and oriented x3.  The rest the comprehensive physical examination is deferred due to public health emergency video visit restrictions.      Laboratory/Imaging Studies  Labs reviewed and documented in the EMR.    ASSESSMENT/PLAN:  Ghazal is a very pleasant 40-year-old Tamazight-speaking woman, originally from [place], with [id number] ER positive IL positive HER-2/brayden negative by FISH left-sided breast cancer, with intermediate Oncotype DX recurrence score of 23, status post bilateral mastectomy and axillary sentinel lymph node biopsy on April 5, 2021.  She also underwent immediate reconstruction by Dr. Diggs.  There is a question whether she will need adjuvant radiation therapy given microscopic disease in 1 of 4 axillary sentinel lymph nodes. We are planning to present her case at our multidisciplinary tumor board. We will also refer her to radiation oncology following completion of chemotherapy for consultation. Given Oncotype recurrence score of 23,  grade 3 disease and her young age, I would favor proceeding with adjuvant chemotherapy in the form of Taxotere/Cytoxan every 3 weeks for 4 cycles.  Recommendations were relayed to the patient and her son. However the  was not available at today's visit to discuss potential side effects of chemotherapy in detail, and we will see the patient back to discuss side effects of adjuvant chemotherapy in detail on April 30, with  and we will plan to proceed with cycle 1 day 1 of adjuvant Taxotere and Cytoxan on April 30.  At the end of our visit patient verbalized understanding and concurred with the plan.        Again, thank you for allowing me to participate in the  care of your patient.        Sincerely,        Mallory Fine MD, MD

## 2021-04-22 ENCOUNTER — TELEPHONE (OUTPATIENT)
Dept: SURGERY | Facility: PHYSICIAN GROUP | Age: 41
End: 2021-04-22

## 2021-04-30 ENCOUNTER — INFUSION THERAPY VISIT (OUTPATIENT)
Dept: INFUSION THERAPY | Facility: CLINIC | Age: 41
End: 2021-04-30
Attending: INTERNAL MEDICINE
Payer: COMMERCIAL

## 2021-04-30 ENCOUNTER — ONCOLOGY VISIT (OUTPATIENT)
Dept: ONCOLOGY | Facility: CLINIC | Age: 41
End: 2021-04-30
Attending: INTERNAL MEDICINE
Payer: COMMERCIAL

## 2021-04-30 ENCOUNTER — PATIENT OUTREACH (OUTPATIENT)
Dept: ONCOLOGY | Facility: CLINIC | Age: 41
End: 2021-04-30

## 2021-04-30 VITALS
RESPIRATION RATE: 16 BRPM | TEMPERATURE: 97.3 F | OXYGEN SATURATION: 97 % | DIASTOLIC BLOOD PRESSURE: 88 MMHG | BODY MASS INDEX: 31.48 KG/M2 | HEART RATE: 70 BPM | WEIGHT: 177.7 LBS | HEIGHT: 63 IN | SYSTOLIC BLOOD PRESSURE: 129 MMHG

## 2021-04-30 DIAGNOSIS — C50.912 MALIGNANT NEOPLASM OF LEFT BREAST IN FEMALE, ESTROGEN RECEPTOR POSITIVE, UNSPECIFIED SITE OF BREAST (H): Primary | ICD-10-CM

## 2021-04-30 DIAGNOSIS — C50.912 INVASIVE DUCTAL CARCINOMA OF BREAST, FEMALE, LEFT (H): Primary | ICD-10-CM

## 2021-04-30 DIAGNOSIS — Z17.0 MALIGNANT NEOPLASM OF LEFT BREAST IN FEMALE, ESTROGEN RECEPTOR POSITIVE, UNSPECIFIED SITE OF BREAST (H): Primary | ICD-10-CM

## 2021-04-30 LAB
ALBUMIN SERPL-MCNC: 3.3 G/DL (ref 3.4–5)
ALP SERPL-CCNC: 90 U/L (ref 40–150)
ALT SERPL W P-5'-P-CCNC: 61 U/L (ref 0–50)
ANION GAP SERPL CALCULATED.3IONS-SCNC: 7 MMOL/L (ref 3–14)
AST SERPL W P-5'-P-CCNC: 31 U/L (ref 0–45)
BASOPHILS # BLD AUTO: 0 10E9/L (ref 0–0.2)
BASOPHILS NFR BLD AUTO: 0.4 %
BILIRUB SERPL-MCNC: 0.3 MG/DL (ref 0.2–1.3)
BUN SERPL-MCNC: 9 MG/DL (ref 7–30)
CALCIUM SERPL-MCNC: 8.6 MG/DL (ref 8.5–10.1)
CHLORIDE SERPL-SCNC: 108 MMOL/L (ref 94–109)
CO2 SERPL-SCNC: 23 MMOL/L (ref 20–32)
CREAT SERPL-MCNC: 0.65 MG/DL (ref 0.52–1.04)
DIFFERENTIAL METHOD BLD: NORMAL
EOSINOPHIL # BLD AUTO: 0.3 10E9/L (ref 0–0.7)
EOSINOPHIL NFR BLD AUTO: 4 %
ERYTHROCYTE [DISTWIDTH] IN BLOOD BY AUTOMATED COUNT: 13.1 % (ref 10–15)
GFR SERPL CREATININE-BSD FRML MDRD: >90 ML/MIN/{1.73_M2}
GLUCOSE SERPL-MCNC: 141 MG/DL (ref 70–99)
HCT VFR BLD AUTO: 38.7 % (ref 35–47)
HGB BLD-MCNC: 12.7 G/DL (ref 11.7–15.7)
IMM GRANULOCYTES # BLD: 0 10E9/L (ref 0–0.4)
IMM GRANULOCYTES NFR BLD: 0.3 %
LYMPHOCYTES # BLD AUTO: 1.7 10E9/L (ref 0.8–5.3)
LYMPHOCYTES NFR BLD AUTO: 23.3 %
MCH RBC QN AUTO: 29 PG (ref 26.5–33)
MCHC RBC AUTO-ENTMCNC: 32.8 G/DL (ref 31.5–36.5)
MCV RBC AUTO: 88 FL (ref 78–100)
MONOCYTES # BLD AUTO: 0.5 10E9/L (ref 0–1.3)
MONOCYTES NFR BLD AUTO: 6.3 %
NEUTROPHILS # BLD AUTO: 4.9 10E9/L (ref 1.6–8.3)
NEUTROPHILS NFR BLD AUTO: 65.7 %
PLATELET # BLD AUTO: 291 10E9/L (ref 150–450)
POTASSIUM SERPL-SCNC: 3.7 MMOL/L (ref 3.4–5.3)
PROT SERPL-MCNC: 7.2 G/DL (ref 6.8–8.8)
RBC # BLD AUTO: 4.38 10E12/L (ref 3.8–5.2)
SODIUM SERPL-SCNC: 138 MMOL/L (ref 133–144)
WBC # BLD AUTO: 7.4 10E9/L (ref 4–11)

## 2021-04-30 PROCEDURE — 85025 COMPLETE CBC W/AUTO DIFF WBC: CPT | Performed by: INTERNAL MEDICINE

## 2021-04-30 PROCEDURE — 99207 PR NO CHARGE LOS: CPT

## 2021-04-30 PROCEDURE — 96417 CHEMO IV INFUS EACH ADDL SEQ: CPT | Performed by: INTERNAL MEDICINE

## 2021-04-30 PROCEDURE — 99214 OFFICE O/P EST MOD 30 MIN: CPT | Mod: 25 | Performed by: INTERNAL MEDICINE

## 2021-04-30 PROCEDURE — 96367 TX/PROPH/DG ADDL SEQ IV INF: CPT | Performed by: INTERNAL MEDICINE

## 2021-04-30 PROCEDURE — 96375 TX/PRO/DX INJ NEW DRUG ADDON: CPT | Performed by: INTERNAL MEDICINE

## 2021-04-30 PROCEDURE — 80053 COMPREHEN METABOLIC PANEL: CPT | Performed by: INTERNAL MEDICINE

## 2021-04-30 PROCEDURE — 96377 APPLICATON ON-BODY INJECTOR: CPT | Mod: 59 | Performed by: INTERNAL MEDICINE

## 2021-04-30 PROCEDURE — 96413 CHEMO IV INFUSION 1 HR: CPT | Performed by: INTERNAL MEDICINE

## 2021-04-30 RX ORDER — LORAZEPAM 0.5 MG/1
0.5 TABLET ORAL EVERY 4 HOURS PRN
Qty: 30 TABLET | Refills: 3 | Status: SHIPPED | OUTPATIENT
Start: 2021-04-30 | End: 2021-06-29

## 2021-04-30 RX ORDER — DEXAMETHASONE 4 MG/1
8 TABLET ORAL 2 TIMES DAILY WITH MEALS
Qty: 10 TABLET | Refills: 3 | Status: SHIPPED | OUTPATIENT
Start: 2021-04-30 | End: 2021-06-29

## 2021-04-30 RX ORDER — PROCHLORPERAZINE MALEATE 10 MG
10 TABLET ORAL EVERY 6 HOURS PRN
Qty: 30 TABLET | Refills: 3 | Status: SHIPPED | OUTPATIENT
Start: 2021-04-30 | End: 2021-06-29

## 2021-04-30 RX ORDER — HEPARIN SODIUM (PORCINE) LOCK FLUSH IV SOLN 100 UNIT/ML 100 UNIT/ML
5 SOLUTION INTRAVENOUS EVERY 8 HOURS
Status: DISCONTINUED | OUTPATIENT
Start: 2021-04-30 | End: 2021-04-30 | Stop reason: HOSPADM

## 2021-04-30 RX ORDER — HEPARIN SODIUM (PORCINE) LOCK FLUSH IV SOLN 100 UNIT/ML 100 UNIT/ML
5 SOLUTION INTRAVENOUS
Status: DISCONTINUED | OUTPATIENT
Start: 2021-04-30 | End: 2021-04-30 | Stop reason: HOSPADM

## 2021-04-30 RX ORDER — PALONOSETRON 0.05 MG/ML
0.25 INJECTION, SOLUTION INTRAVENOUS ONCE
Status: COMPLETED | OUTPATIENT
Start: 2021-04-30 | End: 2021-04-30

## 2021-04-30 RX ADMIN — Medication 250 ML: at 09:50

## 2021-04-30 RX ADMIN — HEPARIN SODIUM (PORCINE) LOCK FLUSH IV SOLN 100 UNIT/ML 5 ML: 100 SOLUTION at 07:46

## 2021-04-30 RX ADMIN — HEPARIN SODIUM (PORCINE) LOCK FLUSH IV SOLN 100 UNIT/ML 5 ML: 100 SOLUTION at 12:34

## 2021-04-30 RX ADMIN — PALONOSETRON 0.25 MG: 0.05 INJECTION, SOLUTION INTRAVENOUS at 10:04

## 2021-04-30 ASSESSMENT — PAIN SCALES - GENERAL: PAINLEVEL: NO PAIN (0)

## 2021-04-30 ASSESSMENT — MIFFLIN-ST. JEOR: SCORE: 1432.23

## 2021-04-30 NOTE — PROGRESS NOTES
Oncology follow-up visit:  Date on this visit: Apr 30, 2021  PCP: Eun Patton  Breast surgeon: Dr.Sara Fonseca   Plastic Surgeon: Dr. Diggs    Diagnosis:  [id number] ER positive PA positive HER-2/brayden negative by FISH left-sided breast cancer, with intermediate Oncotype DX recurrence score of 23, status post bilateral mastectomy and axillary sentinel lymph node biopsy on April 5, 2021.    Oncologic history:    1.  Breast cancer- She presented in Feb 2021 with L breast lump and itching x 1 month.  B/l diagnostic mammogram with tomosynthesis on 2/12/2021 showed in the area of left breast lump, there is an irregular mass measuringabout 1.5 cm. No suspicious mammographic findings in the right breast. L breast US showed in the left breast at 10:00, 3 cm from the nipple, there was an irregular hypoechoic mass with angular margins measuring 2.2 x 1.4 x 1 cm. There was no lymphadenopathy in the left axilla. Contrast enhanced mammogram on 2/23/2021 showed a mass at the 10:00 position in left breast anterior depth measuring 1.7 cm. US guided core needle biopsy on 2/23/2021 showed no decompensating invasive ductal carcinoma, estrogen receptor positive (95%, strong) and progesterone receptor positive by immunohistochemistry (70%). By FISH HER2/MIO 17 ratio:  1.7, IHC 2+, additional 20 cells from areas corresponding to the most intense IHC staining were evaluated by FISH. The results obtained from these 20   additional cells also fall into Group 4. Taken together, the FISH and IHC   results are interpreted as HER2 negative.  FISH and IHC results ultimately interpreted as HER2 negative. OncotypeDx returned at 23, c/w intermediate risk, 9 percent of distant disease recurrence with the use of systemic endocrine therapy and  6.5%  benefit of adjuvant chemotherapy given young age, premenopausal.  She proceeded to undergo b/l breast MRI on 3/12/2021 which showed:  The index cancer noted at 10:00 3 cm from the nipple on  the  left on ultrasound exam 2/23/2021 has a longest diameter of 22 mm.   2 potential satellite lesions are both present in the same quadrant at  11:00. One is posteriorly situated has a longest diameter of 8 mm on  sagittal imaging, the other is at the junction of the mid and  posterior breast measuring 7 mm in longest diameter on sagittal  imaging.   Distance from anterior aspect of the index lesion to the posterior  aspect of the closer potential satellite lesion is 7 cm, and to the  more distant potential satellite lesion is 10 cm.   As far as regional lymph nodes, there was a single internal mammary  chain node that's mildly enlarged between the first and second left  costal cartilages. As far as left axillary lymph nodes, at 2:00  posteriorly, there are 2 equivocally abnormal nodes.  The right breast was unremarkable.  PET CT scan on March 22, 2021 showed:  1. FDG avid mass in the medial upper quadrant of the left breast  measuring 1.7 x 1.1 cm and SUV max 8.3, corresponding to the  previously biopsied lesion.   1a. Additional mildly FDG avid 0.8 cm lesion in the same quadrant.  5  mm nodule superior-medial to this, below the threshold of PET.  2. No suspicious axillary lymphadenopathy or hypermetabolic lesions in  the right breast.  3. Mildly FDG avid hypoattenuating nodule in the left thyroid lobe  measuring 1.2 x 0.7 cm and SUV max 4.6. Recommend thyroid ultrasound  to further evaluate.  4. Perifissural solid 7 mm pulmonary nodule in the right upper lobe  likely represents a lymph node. Attention on follow-up.  5. No suspicious FDG uptake in the abdomen or pelvis.  . Additional normal-appearing non-FDG avid intramammary  lymph nodes on the left.  We will refer to endocrinology for evaluation of thyroid nodule.  We will follow follow-up with CT chest in the future and pulmonary nodule.  Left breast and axillary lymph node biopsy is planned for March 29.    2. Young age at breast cancer diagnosis-she had  blood drawn on March 19, 2021 and tested negative  for mutations in the FLEX, BRCA1, BRCA2, BRIP1, CDH1, CHEK2, EPCAM, MLH1, MSH2, MSH6, NBN, NF1, PALB2, PMS2, PTEN, RAD51C, RAD51D, STK11, and TP53 genes.    3. Thyroid nodules noted on thyroid ultrasound. These were first incidentally discovered on PET/CT in March 2021.    4. 7 mm pulmonary nodule in the right upper lobe-incidentally noted on PET/CT in March 2021    5. Premenopausal status- She has been menstruating regularly at diagnosis.  She has 5 children ages 5-16.  She is not planning to have any more children.   She offers no other health related complaints.    History Of Present Illness:  Ms. Martinez is a 40 year old premenopausal female, originally from [place], who presents for follow-up of breast cancer.She proceeded to undergo with bilateral mastectomy and left axillary sentinel lymph node biopsy on April 5, 2021.  She had immediate reconstruction with Dr. Diggs.    Pathology from surgery demonstrated 23 mm left breast invasive ductal carcinoma, [place] grade 3, with associated DCIS as well as LCIS.  Micrometastasis in 1 of 4 left axillary sentinel lymph nodes.  Pathology from right breast demonstrated 4 mm focus of high-grade DCIS, [id number]. Oncotype DX was obtained on her core needle biopsy ([id number]) and returned at the score of 23 correlating with 9% risk of distant disease recurrence at 9 years with use of an aromatase inhibitor or tamoxifen alone. However since patient is young and 40-year-old, for her age and intermediate Oncotype DX recurrence score, there was about 6.5% benefit of adjuvant chemotherapy, and likely even higher given micrometastatic disease in one of the axillary sentinel lymph nodes.  We recommended that she proceed with adjuvant Taxotere/Cytoxan every 3 weeks for 4 cycles.  She is here to discuss and start adjuvant chemotherapy today.  She is here by herself.  History is obtained with the help of .   In  addition, a complete 12 point  review of systems is negative.    Past Medical/Surgical History:  Past Medical History:   Diagnosis Date     Varicose veins of legs      Past Surgical History:   Procedure Laterality Date     INSERT PORT VASCULAR ACCESS N/A 4/5/2021    Procedure: PORT PLACEMENT;  Surgeon: Una Fonseca MD;  Location: [facility] OR     MASTECTOMY SIMPLE BILATERAL, SENTINEL NODE BILATERAL, COMBINED N/A 4/5/2021    Procedure: BILATERAL SKIN SPARING MASTECTOMY WITH LEFT SENTINEL LYMPH NODE BIOPSY;  Surgeon: Una Fonseca MD;  Location: [facility] OR     NO HISTORY OF SURGERY       RECONSTRUCT BREAST, INSERT TISSUE EXPANDER, COMBINED Bilateral 4/5/2021    Procedure: BILATERAL FIRST STAGE BREAST RECONSTRUCTION WITH TISSUE EXPANDERS; AND ACELLULAR DERMAL MATRIX;  Surgeon: Kervin Fu MD;  Location:  OR     Allergies:  Allergies as of 04/30/2021     (No Known Allergies)     Current Medications:  Current Outpatient Medications   Medication Sig Dispense Refill     ferrous sulfate (FEROSUL) 325 (65 Fe) MG tablet Take 1 tablet (325 mg) by mouth daily (with breakfast) 90 tablet 3     diphenhydrAMINE (BENADRYL) 25 MG tablet Take 1 tablet (25 mg) by mouth every 8 hours as needed (itching) (Patient not taking: Reported on 4/30/2021) 40 tablet 1     HYDROcodone-acetaminophen (NORCO) 5-325 MG tablet Take 1-2 tablets by mouth every 6 hours as needed for moderate to severe pain (Patient not taking: Reported on 4/30/2021) 30 tablet 0     methocarbamol (ROBAXIN) 750 MG tablet Take 1 tablet (750 mg) by mouth every 6 hours as needed for muscle spasms (Patient not taking: Reported on 4/30/2021) 50 tablet 1     senna-docusate (SENOKOT-S/PERICOLACE) 8.6-50 MG tablet Take 1 tablet by mouth 2 times daily as needed for constipation (Patient not taking: Reported on 4/30/2021) 60 tablet 1      Family History:  Family History   Problem Relation Age of Onset     No Known Problems Mother      No Known Problems Father     There is no  "family history of breast or ovarian cancer.  Cousin had uterine cancer.    Social History:  Social History     Socioeconomic History     Marital status:      Spouse name: Familia     Number of children: 5   Occupational History     Occupation: homemaker   Tobacco Use     Smoking status: Never Smoker     Smokeless tobacco: Never Used   Substance and Sexual Activity     Alcohol use: No     Drug use: No     Sexual activity: Yes     Partners: Male     Birth control/protection: Pill   Social History Narrative    From [place] to [place] in 2004.      Physical Exam:  /88 (BP Location: Right arm, Patient Position: Left side, Cuff Size: Adult Regular)   Pulse 70   Temp 97.3  F (36.3  C) (Oral)   Resp 16   Ht 1.588 m (5' 2.5\")   Wt 80.6 kg (177 lb 11.2 oz)   LMP 03/10/2021 (Approximate)   SpO2 97%   Breastfeeding No   BMI 31.98 kg/m        GENERAL APPEARANCE: healthy, alert and no distress  HEENT: PEERLA, no neck asymmetry or masses    CV: S1S2 reg no murmur  Respiratory: CTA b/l  GI: soft,  BS+, NT, ND  Extremities: no edema.    SKIN: no suspicious lesions or rashes    PSYCHIATRIC: mentation appears normal and affect normal  Neuro: gait intact. Axox3  .  Chest wall: Status post bilateral mastectomy and reconstruction.  Incisions healed well.  No sign of infection.      Laboratory/Imaging Studies  Orders Only on 04/30/2021   Component Date Value Ref Range Status     Sodium 04/30/2021 138  133 - 144 mmol/L Final     Potassium 04/30/2021 3.7  3.4 - 5.3 mmol/L Final     Chloride 04/30/2021 108  94 - 109 mmol/L Final     Carbon Dioxide 04/30/2021 23  20 - 32 mmol/L Final     Anion Gap 04/30/2021 7  3 - 14 mmol/L Final     Glucose 04/30/2021 141* 70 - 99 mg/dL Final     Urea Nitrogen 04/30/2021 9  7 - 30 mg/dL Final     Creatinine 04/30/2021 0.65  0.52 - 1.04 mg/dL Final     GFR Estimate 04/30/2021 >90  >60 mL/min/[1.73_m2] Final    Comment: Non  GFR Calc  Starting 12/18/2018, serum creatinine " based estimated GFR (eGFR) will be   calculated using the Chronic Kidney Disease Epidemiology Collaboration   (CKD-EPI) equation.       GFR Estimate If Black 04/30/2021 >90  >60 mL/min/[1.73_m2] Final    Comment:  GFR Calc  Starting 12/18/2018, serum creatinine based estimated GFR (eGFR) will be   calculated using the Chronic Kidney Disease Epidemiology Collaboration   (CKD-EPI) equation.       Calcium 04/30/2021 8.6  8.5 - 10.1 mg/dL Final     Bilirubin Total 04/30/2021 0.3  0.2 - 1.3 mg/dL Final     Albumin 04/30/2021 3.3* 3.4 - 5.0 g/dL Final     Protein Total 04/30/2021 7.2  6.8 - 8.8 g/dL Final     Alkaline Phosphatase 04/30/2021 90  40 - 150 U/L Final     ALT 04/30/2021 61* 0 - 50 U/L Final     AST 04/30/2021 31  0 - 45 U/L Final     WBC 04/30/2021 7.4  4.0 - 11.0 10e9/L Final     RBC Count 04/30/2021 4.38  3.8 - 5.2 10e12/L Final     Hemoglobin 04/30/2021 12.7  11.7 - 15.7 g/dL Final     Hematocrit 04/30/2021 38.7  35.0 - 47.0 % Final     MCV 04/30/2021 88  78 - 100 fl Final     MCH 04/30/2021 29.0  26.5 - 33.0 pg Final     MCHC 04/30/2021 32.8  31.5 - 36.5 g/dL Final     RDW 04/30/2021 13.1  10.0 - 15.0 % Final     Platelet Count 04/30/2021 291  150 - 450 10e9/L Final     Diff Method 04/30/2021 Automated Method   Final     % Neutrophils 04/30/2021 65.7  % Final     % Lymphocytes 04/30/2021 23.3  % Final     % Monocytes 04/30/2021 6.3  % Final     % Eosinophils 04/30/2021 4.0  % Final     % Basophils 04/30/2021 0.4  % Final     % Immature Granulocytes 04/30/2021 0.3  % Final     Absolute Neutrophil 04/30/2021 4.9  1.6 - 8.3 10e9/L Final     Absolute Lymphocytes 04/30/2021 1.7  0.8 - 5.3 10e9/L Final     Absolute Monocytes 04/30/2021 0.5  0.0 - 1.3 10e9/L Final     Absolute Eosinophils 04/30/2021 0.3  0.0 - 0.7 10e9/L Final     Absolute Basophils 04/30/2021 0.0  0.0 - 0.2 10e9/L Final     Abs Immature Granulocytes 04/30/2021 0.0  0 - 0.4 10e9/L Final         ASSESSMENT/PLAN:  Ghazal is a very  pleasant 40-year-old French-speaking woman, originally from [place], with [id number] ER positive [place] positive HER-2/brayden negative by FISH left-sided breast cancer, with intermediate Oncotype DX recurrence score of 23, status post bilateral mastectomy and axillary sentinel lymph node biopsy on April 5, 2021.  She also underwent immediate reconstruction by Dr. Diggs.  I recommended she proceed with adjuvant chemotherapy with 4 cycles of every 3-week of Taxotere/Cytoxan, given her young age, high-grade disease and intermediate risk Oncotype DX score of 23.  There is a question whether she will need adjuvant radiation therapy given microscopic disease in 1 of 4 axillary sentinel lymph nodes.  We will  refer her to radiation oncology following completion of chemotherapy for consultation.  She will need adjuvant systemic endocrine therapy.    We talked about Taxotere/Cytoxan regimen every 3 weeks for 4 cycles. The side effects of the chemotherapy including more acute and long-term side effects were discussed with the patient in detail. Among them are the risks of acute allergic reactions, neurotoxicity, myelosuppression, hair loss, gastrointestinal toxicity, among others.  The patient agreed to proceed with treatment.  Cycle 1 day 1 today-on April 30, 2021.  We will see her back in 3 weeks after initiation of her chemotherapy for cycle #2 of Taxotere and Cytoxan.  All of Ghazal's questions were answered.  At the end of our visit patient verbalized understanding and concurred with the plan.

## 2021-04-30 NOTE — LETTER
4/30/2021         RE: Ghazal Martinez  [address]  [place] [address]        Dear Colleague,    Thank you for referring your patient, Ghazal Martinez, to the [facility]. Please see a copy of my visit note below.    Oncology follow-up visit:  Date on this visit: Apr 30, 2021  PCP: Eun Patton  Breast surgeon: Dr.Sara Fonseca   Plastic Surgeon: Dr. Diggs    Diagnosis:  [id number] ER positive [place] positive HER-2/brayden negative by FISH left-sided breast cancer, with intermediate Oncotype DX recurrence score of 23, status post bilateral mastectomy and axillary sentinel lymph node biopsy on April 5, 2021.    Oncologic history:    1.  Breast cancer- She presented in Feb 2021 with L breast lump and itching x 1 month.  B/l diagnostic mammogram with tomosynthesis on 2/12/2021 showed in the area of left breast lump, there is an irregular mass measuringabout 1.5 cm. No suspicious mammographic findings in the right breast. L breast US showed in the left breast at 10:00, 3 cm from the nipple, there was an irregular hypoechoic mass with angular margins measuring 2.2 x 1.4 x 1 cm. There was no lymphadenopathy in the left axilla. Contrast enhanced mammogram on 2/23/2021 showed a mass at the 10:00 position in left breast anterior depth measuring 1.7 cm. US guided core needle biopsy on 2/23/2021 showed no decompensating invasive ductal carcinoma, estrogen receptor positive (95%, strong) and progesterone receptor positive by immunohistochemistry (70%). By FISH HER2/MIO 17 ratio:  1.7, IHC 2+, additional 20 cells from areas corresponding to the most intense IHC staining were evaluated by FISH. The results obtained from these 20   additional cells also fall into Group 4. Taken together, the FISH and IHC   results are interpreted as HER2 negative.  FISH and IHC results ultimately interpreted as HER2 negative. OncotypeDx returned at 23, c/w intermediate risk, 9 percent of distant disease  recurrence with the use of systemic endocrine therapy and  6.5%  benefit of adjuvant chemotherapy given young age, premenopausal.  She proceeded to undergo b/l breast MRI on 3/12/2021 which showed:  The index cancer noted at 10:00 3 cm from the nipple on the  left on ultrasound exam 2/23/2021 has a longest diameter of 22 mm.   2 potential satellite lesions are both present in the same quadrant at  11:00. One is posteriorly situated has a longest diameter of 8 mm on  sagittal imaging, the other is at the junction of the mid and  posterior breast measuring 7 mm in longest diameter on sagittal  imaging.   Distance from anterior aspect of the index lesion to the posterior  aspect of the closer potential satellite lesion is 7 cm, and to the  more distant potential satellite lesion is 10 cm.   As far as regional lymph nodes, there was a single internal mammary  chain node that's mildly enlarged between the first and second left  costal cartilages. As far as left axillary lymph nodes, at 2:00  posteriorly, there are 2 equivocally abnormal nodes.  The right breast was unremarkable.  PET CT scan on March 22, 2021 showed:  1. FDG avid mass in the medial upper quadrant of the left breast  measuring 1.7 x 1.1 cm and SUV max 8.3, corresponding to the  previously biopsied lesion.   1a. Additional mildly FDG avid 0.8 cm lesion in the same quadrant.  5  mm nodule superior-medial to this, below the threshold of PET.  2. No suspicious axillary lymphadenopathy or hypermetabolic lesions in  the right breast.  3. Mildly FDG avid hypoattenuating nodule in the left thyroid lobe  measuring 1.2 x 0.7 cm and SUV max 4.6. Recommend thyroid ultrasound  to further evaluate.  4. Perifissural solid 7 mm pulmonary nodule in the right upper lobe  likely represents a lymph node. Attention on follow-up.  5. No suspicious FDG uptake in the abdomen or pelvis.  . Additional normal-appearing non-FDG avid intramammary  lymph nodes on the left.  We will  refer to endocrinology for evaluation of thyroid nodule.  We will follow follow-up with CT chest in the future and pulmonary nodule.  Left breast and axillary lymph node biopsy is planned for March 29.    2. Young age at breast cancer diagnosis-she had blood drawn on March 19, 2021 and tested negative  for mutations in the FLEX, BRCA1, BRCA2, BRIP1, CDH1, CHEK2, EPCAM, MLH1, MSH2, MSH6, NBN, NF1, PALB2, PMS2, PTEN, RAD51C, RAD51D, STK11, and TP53 genes.    3. Thyroid nodules noted on thyroid ultrasound. These were first incidentally discovered on PET/CT in March 2021.    4. 7 mm pulmonary nodule in the right upper lobe-incidentally noted on PET/CT in March 2021    5. Premenopausal status- She has been menstruating regularly at diagnosis.  She has 5 children ages 5-16.  She is not planning to have any more children.   She offers no other health related complaints.    History Of Present Illness:  Ms. Martinez is a 40 year old premenopausal female, originally from [place], who presents for follow-up of breast cancer.She proceeded to undergo with bilateral mastectomy and left axillary sentinel lymph node biopsy on April 5, 2021.  She had immediate reconstruction with Dr. Diggs.    Pathology from surgery demonstrated 23 mm left breast invasive ductal carcinoma, [place] grade 3, with associated DCIS as well as LCIS.  Micrometastasis in 1 of 4 left axillary sentinel lymph nodes.  Pathology from right breast demonstrated 4 mm focus of high-grade DCIS, [id number]. Oncotype DX was obtained on her core needle biopsy ([id number]) and returned at the score of 23 correlating with 9% risk of distant disease recurrence at 9 years with use of an aromatase inhibitor or tamoxifen alone. However since patient is young and 40-year-old, for her age and intermediate Oncotype DX recurrence score, there was about 6.5% benefit of adjuvant chemotherapy, and likely even higher given micrometastatic disease in one of the axillary sentinel lymph  nodes.  We recommended that she proceed with adjuvant Taxotere/Cytoxan every 3 weeks for 4 cycles.  She is here to discuss and start adjuvant chemotherapy today.  She is here by herself.  History is obtained with the help of .   In addition, a complete 12 point  review of systems is negative.    Past Medical/Surgical History:  Past Medical History:   Diagnosis Date     Varicose veins of legs      Past Surgical History:   Procedure Laterality Date     INSERT PORT VASCULAR ACCESS N/A 4/5/2021    Procedure: PORT PLACEMENT;  Surgeon: Una Fonseca MD;  Location: [facility] OR     MASTECTOMY SIMPLE BILATERAL, SENTINEL NODE BILATERAL, COMBINED N/A 4/5/2021    Procedure: BILATERAL SKIN SPARING MASTECTOMY WITH LEFT SENTINEL LYMPH NODE BIOPSY;  Surgeon: Una Fonseca MD;  Location:  OR     NO HISTORY OF SURGERY       RECONSTRUCT BREAST, INSERT TISSUE EXPANDER, COMBINED Bilateral 4/5/2021    Procedure: BILATERAL FIRST STAGE BREAST RECONSTRUCTION WITH TISSUE EXPANDERS; AND ACELLULAR DERMAL MATRIX;  Surgeon: Kervin Fu MD;  Location:  OR     Allergies:  Allergies as of 04/30/2021     (No Known Allergies)     Current Medications:  Current Outpatient Medications   Medication Sig Dispense Refill     ferrous sulfate (FEROSUL) 325 (65 Fe) MG tablet Take 1 tablet (325 mg) by mouth daily (with breakfast) 90 tablet 3     diphenhydrAMINE (BENADRYL) 25 MG tablet Take 1 tablet (25 mg) by mouth every 8 hours as needed (itching) (Patient not taking: Reported on 4/30/2021) 40 tablet 1     HYDROcodone-acetaminophen (NORCO) 5-325 MG tablet Take 1-2 tablets by mouth every 6 hours as needed for moderate to severe pain (Patient not taking: Reported on 4/30/2021) 30 tablet 0     methocarbamol (ROBAXIN) 750 MG tablet Take 1 tablet (750 mg) by mouth every 6 hours as needed for muscle spasms (Patient not taking: Reported on 4/30/2021) 50 tablet 1     senna-docusate (SENOKOT-S/PERICOLACE) 8.6-50 MG tablet Take 1 tablet  "by mouth 2 times daily as needed for constipation (Patient not taking: Reported on 4/30/2021) 60 tablet 1      Family History:  Family History   Problem Relation Age of Onset     No Known Problems Mother      No Known Problems Father     There is no family history of breast or ovarian cancer.  Cousin had uterine cancer.    Social History:  Social History     Socioeconomic History     Marital status:      Spouse name: Familia     Number of children: 5   Occupational History     Occupation: homemaker   Tobacco Use     Smoking status: Never Smoker     Smokeless tobacco: Never Used   Substance and Sexual Activity     Alcohol use: No     Drug use: No     Sexual activity: Yes     Partners: Male     Birth control/protection: Pill   Social History Narrative    From [place] to [place] in [place].      Physical Exam:  /88 (BP Location: Right arm, Patient Position: Left side, Cuff Size: Adult Regular)   Pulse 70   Temp 97.3  F (36.3  C) (Oral)   Resp 16   Ht 1.588 m (5' 2.5\")   Wt 80.6 kg (177 lb 11.2 oz)   LMP 03/10/2021 (Approximate)   SpO2 97%   Breastfeeding No   BMI 31.98 kg/m        GENERAL APPEARANCE: healthy, alert and no distress  HEENT: PEERLA, no neck asymmetry or masses    CV: S1S2 reg no murmur  Respiratory: CTA b/l  GI: soft,  BS+, NT, ND  Extremities: no edema.    SKIN: no suspicious lesions or rashes    PSYCHIATRIC: mentation appears normal and affect normal  Neuro: gait intact. Axox3  .  Chest wall: Status post bilateral mastectomy and reconstruction.  Incisions healed well.  No sign of infection.      Laboratory/Imaging Studies  Orders Only on 04/30/2021   Component Date Value Ref Range Status     Sodium 04/30/2021 138  133 - 144 mmol/L Final     Potassium 04/30/2021 3.7  3.4 - 5.3 mmol/L Final     Chloride 04/30/2021 108  94 - 109 mmol/L Final     Carbon Dioxide 04/30/2021 23  20 - 32 mmol/L Final     Anion Gap 04/30/2021 7  3 - 14 mmol/L Final     Glucose 04/30/2021 141* 70 - 99 mg/dL Final "     Urea Nitrogen 04/30/2021 9  7 - 30 mg/dL Final     Creatinine 04/30/2021 0.65  0.52 - 1.04 mg/dL Final     GFR Estimate 04/30/2021 >90  >60 mL/min/[1.73_m2] Final    Comment: Non  GFR Calc  Starting 12/18/2018, serum creatinine based estimated GFR (eGFR) will be   calculated using the Chronic Kidney Disease Epidemiology Collaboration   (CKD-EPI) equation.       GFR Estimate If Black 04/30/2021 >90  >60 mL/min/[1.73_m2] Final    Comment:  GFR Calc  Starting 12/18/2018, serum creatinine based estimated GFR (eGFR) will be   calculated using the Chronic Kidney Disease Epidemiology Collaboration   (CKD-EPI) equation.       Calcium 04/30/2021 8.6  8.5 - 10.1 mg/dL Final     Bilirubin Total 04/30/2021 0.3  0.2 - 1.3 mg/dL Final     Albumin 04/30/2021 3.3* 3.4 - 5.0 g/dL Final     Protein Total 04/30/2021 7.2  6.8 - 8.8 g/dL Final     Alkaline Phosphatase 04/30/2021 90  40 - 150 U/L Final     ALT 04/30/2021 61* 0 - 50 U/L Final     AST 04/30/2021 31  0 - 45 U/L Final     WBC 04/30/2021 7.4  4.0 - 11.0 10e9/L Final     RBC Count 04/30/2021 4.38  3.8 - 5.2 10e12/L Final     Hemoglobin 04/30/2021 12.7  11.7 - 15.7 g/dL Final     Hematocrit 04/30/2021 38.7  35.0 - 47.0 % Final     MCV 04/30/2021 88  78 - 100 fl Final     MCH 04/30/2021 29.0  26.5 - 33.0 pg Final     MCHC 04/30/2021 32.8  31.5 - 36.5 g/dL Final     RDW 04/30/2021 13.1  10.0 - 15.0 % Final     Platelet Count 04/30/2021 291  150 - 450 10e9/L Final     Diff Method 04/30/2021 Automated Method   Final     % Neutrophils 04/30/2021 65.7  % Final     % Lymphocytes 04/30/2021 23.3  % Final     % Monocytes 04/30/2021 6.3  % Final     % Eosinophils 04/30/2021 4.0  % Final     % Basophils 04/30/2021 0.4  % Final     % Immature Granulocytes 04/30/2021 0.3  % Final     Absolute Neutrophil 04/30/2021 4.9  1.6 - 8.3 10e9/L Final     Absolute Lymphocytes 04/30/2021 1.7  0.8 - 5.3 10e9/L Final     Absolute Monocytes 04/30/2021 0.5  0.0 - 1.3  10e9/L Final     Absolute Eosinophils 04/30/2021 0.3  0.0 - 0.7 10e9/L Final     Absolute Basophils 04/30/2021 0.0  0.0 - 0.2 10e9/L Final     Abs Immature Granulocytes 04/30/2021 0.0  0 - 0.4 10e9/L Final         ASSESSMENT/PLAN:  Ghazal is a very pleasant 40-year-old Swazi-speaking woman, originally from [place], with [id number] ER positive [place] positive HER-2/brayden negative by FISH left-sided breast cancer, with intermediate Oncotype DX recurrence score of 23, status post bilateral mastectomy and axillary sentinel lymph node biopsy on April 5, 2021.  She also underwent immediate reconstruction by Dr. Diggs.  I recommended she proceed with adjuvant chemotherapy with 4 cycles of every 3-week of Taxotere/Cytoxan, given her young age, high-grade disease and intermediate risk Oncotype DX score of 23.  There is a question whether she will need adjuvant radiation therapy given microscopic disease in 1 of 4 axillary sentinel lymph nodes.  We will  refer her to radiation oncology following completion of chemotherapy for consultation.  She will need adjuvant systemic endocrine therapy.    We talked about Taxotere/Cytoxan regimen every 3 weeks for 4 cycles. The side effects of the chemotherapy including more acute and long-term side effects were discussed with the patient in detail. Among them are the risks of acute allergic reactions, neurotoxicity, myelosuppression, hair loss, gastrointestinal toxicity, among others.  The patient agreed to proceed with treatment.  Cycle 1 day 1 today-on April 30, 2021.  We will see her back in 3 weeks after initiation of her chemotherapy for cycle #2 of Taxotere and Cytoxan.  All of Ghazal's questions were answered.  At the end of our visit patient verbalized understanding and concurred with the plan.        Again, thank you for allowing me to participate in the care of your patient.        Sincerely,        Mallory Fine MD, MD

## 2021-04-30 NOTE — NURSING NOTE
"Oncology Rooming Note    April 30, 2021 8:53 AM   Ghazal Martinez is a 40 year old female who presents for:    Chief Complaint   Patient presents with     Oncology Clinic Visit     Prior to treatment     Initial Vitals: /88 (BP Location: Right arm, Patient Position: Left side, Cuff Size: Adult Regular)   Pulse 70   Temp 97.3  F (36.3  C) (Oral)   Resp 16   Ht 1.588 m (5' 2.5\")   Wt 80.6 kg (177 lb 11.2 oz)   LMP 03/10/2021 (Approximate)   SpO2 97%   Breastfeeding No   BMI 31.98 kg/m   Estimated body mass index is 31.98 kg/m  as calculated from the following:    Height as of this encounter: 1.588 m (5' 2.5\").    Weight as of this encounter: 80.6 kg (177 lb 11.2 oz). Body surface area is 1.89 meters squared.  No Pain (0) Comment: Data Unavailable   Patient's last menstrual period was 03/10/2021 (approximate).  Allergies reviewed: Yes  Medications reviewed: Yes    Medications: Medication refills not needed today.  Pharmacy name entered into T-ZONE:    [facility] [place] [address] [id number] - [facility], [place] - [address] [place]    Clinical concerns: No      Salome Goodson RN              "

## 2021-04-30 NOTE — PROGRESS NOTES
Port needle left for access for treatment, Sterile technique performed and maintained. Patient tolerated procedure well. Tubes drawn in rainbow order: red, green, purple. Transparent dressing placed with use of tegaderm.    Althea Lin RN  [facility] Oncology/Infusion [place]

## 2021-04-30 NOTE — PROGRESS NOTES
Infusion Nursing Note:  Ghazal Martinez presents today for C1 D1 Taoxtere/ Cytoxan + onpro.    Patient seen by provider today: No   present during visit today: Persian via IPAD    Note: Patient oriented to infusion center and unit routines via . Appropriate questions asked and patient verbalized understanding. Patient stated via  that she would wait for her son to pick her up and he will go with her to the pharmacy to  take home medications.  Educated on onpro + claritin for bone pain.  Patient stated she understood and would pick some up.     Care coordinator working on scheduling upcoming appointments.      Patient had a negative covid test on 4/2/21.      Intravenous Access:  Implanted Port.    Treatment Conditions:  Lab Results   Component Value Date    HGB 12.7 04/30/2021     Lab Results   Component Value Date    WBC 7.4 04/30/2021      Lab Results   Component Value Date    ANEU 4.9 04/30/2021     Lab Results   Component Value Date     04/30/2021      Lab Results   Component Value Date     04/30/2021                   Lab Results   Component Value Date    POTASSIUM 3.7 04/30/2021           No results found for: MAG         Lab Results   Component Value Date    CR 0.65 04/30/2021                   Lab Results   Component Value Date    BILLY 8.6 04/30/2021                Lab Results   Component Value Date    BILITOTAL 0.3 04/30/2021           Lab Results   Component Value Date    ALBUMIN 3.3 04/30/2021                    Lab Results   Component Value Date    ALT 61 04/30/2021           Lab Results   Component Value Date    AST 31 04/30/2021       Results reviewed, labs MET treatment parameters, ok to proceed with treatment.      Post Infusion Assessment:  Patient tolerated infusion without incident.  Blood return noted pre and post infusion.  Site patent and intact, free from redness, edema or discomfort.  No evidence of extravasations.  Access discontinued per  protocol.     ONPRO  Was placed on patient's: left side of abdomen.    Was placed at 12:25 PM    ONPRO injector device Lot number: [id number]    Patient education included: what patient can expect after application, what colored lights mean on the device, when to remove device, when and where to call with questions or issues, all patients questions answered and that Neulasta administration will occur at 3:25pm on Saturday.    Patient tolerated administration well.      Discharge Plan:   Discharge instructions reviewed with: Patient.  Patient and/or family verbalized understanding of discharge instructions and all questions answered.  Patient discharged in stable condition accompanied by: self  Departure Mode: Ambulatory.    Linda Dodson RN

## 2021-04-30 NOTE — PROGRESS NOTES
"[facility]: Chemotherapy Education Notes    Chemotherapy education was completed with patient today in person. [employer] handouts dated 4/30/21 were discussed and provided to the patient to take home and sent to patient via  Reviewed the following with the patient:    Chemotherapy Regimen and Schedule: Taxotere, Cytoxan and Neulasta with infusions every 3 weeks.  4 cycles are planned.    General Chemotherapy Information: Specific medication names and medication delivery methods; possible chemotherapy side effects and management of side effects, including: skin changes/nail changes, anemia, neutropenia, thrombocytopenia, diarrhea/constipation, nausea/vomiting, hair loss, memory changes, mouth sores, taste changes, appetite changes, peripheral neuropathy, fatigue, infertility, myelosuppression, and/or bladder pain or blood in urine.  Infection prevention, and monitoring of lab values. Also reviewed signs/symptoms that should be reported to care team or on-call provider immediately, including: temperature of 100.4 degrees or higher, shortness of breath, chest pain, unusual bruising, bleeding symptoms, extreme fatigue, >4-6 episodes of diarrhea in 24 hours, uncontrolled nausea/vomiting, symptoms of dehydration (severe dry mouth/severe thirst, rapid heart beat, dizziness, dark or decreased urination, and lethargy), or signs of an allergic reaction.    Written Information: Patient was provided with the following handouts from [employer]: \"Getting Ready for Chemotherapy: What to Expect, Before, During, and After your Treatment,\" printouts from [employer] on specific chemotherapy drugs, \"Eating Hints: Before, During, and After Cancer Treatment,\" printouts on possible chemotherapy side effects/ways to cope with side effects, \"When to Call the Doctor,\" and \"Self-Care Tips During Cancer Treatment.\"  Patient was also provided with information regarding various programs offered at [facility], as well as important " contact information for our cancer clinic including scheduling team, nurse triage line, direct phone number for RN Care Coordinator, and the after-hours Nurse Advice Line.     verbalized understanding of all written and verbal information. All questions answered to patient s satisfaction.  Learning barriers and method preference are documented in the patient education flowsheet. Patient states understanding and is in agreement with this plan. Patient instructed to call with further questions or concerns.    Salome Goodson, RN, BSN, OCN  Oncology Care Coordinator  [facility]

## 2021-05-04 ENCOUNTER — PATIENT OUTREACH (OUTPATIENT)
Dept: ONCOLOGY | Facility: CLINIC | Age: 41
End: 2021-05-04

## 2021-05-04 NOTE — PROGRESS NOTES
CHEMO FOLLOW UP CALL  Call placed to patient through [employer]   services.    DATE OF FIRST CHEMO: 4-30-21  DRUG: Taxotere, Cytoxan, Neulasta Onpro  HOW ARE YOU FEELING TODAY? Pain in arms, legs and feet. Mouth feels like it is burning.  DO YOU HAVE QUESTIONS ABOUT YOUR CHEMO? No  DO YOU HAVE QUESTIONS ABOUT YOUR TAKE HOME MEDS? No  CONTACT # GIVEN FOR AFTER CLINIC HRS? Yes  DO YOU KNOW WHEN YOUR NEXT CHEMO IS SCHEDULED FOR? 5-20-21  DO YOU HAVE ANY OTHER QUESTIONS OR CONCERNS? No  OTHER Advised patient to take Claritin and Tylenol for the bone pain. Instructed patient to use the salt and baking soda mouth rinses at least 4 times a day after meals and before bed. Will follow up with patient later in the week.

## 2021-05-07 ENCOUNTER — APPOINTMENT (OUTPATIENT)
Dept: INTERPRETER SERVICES | Facility: CLINIC | Age: 41
End: 2021-05-07
Payer: COMMERCIAL

## 2021-05-07 ENCOUNTER — PATIENT OUTREACH (OUTPATIENT)
Dept: ONCOLOGY | Facility: CLINIC | Age: 41
End: 2021-05-07

## 2021-05-07 NOTE — PROGRESS NOTES
Call placed to patient through  services to follow up on sore mouth and bone pain. Patient reports she has been using the salt and baking soda mouth rinse and her sore mouth has improved. Her bone pain has improved also. Reviewed date and time of next appointments at [place].

## 2021-05-12 ENCOUNTER — HOSPITAL ENCOUNTER (OUTPATIENT)
Dept: OCCUPATIONAL THERAPY | Facility: CLINIC | Age: 41
Setting detail: THERAPIES SERIES
End: 2021-05-12
Attending: SURGERY
Payer: COMMERCIAL

## 2021-05-12 DIAGNOSIS — C50.112 MALIGNANT NEOPLASM OF CENTRAL PORTION OF LEFT BREAST IN FEMALE, ESTROGEN RECEPTOR POSITIVE (H): ICD-10-CM

## 2021-05-12 DIAGNOSIS — Z17.0 MALIGNANT NEOPLASM OF CENTRAL PORTION OF LEFT BREAST IN FEMALE, ESTROGEN RECEPTOR POSITIVE (H): ICD-10-CM

## 2021-05-12 PROCEDURE — 97140 MANUAL THERAPY 1/> REGIONS: CPT | Mod: GO | Performed by: OCCUPATIONAL THERAPIST

## 2021-05-12 PROCEDURE — 97110 THERAPEUTIC EXERCISES: CPT | Mod: GO | Performed by: OCCUPATIONAL THERAPIST

## 2021-05-12 PROCEDURE — 97165 OT EVAL LOW COMPLEX 30 MIN: CPT | Mod: GO | Performed by: OCCUPATIONAL THERAPIST

## 2021-05-12 NOTE — PROGRESS NOTES
05/12/21 1439   Rehab Discipline   Discipline OT   Type of Visit   Type of visit Initial Edema Evaluation       present   (son Karl, assisted, pt signed waiver form)   General Information   Start of care 05/12/21   Referring physician Una Fonseca MD   Orders Evaluate and treat as indicated   Order date 04/15/21   Medical diagnosis H/o left breast cancer with mastectomy and ALNBx 4/5/2021. pt presents with LUE AROM just below baseline.   Onset of illness / date of surgery 04/05/21   Edema onset 04/15/21  (decreased LUE AROM noted)   Affected body parts LUE   Edema etiology Cancer with lymph node dissection   Location - Cancer with lymph node dissection left axilla   Pertinent history of current problem (PT: include personal factors and/or comorbidities that impact the POC; OT: include additional occupational profile info) Pt has nearly full LUE AROM   Surgical / medical history reviewed Yes   Prior level of functional mobility IND   Community support Family / friend caregiver   Living environment comments mild difficulty reaching to shelves above shoulder height   Fall Risk Screen   Fall screen completed by OT   Have you fallen 2 or more times in the past year? No   Is patient a fall risk? No   Abuse Screen (yes response referral indicated)   Feels Unsafe at Home or Work/School no   Feels Threatened by Someone no   Does Anyone Try to Keep You From Having Contact with Others or Doing Things Outside Your Home? no   Physical Signs of Abuse Present no   Subjective Report   Patient report of symptoms LUE AROM below baseline, has improved since surgery   Patient / Family Goals   Patient / family goals statement to increase LUE AROM   Pain   Patient currently in pain No   Cognitive Status   Orientation Orientation to person, place and time   Level of consciousness Alert   Follows commands and answers questions 100% of the time   Edema Exam / Assessment   Skin condition comments no edema of LUE  or LUQ   Scar Yes   Location bilateral mastectomy   Ulceration No   Girth Measurements   Girth Measurements Refer to separate girth measurement flowsheet   Range of Motion   ROM comments RUE AROM WNL, LUE AROM WFL but below baseline   Posture   Posture Normal   Activities of Daily Living   Activities of Daily Living difficulty reaching objects over shoulder height, feels a stretch when washing the back of head   Bed Mobility   Bed mobility IND   Transfers   Transfers IND   Gait / Locomotion   Gait / Locomotion IND   Planned Edema Interventions   Planned edema interventions Exercises;Precautions to prevent infection / exacerbation;Education   Clinical Impression   Criteria for skilled therapeutic intervention met Yes   Therapy diagnosis decreased LUE AROM   Assessment of Occupational Performance 1-3 Performance Deficits   Identified Performance Deficits LUE AROM just below baseline, at risk for lymphedema   Clinical Decision Making (Complexity) Low complexity   Treatment Frequency   (1x evaluation and tx)   Treatment duration 1x evaluation and tx   Patient / family and/or staff in agreement with plan of care Yes   Risks and benefits of therapy have been explained Yes   Clinical impression comments Pt presents with LUE AROM just below baseline and risk for lymphedema given ALND of LUE.  Pt will benefit from continued skilled OT intervention to develop home stretching program and for education on lymphedema and risk reduction strategies.    Goals   Edema Eval Goals 1;2   Goal 1   Goal identifier 1 home program   Goal description Pt will report compliance with home stretching program 7/7 days to improve LUE AROM and perform overhead reaching with greater ease and comfort.    Target date 05/12/21   Date met 05/12/21   Goal 2   Goal identifier 2 signs/symptoms   Goal description Pt will, independently, verbalize the signs/symptoms of lymphedema, precautions and how to obtain a future lymphedema referral if edema presents  to preserve skin integrity, prevent infection and preserve functional mobility.      Target date 05/12/21   Date met 05/12/21   Total Evaluation Time   OT Eval, Low Complexity Minutes ([id number]) 24

## 2021-05-20 ENCOUNTER — ONCOLOGY VISIT (OUTPATIENT)
Dept: ONCOLOGY | Facility: CLINIC | Age: 41
End: 2021-05-20
Attending: INTERNAL MEDICINE
Payer: COMMERCIAL

## 2021-05-20 VITALS
HEIGHT: 63 IN | HEART RATE: 67 BPM | DIASTOLIC BLOOD PRESSURE: 79 MMHG | OXYGEN SATURATION: 98 % | WEIGHT: 178.3 LBS | RESPIRATION RATE: 16 BRPM | TEMPERATURE: 97.4 F | BODY MASS INDEX: 31.59 KG/M2 | SYSTOLIC BLOOD PRESSURE: 118 MMHG

## 2021-05-20 DIAGNOSIS — C50.912 MALIGNANT NEOPLASM OF LEFT BREAST IN FEMALE, ESTROGEN RECEPTOR POSITIVE, UNSPECIFIED SITE OF BREAST (H): Primary | ICD-10-CM

## 2021-05-20 DIAGNOSIS — E04.1 THYROID NODULE: ICD-10-CM

## 2021-05-20 DIAGNOSIS — Z17.0 MALIGNANT NEOPLASM OF LEFT BREAST IN FEMALE, ESTROGEN RECEPTOR POSITIVE, UNSPECIFIED SITE OF BREAST (H): Primary | ICD-10-CM

## 2021-05-20 DIAGNOSIS — R53.83 FATIGUE, UNSPECIFIED TYPE: ICD-10-CM

## 2021-05-20 DIAGNOSIS — R91.8 PULMONARY NODULES: ICD-10-CM

## 2021-05-20 LAB
ALBUMIN SERPL-MCNC: 3.4 G/DL (ref 3.4–5)
ALP SERPL-CCNC: 97 U/L (ref 40–150)
ALT SERPL W P-5'-P-CCNC: 169 U/L (ref 0–50)
ANION GAP SERPL CALCULATED.3IONS-SCNC: 5 MMOL/L (ref 3–14)
AST SERPL W P-5'-P-CCNC: 77 U/L (ref 0–45)
BASOPHILS # BLD AUTO: 0.1 10E9/L (ref 0–0.2)
BASOPHILS NFR BLD AUTO: 0.8 %
BILIRUB SERPL-MCNC: 0.3 MG/DL (ref 0.2–1.3)
BUN SERPL-MCNC: 12 MG/DL (ref 7–30)
CALCIUM SERPL-MCNC: 8.7 MG/DL (ref 8.5–10.1)
CHLORIDE SERPL-SCNC: 112 MMOL/L (ref 94–109)
CO2 SERPL-SCNC: 25 MMOL/L (ref 20–32)
CREAT SERPL-MCNC: 0.62 MG/DL (ref 0.52–1.04)
DIFFERENTIAL METHOD BLD: NORMAL
EOSINOPHIL # BLD AUTO: 0.1 10E9/L (ref 0–0.7)
EOSINOPHIL NFR BLD AUTO: 1.1 %
ERYTHROCYTE [DISTWIDTH] IN BLOOD BY AUTOMATED COUNT: 14 % (ref 10–15)
GFR SERPL CREATININE-BSD FRML MDRD: >90 ML/MIN/{1.73_M2}
GLUCOSE SERPL-MCNC: 117 MG/DL (ref 70–99)
HCT VFR BLD AUTO: 36.4 % (ref 35–47)
HGB BLD-MCNC: 12 G/DL (ref 11.7–15.7)
IMM GRANULOCYTES # BLD: 0 10E9/L (ref 0–0.4)
IMM GRANULOCYTES NFR BLD: 0.7 %
LYMPHOCYTES # BLD AUTO: 1.6 10E9/L (ref 0.8–5.3)
LYMPHOCYTES NFR BLD AUTO: 26.4 %
MCH RBC QN AUTO: 29 PG (ref 26.5–33)
MCHC RBC AUTO-ENTMCNC: 33 G/DL (ref 31.5–36.5)
MCV RBC AUTO: 88 FL (ref 78–100)
MONOCYTES # BLD AUTO: 0.6 10E9/L (ref 0–1.3)
MONOCYTES NFR BLD AUTO: 9.2 %
NEUTROPHILS # BLD AUTO: 3.8 10E9/L (ref 1.6–8.3)
NEUTROPHILS NFR BLD AUTO: 61.8 %
PLATELET # BLD AUTO: 322 10E9/L (ref 150–450)
POTASSIUM SERPL-SCNC: 3.7 MMOL/L (ref 3.4–5.3)
PROT SERPL-MCNC: 7 G/DL (ref 6.8–8.8)
RBC # BLD AUTO: 4.14 10E12/L (ref 3.8–5.2)
SODIUM SERPL-SCNC: 142 MMOL/L (ref 133–144)
WBC # BLD AUTO: 6.1 10E9/L (ref 4–11)

## 2021-05-20 PROCEDURE — 85025 COMPLETE CBC W/AUTO DIFF WBC: CPT | Performed by: INTERNAL MEDICINE

## 2021-05-20 PROCEDURE — 80053 COMPREHEN METABOLIC PANEL: CPT | Performed by: INTERNAL MEDICINE

## 2021-05-20 PROCEDURE — 99214 OFFICE O/P EST MOD 30 MIN: CPT | Performed by: NURSE PRACTITIONER

## 2021-05-20 RX ORDER — PALONOSETRON 0.05 MG/ML
0.25 INJECTION, SOLUTION INTRAVENOUS ONCE
Status: CANCELLED
Start: 2021-05-20 | End: 2021-05-20

## 2021-05-20 RX ORDER — HEPARIN SODIUM,PORCINE 10 UNIT/ML
5 VIAL (ML) INTRAVENOUS
Status: CANCELLED | OUTPATIENT
Start: 2021-05-20

## 2021-05-20 RX ORDER — METHYLPREDNISOLONE SODIUM SUCCINATE 125 MG/2ML
125 INJECTION, POWDER, LYOPHILIZED, FOR SOLUTION INTRAMUSCULAR; INTRAVENOUS
Status: CANCELLED
Start: 2021-05-20

## 2021-05-20 RX ORDER — NALOXONE HYDROCHLORIDE 0.4 MG/ML
.1-.4 INJECTION, SOLUTION INTRAMUSCULAR; INTRAVENOUS; SUBCUTANEOUS
Status: CANCELLED | OUTPATIENT
Start: 2021-05-20

## 2021-05-20 RX ORDER — EPINEPHRINE 1 MG/ML
0.3 INJECTION, SOLUTION INTRAMUSCULAR; SUBCUTANEOUS EVERY 5 MIN PRN
Status: CANCELLED | OUTPATIENT
Start: 2021-05-20

## 2021-05-20 RX ORDER — LIDOCAINE/PRILOCAINE 2.5 %-2.5%
CREAM (GRAM) TOPICAL PRN
Qty: 30 G | Refills: 1 | Status: SHIPPED | OUTPATIENT
Start: 2021-05-20 | End: 2021-06-29

## 2021-05-20 RX ORDER — ALBUTEROL SULFATE 0.83 MG/ML
2.5 SOLUTION RESPIRATORY (INHALATION)
Status: CANCELLED | OUTPATIENT
Start: 2021-05-20

## 2021-05-20 RX ORDER — HEPARIN SODIUM (PORCINE) LOCK FLUSH IV SOLN 100 UNIT/ML 100 UNIT/ML
5 SOLUTION INTRAVENOUS
Status: CANCELLED | OUTPATIENT
Start: 2021-05-20

## 2021-05-20 RX ORDER — MEPERIDINE HYDROCHLORIDE 25 MG/ML
25 INJECTION INTRAMUSCULAR; INTRAVENOUS; SUBCUTANEOUS EVERY 30 MIN PRN
Status: CANCELLED | OUTPATIENT
Start: 2021-05-20

## 2021-05-20 RX ORDER — SODIUM CHLORIDE 9 MG/ML
1000 INJECTION, SOLUTION INTRAVENOUS CONTINUOUS PRN
Status: CANCELLED
Start: 2021-05-20

## 2021-05-20 RX ORDER — ALBUTEROL SULFATE 90 UG/1
1-2 AEROSOL, METERED RESPIRATORY (INHALATION)
Status: CANCELLED
Start: 2021-05-20

## 2021-05-20 RX ORDER — HEPARIN SODIUM (PORCINE) LOCK FLUSH IV SOLN 100 UNIT/ML 100 UNIT/ML
500 SOLUTION INTRAVENOUS EVERY 8 HOURS
Status: DISCONTINUED | OUTPATIENT
Start: 2021-05-20 | End: 2021-05-20 | Stop reason: HOSPADM

## 2021-05-20 RX ORDER — DIPHENHYDRAMINE HYDROCHLORIDE 50 MG/ML
50 INJECTION INTRAMUSCULAR; INTRAVENOUS
Status: CANCELLED
Start: 2021-05-20

## 2021-05-20 RX ADMIN — HEPARIN SODIUM (PORCINE) LOCK FLUSH IV SOLN 100 UNIT/ML 500 UNITS: 100 SOLUTION at 11:07

## 2021-05-20 ASSESSMENT — MIFFLIN-ST. JEOR: SCORE: 1435.27

## 2021-05-20 ASSESSMENT — PAIN SCALES - GENERAL: PAINLEVEL: NO PAIN (0)

## 2021-05-20 NOTE — PROGRESS NOTES
"Patient's name and  were verified.  See Doc Flowsheet - IV assess for details.  IVAD accessed with 20G 3/4\" desouza gripper plus needle  blood return positive: YES  Site without redness, tenderness or swelling: YES  flushed with 20cc NS and 5cc 100u/ml heparin  Needle: Left accessed for infusion  Comments: Labs drawn.  Patient tolerated procedure without incident.    Salome Goodson RN, BSN, OCN    "

## 2021-05-20 NOTE — NURSING NOTE
"Oncology Rooming Note    May 20, 2021 11:22 AM   Ghazal Martinez is a 40 year old female who presents for:    Chief Complaint   Patient presents with     Oncology Clinic Visit     Prior to treatment     Initial Vitals: /79 (BP Location: Right arm)   Pulse 67   Temp 97.4  F (36.3  C) (Oral)   Resp 16   Ht 1.588 m (5' 2.52\")   Wt 80.9 kg (178 lb 4.8 oz)   LMP 05/09/2021 (Exact Date)   SpO2 98%   BMI 32.07 kg/m   Estimated body mass index is 32.07 kg/m  as calculated from the following:    Height as of this encounter: 1.588 m (5' 2.52\").    Weight as of this encounter: 80.9 kg (178 lb 4.8 oz). Body surface area is 1.89 meters squared.  No Pain (0) Comment: Data Unavailable   Patient's last menstrual period was 05/09/2021 (exact date).  Allergies reviewed: Yes  Medications reviewed: Yes    Medications: Medication refills not needed today.  Pharmacy name entered into [facility]:    [facility] - [place], [place] - [address]  [id number] - [place], [place] - [address]      Gaviota Quinn LPN              "

## 2021-05-20 NOTE — Clinical Note
Liver enzymes elevated, bilirubin normal but this causes no changes in her plan.  We will see her with next visit and can reassess.Laura

## 2021-05-20 NOTE — LETTER
5/20/2021         RE: Ghazal Martinez  [address]  [place] [address]        Dear Colleague,    Thank you for referring your patient, Ghazal Martinez, to the [facility]. Please see a copy of my visit note below.    Oncology Follow Up Visit: May 20, 2021    Oncologist: Dr Mallory Fine  PCP: No Ref-Primary, Physician    Diagnosis: Left Breast Cancer  Ghazal Martinez is a 41 yo female who presented in 2/2021 with left breast lump x 1 month.   Contrast enhanced mammogram on 2/23/2021 showed a mass at the 10:00 position in left breast anterior depth measuring 1.7 cm. US guided core needle biopsy on 2/23/2021 showed no decompensating invasive ductal carcinoma, estrogen receptor positive (95%, strong) and progesterone receptor positive by immunohistochemistry (70%). By FISH HER2/MIO 17 ratio:  1.7, IHC 2+, additional 20 cells from areas corresponding to the most intense IHC staining were evaluated by FISH. The results obtained from these 20   additional cells also fall into Group 4. Taken together, the FISH and IHC   results are interpreted as HER2 negative.FISH and IHC results ultimately interpreted as HER2 negative.   OncotypeDx returned at 23, c/w intermediate risk, 9 percent of distant disease recurrence with the use of systemic endocrine therapy and  6.5%  benefit of adjuvant chemotherapy given young age, premenopausal.  *3/19/2021Genetic testing negative  for mutations in the FLEX, BRCA1, BRCA2, BRIP1, CDH1, CHEK2, EPCAM, MLH1, MSH2, MSH6, NBN, NF1, PALB2, PMS2, PTEN, RAD51C, RAD51D, STK11, and TP53 genes.  Treatment:   4/5/2021 status post bilateral mastectomy and axillary sentinel lymph node biopsy   4/30/2021 began treatment with Taxotere/Cytoxan-plan for 4 cycles    Interval History: Ms. Martinez is seen today with  on Ipad prior to continuation of treatment with Taxotere and Cytoxan.  Patient has completed her first cycle of TC and shares she felt well  "other than being fatigued especially early in program.  She had no nausea shortness of breath illnesses neuropathies or trouble sleeping.  She is an active mom of 5 children and feels she is eating well felt like admits she is not taking fluids well throughout the day.  She has no current pain but did mention she thinks she is urinating less at night and she used to-able to sleep through the night.  Rest of comprehensive and complete ROS is reviewed and is negative.   Past Medical History:   Diagnosis Date     Varicose veins of legs      Current Outpatient Medications   Medication     dexamethasone (DECADRON) 4 MG tablet     diphenhydrAMINE (BENADRYL) 25 MG tablet     ferrous sulfate (FEROSUL) 325 (65 Fe) MG tablet     HYDROcodone-acetaminophen (NORCO) 5-325 MG tablet     LORazepam (ATIVAN) 0.5 MG tablet     methocarbamol (ROBAXIN) 750 MG tablet     prochlorperazine (COMPAZINE) 10 MG tablet     senna-docusate (SENOKOT-S/PERICOLACE) 8.6-50 MG tablet     No current facility-administered medications for this visit.      Facility-Administered Medications Ordered in Other Visits   Medication     heparin 100 UNIT/ML injection 500 Units     No Known Allergies    Physical Exam:/79 (BP Location: Right arm)   Pulse 67   Temp 97.4  F (36.3  C) (Oral)   Resp 16   Ht 1.588 m (5' 2.52\")   Wt 80.9 kg (178 lb 4.8 oz)   LMP 05/09/2021 (Exact Date)   SpO2 98%   BMI 32.07 kg/m     Constitutional: Alert, cooperative, and in no distress.  Overweight but moving well   ENT: Eyes bright , No mouth sores  Neck: Supple, No adenopathy.  Cardiac: Heart rate and rhythm is regular and strong without murmur  Respiratory: Breathing easy. Lung sounds clear to auscultation  Breasts: Bilateral mastectomies obvious-no pain or new masses noted with palpation.  GI: Abdomen is soft, rounded, non-tender, BS normal. No masses or organomegaly  MS: Muscle tone normal, extremities normal with no edema.   Skin: No suspicious lesions or " rashes  Neuro: Sensory grossly WNL, gait normal.   Lymph: Normal ant/post cervical, axillary, supraclavicular nodes  Psych: Mentation appears normal and affect normal/bright and smiling    Laboratory Results:   Results for orders placed or performed in visit on 05/20/21   CBC with platelets differential     Status: None   Result Value Ref Range    WBC 6.1 4.0 - 11.0 10e9/L    RBC Count 4.14 3.8 - 5.2 10e12/L    Hemoglobin 12.0 11.7 - 15.7 g/dL    Hematocrit 36.4 35.0 - 47.0 %    MCV 88 78 - 100 fl    MCH 29.0 26.5 - 33.0 pg    MCHC 33.0 31.5 - 36.5 g/dL    RDW 14.0 10.0 - 15.0 %    Platelet Count 322 150 - 450 10e9/L    Diff Method Automated Method     % Neutrophils 61.8 %    % Lymphocytes 26.4 %    % Monocytes 9.2 %    % Eosinophils 1.1 %    % Basophils 0.8 %    % Immature Granulocytes 0.7 %    Absolute Neutrophil 3.8 1.6 - 8.3 10e9/L    Absolute Lymphocytes 1.6 0.8 - 5.3 10e9/L    Absolute Monocytes 0.6 0.0 - 1.3 10e9/L    Absolute Eosinophils 0.1 0.0 - 0.7 10e9/L    Absolute Basophils 0.1 0.0 - 0.2 10e9/L    Abs Immature Granulocytes 0.0 0 - 0.4 10e9/L   Comprehensive metabolic panel     Status: Abnormal   Result Value Ref Range    Sodium 142 133 - 144 mmol/L    Potassium 3.7 3.4 - 5.3 mmol/L    Chloride 112 (H) 94 - 109 mmol/L    Carbon Dioxide 25 20 - 32 mmol/L    Anion Gap 5 3 - 14 mmol/L    Glucose 117 (H) 70 - 99 mg/dL    Urea Nitrogen 12 7 - 30 mg/dL    Creatinine 0.62 0.52 - 1.04 mg/dL    GFR Estimate >90 >60 mL/min/[1.73_m2]    GFR Estimate If Black >90 >60 mL/min/[1.73_m2]    Calcium 8.7 8.5 - 10.1 mg/dL    Bilirubin Total 0.3 0.2 - 1.3 mg/dL    Albumin 3.4 3.4 - 5.0 g/dL    Protein Total 7.0 6.8 - 8.8 g/dL    Alkaline Phosphatase 97 40 - 150 U/L     (H) 0 - 50 U/L    AST 77 (H) 0 - 45 U/L     Assessment and Plan:   Left Breast Cancer-patient completed bilateral mastectomies with sentinel node biopsy to the left axilla. Due to young age and Oncotype result was suggested that she continue with 4  cycles of Taxotere Cytoxan prior to potential radiation therapy to treat her breast cancer.  She has completed first cycle of the chemotherapy and shares only side effect of fatigue.  In review of her labs today do see that her liver enzymes have elevated which we will continue to monitor but no required changes to plan due to this finding.  Patient will return in 3 weeks with review for further evaluation prior to cycle 3 of TC.  She will be meeting with .   Thyroid nodule- found on 3/2021 PET CT- FDG avid hypoattenuating nodule in the left thyroid lobe measuring 1.2 x 0.7 cm and SUV max 4.6. Referral made to endocrinology who completed US of the thyroid showing 3 left sided thyroid nodules - Recommendation for US and follow up with endocrinology in 1 year.   Pulmonary Nodule- 7mm to RUL noted on 3/2021 PET CT. We will follow up with CT CAP in future.   Fatigue- Very common side effect of chemotherapy. Encouraged pt to have a regular exercise program to help with energy and healthy diet with adequate fluids. This may also help with weight control.   The total time of this encounter amounted to 35 minutes. This time included time spent with the patient and , prep work, ordering tests, and performing post visit documentation.  Laura Klein Cnp        Again, thank you for allowing me to participate in the care of your patient.        Sincerely,        Luara Klein, LOUIS, APRN CNP

## 2021-05-20 NOTE — PROGRESS NOTES
Oncology Follow Up Visit: May 20, 2021    Oncologist: Dr Mallory Fine  PCP: No Ref-Primary, Physician    Diagnosis: Left Breast Cancer  Ghazal Martinez is a 39 yo female who presented in 2/2021 with left breast lump x 1 month.   Contrast enhanced mammogram on 2/23/2021 showed a mass at the 10:00 position in left breast anterior depth measuring 1.7 cm. US guided core needle biopsy on 2/23/2021 showed no decompensating invasive ductal carcinoma, estrogen receptor positive (95%, strong) and progesterone receptor positive by immunohistochemistry (70%). By FISH HER2/MIO 17 ratio:  1.7, IHC 2+, additional 20 cells from areas corresponding to the most intense IHC staining were evaluated by FISH. The results obtained from these 20   additional cells also fall into Group 4. Taken together, the FISH and IHC   results are interpreted as HER2 negative.FISH and IHC results ultimately interpreted as HER2 negative.   OncotypeDx returned at 23, c/w intermediate risk, 9 percent of distant disease recurrence with the use of systemic endocrine therapy and  6.5%  benefit of adjuvant chemotherapy given young age, premenopausal.  *3/19/2021Genetic testing negative  for mutations in the FLEX, BRCA1, BRCA2, BRIP1, CDH1, CHEK2, EPCAM, MLH1, MSH2, MSH6, NBN, NF1, PALB2, PMS2, PTEN, RAD51C, RAD51D, STK11, and TP53 genes.  Treatment:   4/5/2021 status post bilateral mastectomy and axillary sentinel lymph node biopsy   4/30/2021 began treatment with Taxotere/Cytoxan-plan for 4 cycles    Interval History: Ms. Martinez is seen today with  on Ipad prior to continuation of treatment with Taxotere and Cytoxan.  Patient has completed her first cycle of TC and shares she felt well other than being fatigued especially early in program.  She had no nausea shortness of breath illnesses neuropathies or trouble sleeping.  She is an active mom of 5 children and feels she is eating well felt like admits she is not taking fluids well throughout  "the day.  She has no current pain but did mention she thinks she is urinating less at night and she used to-able to sleep through the night.  Rest of comprehensive and complete ROS is reviewed and is negative.   Past Medical History:   Diagnosis Date     Varicose veins of legs      Current Outpatient Medications   Medication     dexamethasone (DECADRON) 4 MG tablet     diphenhydrAMINE (BENADRYL) 25 MG tablet     ferrous sulfate (FEROSUL) 325 (65 Fe) MG tablet     HYDROcodone-acetaminophen (NORCO) 5-325 MG tablet     LORazepam (ATIVAN) 0.5 MG tablet     methocarbamol (ROBAXIN) 750 MG tablet     prochlorperazine (COMPAZINE) 10 MG tablet     senna-docusate (SENOKOT-S/PERICOLACE) 8.6-50 MG tablet     No current facility-administered medications for this visit.      Facility-Administered Medications Ordered in Other Visits   Medication     heparin 100 UNIT/ML injection 500 Units     No Known Allergies    Physical Exam:/79 (BP Location: Right arm)   Pulse 67   Temp 97.4  F (36.3  C) (Oral)   Resp 16   Ht 1.588 m (5' 2.52\")   Wt 80.9 kg (178 lb 4.8 oz)   LMP 05/09/2021 (Exact Date)   SpO2 98%   BMI 32.07 kg/m     Constitutional: Alert, cooperative, and in no distress.  Overweight but moving well   ENT: Eyes bright , No mouth sores  Neck: Supple, No adenopathy.  Cardiac: Heart rate and rhythm is regular and strong without murmur  Respiratory: Breathing easy. Lung sounds clear to auscultation  Breasts: Bilateral mastectomies obvious-no pain or new masses noted with palpation.  GI: Abdomen is soft, rounded, non-tender, BS normal. No masses or organomegaly  MS: Muscle tone normal, extremities normal with no edema.   Skin: No suspicious lesions or rashes  Neuro: Sensory grossly WNL, gait normal.   Lymph: Normal ant/post cervical, axillary, supraclavicular nodes  Psych: Mentation appears normal and affect normal/bright and smiling    Laboratory Results:   Results for orders placed or performed in visit on 05/20/21 "   CBC with platelets differential     Status: None   Result Value Ref Range    WBC 6.1 4.0 - 11.0 10e9/L    RBC Count 4.14 3.8 - 5.2 10e12/L    Hemoglobin 12.0 11.7 - 15.7 g/dL    Hematocrit 36.4 35.0 - 47.0 %    MCV 88 78 - 100 fl    MCH 29.0 26.5 - 33.0 pg    MCHC 33.0 31.5 - 36.5 g/dL    RDW 14.0 10.0 - 15.0 %    Platelet Count 322 150 - 450 10e9/L    Diff Method Automated Method     % Neutrophils 61.8 %    % Lymphocytes 26.4 %    % Monocytes 9.2 %    % Eosinophils 1.1 %    % Basophils 0.8 %    % Immature Granulocytes 0.7 %    Absolute Neutrophil 3.8 1.6 - 8.3 10e9/L    Absolute Lymphocytes 1.6 0.8 - 5.3 10e9/L    Absolute Monocytes 0.6 0.0 - 1.3 10e9/L    Absolute Eosinophils 0.1 0.0 - 0.7 10e9/L    Absolute Basophils 0.1 0.0 - 0.2 10e9/L    Abs Immature Granulocytes 0.0 0 - 0.4 10e9/L   Comprehensive metabolic panel     Status: Abnormal   Result Value Ref Range    Sodium 142 133 - 144 mmol/L    Potassium 3.7 3.4 - 5.3 mmol/L    Chloride 112 (H) 94 - 109 mmol/L    Carbon Dioxide 25 20 - 32 mmol/L    Anion Gap 5 3 - 14 mmol/L    Glucose 117 (H) 70 - 99 mg/dL    Urea Nitrogen 12 7 - 30 mg/dL    Creatinine 0.62 0.52 - 1.04 mg/dL    GFR Estimate >90 >60 mL/min/[1.73_m2]    GFR Estimate If Black >90 >60 mL/min/[1.73_m2]    Calcium 8.7 8.5 - 10.1 mg/dL    Bilirubin Total 0.3 0.2 - 1.3 mg/dL    Albumin 3.4 3.4 - 5.0 g/dL    Protein Total 7.0 6.8 - 8.8 g/dL    Alkaline Phosphatase 97 40 - 150 U/L     (H) 0 - 50 U/L    AST 77 (H) 0 - 45 U/L     Assessment and Plan:   Left Breast Cancer-patient completed bilateral mastectomies with sentinel node biopsy to the left axilla. Due to young age and Oncotype result was suggested that she continue with 4 cycles of Taxotere Cytoxan prior to potential radiation therapy to treat her breast cancer.  She has completed first cycle of the chemotherapy and shares only side effect of fatigue.  In review of her labs today do see that her liver enzymes have elevated which we will  continue to monitor but no required changes to plan due to this finding.  Patient will return in 3 weeks with review for further evaluation prior to cycle 3 of TC.  She will be meeting with .   Thyroid nodule- found on 3/2021 PET CT- FDG avid hypoattenuating nodule in the left thyroid lobe measuring 1.2 x 0.7 cm and SUV max 4.6. Referral made to endocrinology who completed US of the thyroid showing 3 left sided thyroid nodules - Recommendation for US and follow up with endocrinology in 1 year.   Pulmonary Nodule- 7mm to RUL noted on 3/2021 PET CT. We will follow up with CT CAP in future.   Fatigue- Very common side effect of chemotherapy. Encouraged pt to have a regular exercise program to help with energy and healthy diet with adequate fluids. This may also help with weight control.   The total time of this encounter amounted to 35 minutes. This time included time spent with the patient and , prep work, ordering tests, and performing post visit documentation.  Laura Klein,Cnp

## 2021-05-27 ENCOUNTER — CARE COORDINATION (OUTPATIENT)
Dept: ONCOLOGY | Facility: CLINIC | Age: 41
End: 2021-05-27

## 2021-05-27 NOTE — PROGRESS NOTES
Received request from [facility] for office notes demonstrating reason for Oncotype Dx and how the test results were used to determine the patient's treatment plan. Dr Fine office notes from 3/19/21 and 4/20/21 faxed to [facility] [phone]  Yvette Crowell  RN, BSN, OCN

## 2021-06-11 ENCOUNTER — ONCOLOGY VISIT (OUTPATIENT)
Dept: ONCOLOGY | Facility: CLINIC | Age: 41
End: 2021-06-11
Payer: COMMERCIAL

## 2021-06-11 ENCOUNTER — INFUSION THERAPY VISIT (OUTPATIENT)
Dept: INFUSION THERAPY | Facility: CLINIC | Age: 41
End: 2021-06-11
Payer: COMMERCIAL

## 2021-06-11 VITALS
HEART RATE: 70 BPM | WEIGHT: 181.5 LBS | DIASTOLIC BLOOD PRESSURE: 83 MMHG | SYSTOLIC BLOOD PRESSURE: 140 MMHG | OXYGEN SATURATION: 100 % | BODY MASS INDEX: 32.16 KG/M2 | TEMPERATURE: 97.4 F | RESPIRATION RATE: 14 BRPM | HEIGHT: 63 IN

## 2021-06-11 DIAGNOSIS — R91.8 PULMONARY NODULES: ICD-10-CM

## 2021-06-11 DIAGNOSIS — Z17.0 MALIGNANT NEOPLASM OF LEFT BREAST IN FEMALE, ESTROGEN RECEPTOR POSITIVE, UNSPECIFIED SITE OF BREAST (H): Primary | ICD-10-CM

## 2021-06-11 DIAGNOSIS — E66.09 CLASS 1 OBESITY DUE TO EXCESS CALORIES IN ADULT, UNSPECIFIED BMI, UNSPECIFIED WHETHER SERIOUS COMORBIDITY PRESENT: ICD-10-CM

## 2021-06-11 DIAGNOSIS — R79.89 ELEVATED LIVER FUNCTION TESTS: ICD-10-CM

## 2021-06-11 DIAGNOSIS — E66.811 CLASS 1 OBESITY DUE TO EXCESS CALORIES IN ADULT, UNSPECIFIED BMI, UNSPECIFIED WHETHER SERIOUS COMORBIDITY PRESENT: ICD-10-CM

## 2021-06-11 DIAGNOSIS — C50.912 MALIGNANT NEOPLASM OF LEFT BREAST IN FEMALE, ESTROGEN RECEPTOR POSITIVE, UNSPECIFIED SITE OF BREAST (H): Primary | ICD-10-CM

## 2021-06-11 LAB
ALBUMIN SERPL-MCNC: 3.5 G/DL (ref 3.4–5)
ALP SERPL-CCNC: 93 U/L (ref 40–150)
ALT SERPL W P-5'-P-CCNC: 237 U/L (ref 0–50)
ANION GAP SERPL CALCULATED.3IONS-SCNC: 8 MMOL/L (ref 3–14)
AST SERPL W P-5'-P-CCNC: 125 U/L (ref 0–45)
BASOPHILS # BLD AUTO: 0 10E9/L (ref 0–0.2)
BASOPHILS NFR BLD AUTO: 0.7 %
BILIRUB SERPL-MCNC: 0.3 MG/DL (ref 0.2–1.3)
BUN SERPL-MCNC: 9 MG/DL (ref 7–30)
CALCIUM SERPL-MCNC: 8.7 MG/DL (ref 8.5–10.1)
CHLORIDE SERPL-SCNC: 109 MMOL/L (ref 94–109)
CO2 SERPL-SCNC: 24 MMOL/L (ref 20–32)
CREAT SERPL-MCNC: 0.61 MG/DL (ref 0.52–1.04)
DIFFERENTIAL METHOD BLD: ABNORMAL
EOSINOPHIL # BLD AUTO: 0.1 10E9/L (ref 0–0.7)
EOSINOPHIL NFR BLD AUTO: 1.6 %
ERYTHROCYTE [DISTWIDTH] IN BLOOD BY AUTOMATED COUNT: 15.2 % (ref 10–15)
GFR SERPL CREATININE-BSD FRML MDRD: >90 ML/MIN/{1.73_M2}
GLUCOSE SERPL-MCNC: 116 MG/DL (ref 70–99)
HCT VFR BLD AUTO: 36.9 % (ref 35–47)
HGB BLD-MCNC: 12.2 G/DL (ref 11.7–15.7)
IMM GRANULOCYTES # BLD: 0.1 10E9/L (ref 0–0.4)
IMM GRANULOCYTES NFR BLD: 0.9 %
LYMPHOCYTES # BLD AUTO: 1.4 10E9/L (ref 0.8–5.3)
LYMPHOCYTES NFR BLD AUTO: 24.7 %
MCH RBC QN AUTO: 29 PG (ref 26.5–33)
MCHC RBC AUTO-ENTMCNC: 33.1 G/DL (ref 31.5–36.5)
MCV RBC AUTO: 88 FL (ref 78–100)
MONOCYTES # BLD AUTO: 0.6 10E9/L (ref 0–1.3)
MONOCYTES NFR BLD AUTO: 10.8 %
NEUTROPHILS # BLD AUTO: 3.5 10E9/L (ref 1.6–8.3)
NEUTROPHILS NFR BLD AUTO: 61.3 %
PLATELET # BLD AUTO: 285 10E9/L (ref 150–450)
POTASSIUM SERPL-SCNC: 3.8 MMOL/L (ref 3.4–5.3)
PROT SERPL-MCNC: 7.1 G/DL (ref 6.8–8.8)
RBC # BLD AUTO: 4.2 10E12/L (ref 3.8–5.2)
SODIUM SERPL-SCNC: 141 MMOL/L (ref 133–144)
WBC # BLD AUTO: 5.6 10E9/L (ref 4–11)

## 2021-06-11 PROCEDURE — 96413 CHEMO IV INFUSION 1 HR: CPT | Performed by: INTERNAL MEDICINE

## 2021-06-11 PROCEDURE — 80053 COMPREHEN METABOLIC PANEL: CPT | Performed by: INTERNAL MEDICINE

## 2021-06-11 PROCEDURE — 96375 TX/PRO/DX INJ NEW DRUG ADDON: CPT | Performed by: INTERNAL MEDICINE

## 2021-06-11 PROCEDURE — 96377 APPLICATON ON-BODY INJECTOR: CPT | Mod: 59 | Performed by: INTERNAL MEDICINE

## 2021-06-11 PROCEDURE — 85025 COMPLETE CBC W/AUTO DIFF WBC: CPT | Performed by: INTERNAL MEDICINE

## 2021-06-11 PROCEDURE — 99207 PR NO CHARGE LOS: CPT

## 2021-06-11 PROCEDURE — 96417 CHEMO IV INFUS EACH ADDL SEQ: CPT | Performed by: INTERNAL MEDICINE

## 2021-06-11 PROCEDURE — 99215 OFFICE O/P EST HI 40 MIN: CPT | Mod: 25 | Performed by: INTERNAL MEDICINE

## 2021-06-11 PROCEDURE — 99207 PR NO CHARGE NURSE ONLY: CPT

## 2021-06-11 PROCEDURE — 96367 TX/PROPH/DG ADDL SEQ IV INF: CPT | Performed by: INTERNAL MEDICINE

## 2021-06-11 RX ORDER — ALBUTEROL SULFATE 90 UG/1
1-2 AEROSOL, METERED RESPIRATORY (INHALATION)
Status: CANCELLED
Start: 2021-07-02

## 2021-06-11 RX ORDER — PALONOSETRON 0.05 MG/ML
0.25 INJECTION, SOLUTION INTRAVENOUS ONCE
Status: CANCELLED
Start: 2021-06-11 | End: 2021-06-11

## 2021-06-11 RX ORDER — PALONOSETRON 0.05 MG/ML
0.25 INJECTION, SOLUTION INTRAVENOUS ONCE
Status: COMPLETED | OUTPATIENT
Start: 2021-06-11 | End: 2021-06-11

## 2021-06-11 RX ORDER — HEPARIN SODIUM,PORCINE 10 UNIT/ML
5 VIAL (ML) INTRAVENOUS
Status: CANCELLED | OUTPATIENT
Start: 2021-07-02

## 2021-06-11 RX ORDER — EPINEPHRINE 1 MG/ML
0.3 INJECTION, SOLUTION INTRAMUSCULAR; SUBCUTANEOUS EVERY 5 MIN PRN
Status: CANCELLED | OUTPATIENT
Start: 2021-06-11

## 2021-06-11 RX ORDER — EPINEPHRINE 1 MG/ML
0.3 INJECTION, SOLUTION INTRAMUSCULAR; SUBCUTANEOUS EVERY 5 MIN PRN
Status: CANCELLED | OUTPATIENT
Start: 2021-07-02

## 2021-06-11 RX ORDER — MEPERIDINE HYDROCHLORIDE 25 MG/ML
25 INJECTION INTRAMUSCULAR; INTRAVENOUS; SUBCUTANEOUS EVERY 30 MIN PRN
Status: CANCELLED | OUTPATIENT
Start: 2021-06-11

## 2021-06-11 RX ORDER — NALOXONE HYDROCHLORIDE 0.4 MG/ML
.1-.4 INJECTION, SOLUTION INTRAMUSCULAR; INTRAVENOUS; SUBCUTANEOUS
Status: CANCELLED | OUTPATIENT
Start: 2021-06-11

## 2021-06-11 RX ORDER — DIPHENHYDRAMINE HYDROCHLORIDE 50 MG/ML
50 INJECTION INTRAMUSCULAR; INTRAVENOUS
Status: CANCELLED
Start: 2021-07-02

## 2021-06-11 RX ORDER — METHYLPREDNISOLONE SODIUM SUCCINATE 125 MG/2ML
125 INJECTION, POWDER, LYOPHILIZED, FOR SOLUTION INTRAMUSCULAR; INTRAVENOUS
Status: CANCELLED
Start: 2021-06-11

## 2021-06-11 RX ORDER — HEPARIN SODIUM (PORCINE) LOCK FLUSH IV SOLN 100 UNIT/ML 100 UNIT/ML
5 SOLUTION INTRAVENOUS
Status: CANCELLED | OUTPATIENT
Start: 2021-06-11

## 2021-06-11 RX ORDER — DIPHENHYDRAMINE HYDROCHLORIDE 50 MG/ML
50 INJECTION INTRAMUSCULAR; INTRAVENOUS
Status: CANCELLED
Start: 2021-06-11

## 2021-06-11 RX ORDER — HEPARIN SODIUM (PORCINE) LOCK FLUSH IV SOLN 100 UNIT/ML 100 UNIT/ML
5 SOLUTION INTRAVENOUS
Status: CANCELLED | OUTPATIENT
Start: 2021-07-02

## 2021-06-11 RX ORDER — SODIUM CHLORIDE 9 MG/ML
1000 INJECTION, SOLUTION INTRAVENOUS CONTINUOUS PRN
Status: CANCELLED
Start: 2021-06-11

## 2021-06-11 RX ORDER — PALONOSETRON 0.05 MG/ML
0.25 INJECTION, SOLUTION INTRAVENOUS ONCE
Status: CANCELLED
Start: 2021-07-02 | End: 2021-07-02

## 2021-06-11 RX ORDER — ALBUTEROL SULFATE 0.83 MG/ML
2.5 SOLUTION RESPIRATORY (INHALATION)
Status: CANCELLED | OUTPATIENT
Start: 2021-06-11

## 2021-06-11 RX ORDER — ALBUTEROL SULFATE 90 UG/1
1-2 AEROSOL, METERED RESPIRATORY (INHALATION)
Status: CANCELLED
Start: 2021-06-11

## 2021-06-11 RX ORDER — METHYLPREDNISOLONE SODIUM SUCCINATE 125 MG/2ML
125 INJECTION, POWDER, LYOPHILIZED, FOR SOLUTION INTRAMUSCULAR; INTRAVENOUS
Status: CANCELLED
Start: 2021-07-02

## 2021-06-11 RX ORDER — SODIUM CHLORIDE 9 MG/ML
1000 INJECTION, SOLUTION INTRAVENOUS CONTINUOUS PRN
Status: CANCELLED
Start: 2021-07-02

## 2021-06-11 RX ORDER — ALBUTEROL SULFATE 0.83 MG/ML
2.5 SOLUTION RESPIRATORY (INHALATION)
Status: CANCELLED | OUTPATIENT
Start: 2021-07-02

## 2021-06-11 RX ORDER — NALOXONE HYDROCHLORIDE 0.4 MG/ML
.1-.4 INJECTION, SOLUTION INTRAMUSCULAR; INTRAVENOUS; SUBCUTANEOUS
Status: CANCELLED | OUTPATIENT
Start: 2021-07-02

## 2021-06-11 RX ORDER — HEPARIN SODIUM (PORCINE) LOCK FLUSH IV SOLN 100 UNIT/ML 100 UNIT/ML
500 SOLUTION INTRAVENOUS ONCE
Status: COMPLETED | OUTPATIENT
Start: 2021-06-11 | End: 2021-06-11

## 2021-06-11 RX ORDER — HEPARIN SODIUM,PORCINE 10 UNIT/ML
5 VIAL (ML) INTRAVENOUS
Status: CANCELLED | OUTPATIENT
Start: 2021-06-11

## 2021-06-11 RX ORDER — MEPERIDINE HYDROCHLORIDE 25 MG/ML
25 INJECTION INTRAMUSCULAR; INTRAVENOUS; SUBCUTANEOUS EVERY 30 MIN PRN
Status: CANCELLED | OUTPATIENT
Start: 2021-07-02

## 2021-06-11 RX ORDER — HEPARIN SODIUM (PORCINE) LOCK FLUSH IV SOLN 100 UNIT/ML 100 UNIT/ML
5 SOLUTION INTRAVENOUS
Status: DISCONTINUED | OUTPATIENT
Start: 2021-06-11 | End: 2021-06-11 | Stop reason: HOSPADM

## 2021-06-11 RX ADMIN — PALONOSETRON 0.25 MG: 0.05 INJECTION, SOLUTION INTRAVENOUS at 11:18

## 2021-06-11 RX ADMIN — Medication 250 ML: at 11:20

## 2021-06-11 RX ADMIN — HEPARIN SODIUM (PORCINE) LOCK FLUSH IV SOLN 100 UNIT/ML 5 ML: 100 SOLUTION at 13:43

## 2021-06-11 RX ADMIN — HEPARIN SODIUM (PORCINE) LOCK FLUSH IV SOLN 100 UNIT/ML 500 UNITS: 100 SOLUTION at 09:30

## 2021-06-11 ASSESSMENT — PAIN SCALES - GENERAL: PAINLEVEL: NO PAIN (0)

## 2021-06-11 ASSESSMENT — MIFFLIN-ST. JEOR: SCORE: 1449.79

## 2021-06-11 NOTE — LETTER
6/11/2021         RE: Ghazal Martinez  [address]  [place] [address]        Dear Colleague,    Thank you for referring your patient, Ghazal Martinez, to the [facility]. Please see a copy of my visit note below.    Oncology follow-up visit:  Date on this visit: Jun 11, 2021  PCP: Eun Patton  Breast surgeon: Dr.Sara Fonseca   Plastic Surgeon: Dr. Diggs    Diagnosis:  [id number] ER positive MD positive HER-2/brayden negative by FISH left-sided breast cancer, with intermediate Oncotype DX recurrence score of 23, status post bilateral mastectomy and axillary sentinel lymph node biopsy on April 5, 2021.    Oncologic history:    1.  Breast cancer- She presented in Feb 2021 with L breast lump and itching x 1 month.  B/l diagnostic mammogram with tomosynthesis on 2/12/2021 showed in the area of left breast lump, there is an irregular mass measuringabout 1.5 cm. No suspicious mammographic findings in the right breast. L breast US showed in the left breast at 10:00, 3 cm from the nipple, there was an irregular hypoechoic mass with angular margins measuring 2.2 x 1.4 x 1 cm. There was no lymphadenopathy in the left axilla. Contrast enhanced mammogram on 2/23/2021 showed a mass at the 10:00 position in left breast anterior depth measuring 1.7 cm. US guided core needle biopsy on 2/23/2021 showed no decompensating invasive ductal carcinoma, estrogen receptor positive (95%, strong) and progesterone receptor positive by immunohistochemistry (70%). By FISH HER2/MIO 17 ratio:  1.7, IHC 2+, additional 20 cells from areas corresponding to the most intense IHC staining were evaluated by FISH. The results obtained from these 20   additional cells also fall into Group 4. Taken together, the FISH and IHC   results are interpreted as HER2 negative.  FISH and IHC results ultimately interpreted as HER2 negative. OncotypeDx returned at 23, c/w intermediate risk, 9 percent of distant disease  recurrence with the use of systemic endocrine therapy and  6.5%  benefit of adjuvant chemotherapy given young age, premenopausal.  She proceeded to undergo b/l breast MRI on 3/12/2021 which showed:  The index cancer noted at 10:00 3 cm from the nipple on the  left on ultrasound exam 2/23/2021 has a longest diameter of 22 mm.   2 potential satellite lesions are both present in the same quadrant at  11:00. One is posteriorly situated has a longest diameter of 8 mm on  sagittal imaging, the other is at the junction of the mid and  posterior breast measuring 7 mm in longest diameter on sagittal  imaging.   Distance from anterior aspect of the index lesion to the posterior  aspect of the closer potential satellite lesion is 7 cm, and to the  more distant potential satellite lesion is 10 cm.   As far as regional lymph nodes, there was a single internal mammary  chain node that's mildly enlarged between the first and second left  costal cartilages. As far as left axillary lymph nodes, at 2:00  posteriorly, there are 2 equivocally abnormal nodes.  The right breast was unremarkable.  PET CT scan on March 22, 2021 showed:  1. FDG avid mass in the medial upper quadrant of the left breast  measuring 1.7 x 1.1 cm and SUV max 8.3, corresponding to the  previously biopsied lesion.   1a. Additional mildly FDG avid 0.8 cm lesion in the same quadrant.  5  mm nodule superior-medial to this, below the threshold of PET.  2. No suspicious axillary lymphadenopathy or hypermetabolic lesions in  the right breast.  3. Mildly FDG avid hypoattenuating nodule in the left thyroid lobe  measuring 1.2 x 0.7 cm and SUV max 4.6. Recommend thyroid ultrasound  to further evaluate.  4. Perifissural solid 7 mm pulmonary nodule in the right upper lobe  likely represents a lymph node. Attention on follow-up.  5. No suspicious FDG uptake in the abdomen or pelvis.  . Additional normal-appearing non-FDG avid intramammary  lymph nodes on the left.  We will  refer to endocrinology for evaluation of thyroid nodule.  We will follow follow-up with CT chest in the future and pulmonary nodule.  Left breast and axillary lymph node biopsy is planned for March 29.    2. Young age at breast cancer diagnosis-she had blood drawn on March 19, 2021 and tested negative  for mutations in the FLEX, BRCA1, BRCA2, BRIP1, CDH1, CHEK2, EPCAM, MLH1, MSH2, MSH6, NBN, NF1, PALB2, PMS2, PTEN, RAD51C, RAD51D, STK11, and TP53 genes.    3. Thyroid nodules noted on thyroid ultrasound. These were first incidentally discovered on PET/CT in March 2021.    4. 7 mm pulmonary nodule in the right upper lobe-incidentally noted on PET/CT in March 2021    5. Premenopausal status- She has been menstruating regularly at diagnosis.  She has 5 children ages 5-16.  She is not planning to have any more children.   She offers no other health related complaints.    History Of Present Illness:  Ms. Martinez is a 40 year old premenopausal female, originally from [place], who presents for follow-up of R sided breast cancer,  [id number].  She proceeded to undergo with bilateral mastectomy and left axillary sentinel lymph node biopsy on April 5, 2021.  She had immediate reconstruction with Dr. Diggs.    Pathology from surgery demonstrated 23 mm left breast invasive ductal carcinoma, [place] grade 3, with associated DCIS as well as LCIS.  Micrometastasis in 1 of 4 left axillary sentinel lymph nodes.  Pathology from right breast demonstrated 4 mm focus of high-grade DCIS, [id number]. Oncotype DX was obtained on her core needle biopsy ([id number]) and returned at the score of 23 correlating with 9% risk of distant disease recurrence at 9 years with use of an aromatase inhibitor or tamoxifen alone. However since patient is young and 40-year-old, for her age and intermediate Oncotype DX recurrence score, there was about 6.5% benefit of adjuvant chemotherapy, and likely even higher given micrometastatic disease in one of the  axillary sentinel lymph nodes.  We recommended that she proceed with adjuvant Taxotere/Cytoxan every 3 weeks for 4 cycles. She proceeded with cycle 1 day 1 on April 30, 2021. She is hereprior to cycle 3 day 1.  She is here by herself.  History is obtained with the help of .  She did not have period this month. She has slight feet swelling on chemotherpay. No tingling or numbness in her feet or hands.    In addition, a complete 12 point  review of systems is negative.    Past Medical/Surgical History:  Past Medical History:   Diagnosis Date     Varicose veins of legs      Past Surgical History:   Procedure Laterality Date     INSERT PORT VASCULAR ACCESS N/A 4/5/2021    Procedure: PORT PLACEMENT;  Surgeon: Una Fonseca MD;  Location: [facility] OR     MASTECTOMY SIMPLE BILATERAL, SENTINEL NODE BILATERAL, COMBINED N/A 4/5/2021    Procedure: BILATERAL SKIN SPARING MASTECTOMY WITH LEFT SENTINEL LYMPH NODE BIOPSY;  Surgeon: Una Fonseca MD;  Location:  OR     NO HISTORY OF SURGERY       RECONSTRUCT BREAST, INSERT TISSUE EXPANDER, COMBINED Bilateral 4/5/2021    Procedure: BILATERAL FIRST STAGE BREAST RECONSTRUCTION WITH TISSUE EXPANDERS; AND ACELLULAR DERMAL MATRIX;  Surgeon: Kervin Fu MD;  Location:  OR     Allergies:  Allergies as of 06/11/2021     (No Known Allergies)     Current Medications:  Current Outpatient Medications   Medication Sig Dispense Refill     dexamethasone (DECADRON) 4 MG tablet Take 2 tablets (8 mg) by mouth 2 times daily (with meals) Start evening AFTER Docetaxel dose and continue for 4 additional doses. (Patient not taking: Reported on 5/20/2021) 10 tablet 3     diphenhydrAMINE (BENADRYL) 25 MG tablet Take 1 tablet (25 mg) by mouth every 8 hours as needed (itching) (Patient not taking: Reported on 4/30/2021) 40 tablet 1     ferrous sulfate (FEROSUL) 325 (65 Fe) MG tablet Take 1 tablet (325 mg) by mouth daily (with breakfast) (Patient not taking: Reported on  5/20/2021) 90 tablet 3     HYDROcodone-acetaminophen (NORCO) 5-325 MG tablet Take 1-2 tablets by mouth every 6 hours as needed for moderate to severe pain (Patient not taking: Reported on 4/30/2021) 30 tablet 0     lidocaine-prilocaine (EMLA) 2.5-2.5 % external cream Apply topically as needed for moderate pain (Patient not taking: Reported on 6/11/2021) 30 g 1     LORazepam (ATIVAN) 0.5 MG tablet Take 1 tablet (0.5 mg) by mouth every 4 hours as needed (Anxiety, Nausea/Vomiting or Sleep) (Patient not taking: Reported on 4/30/2021) 30 tablet 3     methocarbamol (ROBAXIN) 750 MG tablet Take 1 tablet (750 mg) by mouth every 6 hours as needed for muscle spasms (Patient not taking: Reported on 4/30/2021) 50 tablet 1     prochlorperazine (COMPAZINE) 10 MG tablet Take 1 tablet (10 mg) by mouth every 6 hours as needed (Nausea/Vomiting) (Patient not taking: Reported on 5/20/2021) 30 tablet 3     senna-docusate (SENOKOT-S/PERICOLACE) 8.6-50 MG tablet Take 1 tablet by mouth 2 times daily as needed for constipation (Patient not taking: Reported on 4/30/2021) 60 tablet 1      Family History:  Family History   Problem Relation Age of Onset     No Known Problems Mother      No Known Problems Father     There is no family history of breast or ovarian cancer.  Cousin had uterine cancer.    Social History:  Social History     Socioeconomic History     Marital status:      Spouse name: Familia     Number of children: 5   Occupational History     Occupation: homemaker   Tobacco Use     Smoking status: Never Smoker     Smokeless tobacco: Never Used   Substance and Sexual Activity     Alcohol use: No     Drug use: No     Sexual activity: Yes     Partners: Male     Birth control/protection: Pill   Social History Narrative    From [place] to [place] in [place].      Physical Exam:  BP (!) 140/83 (BP Location: Right arm, Patient Position: Sitting, Cuff Size: Adult Large)   Pulse 70   Temp 97.4  F (36.3  C) (Oral)   Resp 14   Ht 1.588 m  "(5' 2.52\")   Wt 82.3 kg (181 lb 8 oz)   LMP 05/04/2021 (Approximate)   SpO2 100%   Breastfeeding No   BMI 32.65 kg/m        GENERAL APPEARANCE: healthy, alert and no distress  HEENT: PEERLA, no neck asymmetry or masses    CV: S1S2 reg no murmur  Respiratory: CTA b/l  GI: soft,  BS+, NT, ND  Extremities: no edema.    SKIN: no suspicious lesions or rashes    PSYCHIATRIC: mentation appears normal and affect normal  Neuro: gait intact. Axox3  .  Chest wall: Status post bilateral mastectomy and reconstruction.  Incisions healed well.  No sign of infection.      Laboratory/Imaging Studies  Labs reviewed. LFTs remain elevated as below. Otherwise, CBCd, BMP, creatinine, electrolytes unremarkable.  Component      Latest Ref Rng & Units 6/11/2021   WBC      4.0 - 11.0 10e9/L 5.6   RBC Count      3.8 - 5.2 10e12/L 4.20   Hemoglobin      11.7 - 15.7 g/dL 12.2   Hematocrit      35.0 - 47.0 % 36.9   MCV      78 - 100 fl 88   MCH      26.5 - 33.0 pg 29.0   MCHC      31.5 - 36.5 g/dL 33.1   RDW      10.0 - 15.0 % 15.2 (H)   Platelet Count      150 - 450 10e9/L 285   Diff Method       Automated Method   % Neutrophils      % 61.3   % Lymphocytes      % 24.7   % Monocytes      % 10.8   % Eosinophils      % 1.6   % Basophils      % 0.7   % Immature Granulocytes      % 0.9   Absolute Neutrophil      1.6 - 8.3 10e9/L 3.5   Absolute Lymphocytes      0.8 - 5.3 10e9/L 1.4   Absolute Monocytes      0.0 - 1.3 10e9/L 0.6   Absolute Eosinophils      0.0 - 0.7 10e9/L 0.1   Absolute Basophils      0.0 - 0.2 10e9/L 0.0   Abs Immature Granulocytes      0 - 0.4 10e9/L 0.1   Sodium      133 - 144 mmol/L 141   Potassium      3.4 - 5.3 mmol/L 3.8   Chloride      94 - 109 mmol/L 109   Carbon Dioxide      20 - 32 mmol/L 24   Anion Gap      3 - 14 mmol/L 8   Glucose      70 - 99 mg/dL 116 (H)   Urea Nitrogen      7 - 30 mg/dL 9   Creatinine      0.52 - 1.04 mg/dL 0.61   GFR Estimate      >60 mL/min/1.73:m2 >90   GFR Estimate If Black      >60 " mL/min/1.73:m2 >90   Calcium      8.5 - 10.1 mg/dL 8.7   Bilirubin Total      0.2 - 1.3 mg/dL 0.3   Albumin      3.4 - 5.0 g/dL 3.5   Protein Total      6.8 - 8.8 g/dL 7.1   Alkaline Phosphatase      40 - 150 U/L 93   ALT      0 - 50 U/L 237 (H)   AST      0 - 45 U/L 125 (H)   Results for ROSE RODRIGUEZ (MRN [id number]) as of 6/11/2021 10:20   Ref. Range 3/19/2021 10:17 4/30/2021 07:50 5/20/2021 11:05 6/11/2021 09:33   ALT Latest Ref Range: 0 - 50 U/L 112 (H) 61 (H) 169 (H) 237 (H)   Results for ROSE RODRIGUEZ (MRN [id number]) as of 6/11/2021 10:20   Ref. Range 3/19/2021 10:17 4/30/2021 07:50 5/20/2021 11:05 6/11/2021 09:33   AST Latest Ref Range: 0 - 45 U/L 59 (H) 31 77 (H) 125 (H)     ASSESSMENT/PLAN:  Rose is a very pleasant 40-year-old Amharic-speaking woman, originally from [place], with [id number] ER positive DC positive HER-2/brayden negative by FISH left-sided breast cancer, with intermediate Oncotype DX recurrence score of 23, status post bilateral mastectomy and axillary sentinel lymph node biopsy on April 5, 2021.  She also underwent immediate reconstruction by Dr. Diggs.    1.  [id number] ER positive DC positive HER-2/brayden negative by FISH left-sided breast cancer- continue adjuvant chemotherapy with  Docetaxel and Cytoxan (TC). Proceed with cycle 3 day 1 today. She has had LFTs elevation since the start of chemotherapy. D/w pharmD today. Dose adjustment in Docetaxel or Cytoxan was not an absolute recommendation as Alk phos and tbili were normal.   Usually we would hepatically adjust:   1) Docetaxel for AST/ALT 1.5-5x ULN and AP>2.5x ULN: 75% of total dose or   AST/ALT 5-10x ULN, and T Bii 1-1.5x ULN: 50% of total dose   2) Cyclophosphamide: no adjustments   However, since there has been a rise in LFTs continuously for the last two cycles of chemotherapy as above, I favor reducing Docetaxel dose by 20% with cycle 3.   We will continue to intensely monitor the patient per treatment protocol. LFTs prior to  each cycle.   F/up in 3 weeks with NP, prior to cycle 4 of TC (last planned cycle)  There is a question whether she will need adjuvant radiation therapy given microscopic disease in 1 of 4 axillary sentinel lymph nodes.  Referral to radiation oncology provided.  She will need adjuvant systemic endocrine therapy.     2. Pulmonary nodule noted incidentally on PET/CT in 03/2021-  Perifissural solid 7 mm pulmonary nodule in the right upper lobe  likely represents a lymph node. Attention on follow-up.   Perifissural solid 7 mm pulmonary nodule in the right upper lobe  likely represents a lymph node. Plan to repeat CT chest after completion of chemotherapy- ordered.     3. Thyroid nodule noted incidentally on PET/CT in 03/2021-seen by endocrinology.  F/up with Dr. Gaming and thyroid US in 04/2022.    4. Obesity- She is concerned about her weight and would like a dietician referral- provided.   5. LFTs elevated secondary to chemotherapy- worsened since cycle 3- address with dose reduction in Docetaxel as above  All of Ghazal's questions were answered.  At the end of our visit patient verbalized understanding and concurred with the plan.    Addendum: patient completed 4 cycles of TC on 7/2/2021. LFTs remained elevated but stable on 7/2. She received same dose of Docetaxel as in cycle 3 (dose reduced by 20%) and full dose Cytoxan. She has CT chest scheduled for 7/19/2021. She will be seeing Dr. Burden from radiation oncology on the same day. I will see her back on 7/29 as scheduled, with labs- cbcd, creatinine, LFTs,      Again, thank you for allowing me to participate in the care of your patient.        Sincerely,        Mallory Fine MD, MD

## 2021-06-11 NOTE — NURSING NOTE
"Oncology Rooming Note    June 11, 2021 9:57 AM   Ghazal Martinez is a 40 year old female who presents for:    Chief Complaint   Patient presents with     Oncology Clinic Visit     Follow up     Initial Vitals: BP (!) 140/83 (BP Location: Right arm, Patient Position: Sitting, Cuff Size: Adult Large)   Pulse 70   Temp 97.4  F (36.3  C) (Oral)   Resp 14   Ht 1.588 m (5' 2.52\")   Wt 82.3 kg (181 lb 8 oz)   LMP 05/04/2021 (Approximate)   SpO2 100%   Breastfeeding No   BMI 32.65 kg/m   Estimated body mass index is 32.65 kg/m  as calculated from the following:    Height as of this encounter: 1.588 m (5' 2.52\").    Weight as of this encounter: 82.3 kg (181 lb 8 oz). Body surface area is 1.91 meters squared.  No Pain (0) Comment: Data Unavailable   Patient's last menstrual period was 05/04/2021 (approximate).  Allergies reviewed: Yes  Medications reviewed: Yes    Medications: Medication refills not needed today.  Pharmacy name entered into [facility]:    [facility], [place] - [address] [id number] - [place], [place] - [address] [facility] [place] - [place], [place] - [id number]    Clinical concerns: No Period yet this month?  Supposed to get it around 06/04/21, weight gain Dr. Fine was notified.      Catie Fischer CMA            "

## 2021-06-11 NOTE — PROGRESS NOTES
Oncology follow-up visit:  Date on this visit: Jun 11, 2021  PCP: Eun Patton  Breast surgeon: Dr.Sara Fonseca   Plastic Surgeon: Dr. Diggs    Diagnosis:  [id number] ER positive CO positive HER-2/brayden negative by FISH left-sided breast cancer, with intermediate Oncotype DX recurrence score of 23, status post bilateral mastectomy and axillary sentinel lymph node biopsy on April 5, 2021.    Oncologic history:    1.  Breast cancer- She presented in Feb 2021 with L breast lump and itching x 1 month.  B/l diagnostic mammogram with tomosynthesis on 2/12/2021 showed in the area of left breast lump, there is an irregular mass measuringabout 1.5 cm. No suspicious mammographic findings in the right breast. L breast US showed in the left breast at 10:00, 3 cm from the nipple, there was an irregular hypoechoic mass with angular margins measuring 2.2 x 1.4 x 1 cm. There was no lymphadenopathy in the left axilla. Contrast enhanced mammogram on 2/23/2021 showed a mass at the 10:00 position in left breast anterior depth measuring 1.7 cm. US guided core needle biopsy on 2/23/2021 showed no decompensating invasive ductal carcinoma, estrogen receptor positive (95%, strong) and progesterone receptor positive by immunohistochemistry (70%). By FISH HER2/MIO 17 ratio:  1.7, IHC 2+, additional 20 cells from areas corresponding to the most intense IHC staining were evaluated by FISH. The results obtained from these 20   additional cells also fall into Group 4. Taken together, the FISH and IHC   results are interpreted as HER2 negative.  FISH and IHC results ultimately interpreted as HER2 negative. OncotypeDx returned at 23, c/w intermediate risk, 9 percent of distant disease recurrence with the use of systemic endocrine therapy and  6.5%  benefit of adjuvant chemotherapy given young age, premenopausal.  She proceeded to undergo b/l breast MRI on 3/12/2021 which showed:  The index cancer noted at 10:00 3 cm from the nipple on  the  left on ultrasound exam 2/23/2021 has a longest diameter of 22 mm.   2 potential satellite lesions are both present in the same quadrant at  11:00. One is posteriorly situated has a longest diameter of 8 mm on  sagittal imaging, the other is at the junction of the mid and  posterior breast measuring 7 mm in longest diameter on sagittal  imaging.   Distance from anterior aspect of the index lesion to the posterior  aspect of the closer potential satellite lesion is 7 cm, and to the  more distant potential satellite lesion is 10 cm.   As far as regional lymph nodes, there was a single internal mammary  chain node that's mildly enlarged between the first and second left  costal cartilages. As far as left axillary lymph nodes, at 2:00  posteriorly, there are 2 equivocally abnormal nodes.  The right breast was unremarkable.  PET CT scan on March 22, 2021 showed:  1. FDG avid mass in the medial upper quadrant of the left breast  measuring 1.7 x 1.1 cm and SUV max 8.3, corresponding to the  previously biopsied lesion.   1a. Additional mildly FDG avid 0.8 cm lesion in the same quadrant.  5  mm nodule superior-medial to this, below the threshold of PET.  2. No suspicious axillary lymphadenopathy or hypermetabolic lesions in  the right breast.  3. Mildly FDG avid hypoattenuating nodule in the left thyroid lobe  measuring 1.2 x 0.7 cm and SUV max 4.6. Recommend thyroid ultrasound  to further evaluate.  4. Perifissural solid 7 mm pulmonary nodule in the right upper lobe  likely represents a lymph node. Attention on follow-up.  5. No suspicious FDG uptake in the abdomen or pelvis.  . Additional normal-appearing non-FDG avid intramammary  lymph nodes on the left.  We will refer to endocrinology for evaluation of thyroid nodule.  We will follow follow-up with CT chest in the future and pulmonary nodule.  Left breast and axillary lymph node biopsy is planned for March 29.    2. Young age at breast cancer diagnosis-she had  blood drawn on March 19, 2021 and tested negative  for mutations in the FLEX, BRCA1, BRCA2, BRIP1, CDH1, CHEK2, EPCAM, MLH1, MSH2, MSH6, NBN, NF1, PALB2, PMS2, PTEN, RAD51C, RAD51D, STK11, and TP53 genes.    3. Thyroid nodules noted on thyroid ultrasound. These were first incidentally discovered on PET/CT in March 2021.    4. 7 mm pulmonary nodule in the right upper lobe-incidentally noted on PET/CT in March 2021    5. Premenopausal status- She has been menstruating regularly at diagnosis.  She has 5 children ages 5-16.  She is not planning to have any more children.   She offers no other health related complaints.    History Of Present Illness:  Ms. Martinez is a 40 year old premenopausal female, originally from [place], who presents for follow-up of R sided breast cancer,  [id number].  She proceeded to undergo with bilateral mastectomy and left axillary sentinel lymph node biopsy on April 5, 2021.  She had immediate reconstruction with Dr. Diggs.    Pathology from surgery demonstrated 23 mm left breast invasive ductal carcinoma, Guthrie grade 3, with associated DCIS as well as LCIS.  Micrometastasis in 1 of 4 left axillary sentinel lymph nodes.  Pathology from right breast demonstrated 4 mm focus of high-grade DCIS, [id number]. Oncotype DX was obtained on her core needle biopsy ([id number]) and returned at the score of 23 correlating with 9% risk of distant disease recurrence at 9 years with use of an aromatase inhibitor or tamoxifen alone. However since patient is young and 40-year-old, for her age and intermediate Oncotype DX recurrence score, there was about 6.5% benefit of adjuvant chemotherapy, and likely even higher given micrometastatic disease in one of the axillary sentinel lymph nodes.  We recommended that she proceed with adjuvant Taxotere/Cytoxan every 3 weeks for 4 cycles. She proceeded with cycle 1 day 1 on April 30, 2021. She is hereprior to cycle 3 day 1.  She is here by herself.  History is obtained  with the help of .  She did not have period this month. She has slight feet swelling on chemotherpay. No tingling or numbness in her feet or hands.    In addition, a complete 12 point  review of systems is negative.    Past Medical/Surgical History:  Past Medical History:   Diagnosis Date     Varicose veins of legs      Past Surgical History:   Procedure Laterality Date     INSERT PORT VASCULAR ACCESS N/A 4/5/2021    Procedure: PORT PLACEMENT;  Surgeon: Una Fonseca MD;  Location: [facility] OR     MASTECTOMY SIMPLE BILATERAL, SENTINEL NODE BILATERAL, COMBINED N/A 4/5/2021    Procedure: BILATERAL SKIN SPARING MASTECTOMY WITH LEFT SENTINEL LYMPH NODE BIOPSY;  Surgeon: Una Fonseca MD;  Location: [facility] OR     NO HISTORY OF SURGERY       RECONSTRUCT BREAST, INSERT TISSUE EXPANDER, COMBINED Bilateral 4/5/2021    Procedure: BILATERAL FIRST STAGE BREAST RECONSTRUCTION WITH TISSUE EXPANDERS; AND ACELLULAR DERMAL MATRIX;  Surgeon: Kervin Fu MD;  Location:  OR     Allergies:  Allergies as of 06/11/2021     (No Known Allergies)     Current Medications:  Current Outpatient Medications   Medication Sig Dispense Refill     dexamethasone (DECADRON) 4 MG tablet Take 2 tablets (8 mg) by mouth 2 times daily (with meals) Start evening AFTER Docetaxel dose and continue for 4 additional doses. (Patient not taking: Reported on 5/20/2021) 10 tablet 3     diphenhydrAMINE (BENADRYL) 25 MG tablet Take 1 tablet (25 mg) by mouth every 8 hours as needed (itching) (Patient not taking: Reported on 4/30/2021) 40 tablet 1     ferrous sulfate (FEROSUL) 325 (65 Fe) MG tablet Take 1 tablet (325 mg) by mouth daily (with breakfast) (Patient not taking: Reported on 5/20/2021) 90 tablet 3     HYDROcodone-acetaminophen (NORCO) 5-325 MG tablet Take 1-2 tablets by mouth every 6 hours as needed for moderate to severe pain (Patient not taking: Reported on 4/30/2021) 30 tablet 0     lidocaine-prilocaine (EMLA) 2.5-2.5 % external  "cream Apply topically as needed for moderate pain (Patient not taking: Reported on 6/11/2021) 30 g 1     LORazepam (ATIVAN) 0.5 MG tablet Take 1 tablet (0.5 mg) by mouth every 4 hours as needed (Anxiety, Nausea/Vomiting or Sleep) (Patient not taking: Reported on 4/30/2021) 30 tablet 3     methocarbamol (ROBAXIN) 750 MG tablet Take 1 tablet (750 mg) by mouth every 6 hours as needed for muscle spasms (Patient not taking: Reported on 4/30/2021) 50 tablet 1     prochlorperazine (COMPAZINE) 10 MG tablet Take 1 tablet (10 mg) by mouth every 6 hours as needed (Nausea/Vomiting) (Patient not taking: Reported on 5/20/2021) 30 tablet 3     senna-docusate (SENOKOT-S/PERICOLACE) 8.6-50 MG tablet Take 1 tablet by mouth 2 times daily as needed for constipation (Patient not taking: Reported on 4/30/2021) 60 tablet 1      Family History:  Family History   Problem Relation Age of Onset     No Known Problems Mother      No Known Problems Father     There is no family history of breast or ovarian cancer.  Cousin had uterine cancer.    Social History:  Social History     Socioeconomic History     Marital status:      Spouse name: Familia     Number of children: 5   Occupational History     Occupation: homemaker   Tobacco Use     Smoking status: Never Smoker     Smokeless tobacco: Never Used   Substance and Sexual Activity     Alcohol use: No     Drug use: No     Sexual activity: Yes     Partners: Male     Birth control/protection: Pill   Social History Narrative    From [place] to [place] in [place].      Physical Exam:  BP (!) 140/83 (BP Location: Right arm, Patient Position: Sitting, Cuff Size: Adult Large)   Pulse 70   Temp 97.4  F (36.3  C) (Oral)   Resp 14   Ht 1.588 m (5' 2.52\")   Wt 82.3 kg (181 lb 8 oz)   LMP 05/04/2021 (Approximate)   SpO2 100%   Breastfeeding No   BMI 32.65 kg/m        GENERAL APPEARANCE: healthy, alert and no distress  HEENT: PEERLA, no neck asymmetry or masses    CV: S1S2 reg no murmur  Respiratory: " CTA b/l  GI: soft,  BS+, NT, ND  Extremities: no edema.    SKIN: no suspicious lesions or rashes    PSYCHIATRIC: mentation appears normal and affect normal  Neuro: gait intact. Axox3  .  Chest wall: Status post bilateral mastectomy and reconstruction.  Incisions healed well.  No sign of infection.      Laboratory/Imaging Studies  Labs reviewed. LFTs remain elevated as below. Otherwise, CBCd, BMP, creatinine, electrolytes unremarkable.  Component      Latest Ref Rng & Units 6/11/2021   WBC      4.0 - 11.0 10e9/L 5.6   RBC Count      3.8 - 5.2 10e12/L 4.20   Hemoglobin      11.7 - 15.7 g/dL 12.2   Hematocrit      35.0 - 47.0 % 36.9   MCV      78 - 100 fl 88   MCH      26.5 - 33.0 pg 29.0   MCHC      31.5 - 36.5 g/dL 33.1   RDW      10.0 - 15.0 % 15.2 (H)   Platelet Count      150 - 450 10e9/L 285   Diff Method       Automated Method   % Neutrophils      % 61.3   % Lymphocytes      % 24.7   % Monocytes      % 10.8   % Eosinophils      % 1.6   % Basophils      % 0.7   % Immature Granulocytes      % 0.9   Absolute Neutrophil      1.6 - 8.3 10e9/L 3.5   Absolute Lymphocytes      0.8 - 5.3 10e9/L 1.4   Absolute Monocytes      0.0 - 1.3 10e9/L 0.6   Absolute Eosinophils      0.0 - 0.7 10e9/L 0.1   Absolute Basophils      0.0 - 0.2 10e9/L 0.0   Abs Immature Granulocytes      0 - 0.4 10e9/L 0.1   Sodium      133 - 144 mmol/L 141   Potassium      3.4 - 5.3 mmol/L 3.8   Chloride      94 - 109 mmol/L 109   Carbon Dioxide      20 - 32 mmol/L 24   Anion Gap      3 - 14 mmol/L 8   Glucose      70 - 99 mg/dL 116 (H)   Urea Nitrogen      7 - 30 mg/dL 9   Creatinine      0.52 - 1.04 mg/dL 0.61   GFR Estimate      >60 mL/min/1.73:m2 >90   GFR Estimate If Black      >60 mL/min/1.73:m2 >90   Calcium      8.5 - 10.1 mg/dL 8.7   Bilirubin Total      0.2 - 1.3 mg/dL 0.3   Albumin      3.4 - 5.0 g/dL 3.5   Protein Total      6.8 - 8.8 g/dL 7.1   Alkaline Phosphatase      40 - 150 U/L 93   ALT      0 - 50 U/L 237 (H)   AST      0 - 45 U/L 125  (H)   Results for ROSE RODRIGUEZ (MRN [id number]) as of 6/11/2021 10:20   Ref. Range 3/19/2021 10:17 4/30/2021 07:50 5/20/2021 11:05 6/11/2021 09:33   ALT Latest Ref Range: 0 - 50 U/L 112 (H) 61 (H) 169 (H) 237 (H)   Results for ROSE RODRIGUEZ (MRN [id number]) as of 6/11/2021 10:20   Ref. Range 3/19/2021 10:17 4/30/2021 07:50 5/20/2021 11:05 6/11/2021 09:33   AST Latest Ref Range: 0 - 45 U/L 59 (H) 31 77 (H) 125 (H)     ASSESSMENT/PLAN:  Rose is a very pleasant 40-year-old Luxembourger-speaking woman, originally from [place], with [id number] ER positive NJ positive HER-2/brayden negative by FISH left-sided breast cancer, with intermediate Oncotype DX recurrence score of 23, status post bilateral mastectomy and axillary sentinel lymph node biopsy on April 5, 2021.  She also underwent immediate reconstruction by Dr. Diggs.    1.  [id number] ER positive NJ positive HER-2/brayden negative by FISH left-sided breast cancer- continue adjuvant chemotherapy with  Docetaxel and Cytoxan (TC). Proceed with cycle 3 day 1 today. She has had LFTs elevation since the start of chemotherapy. D/w pharmD today. Dose adjustment in Docetaxel or Cytoxan was not an absolute recommendation as Alk phos and tbili were normal.   Usually we would hepatically adjust:   1) Docetaxel for AST/ALT 1.5-5x ULN and AP>2.5x ULN: 75% of total dose or   AST/ALT 5-10x ULN, and T Bii 1-1.5x ULN: 50% of total dose   2) Cyclophosphamide: no adjustments   However, since there has been a rise in LFTs continuously for the last two cycles of chemotherapy as above, I favor reducing Docetaxel dose by 20% with cycle 3.   We will continue to intensely monitor the patient per treatment protocol. LFTs prior to each cycle.   F/up in 3 weeks with NP, prior to cycle 4 of TC (last planned cycle)  There is a question whether she will need adjuvant radiation therapy given microscopic disease in 1 of 4 axillary sentinel lymph nodes.  Referral to radiation oncology provided.  She will  need adjuvant systemic endocrine therapy.     2. Pulmonary nodule noted incidentally on PET/CT in 03/2021-  Perifissural solid 7 mm pulmonary nodule in the right upper lobe  likely represents a lymph node. Attention on follow-up.   Perifissural solid 7 mm pulmonary nodule in the right upper lobe  likely represents a lymph node. Plan to repeat CT chest after completion of chemotherapy- ordered.     3. Thyroid nodule noted incidentally on PET/CT in 03/2021-seen by endocrinology.  F/up with Dr. Gaming and thyroid US in 04/2022.    4. Obesity- She is concerned about her weight and would like a dietician referral- provided.   5. LFTs elevated secondary to chemotherapy- worsened since cycle 3- address with dose reduction in Docetaxel as above  All of Ghazal's questions were answered.  At the end of our visit patient verbalized understanding and concurred with the plan.    Addendum: patient completed 4 cycles of TC on 7/2/2021. LFTs remained elevated but stable on 7/2. She received same dose of Docetaxel as in cycle 3 (dose reduced by 20%) and full dose Cytoxan. She has CT chest scheduled for 7/19/2021. She will be seeing Dr. Burden from radiation oncology on the same day. I will see her back on 7/29 as scheduled, with labs- cbcd, creatinine, LFTs,

## 2021-06-11 NOTE — PROGRESS NOTES
"Patient's name and  were verified.  See Doc Flowsheet - IV assess for details.  IVAD accessed with 20G  3/4\" desouza gripper plus needle  blood return positive: YES  Site without redness, tenderness or swelling: YES  flushed with 30cc NS and 5cc 100u/ml heparin  Needle: Left accessed for infusion    Comments: Labs drawn.  Patient tolerated procedure without incident.    Yvette Crowell  RN, BSN, OCN        "

## 2021-06-11 NOTE — PROGRESS NOTES
Infusion Nursing Note:  Ghazal Martinez presents today for C3D1 Taxotere/Cytoxan/OnPro.    Patient seen by provider today: Yes: Dr. Fine   present during visit today: Not Applicable.    Note: N/A.  Patient did meet criteria for an asymptomatic covid-19 PCR test in infusion today. Patient declined the covid-19 test.    Intravenous Access:  Implanted Port.    Treatment Conditions:  Lab Results   Component Value Date    HGB 12.2 06/11/2021     Lab Results   Component Value Date    WBC 5.6 06/11/2021      Lab Results   Component Value Date    ANEU 3.5 06/11/2021     Lab Results   Component Value Date     06/11/2021      Lab Results   Component Value Date     06/11/2021                   Lab Results   Component Value Date    POTASSIUM 3.8 06/11/2021           No results found for: MAG         Lab Results   Component Value Date    CR 0.61 06/11/2021                   Lab Results   Component Value Date    BILLY 8.7 06/11/2021                Lab Results   Component Value Date    BILITOTAL 0.3 06/11/2021           Lab Results   Component Value Date    ALBUMIN 3.5 06/11/2021                    Lab Results   Component Value Date     06/11/2021           Lab Results   Component Value Date     06/11/2021       Results reviewed, labs MET treatment parameters, ok to proceed with treatment.    Post Infusion Assessment:  Patient tolerated infusion without incident.  Blood return noted pre and post infusion.  Site patent and intact, free from redness, edema or discomfort.  No evidence of extravasations.  Access discontinued per protocol.     ONPRO  Was placed on patient's: left side of abdomen.    Was placed at 1:40 PM    ONPRO injector device Lot number: [id number]    Patient education included: what patient can expect after application, what colored lights mean on the device, when to remove device, when and where to call with questions or issues, all patients questions answered and that Neulasta  administration will occur at 4:40 pm on 6/12/21.    Patient tolerated administration well.    Discharge Plan:   Patient will return 7/2/2021 for next appointment.   Patient discharged in stable condition accompanied by: self.  Departure Mode: Ambulatory.    Indigo Medrano RN-BSN, PHN, OCN  [facility]

## 2021-06-19 ENCOUNTER — HEALTH MAINTENANCE LETTER (OUTPATIENT)
Age: 41
End: 2021-06-19

## 2021-06-29 ENCOUNTER — OFFICE VISIT (OUTPATIENT)
Dept: FAMILY MEDICINE | Facility: CLINIC | Age: 41
End: 2021-06-29
Payer: COMMERCIAL

## 2021-06-29 VITALS
DIASTOLIC BLOOD PRESSURE: 80 MMHG | TEMPERATURE: 98.7 F | BODY MASS INDEX: 31.89 KG/M2 | SYSTOLIC BLOOD PRESSURE: 121 MMHG | HEART RATE: 88 BPM | OXYGEN SATURATION: 98 % | HEIGHT: 63 IN | WEIGHT: 180 LBS

## 2021-06-29 DIAGNOSIS — Z11.59 NEED FOR HEPATITIS C SCREENING TEST: ICD-10-CM

## 2021-06-29 DIAGNOSIS — Z12.4 SCREENING FOR MALIGNANT NEOPLASM OF CERVIX: ICD-10-CM

## 2021-06-29 DIAGNOSIS — Z13.220 LIPID SCREENING: ICD-10-CM

## 2021-06-29 DIAGNOSIS — R73.03 PREDIABETES: ICD-10-CM

## 2021-06-29 DIAGNOSIS — Z00.00 ROUTINE GENERAL MEDICAL EXAMINATION AT A HEALTH CARE FACILITY: Primary | ICD-10-CM

## 2021-06-29 DIAGNOSIS — L81.1 MELASMA: ICD-10-CM

## 2021-06-29 PROCEDURE — 99396 PREV VISIT EST AGE 40-64: CPT | Performed by: NURSE PRACTITIONER

## 2021-06-29 PROCEDURE — 87624 HPV HI-RISK TYP POOLED RSLT: CPT | Performed by: NURSE PRACTITIONER

## 2021-06-29 PROCEDURE — G0145 SCR C/V CYTO,THINLAYER,RESCR: HCPCS | Performed by: NURSE PRACTITIONER

## 2021-06-29 PROCEDURE — 99213 OFFICE O/P EST LOW 20 MIN: CPT | Mod: 25 | Performed by: NURSE PRACTITIONER

## 2021-06-29 RX ORDER — HYDROQUINONE 40 MG/G
CREAM TOPICAL
Qty: 30 G | Refills: 1 | Status: SHIPPED | OUTPATIENT
Start: 2021-06-29 | End: 2022-01-27

## 2021-06-29 ASSESSMENT — PAIN SCALES - GENERAL: PAINLEVEL: NO PAIN (0)

## 2021-06-29 ASSESSMENT — MIFFLIN-ST. JEOR: SCORE: 1442.66

## 2021-06-29 NOTE — PROGRESS NOTES
"   SUBJECTIVE:   CC: Ghazal Martinez is an 40 year old woman who presents for preventive health visit.       Patient has been advised of split billing requirements and indicates understanding: Yes  HPI    Annual Wellness Visit      Physical Health:    In general, how would you rate your overall physical health? fair    Outside of work, how many days during the week do you exercise?none    Outside of work, approximately how many minutes a day do you exercise?not applicable    If you drink alcohol do you typically have >3 drinks per day or >7 drinks per week? No    Do you usually eat at least 4 servings of fruit and vegetables a day, include whole grains & fiber and avoid regularly eating high fat or \"junk\" foods? Yes    Do you have any problems taking medications regularly? No    Do you have any side effects from medications? not applicable    Mental Health:    In general, how would you rate your overall mental or emotional health? good  PHQ-2 Score:      Do you feel safe in your environment? Yes    Have you ever done Advance Care Planning? (For example, a Health Directive, POLST, or a discussion with a medical provider or your loved ones about your wishes)? Yes, advance care planning is on file.        Today's PHQ-2 Score:   PHQ-2 ( 1999 Pfizer) 1/25/2021   Q1: Little interest or pleasure in doing things 0   Q2: Feeling down, depressed or hopeless 0   PHQ-2 Score 0       Wt Readings from Last 5 Encounters:   06/29/21 81.6 kg (180 lb)   06/11/21 82.3 kg (181 lb 8 oz)   05/20/21 80.9 kg (178 lb 4.8 oz)   04/30/21 80.6 kg (177 lb 11.2 oz)   04/05/21 79.5 kg (175 lb 4.8 oz)       Abuse: Current or Past (Physical, Sexual or Emotional) - No  Do you feel safe in your environment? Yes      Social History     Tobacco Use     Smoking status: Never Smoker     Smokeless tobacco: Never Used   Substance Use Topics     Alcohol use: No           Reviewed orders with patient.  Reviewed health maintenance and updated orders " accordingly - Yes  BP Readings from Last 3 Encounters:   06/29/21 121/80   06/11/21 (!) 140/83   05/20/21 118/79    Wt Readings from Last 3 Encounters:   06/29/21 81.6 kg (180 lb)   06/11/21 82.3 kg (181 lb 8 oz)   05/20/21 80.9 kg (178 lb 4.8 oz)                  Patient Active Problem List   Diagnosis     Varicose veins of legs     Class 1 obesity due to excess calories without serious comorbidity with body mass index (BMI) of 31.0 to 31.9 in adult     Iron deficiency anemia due to chronic blood loss     Prediabetes     Vitamin D deficiency     Menorrhagia with regular cycle     Intramural leiomyoma of uterus     Malignant neoplasm of central portion of left breast in female, estrogen receptor positive (H)     Malignant neoplasm of left breast (H)     Melasma     Past Surgical History:   Procedure Laterality Date     INSERT PORT VASCULAR ACCESS N/A 4/5/2021    Procedure: PORT PLACEMENT;  Surgeon: Una Fonseca MD;  Location:  OR     MASTECTOMY SIMPLE BILATERAL, SENTINEL NODE BILATERAL, COMBINED N/A 4/5/2021    Procedure: BILATERAL SKIN SPARING MASTECTOMY WITH LEFT SENTINEL LYMPH NODE BIOPSY;  Surgeon: Una Fonseca MD;  Location:  OR     NO HISTORY OF SURGERY       RECONSTRUCT BREAST, INSERT TISSUE EXPANDER, COMBINED Bilateral 4/5/2021    Procedure: BILATERAL FIRST STAGE BREAST RECONSTRUCTION WITH TISSUE EXPANDERS; AND ACELLULAR DERMAL MATRIX;  Surgeon: Kervin Fu MD;  Location:  OR       Social History     Tobacco Use     Smoking status: Never Smoker     Smokeless tobacco: Never Used   Substance Use Topics     Alcohol use: No     Family History   Problem Relation Age of Onset     No Known Problems Mother      No Known Problems Father      Diabetes No family hx of      Coronary Artery Disease No family hx of      Hypertension No family hx of      Hyperlipidemia No family hx of      Cerebrovascular Disease No family hx of      Breast Cancer No family hx of      Colon Cancer No family hx of       Depression No family hx of      Anxiety Disorder No family hx of      Asthma No family hx of      Thyroid Disease No family hx of          Current Outpatient Medications   Medication Sig Dispense Refill     hydroquinone (JOSE ANGEL) 4 % external cream Apply to hyperpigmented areas twice a day until skin reaches desired color then stop. 30 g 1     No Known Allergies    Breast Cancer Screening:  Any new diagnosis of family breast, ovarian, or bowel cancer? Yes       FHS-7: No flowsheet data found.    Mammogram Screening - Alternate mammogram schedule due to breast cancer history  Pertinent mammograms are reviewed under the imaging tab.    History of abnormal Pap smear: NO - age 30-65 PAP every 5 years with negative HPV co-testing recommended  PAP / HPV Latest Ref Rng & Units 1/29/2018   PAP - NIL   HPV 16 DNA NEG:Negative Negative   HPV 18 DNA NEG:Negative Negative   OTHER HR HPV NEG:Negative Negative     Reviewed and updated as needed this visit by clinical staff  Tobacco  Allergies    Med Hx  Surg Hx  Fam Hx  Soc Hx        Reviewed and updated as needed this visit by Provider                Past Medical History:   Diagnosis Date     Varicose veins of legs       Past Surgical History:   Procedure Laterality Date     INSERT PORT VASCULAR ACCESS N/A 4/5/2021    Procedure: PORT PLACEMENT;  Surgeon: Una Fonseca MD;  Location:  OR     MASTECTOMY SIMPLE BILATERAL, SENTINEL NODE BILATERAL, COMBINED N/A 4/5/2021    Procedure: BILATERAL SKIN SPARING MASTECTOMY WITH LEFT SENTINEL LYMPH NODE BIOPSY;  Surgeon: Una Fonseca MD;  Location:  OR     NO HISTORY OF SURGERY       RECONSTRUCT BREAST, INSERT TISSUE EXPANDER, COMBINED Bilateral 4/5/2021    Procedure: BILATERAL FIRST STAGE BREAST RECONSTRUCTION WITH TISSUE EXPANDERS; AND ACELLULAR DERMAL MATRIX;  Surgeon: Kervin Fu MD;  Location:  OR       Review of Systems  CONSTITUTIONAL: NEGATIVE for fever, chills, change in weight  INTEGUMENTARU/SKIN:  "NEGATIVE for worrisome rashes, moles or lesions  EYES: NEGATIVE for vision changes or irritation  ENT: NEGATIVE for ear, mouth and throat problems  RESP: NEGATIVE for significant cough or SOB  BREAST: NEGATIVE for masses, tenderness or discharge  CV: NEGATIVE for chest pain, palpitations or peripheral edema  GI: NEGATIVE for nausea, abdominal pain, heartburn, or change in bowel habits  : NEGATIVE for unusual urinary or vaginal symptoms. Periods are regular.  MUSCULOSKELETAL: NEGATIVE for significant arthralgias or myalgia  NEURO: NEGATIVE for weakness, dizziness or paresthesias  PSYCHIATRIC: NEGATIVE for changes in mood or affect     OBJECTIVE:   /80 (BP Location: Left arm, Patient Position: Chair, Cuff Size: Adult Regular)   Pulse 88   Temp 98.7  F (37.1  C) (Tympanic)   Ht 1.588 m (5' 2.5\")   Wt 81.6 kg (180 lb)   SpO2 98%   Breastfeeding No   BMI 32.40 kg/m    Physical Exam  GENERAL: healthy, alert and no distress  EYES: Eyes grossly normal to inspection, PERRL and conjunctivae and sclerae normal  HENT: ear canals and TM's normal, nose and mouth without ulcers or lesions  NECK: no adenopathy, no asymmetry, masses, or scars and thyroid normal to palpation  RESP: lungs clear to auscultation - no rales, rhonchi or wheezes  CV: regular rate and rhythm, normal S1 S2, no S3 or S4, no murmur, click or rub, no peripheral edema and peripheral pulses strong  ABDOMEN: soft, nontender, no hepatosplenomegaly, no masses and bowel sounds normal   (female): normal female external genitalia, normal urethral meatus, vaginal mucosa pink, moist, well rugated, and normal cervix/adnexa/uterus without masses or discharge  MS: no gross musculoskeletal defects noted, no edema  SKIN: hyperpigmentation on bilateral cheeks  NEURO: Normal strength and tone, mentation intact and speech normal  PSYCH: mentation appears normal, affect normal/bright    Diagnostic Test Results:  Labs reviewed in Epic    ASSESSMENT/PLAN:   1. " "Routine general medical examination at a health care facility  Patient currently undergoing treatment for breast cancer.  Last day of chemo this week.  Then they will decide if she needs radiation.      2. Melasma  Trial of below.  Warned about hypopigmentation.  Advised to stop using as soon as desired skin color achieved. She understands.   - hydroquinone (JOSE ANGEL) 4 % external cream; Apply to hyperpigmented areas twice a day until skin reaches desired color then stop.  Dispense: 30 g; Refill: 1    3. Prediabetes  Rechecking.    - **A1C FUTURE anytime; Future    4. Need for hepatitis C screening test  - Hepatitis C Screen Reflex to HCV RNA Quant and Genotype; Future    5. Screening for malignant neoplasm of cervix  Done today.   - Pap imaged thin layer screen with HPV - recommended age 30 - 65 years (select HPV order below)  - HPV High Risk Types DNA Cervical    6. Lipid screening  - Lipid panel reflex to direct LDL Fasting; Future    Patient has been advised of split billing requirements and indicates understanding: Yes  COUNSELING:  Reviewed preventive health counseling, as reflected in patient instructions       Regular exercise       Healthy diet/nutrition       Immunizations    Patient will resume recommended vaccinations once no longer in active chemotherapy treatment.           Estimated body mass index is 32.4 kg/m  as calculated from the following:    Height as of this encounter: 1.588 m (5' 2.5\").    Weight as of this encounter: 81.6 kg (180 lb).    Weight management plan: Discussed healthy diet and exercise guidelines    She reports that she has never smoked. She has never used smokeless tobacco.      Counseling Resources:  ATP IV Guidelines  Pooled Cohorts Equation Calculator  Breast Cancer Risk Calculator  BRCA-Related Cancer Risk Assessment: FHS-7 Tool  FRAX Risk Assessment  ICSI Preventive Guidelines  Dietary Guidelines for Americans, 2010  USDA's MyPlate  ASA Prophylaxis  Lung CA " Screening    KWABENA Head CNP  [facility]

## 2021-06-29 NOTE — PATIENT INSTRUCTIONS
Return for fasting labs when able (call clinic at [phone] or make lab only appointment on your MyChart)

## 2021-06-29 NOTE — LETTER
June 29, 2021      Ghazal Martinez  [address]  [place] [address]        To Whom It May Concern,     Ghazal Martinez was seen today in clinic for a physical exam.  Please contact us at the phone number above if any additional information is needed.       Sincerely,        KWABENA Head CNP

## 2021-07-01 ENCOUNTER — VIRTUAL VISIT (OUTPATIENT)
Dept: ONCOLOGY | Facility: CLINIC | Age: 41
End: 2021-07-01
Payer: COMMERCIAL

## 2021-07-01 VITALS — WEIGHT: 180 LBS | HEIGHT: 63 IN | BODY MASS INDEX: 31.89 KG/M2

## 2021-07-01 DIAGNOSIS — C50.112 MALIGNANT NEOPLASM OF CENTRAL PORTION OF LEFT BREAST IN FEMALE, ESTROGEN RECEPTOR POSITIVE (H): ICD-10-CM

## 2021-07-01 DIAGNOSIS — R74.8 ELEVATED LIVER ENZYMES: ICD-10-CM

## 2021-07-01 DIAGNOSIS — R91.8 PULMONARY NODULES: ICD-10-CM

## 2021-07-01 DIAGNOSIS — C50.912 MALIGNANT NEOPLASM OF LEFT BREAST IN FEMALE, ESTROGEN RECEPTOR POSITIVE, UNSPECIFIED SITE OF BREAST (H): Primary | ICD-10-CM

## 2021-07-01 DIAGNOSIS — Z17.0 MALIGNANT NEOPLASM OF LEFT BREAST IN FEMALE, ESTROGEN RECEPTOR POSITIVE, UNSPECIFIED SITE OF BREAST (H): Primary | ICD-10-CM

## 2021-07-01 DIAGNOSIS — Z17.0 MALIGNANT NEOPLASM OF CENTRAL PORTION OF LEFT BREAST IN FEMALE, ESTROGEN RECEPTOR POSITIVE (H): ICD-10-CM

## 2021-07-01 DIAGNOSIS — L60.8 CHANGE IN NAIL APPEARANCE: ICD-10-CM

## 2021-07-01 PROCEDURE — 99214 OFFICE O/P EST MOD 30 MIN: CPT | Mod: GT | Performed by: NURSE PRACTITIONER

## 2021-07-01 ASSESSMENT — PAIN SCALES - GENERAL: PAINLEVEL: NO PAIN (0)

## 2021-07-01 ASSESSMENT — MIFFLIN-ST. JEOR: SCORE: 1442.66

## 2021-07-01 NOTE — LETTER
7/1/2021         RE: Ghazal Martinez  [address]  [place] [address]        Dear Colleague,    Thank you for referring your patient, Ghazal Martinez, to the [facility]. Please see a copy of my visit note below.    Oncology Follow Up Visit: July 1, 2021     Oncologist: Dr Mallory Fine  PCP: No Ref-Primary, Physician    Diagnosis: Left Breast Cancer  Ghazal Martinez is a 39 yo female who presented in 2/2021 with left breast lump x 1 month.   Contrast enhanced mammogram on 2/23/2021 showed a mass at the 10:00 position in left breast anterior depth measuring 1.7 cm. US guided core needle biopsy on 2/23/2021 showed no decompensating invasive ductal carcinoma, estrogen receptor positive (95%, strong) and progesterone receptor positive by immunohistochemistry (70%). By FISH HER2/MIO 17 ratio:  1.7, IHC 2+, additional 20 cells from areas corresponding to the most intense IHC staining were evaluated by FISH. The results obtained from these 20   additional cells also fall into Group 4. Taken together, the FISH and IHC   results are interpreted as HER2 negative.FISH and IHC results ultimately interpreted as HER2 negative.   OncotypeDx returned at 23, c/w intermediate risk, 9 percent of distant disease recurrence with the use of systemic endocrine therapy and  6.5%  benefit of adjuvant chemotherapy given young age, premenopausal.  *3/19/2021Genetic testing negative  for mutations in the FLEX, BRCA1, BRCA2, BRIP1, CDH1, CHEK2, EPCAM, MLH1, MSH2, MSH6, NBN, NF1, PALB2, PMS2, PTEN, RAD51C, RAD51D, STK11, and TP53 genes.  Treatment:   4/5/2021 status post bilateral mastectomy and axillary sentinel lymph node biopsy   4/30/2021 began treatment with Taxotere/Cytoxan-plan for 4 cycles  Plan for radiation review and endocrine therapy for the future.     Interval History: Ms. Martinez is contacted today prior to 4th and final treatment with Taxotere and Cytoxan-son acting as   per patient wish.  Patient states she has been doing well with current treatment and having no side effects however later she does talk about darkening nails and some numbness and tingling to the feet and lower legs.  She denies any nausea and she is eating well and taking fluids well.  She states she is sleeping well and is very happy to be finishing her chemotherapy.  She is staying active in the home and getting some walking in.  Rest of comprehensive and complete ROS is reviewed and is negative.   Past Medical History:   Diagnosis Date     Varicose veins of legs      Current Outpatient Medications   Medication     hydroquinone (JOSE ANGEL) 4 % external cream     No current facility-administered medications for this visit.      No Known Allergies    Physical Exam:LMP  (LMP Unknown)    GENERAL: Healthy, overweight, alert and no distress  EYES: Eyes grossly normal to inspection.  No discharge or erythema, or obvious scleral/conjunctival abnormalities.  RESP: No audible wheeze, cough, or visible cyanosis.  No visible retractions or increased work of breathing.    SKIN: Visible skin clear. No significant rash, abnormal pigmentation or lesions.alopecia from the chemotherapy.   NEURO: Cranial nerves grossly intact.  Mentation and speech appropriate for age.  PSYCH: Mentation appears normal, affect normal/bright, judgement and insight intact, normal speech and appearance well-groomed.  The rest of a comprehensive physical examination is deferred due to PHE (public health emergency) video visit restrictions     Laboratory Results:   No results found for any visits on 07/01/21.- will be completed prior to infusion tomorrow.    Assessment and Plan:   Left Breast Cancer-patient completed bilateral mastectomies with sentinel node biopsy to the left axilla. Due to young age and Oncotype result was suggested that she continue with 4 cycles of Taxotere Cytoxan prior to potential radiation therapy to treat her breast cancer.  She  has completed 3 cycles and is due for 4th and final cycle tomorrow. She is having no major side effects - noting only darkening of the nails and some neuropathy of the feet.   Elevated liver enzymes noted with last chemotherapy- noted to be > 3 x ULN and dose decreased of the docetaxel and the cytoxan by 20%. We will watch results for tomorrow.   She has evaluation with Dr Burden for possible radiation on 7/19/2021.   Patient will return in 1 month for review for post chemotherapy review and initiation of endocrine therapy with .   * reviewed genetic testing showing no mutations with her cancer- cannot pass on to family.  Thyroid nodule- found on 3/2021 PET CT- FDG avid hypoattenuating nodule in the left thyroid lobe measuring 1.2 x 0.7 cm and SUV max 4.6. Referral made to endocrinology who completed US of the thyroid showing 3 left sided thyroid nodules - Recommendation for US and follow up with endocrinology in 1 year( 4/2022)  Pulmonary Nodule- 7mm to RUL noted on 3/2021 PET CT. We will follow up with CT CAP in future.   Nail changes- reassured darkening and fragile nails will improve over next months after chemotherapy  The total time of this encounter amounted to 35 minutes. This time included 25 min video time spent with the patient and , prep work, ordering tests, and performing post visit documentation.  Laura Klein Cnp        Again, thank you for allowing me to participate in the care of your patient.        Sincerely,        Laura Klein NP, APRN CNP

## 2021-07-01 NOTE — NURSING NOTE
Ghazal is a 40 year old who is being evaluated via a billable video visit.      How would you like to obtain your AVS? MyChart  If the video visit is dropped, the invitation should be resent by: Text to cell phone: [phone]  Will anyone else be joining your video visit? No      Video-Visit Details    Type of service:  Video Visit    Originating Location (pt. Location): Home in [place]    Distant Location (provider location):  [facility]     Platform used for Video Visit: Doximity     Per patient no concerns    Catie Fischer CMA

## 2021-07-01 NOTE — PROGRESS NOTES
Oncology Follow Up Visit: July 1, 2021     Oncologist: Dr Mallory Fine  PCP: No Ref-Primary, Physician    Diagnosis: Left Breast Cancer  Ghazal Martinez is a 41 yo female who presented in 2/2021 with left breast lump x 1 month.   Contrast enhanced mammogram on 2/23/2021 showed a mass at the 10:00 position in left breast anterior depth measuring 1.7 cm. US guided core needle biopsy on 2/23/2021 showed no decompensating invasive ductal carcinoma, estrogen receptor positive (95%, strong) and progesterone receptor positive by immunohistochemistry (70%). By FISH HER2/MIO 17 ratio:  1.7, IHC 2+, additional 20 cells from areas corresponding to the most intense IHC staining were evaluated by FISH. The results obtained from these 20   additional cells also fall into Group 4. Taken together, the FISH and IHC   results are interpreted as HER2 negative.FISH and IHC results ultimately interpreted as HER2 negative.   OncotypeDx returned at 23, c/w intermediate risk, 9 percent of distant disease recurrence with the use of systemic endocrine therapy and  6.5%  benefit of adjuvant chemotherapy given young age, premenopausal.  *3/19/2021Genetic testing negative  for mutations in the FLEX, BRCA1, BRCA2, BRIP1, CDH1, CHEK2, EPCAM, MLH1, MSH2, MSH6, NBN, NF1, PALB2, PMS2, PTEN, RAD51C, RAD51D, STK11, and TP53 genes.  Treatment:   4/5/2021 status post bilateral mastectomy and axillary sentinel lymph node biopsy   4/30/2021 began treatment with Taxotere/Cytoxan-plan for 4 cycles  Plan for radiation review and endocrine therapy for the future.     Interval History: Ms. Martinez is contacted today prior to 4th and final treatment with Taxotere and Cytoxan-son acting as  per patient wish.  Patient states she has been doing well with current treatment and having no side effects however later she does talk about darkening nails and some numbness and tingling to the feet and lower legs.  She denies any nausea and she is eating well  and taking fluids well.  She states she is sleeping well and is very happy to be finishing her chemotherapy.  She is staying active in the home and getting some walking in.  Has not received Covid 19 vaccinations  Rest of comprehensive and complete ROS is reviewed and is negative.   Past Medical History:   Diagnosis Date     Varicose veins of legs      Current Outpatient Medications   Medication     hydroquinone (JOSE ANGEL) 4 % external cream     No current facility-administered medications for this visit.      No Known Allergies    Physical Exam:LMP  (LMP Unknown)    GENERAL: Healthy, overweight, alert and no distress  EYES: Eyes grossly normal to inspection.  No discharge or erythema, or obvious scleral/conjunctival abnormalities.  RESP: No audible wheeze, cough, or visible cyanosis.  No visible retractions or increased work of breathing.    SKIN: Visible skin clear. No significant rash, abnormal pigmentation or lesions.alopecia from the chemotherapy.   NEURO: Cranial nerves grossly intact.  Mentation and speech appropriate for age.  PSYCH: Mentation appears normal, affect normal/bright, judgement and insight intact, normal speech and appearance well-groomed.  The rest of a comprehensive physical examination is deferred due to PHE (public health emergency) video visit restrictions     Laboratory Results:   No results found for any visits on 07/01/21.- will be completed prior to infusion tomorrow.    Assessment and Plan:   Left Breast Cancer-patient completed bilateral mastectomies with sentinel node biopsy to the left axilla. Due to young age and Oncotype result was suggested that she continue with 4 cycles of Taxotere Cytoxan prior to potential radiation therapy to treat her breast cancer.  She has completed 3 cycles and is due for 4th and final cycle tomorrow. She is having no major side effects - noting only darkening of the nails and some neuropathy of the feet.   Elevated liver enzymes noted with last  chemotherapy- noted to be > 3 x ULN and dose decreased of the docetaxel and the cytoxan by 20%. We will watch results for tomorrow.   She has evaluation with Dr Burden for possible radiation on 7/19/2021.   Patient will return in 1 month for review for post chemotherapy review and initiation of endocrine therapy with .   * reviewed genetic testing showing no mutations with her cancer- cannot pass on to family.  Thyroid nodule- found on 3/2021 PET CT- FDG avid hypoattenuating nodule in the left thyroid lobe measuring 1.2 x 0.7 cm and SUV max 4.6. Referral made to endocrinology who completed US of the thyroid showing 3 left sided thyroid nodules - Recommendation for US and follow up with endocrinology in 1 year( 4/2022)  Pulmonary Nodule- 7mm to RUL noted on 3/2021 PET CT. We will follow up with CT CAP in future.   Nail changes- reassured darkening and fragile nails will improve over next months after chemotherapy  Covid 19 precautions - highly recommend receiving 2 dose Covid 19 vaccinations when chemotherapy is completed. Discussed need and prevention of disease.  The total time of this encounter amounted to 35 minutes. This time included 25 min video time spent with the patient and , prep work, ordering tests, and performing post visit documentation.  Laura Klein,Cnp

## 2021-07-02 ENCOUNTER — INFUSION THERAPY VISIT (OUTPATIENT)
Dept: INFUSION THERAPY | Facility: CLINIC | Age: 41
End: 2021-07-02
Attending: INTERNAL MEDICINE
Payer: COMMERCIAL

## 2021-07-02 VITALS
WEIGHT: 178.4 LBS | RESPIRATION RATE: 14 BRPM | DIASTOLIC BLOOD PRESSURE: 85 MMHG | HEART RATE: 70 BPM | OXYGEN SATURATION: 97 % | TEMPERATURE: 97.8 F | BODY MASS INDEX: 32.11 KG/M2 | SYSTOLIC BLOOD PRESSURE: 122 MMHG

## 2021-07-02 DIAGNOSIS — C50.912 MALIGNANT NEOPLASM OF LEFT BREAST IN FEMALE, ESTROGEN RECEPTOR POSITIVE, UNSPECIFIED SITE OF BREAST (H): Primary | ICD-10-CM

## 2021-07-02 DIAGNOSIS — Z17.0 MALIGNANT NEOPLASM OF LEFT BREAST IN FEMALE, ESTROGEN RECEPTOR POSITIVE, UNSPECIFIED SITE OF BREAST (H): Primary | ICD-10-CM

## 2021-07-02 LAB
ALBUMIN SERPL-MCNC: 3.4 G/DL (ref 3.4–5)
ALP SERPL-CCNC: 83 U/L (ref 40–150)
ALT SERPL W P-5'-P-CCNC: 247 U/L (ref 0–50)
ANION GAP SERPL CALCULATED.3IONS-SCNC: 8 MMOL/L (ref 3–14)
AST SERPL W P-5'-P-CCNC: 126 U/L (ref 0–45)
BASOPHILS # BLD AUTO: 0 10E9/L (ref 0–0.2)
BASOPHILS NFR BLD AUTO: 0.4 %
BILIRUB SERPL-MCNC: 0.4 MG/DL (ref 0.2–1.3)
BUN SERPL-MCNC: 9 MG/DL (ref 7–30)
CALCIUM SERPL-MCNC: 8.8 MG/DL (ref 8.5–10.1)
CHLORIDE SERPL-SCNC: 111 MMOL/L (ref 94–109)
CO2 SERPL-SCNC: 23 MMOL/L (ref 20–32)
COPATH REPORT: NORMAL
CREAT SERPL-MCNC: 0.59 MG/DL (ref 0.52–1.04)
DIFFERENTIAL METHOD BLD: ABNORMAL
EOSINOPHIL # BLD AUTO: 0 10E9/L (ref 0–0.7)
EOSINOPHIL NFR BLD AUTO: 0.7 %
ERYTHROCYTE [DISTWIDTH] IN BLOOD BY AUTOMATED COUNT: 16.4 % (ref 10–15)
GFR SERPL CREATININE-BSD FRML MDRD: >90 ML/MIN/{1.73_M2}
GLUCOSE SERPL-MCNC: 140 MG/DL (ref 70–99)
HCT VFR BLD AUTO: 36.5 % (ref 35–47)
HGB BLD-MCNC: 12 G/DL (ref 11.7–15.7)
IMM GRANULOCYTES # BLD: 0 10E9/L (ref 0–0.4)
IMM GRANULOCYTES NFR BLD: 0.4 %
LYMPHOCYTES # BLD AUTO: 1.3 10E9/L (ref 0.8–5.3)
LYMPHOCYTES NFR BLD AUTO: 27.9 %
MCH RBC QN AUTO: 28.8 PG (ref 26.5–33)
MCHC RBC AUTO-ENTMCNC: 32.9 G/DL (ref 31.5–36.5)
MCV RBC AUTO: 88 FL (ref 78–100)
MONOCYTES # BLD AUTO: 0.4 10E9/L (ref 0–1.3)
MONOCYTES NFR BLD AUTO: 8.8 %
NEUTROPHILS # BLD AUTO: 2.8 10E9/L (ref 1.6–8.3)
NEUTROPHILS NFR BLD AUTO: 61.8 %
PAP: NORMAL
PLATELET # BLD AUTO: 276 10E9/L (ref 150–450)
POTASSIUM SERPL-SCNC: 3.7 MMOL/L (ref 3.4–5.3)
PROT SERPL-MCNC: 6.9 G/DL (ref 6.8–8.8)
RBC # BLD AUTO: 4.17 10E12/L (ref 3.8–5.2)
SODIUM SERPL-SCNC: 142 MMOL/L (ref 133–144)
WBC # BLD AUTO: 4.6 10E9/L (ref 4–11)

## 2021-07-02 PROCEDURE — 96377 APPLICATON ON-BODY INJECTOR: CPT | Mod: 59 | Performed by: INTERNAL MEDICINE

## 2021-07-02 PROCEDURE — 96413 CHEMO IV INFUSION 1 HR: CPT | Performed by: INTERNAL MEDICINE

## 2021-07-02 PROCEDURE — 96367 TX/PROPH/DG ADDL SEQ IV INF: CPT | Performed by: INTERNAL MEDICINE

## 2021-07-02 PROCEDURE — 96417 CHEMO IV INFUS EACH ADDL SEQ: CPT | Performed by: INTERNAL MEDICINE

## 2021-07-02 PROCEDURE — 85025 COMPLETE CBC W/AUTO DIFF WBC: CPT | Performed by: INTERNAL MEDICINE

## 2021-07-02 PROCEDURE — 80053 COMPREHEN METABOLIC PANEL: CPT | Performed by: INTERNAL MEDICINE

## 2021-07-02 PROCEDURE — T1013 SIGN LANG/ORAL INTERPRETER: HCPCS | Mod: U4 | Performed by: INTERNAL MEDICINE

## 2021-07-02 PROCEDURE — 99207 PR NO CHARGE LOS: CPT

## 2021-07-02 PROCEDURE — 96375 TX/PRO/DX INJ NEW DRUG ADDON: CPT | Performed by: INTERNAL MEDICINE

## 2021-07-02 RX ORDER — PALONOSETRON 0.05 MG/ML
0.25 INJECTION, SOLUTION INTRAVENOUS ONCE
Status: COMPLETED | OUTPATIENT
Start: 2021-07-02 | End: 2021-07-02

## 2021-07-02 RX ORDER — HEPARIN SODIUM (PORCINE) LOCK FLUSH IV SOLN 100 UNIT/ML 100 UNIT/ML
5 SOLUTION INTRAVENOUS
Status: DISCONTINUED | OUTPATIENT
Start: 2021-07-02 | End: 2021-07-02 | Stop reason: HOSPADM

## 2021-07-02 RX ORDER — HEPARIN SODIUM (PORCINE) LOCK FLUSH IV SOLN 100 UNIT/ML 100 UNIT/ML
5 SOLUTION INTRAVENOUS EVERY 8 HOURS
Status: DISCONTINUED | OUTPATIENT
Start: 2021-07-02 | End: 2021-07-02 | Stop reason: HOSPADM

## 2021-07-02 RX ORDER — DEXAMETHASONE 4 MG/1
TABLET ORAL
COMMUNITY
Start: 2021-07-01 | End: 2021-07-19

## 2021-07-02 RX ADMIN — HEPARIN SODIUM (PORCINE) LOCK FLUSH IV SOLN 100 UNIT/ML 5 ML: 100 SOLUTION at 11:58

## 2021-07-02 RX ADMIN — Medication 250 ML: at 12:51

## 2021-07-02 RX ADMIN — PALONOSETRON 0.25 MG: 0.05 INJECTION, SOLUTION INTRAVENOUS at 12:51

## 2021-07-02 RX ADMIN — HEPARIN SODIUM (PORCINE) LOCK FLUSH IV SOLN 100 UNIT/ML 5 ML: 100 SOLUTION at 14:55

## 2021-07-02 NOTE — PROGRESS NOTES
Port needle left for access for treatment, Sterile technique performed and maintained. Patient tolerated procedure well. Tubes drawn in rainbow order: red, green, purple. Transparent dressing placed with use of tegaderm.    Althea Lin RN  [facility]Infusion [place]

## 2021-07-02 NOTE — PROGRESS NOTES
Infusion Nursing Note:  Ghazal Martinez presents today for C4D1 Taxotere, Cytoxan and Neulasta Onpro.    Patient seen by provider today: No   present during visit today: Yes, Language: Arabic.  Marcus.     Note:   Patient states she is taking Dexamethasone as prescribed and will  prescription from our retail pharmacy after today's appointment.    Patient has numerous questions regarding what to expect after chemo.  Ex. Can I drink coffee? Will my nails change back to normal? What can I do to improve my liver functions?    Answered patient's questions to her satisfaction.  Clarified with Dr Fine that patient should have labs drawn (including hepatic panel) as previously scheduled on 7/29/21.    Intravenous Access:  Implanted Port.    Treatment Conditions:  Lab Results   Component Value Date    HGB 12.0 07/02/2021     Lab Results   Component Value Date    WBC 4.6 07/02/2021      Lab Results   Component Value Date    ANEU 2.8 07/02/2021     Lab Results   Component Value Date     07/02/2021      Lab Results   Component Value Date     07/02/2021                   Lab Results   Component Value Date    POTASSIUM 3.7 07/02/2021           No results found for: MAG         Lab Results   Component Value Date    CR 0.59 07/02/2021                   Lab Results   Component Value Date    BILLY 8.8 07/02/2021                Lab Results   Component Value Date    BILITOTAL 0.4 07/02/2021           Lab Results   Component Value Date    ALBUMIN 3.4 07/02/2021                    Lab Results   Component Value Date     07/02/2021           Lab Results   Component Value Date     07/02/2021       Results reviewed, labs MET treatment parameters, ok to proceed with treatment.      Post Infusion Assessment:  Patient tolerated infusion without incident.  Blood return noted pre and post infusion.  Site patent and intact, free from redness, edema or discomfort.  No evidence of  extravasations.  Access discontinued per protocol.       Discharge Plan:   AVS to patient via MYCHART.  Patient will return 7/29/21 for lab draw and Dr Fine appointment.  Patient discharged in stable condition accompanied by: self.  Departure Mode: Ambulatory.      Olinda Schrader RN

## 2021-07-08 ENCOUNTER — PATIENT OUTREACH (OUTPATIENT)
Dept: ONCOLOGY | Facility: CLINIC | Age: 41
End: 2021-07-08

## 2021-07-08 NOTE — PROGRESS NOTES
Received call from patient with her son interpreting for her. Patient has a broken left lower molar which is loose and causing some discomfort. Patient asking when she could have dental work done. Last cycle of chemo was 7/2/21. Discussed with Laura MYERS. She reviewed patient's lab results and how they trend after chemo. Patient can have dental work in 1 week. She can take Tylenol for the discomfort. If the Tylenol isn't controlling the pain, she should call back for a stronger pain medication.  Patient verbalized understanding.

## 2021-07-13 ENCOUNTER — VIRTUAL VISIT (OUTPATIENT)
Dept: ONCOLOGY | Facility: CLINIC | Age: 41
End: 2021-07-13
Attending: INTERNAL MEDICINE
Payer: COMMERCIAL

## 2021-07-13 DIAGNOSIS — E66.811 CLASS 1 OBESITY DUE TO EXCESS CALORIES IN ADULT, UNSPECIFIED BMI, UNSPECIFIED WHETHER SERIOUS COMORBIDITY PRESENT: ICD-10-CM

## 2021-07-13 DIAGNOSIS — R79.89 ELEVATED LIVER FUNCTION TESTS: ICD-10-CM

## 2021-07-13 DIAGNOSIS — E66.09 CLASS 1 OBESITY DUE TO EXCESS CALORIES IN ADULT, UNSPECIFIED BMI, UNSPECIFIED WHETHER SERIOUS COMORBIDITY PRESENT: ICD-10-CM

## 2021-07-13 PROCEDURE — 97802 MEDICAL NUTRITION INDIV IN: CPT | Mod: GT | Performed by: DIETITIAN, REGISTERED

## 2021-07-13 NOTE — PROGRESS NOTES
"The patient has been notified of the following:      \"We have found that certain health care needs can be provided without the need for a face to face visit.  This service lets us provide the care you need with a phone conversation.       I will have full access to your [facility] medical record during this entire phone call.   I will be taking notes for your medical record.      Since this is like an office visit, we will bill your insurance company for this service.       There are potential benefits and risks of telephone visits (e.g. limits to patient confidentiality) that differ from in-person visits.?  Confidentiality still applies for telephone services, and nobody will record the visit.  It is important to be in a quiet, private space that is free of distractions (including cell phone or other devices) during the visit.??      If during the course of the call I believe a telephone visit is not appropriate, you will not be charged for this service\"     Consent has been obtained for this service by care team member: Yes     CLINICAL NUTRITION SERVICES - ASSESSMENT NOTE    Ghazal Martinez 40 year old referred for MNT related to breast cancer and weight management     Time Spent: 45 minutes  Visit Type: phone  Referring Physician: Rody Perez accompanied by:  for 18 min.  She dropped the call. Patient's daughter finished interpreting for her.     NUTRITION HISTORY  Current diet: general  Current appetite/intake: matt Harman presents today with desire to lose weight.   -She has cut back on tortillas by half, eating 3 versus 6 at a time.   -She uses a dinner plate and tends to fill her plate full and gets second servings sometimes.   -She inquires about meal options for breakfast, lunch and dinner.    -She has a goal weight that she'd like reach, 150-155 lbs.   -She does not have access to track intake.  She has never calorie counted and doesn't know how to read labels.     Diet " "Recall  Breakfast Small cup of oatmeal   Lunch Soup, 2-3 tortillas   Dinner 3 tortillas with chicken, vegetables   Snacks unknown   Beverages Water only      ANTHROPOMETRICS  Height:  62\"  Weight: 178 lb80kg  BMI: 32  Weight Status:  Obesity Grade I BMI 30-34.9  Weight History:   Wt Readings from Last 6 Encounters:   07/02/21 80.9 kg (178 lb 6.4 oz)   07/01/21 81.6 kg (180 lb)   06/29/21 81.6 kg (180 lb)   06/11/21 82.3 kg (181 lb 8 oz)   05/20/21 80.9 kg (178 lb 4.8 oz)   04/30/21 80.6 kg (177 lb 11.2 oz)     Dosing Weight: 58kg    Medications/vitamins/minerals/herbals:   Reviewed    Labs:  Labs reviewed    ASSESSED NUTRITION NEEDS:  Estimated Energy Needs: 0578-5600 kcals (20-25 Kcal/Kg)  Justification: maintenance/desired wt loss  Estimated Protein Needs: 60 grams protein (0.8-1 g pro/Kg)  Justification: maintenance    NUTRITION DIAGNOSIS:  Food and nutrition-related knowledge deficit related to calorie needs and food choices as evidenced by pt with questions regarding goals and food choices for weight loss.     INTERVENTIONS  Provided written & verbal education:   --Discussed dietary and behavioral modifications to promote weight loss (portion control, meal consistency, measuring foods, 9\"portion plate method, fiber sources and protein sources.  Encouraged to continue limiting grains/tortillas by 1/2 usual volume and measure grains/rice, limiting grain portions to 1/2 cup.   --Discussed calorie goals of 7089-4621/day.  Encouraged to use a salad plate and try to avoid second servings.    Pt verbalize understanding of materials provided during consult.   Patient Understanding: Excellent  Expected Compliance: Excellent     Goals  1.  Aim 1200-1400kcal /day   2.  Choose 1/2 usual portions of grains, rice etc  3. Weight loss towards 150-155 lbs per pt goal weight    Follow-Up Plans: Pt has RD contact information for questions.      Zakia Cannon, SH[place], LDN      "

## 2021-07-13 NOTE — LETTER
"    7/13/2021         RE: Ghazal Martinez  [address]  [place] [address]        Dear Colleague,    Thank you for referring your patient, Ghazal Martinez, to the [facility]. Please see a copy of my visit note below.    The patient has been notified of the following:      \"We have found that certain health care needs can be provided without the need for a face to face visit.  This service lets us provide the care you need with a phone conversation.       I will have full access to your [facility] medical record during this entire phone call.   I will be taking notes for your medical record.      Since this is like an office visit, we will bill your insurance company for this service.       There are potential benefits and risks of telephone visits (e.g. limits to patient confidentiality) that differ from in-person visits.?  Confidentiality still applies for telephone services, and nobody will record the visit.  It is important to be in a quiet, private space that is free of distractions (including cell phone or other devices) during the visit.??      If during the course of the call I believe a telephone visit is not appropriate, you will not be charged for this service\"     Consent has been obtained for this service by care team member: Yes     CLINICAL NUTRITION SERVICES - ASSESSMENT NOTE    Ghazal Martinez 40 year old referred for MNT related to breast cancer and weight management     Time Spent: 45 minutes  Visit Type: phone  Referring Physician: Rody  Pt accompanied by:  for 18 min.  She dropped the call. Patient's daughter finished interpreting for her.     NUTRITION HISTORY  Current diet: general  Current appetite/intake: matt Harman presents today with desire to lose weight.   -She has cut back on tortillas by half, eating 3 versus 6 at a time.   -She uses a dinner plate and tends to fill her plate full and gets second servings sometimes. " "  -She inquires about meal options for breakfast, lunch and dinner.    -She has a goal weight that she'd like reach, 150-155 lbs.   -She does not have access to track intake.  She has never calorie counted and doesn't know how to read labels.     Diet Recall  Breakfast Small cup of oatmeal   Lunch Soup, 2-3 tortillas   Dinner 3 tortillas with chicken, vegetables   Snacks unknown   Beverages Water only      ANTHROPOMETRICS  Height:  62\"  Weight: 178 lb80kg  BMI: 32  Weight Status:  Obesity Grade I BMI 30-34.9  Weight History:   Wt Readings from Last 6 Encounters:   07/02/21 80.9 kg (178 lb 6.4 oz)   07/01/21 81.6 kg (180 lb)   06/29/21 81.6 kg (180 lb)   06/11/21 82.3 kg (181 lb 8 oz)   05/20/21 80.9 kg (178 lb 4.8 oz)   04/30/21 80.6 kg (177 lb 11.2 oz)     Dosing Weight: 58kg    Medications/vitamins/minerals/herbals:   Reviewed    Labs:  Labs reviewed    ASSESSED NUTRITION NEEDS:  Estimated Energy Needs: 7001-7981 kcals (20-25 Kcal/Kg)  Justification: maintenance/desired wt loss  Estimated Protein Needs: 60 grams protein (0.8-1 g pro/Kg)  Justification: maintenance    NUTRITION DIAGNOSIS:  Food and nutrition-related knowledge deficit related to calorie needs and food choices as evidenced by pt with questions regarding goals and food choices for weight loss.     INTERVENTIONS  Provided written & verbal education:   --Discussed dietary and behavioral modifications to promote weight loss (portion control, meal consistency, measuring foods, 9\"portion plate method, fiber sources and protein sources.  Encouraged to continue limiting grains/tortillas by 1/2 usual volume and measure grains/rice, limiting grain portions to 1/2 cup.   --Discussed calorie goals of 8181-4722/day.  Encouraged to use a salad plate and try to avoid second servings.    Pt verbalize understanding of materials provided during consult.   Patient Understanding: Excellent  Expected Compliance: Excellent     Goals  1.  Aim 1200-1400kcal /day   2.  " Choose 1/2 usual portions of grains, rice etc  3. Weight loss towards 150-155 lbs per pt goal weight    Follow-Up Plans: Pt has RD contact information for questions.      Zakia Cannon RDN, LDN          Again, thank you for allowing me to participate in the care of your patient.        Sincerely,        Zakia Cannon RD

## 2021-07-17 NOTE — PROGRESS NOTES
Dear Colleagues,  Today Ghazal Martinez was seen in consultation.  IDENTIFICATION: This is a 40 year old Ukrainian-speaking premenopausal woman with left UIQ breast cancer and right breast DCIS, status postbilateral mastectomy and left SLNBx followed by immediate reconstruction, revealing in the left breast G3 IDC, [id number] ER+/VA+/H2N- disease and right breaast DCIS all excised with negative margins, followed by TC x 4 completed     now referred for adjuvant radiation therapy.   HISTORY OF PRESENT ILLNESS: Ghazal Martinez was in her usual state of health this past year when she noticed a left breast mass of 1 months duration.  She sought medical attention.  On February 12, 2021 bilateral diagnostic mammogram showed a left irregular breast mass.  By ultrasound it measured 2.2 cm in greatest dimension considered hypoechoic with angular margins.  No suspicious finding in the right breast.  No lymphadenopathy in the left axilla.  Contrast-enhanced mammogram completed on February 23, 2021 confirmed the irregular mass in the left breast and no other suspicious lesions.  Same-day biopsy was consistent with grade 3 invasive ductal carcinoma ER/VA positive, HER-2 negative.  On March 12, 2021 breast MRI showed left breast 10:00 lesion 2.2 cm in greatest dimension with 2 potential satellite lesions in the same quadrant.  Single internal mammary lymph node mildly enlarged between the first and second costal cartilages and 2 equivocal left axillary lymph nodes.  The right breast is unremarkable.  Negative results from Genetics on March 15, 2021.  On March 22, 2021 PET/CT showed the left upper inner quadrant breast mass with additional FDG avid 0.8 lesion in the same quadrant and no suspicious axillary lymphadenopathy or hypermetabolic lesions in the right breast.  There was a hypoattenuating nodule in the left thyroid lobe and a solid 7 mm pulmonary nodule in the right upper lobe representing a lymph node.  No suspicious FDG  uptake in the abdomen or pelvis.    On April 5, 2021 Dr. Fonseca took her to the OR and she bilateral mastectomy and left axillary sentinel lymph node biopsy.  Within the left breast there was 2.3 cm of grade 3 IDC. The satelite lesion was DCIS. LCIS was also present. Negative but close superior invasive margin at 0.7mm and there was adequate DCIS margin.  There was 1 of 4 lymph nodes involved with micrometastasis (0.5mm).  Within the right breast 4 mm of grade 3 DCIS was removed. The distance of the in situ focus from the margin of excision cannot be determined as a margin is not present in association with lesional tissue. She had immediate reconstruction with Dr. Collins.  On April 13, 2021 she saw endocrine for thyroid nodules. Plan is to monitor with ultrasound in one year.  She was then seen by Dr. VIDES.  Her Oncotype score returned back as 23 and she received TC x 4 from April 30, 2021 to July 2, 2021.  She is to receive adjuvant endocrine therapy after XRT.    7/19/21 chest CT: Postsurgical changes of bilateral mastectomy and left-sided seminal lymph node biopsy. No evidence of residual or recurrent soft tissue mass in the left chest wall. Stable 7 mm perifissural pulmonary nodule in the right upper lobe. Recommend continued attention on follow-up. Stable appearance of hypodense left lobe thyroid nodule which did not demonstrate any suspicious features on ultrasound dated 4/12/2021.   Since her surgery, she has continued to heal well and denies any significant pain.  She has good ROM.  She denies any other new masses, skin changes, shortness of breath, chest or bony pain, or new neurologic symptoms. She is being seen here today for consideration of postoperative radiotherapy.  REVIEW OF SYSTEMS: As per HPI, a 14-point review of system is otherwise negative.  PAST RADIATION THERAPY:  Denies.  PAST CTD/PACEMAKER: Denies  BREAST RISK FACTORS:  No history of prior breast biopsy. No family history of breast or  ovarian cancer. She started her menses at age 10.   with her first delivery at age 23. She  for a total of 40 months.  Premenopausal. No history of HRT. OCP use x 6 years.    Past Medical History:   Diagnosis Date     Varicose veins of legs      Past Surgical History:   Procedure Laterality Date     INSERT PORT VASCULAR ACCESS N/A 2021    Procedure: PORT PLACEMENT;  Surgeon: Una Fonseca MD;  Location: [facility] OR     MASTECTOMY SIMPLE BILATERAL, SENTINEL NODE BILATERAL, COMBINED N/A 2021    Procedure: BILATERAL SKIN SPARING MASTECTOMY WITH LEFT SENTINEL LYMPH NODE BIOPSY;  Surgeon: Una Fonseca MD;  Location:  OR     NO HISTORY OF SURGERY       RECONSTRUCT BREAST, INSERT TISSUE EXPANDER, COMBINED Bilateral 2021    Procedure: BILATERAL FIRST STAGE BREAST RECONSTRUCTION WITH TISSUE EXPANDERS; AND ACELLULAR DERMAL MATRIX;  Surgeon: Kervin Fu MD;  Location:  OR     Family History   Problem Relation Age of Onset     No Known Problems Mother      No Known Problems Father      Diabetes No family hx of      Coronary Artery Disease No family hx of      Hypertension No family hx of      Hyperlipidemia No family hx of      Cerebrovascular Disease No family hx of      Breast Cancer No family hx of      Colon Cancer No family hx of      Depression No family hx of      Anxiety Disorder No family hx of      Asthma No family hx of      Thyroid Disease No family hx of      Social History     Tobacco Use     Smoking status: Never Smoker     Smokeless tobacco: Never Used   Substance Use Topics     Alcohol use: No     Current Outpatient Medications   Medication     dexamethasone (DECADRON) 4 MG tablet     hydroquinone (JOSE ANGEL) 4 % external cream     No current facility-administered medications for this visit.     No Known Allergies  PHYSICAL EXAMINATION:  /86 (BP Location: Right arm, Patient Position: Sitting, Cuff Size: Adult Regular)   Pulse 64   Temp 97.7  F (36.5  C) (Oral)    Resp 16   Wt 81.3 kg (179 lb 4.8 oz)   LMP  (LMP Unknown)   SpO2 99%   BMI 32.27 kg/m    GENERAL Well-appearing woman in no acute distress.  HEENT Normocephalic, atraumatic.  Sclerae anicteric.  CVR  Regular rate and rhythm.  No murmurs, rubs, or gallops.  LUNGS Clear to auscultation bilaterally.  BREASTS Bilateral breast surgiclaly absent.  CHEST Wall Bilateral chest wall scars are well healed.  Reconstructed breasts show no erythema, ulceration or suspicious nodularity within them.    LYMPH No supraclavicular, infraclavicular, or axillary lymphadenopathy appreciated bilaterally.  ABDOMEN Soft.    EXT  No clubbing or cyanosis or edema.    NEURO No focal deficits.  MSK  Good ROM.   SKIN  Warm and well perfused.   Diffuse alopecia  PSYCH  Alert and oriented x 3    ECOG PERFORMANCE STATUS: 1    IMPRESSION/PLAN: Ghazal Martinez is a 40 year old Citizen of [place]-speaking premenopausal woman with left UIQ breast cancer and right breast DCIS, status postbilateral mastectomy and left SLNBx followed by immediate reconstruction, revealing in the left breast G3 IDC, [id number] ER+/HI+/H2N- disease and right breast G3 DCIS all excised with negative margins, followed by TC x 4 completed 7/2/21 now referred for adjuvant radiation therapy.   I recommend adjuvant XRT to the left chest wall and regional nodes to improve local control and overall survival. Treatment is based on multiple randomized prospective data such as the [place] 82b trial or the [place] trial, and EBCTCG Metaanalysis published in 2014, which show improvement in local regional control and OS with the addition of XRT to postmastectomy premenopausal patients.  Treatment is also based on MA-20.    The risks, benefits, treatment rationale and regimen of radiation therapy to the left chest wall and regional nodes were discussed in great detail today with the patient.  Risks include but are not limited to skin erythema, desquamation, hyperpigmentation, lymphedema,  fibrosis, telengectasia, pneumonitis, cardiac toxicity, and secondary malignancy. As she has an implant in place risks also include implant capsular contraction, shrinkage of the reconstructed breast, and infection or wound breakdown requiring surgical revision. The patient consented to therapy and will have a CT simulation completed after fluid is removed from her right breast. XRT will start within the next 1-2 weeks.  Additional problem list to be addressed in the following manner:  1. S/p systemic chemo.  Her Oncotype score returned back as 23 and she received TC x 4 from April 30, 2021 to July 2, 2021.  She is to receive adjuvant endocrine therapy after XRT.    2. Single internal mammary lymph node mildly enlarged between the first and second costal cartilages.  Will be included in XRT targets  3.  left thyroid lobe and a solid 7 mm pulmonary nodule in the right upper lobe representing a lymph node. Has been seen by endocrine for thyroid nodules. Plan is to monitor with ultrasound in one year. Chest CT completed today shows stable pulmonary nodule.  4. Within the right breast 4 mm of grade 3 DCIS was removed. The distance of the in situ focus from the margin of excision cannot be determined as a margin is not present in association with lesional tissue.  Will confirm with pathology dept if this is a negative margin.    There was ample time for questions and all were answered to the patient's satisfaction. Thank you for allowing me to participate in the care of this pleasant patient. If you have any questions, please do not hesitate to contact my office.    Sincerely,  Nelda Burden MD

## 2021-07-19 ENCOUNTER — ANCILLARY PROCEDURE (OUTPATIENT)
Dept: CT IMAGING | Facility: CLINIC | Age: 41
End: 2021-07-19
Attending: INTERNAL MEDICINE
Payer: COMMERCIAL

## 2021-07-19 ENCOUNTER — OFFICE VISIT (OUTPATIENT)
Dept: RADIATION ONCOLOGY | Facility: CLINIC | Age: 41
End: 2021-07-19
Attending: INTERNAL MEDICINE
Payer: COMMERCIAL

## 2021-07-19 ENCOUNTER — APPOINTMENT (OUTPATIENT)
Dept: INTERPRETER SERVICES | Facility: CLINIC | Age: 41
End: 2021-07-19
Payer: COMMERCIAL

## 2021-07-19 VITALS
RESPIRATION RATE: 16 BRPM | WEIGHT: 179.3 LBS | SYSTOLIC BLOOD PRESSURE: 125 MMHG | HEART RATE: 64 BPM | OXYGEN SATURATION: 99 % | DIASTOLIC BLOOD PRESSURE: 86 MMHG | BODY MASS INDEX: 32.27 KG/M2 | TEMPERATURE: 97.7 F

## 2021-07-19 DIAGNOSIS — Z17.0 MALIGNANT NEOPLASM OF UPPER-INNER QUADRANT OF LEFT BREAST IN FEMALE, ESTROGEN RECEPTOR POSITIVE (H): Primary | ICD-10-CM

## 2021-07-19 DIAGNOSIS — C50.212 MALIGNANT NEOPLASM OF UPPER-INNER QUADRANT OF LEFT BREAST IN FEMALE, ESTROGEN RECEPTOR POSITIVE (H): Primary | ICD-10-CM

## 2021-07-19 DIAGNOSIS — D05.11 INTRADUCTAL CANCER OF RIGHT BREAST: ICD-10-CM

## 2021-07-19 DIAGNOSIS — R91.8 PULMONARY NODULES: ICD-10-CM

## 2021-07-19 DIAGNOSIS — Z17.0 MALIGNANT NEOPLASM OF LEFT BREAST IN FEMALE, ESTROGEN RECEPTOR POSITIVE, UNSPECIFIED SITE OF BREAST (H): ICD-10-CM

## 2021-07-19 DIAGNOSIS — C50.912 MALIGNANT NEOPLASM OF LEFT BREAST IN FEMALE, ESTROGEN RECEPTOR POSITIVE, UNSPECIFIED SITE OF BREAST (H): ICD-10-CM

## 2021-07-19 LAB — HCG UR QL: NEGATIVE

## 2021-07-19 PROCEDURE — 81025 URINE PREGNANCY TEST: CPT | Performed by: RADIOLOGY

## 2021-07-19 PROCEDURE — 71250 CT THORAX DX C-: CPT | Mod: GC | Performed by: RADIOLOGY

## 2021-07-19 PROCEDURE — 99205 OFFICE O/P NEW HI 60 MIN: CPT | Performed by: RADIOLOGY

## 2021-07-19 PROCEDURE — 77263 THER RADIOLOGY TX PLNG CPLX: CPT | Performed by: RADIOLOGY

## 2021-07-19 ASSESSMENT — PAIN SCALES - GENERAL: PAINLEVEL: NO PAIN (0)

## 2021-07-19 NOTE — PATIENT INSTRUCTIONS
Please contact [facility] RN with questions or concerns following today's appointment: [phone].    Thank you!

## 2021-07-19 NOTE — LETTER
7/19/2021         RE: Ghazal Martinez  [address]  [place] [address]        Dear Colleague,    Thank you for referring your patient, Ghazal Martinez, to the [facility]COL[facility] [place]. Please see a copy of my visit note below.    Dear Colleagues,  Today Ghazal Martinez was seen in consultation.  IDENTIFICATION: This is a 40 year old Macedonian-speaking premenopausal woman with left UIQ breast cancer and right breast DCIS, status postbilateral mastectomy and left SLNBx followed by immediate reconstruction, revealing in the left breast G3 IDC, [id number] ER+/IN+/H2N- disease and right breaast DCIS all excised with negative margins, followed by TC x 4 completed     now referred for adjuvant radiation therapy.   HISTORY OF PRESENT ILLNESS: Ghazal Martinez was in her usual state of health this past year when she noticed a left breast mass of 1 months duration.  She sought medical attention.  On February 12, 2021 bilateral diagnostic mammogram showed a left irregular breast mass.  By ultrasound it measured 2.2 cm in greatest dimension considered hypoechoic with angular margins.  No suspicious finding in the right breast.  No lymphadenopathy in the left axilla.  Contrast-enhanced mammogram completed on February 23, 2021 confirmed the irregular mass in the left breast and no other suspicious lesions.  Same-day biopsy was consistent with grade 3 invasive ductal carcinoma ER/IN positive, HER-2 negative.  On March 12, 2021 breast MRI showed left breast 10:00 lesion 2.2 cm in greatest dimension with 2 potential satellite lesions in the same quadrant.  Single internal mammary lymph node mildly enlarged between the first and second costal cartilages and 2 equivocal left axillary lymph nodes.  The right breast is unremarkable.  Negative results from Genetics on March 15, 2021.  On March 22, 2021 PET/CT showed the left upper inner quadrant breast mass with additional FDG avid 0.8 lesion in the  same quadrant and no suspicious axillary lymphadenopathy or hypermetabolic lesions in the right breast.  There was a hypoattenuating nodule in the left thyroid lobe and a solid 7 mm pulmonary nodule in the right upper lobe representing a lymph node.  No suspicious FDG uptake in the abdomen or pelvis.    On April 5, 2021 Dr. Fonseca took her to the OR and she bilateral mastectomy and left axillary sentinel lymph node biopsy.  Within the left breast there was 2.3 cm of grade 3 IDC. The satelite lesion was DCIS. LCIS was also present. Negative but close superior invasive margin at 0.7mm and there was adequate DCIS margin.  There was 1 of 4 lymph nodes involved with micrometastasis (0.5mm).  Within the right breast 4 mm of grade 3 DCIS was removed. The distance of the in situ focus from the margin of excision cannot be determined as a margin is not present in association with lesional tissue. She had immediate reconstruction with Dr. Collins.  On April 13, 2021 she saw endocrine for thyroid nodules. Plan is to monitor with ultrasound in one year.  She was then seen by Dr. VIDES.  Her Oncotype score returned back as 23 and she received TC x 4 from April 30, 2021 to July 2, 2021.  She is to receive adjuvant endocrine therapy after XRT.    7/19/21 chest CT: Postsurgical changes of bilateral mastectomy and left-sided seminal lymph node biopsy. No evidence of residual or recurrent soft tissue mass in the left chest wall. Stable 7 mm perifissural pulmonary nodule in the right upper lobe. Recommend continued attention on follow-up. Stable appearance of hypodense left lobe thyroid nodule which did not demonstrate any suspicious features on ultrasound dated 4/12/2021.   Since her surgery, she has continued to heal well and denies any significant pain.  She has good ROM.  She denies any other new masses, skin changes, shortness of breath, chest or bony pain, or new neurologic symptoms. She is being seen here today for  consideration of postoperative radiotherapy.  REVIEW OF SYSTEMS: As per HPI, a 14-point review of system is otherwise negative.  PAST RADIATION THERAPY:  Denies.  PAST CTD/PACEMAKER: Denies  BREAST RISK FACTORS:  No history of prior breast biopsy. No family history of breast or ovarian cancer. She started her menses at age 10.   with her first delivery at age 23. She  for a total of 40 months.  Premenopausal. No history of HRT. OCP use x 6 years.    Past Medical History:   Diagnosis Date     Varicose veins of legs      Past Surgical History:   Procedure Laterality Date     INSERT PORT VASCULAR ACCESS N/A 2021    Procedure: PORT PLACEMENT;  Surgeon: Una Fonseca MD;  Location: [facility] OR     MASTECTOMY SIMPLE BILATERAL, SENTINEL NODE BILATERAL, COMBINED N/A 2021    Procedure: BILATERAL SKIN SPARING MASTECTOMY WITH LEFT SENTINEL LYMPH NODE BIOPSY;  Surgeon: Una Fonseca MD;  Location:  OR     NO HISTORY OF SURGERY       RECONSTRUCT BREAST, INSERT TISSUE EXPANDER, COMBINED Bilateral 2021    Procedure: BILATERAL FIRST STAGE BREAST RECONSTRUCTION WITH TISSUE EXPANDERS; AND ACELLULAR DERMAL MATRIX;  Surgeon: Kervin Fu MD;  Location:  OR     Family History   Problem Relation Age of Onset     No Known Problems Mother      No Known Problems Father      Diabetes No family hx of      Coronary Artery Disease No family hx of      Hypertension No family hx of      Hyperlipidemia No family hx of      Cerebrovascular Disease No family hx of      Breast Cancer No family hx of      Colon Cancer No family hx of      Depression No family hx of      Anxiety Disorder No family hx of      Asthma No family hx of      Thyroid Disease No family hx of      Social History     Tobacco Use     Smoking status: Never Smoker     Smokeless tobacco: Never Used   Substance Use Topics     Alcohol use: No     Current Outpatient Medications   Medication     dexamethasone (DECADRON) 4 MG tablet     hydroquinone  (JOSE ANGEL) 4 % external cream     No current facility-administered medications for this visit.     No Known Allergies  PHYSICAL EXAMINATION:  /86 (BP Location: Right arm, Patient Position: Sitting, Cuff Size: Adult Regular)   Pulse 64   Temp 97.7  F (36.5  C) (Oral)   Resp 16   Wt 81.3 kg (179 lb 4.8 oz)   LMP  (LMP Unknown)   SpO2 99%   BMI 32.27 kg/m    GENERAL Well-appearing woman in no acute distress.  HEENT Normocephalic, atraumatic.  Sclerae anicteric.  CVR  Regular rate and rhythm.  No murmurs, rubs, or gallops.  LUNGS Clear to auscultation bilaterally.  BREASTS Bilateral breast surgiclaly absent.  CHEST Wall Bilateral chest wall scars are well healed.  Reconstructed breasts show no erythema, ulceration or suspicious nodularity within them.    LYMPH No supraclavicular, infraclavicular, or axillary lymphadenopathy appreciated bilaterally.  ABDOMEN Soft.    EXT  No clubbing or cyanosis or edema.    NEURO No focal deficits.  MSK  Good ROM.   SKIN  Warm and well perfused.   Diffuse alopecia  PSYCH  Alert and oriented x 3    ECOG PERFORMANCE STATUS: 1    IMPRESSION/PLAN: Ghazal Martinez is a 40 year old Kyrgyz-speaking premenopausal woman with left UIQ breast cancer and right breast DCIS, status postbilateral mastectomy and left SLNBx followed by immediate reconstruction, revealing in the left breast G3 IDC, [id number] ER+/SD+/H2N- disease and right breast G3 DCIS all excised with negative margins, followed by TC x 4 completed 7/2/21 now referred for adjuvant radiation therapy.   I recommend adjuvant XRT to the left chest wall and regional nodes to improve local control and overall survival. Treatment is based on multiple randomized prospective data such as the [place] 82b trial or the [place] trial, and EBCTCG Metaanalysis published in 2014, which show improvement in local regional control and OS with the addition of XRT to postmastectomy premenopausal patients.  Treatment is also based on  MA-20.    The risks, benefits, treatment rationale and regimen of radiation therapy to the left chest wall and regional nodes were discussed in great detail today with the patient.  Risks include but are not limited to skin erythema, desquamation, hyperpigmentation, lymphedema, fibrosis, telengectasia, pneumonitis, cardiac toxicity, and secondary malignancy. As she has an implant in place risks also include implant capsular contraction, shrinkage of the reconstructed breast, and infection or wound breakdown requiring surgical revision. The patient consented to therapy and will have a CT simulation completed after fluid is removed from her right breast. XRT will start within the next 1-2 weeks.  Additional problem list to be addressed in the following manner:  1. S/p systemic chemo.  Her Oncotype score returned back as 23 and she received TC x 4 from April 30, 2021 to July 2, 2021.  She is to receive adjuvant endocrine therapy after XRT.    2. Single internal mammary lymph node mildly enlarged between the first and second costal cartilages.  Will be included in XRT targets  3.  left thyroid lobe and a solid 7 mm pulmonary nodule in the right upper lobe representing a lymph node. Has been seen by endocrine for thyroid nodules. Plan is to monitor with ultrasound in one year. Chest CT completed today shows stable pulmonary nodule.  4. Within the right breast 4 mm of grade 3 DCIS was removed. The distance of the in situ focus from the margin of excision cannot be determined as a margin is not present in association with lesional tissue.  Will confirm with pathology dept if this is a negative margin.    There was ample time for questions and all were answered to the patient's satisfaction. Thank you for allowing me to participate in the care of this pleasant patient. If you have any questions, please do not hesitate to contact my office.    Sincerely,  Nelda Burden MD            Again, thank you for allowing me to  participate in the care of your patient.        Sincerely,        Marlee Burden MD

## 2021-07-19 NOTE — NURSING NOTE
REASON FOR APPOINTMENT   Type of Cancer: L breast IDC grade III ER/VT+ HER2- + DCIS  Location: L breast  Date of Symptom Onset: 2021 presented with L breast lump and itching x 1 month     TREATMENT TO-DATE FOR THIS CANCER  Surgery ? 2021 Bilateral skin sparing mastectomy with L SLN bx, port placement, bilateral 1st stage breast reconstruction with tissue expanders- Dr. Fonseca and Dr. Diggs   Chemotherapy ? Taxotere/Cytoxan 2021-2021 completed 4 cycles  Other Treatments for this Cancer ? no    PERSONAL HISTORY OF CANCER   Previous Cancer ? no   Prior Radiation ? no   Prior Chemotherapy ? no   Prior Hormonal Therapy ? no     RECENT IMAGING STUDIES  PET scan (date: 3/22/2021, location: [facility])  CT scan (date: 2021, location: [facility])    REFERRALS NEEDED  no    VITALS  /86 (BP Location: Right arm, Patient Position: Sitting, Cuff Size: Adult Regular)   Pulse 64   Temp 97.7  F (36.5  C) (Oral)   Resp 16   Wt 81.3 kg (179 lb 4.8 oz)   LMP  (LMP Unknown)   SpO2 99%   BMI 32.27 kg/m      PACEMAKER/IMPLANTED CARDIAC DEVICE no    PAIN  Denies    PSYCHOSOCIAL  Marital Status:   Patient lives in home with family.  Number of children: 5  Working status: unknown  Do you feel safe in your home? Yes    REVIEW OF SYSTEMS  Skin: negative  Eyes: negative  Ears/Nose/Throat: negative  Respiratory: No shortness of breath, dyspnea on exertion, cough, or hemoptysis  Cardiovascular: negative  Gastrointestinal: negative  Genitourinary: negative  Musculoskeletal: positive for bilateral leg aches  Neurologic: negative  Psychiatric: negative  Hematologic/Lymphatic/Immunologic: negative  Endocrine: negative    WOMEN ONLY  Any chance you may be pregnant: Yes  Age at first period: 10  Date of last period: Patient has not had period since starting chemotherapy  Number of pregnancies: 5  Birth Control pills: Yes  If yes, for how lon years    Radiation  Oncology Patient Teaching    Current Concern: L breast cancer    Person involved with teaching: Patient and son  Patient asked Questions: Yes  Patient was cooperative: Yes  Patient was receptive (willing to accept information given): Yes    Education Assessment  Comprehension ability: Medium  Knowledge level: Medium  Factors affecting teaching:  services used- Language barrier    Educational Topics Discussed  Radiation Therapy for breast cancer, Side effects- fatigue, skin changes    Response To Teaching  Verbalizes understanding      Do you have an advanced directive or living will? no  Are you DNR/DNI? no    Consent to Communicate Complete- Yes 7/19/2021  Fall Risk Assessment Complete- Yes 7/19/2021    Vivienne Light RN

## 2021-07-21 ENCOUNTER — OFFICE VISIT (OUTPATIENT)
Dept: RADIATION ONCOLOGY | Facility: CLINIC | Age: 41
End: 2021-07-21
Payer: COMMERCIAL

## 2021-07-21 PROCEDURE — 77290 THER RAD SIMULAJ FIELD CPLX: CPT | Performed by: RADIOLOGY

## 2021-07-21 PROCEDURE — 77332 RADIATION TREATMENT AID(S): CPT | Mod: 59 | Performed by: RADIOLOGY

## 2021-07-21 PROCEDURE — 77334 RADIATION TREATMENT AID(S): CPT | Performed by: RADIOLOGY

## 2021-07-25 NOTE — PROGRESS NOTES
"This patient  is being evaluated via a billable video visit.      The patient has been notified of following:     \"This video visit will be conducted via a call between you and your physician/provider. We have found that certain health care needs can be provided without the need for an in-person physical exam.  This service lets us provide the care you need with a video conversation.  If a prescription is necessary we can send it directly to your pharmacy.  If lab work is needed we can place an order for that and you can then stop by our lab to have the test done at a later time.    Video visits are billed at different rates depending on your insurance coverage.  Please reach out to your insurance provider with any questions.    If during the course of the call the physician/provider feels a video visit is not appropriate, you will not be charged for this service.\"    Patient has given verbal consent for Video visit? yes  Video-Visit Details    Type of service:  Video Visit    Video visit duration:   min  Originating Location (pt. Location): [facility]   Distant Location (provider location):  [facility]     Platform used for Video Visit: Arabella Fine MD      Oncology follow-up visit:  Date on this visit: Jul 29, 2021  PCP: Eun Patton  Breast surgeon: Dr.Sara Fonseca   Plastic Surgeon: Dr. Diggs    Diagnosis:  [id number] ER positive VA positive HER-2/braydne negative by FISH left-sided breast cancer, with intermediate Oncotype DX recurrence score of 23, status post bilateral mastectomy and axillary sentinel lymph node biopsy on April 5, 2021.    Oncologic history:    1.  Breast cancer- She presented in Feb 2021 with L breast lump and itching x 1 month.  B/l diagnostic mammogram with tomosynthesis on 2/12/2021 showed in the area of left breast lump, there is an irregular mass measuringabout 1.5 cm. No suspicious mammographic findings " in the right breast. L breast US showed in the left breast at 10:00, 3 cm from the nipple, there was an irregular hypoechoic mass with angular margins measuring 2.2 x 1.4 x 1 cm. There was no lymphadenopathy in the left axilla. Contrast enhanced mammogram on 2/23/2021 showed a mass at the 10:00 position in left breast anterior depth measuring 1.7 cm. US guided core needle biopsy on 2/23/2021 showed no decompensating invasive ductal carcinoma, estrogen receptor positive (95%, strong) and progesterone receptor positive by immunohistochemistry (70%). By FISH HER2/MIO 17 ratio:  1.7, IHC 2+, additional 20 cells from areas corresponding to the most intense IHC staining were evaluated by FISH. The results obtained from these 20   additional cells also fall into Group 4. Taken together, the FISH and IHC   results are interpreted as HER2 negative.  FISH and IHC results ultimately interpreted as HER2 negative. OncotypeDx returned at 23, c/w intermediate risk, 9 percent of distant disease recurrence with the use of systemic endocrine therapy and  6.5%  benefit of adjuvant chemotherapy given young age, premenopausal.  She proceeded to undergo b/l breast MRI on 3/12/2021 which showed:  The index cancer noted at 10:00 3 cm from the nipple on the  left on ultrasound exam 2/23/2021 has a longest diameter of 22 mm.   2 potential satellite lesions are both present in the same quadrant at  11:00. One is posteriorly situated has a longest diameter of 8 mm on  sagittal imaging, the other is at the junction of the mid and  posterior breast measuring 7 mm in longest diameter on sagittal  imaging.   Distance from anterior aspect of the index lesion to the posterior  aspect of the closer potential satellite lesion is 7 cm, and to the  more distant potential satellite lesion is 10 cm.   As far as regional lymph nodes, there was a single internal mammary  chain node that's mildly enlarged between the first and second left  costal cartilages.  As far as left axillary lymph nodes, at 2:00  posteriorly, there are 2 equivocally abnormal nodes.  The right breast was unremarkable.  PET CT scan on March 22, 2021 showed:  1. FDG avid mass in the medial upper quadrant of the left breast  measuring 1.7 x 1.1 cm and SUV max 8.3, corresponding to the  previously biopsied lesion.   1a. Additional mildly FDG avid 0.8 cm lesion in the same quadrant.  5  mm nodule superior-medial to this, below the threshold of PET.  2. No suspicious axillary lymphadenopathy or hypermetabolic lesions in  the right breast.  3. Mildly FDG avid hypoattenuating nodule in the left thyroid lobe  measuring 1.2 x 0.7 cm and SUV max 4.6. Recommend thyroid ultrasound  to further evaluate.  4. Perifissural solid 7 mm pulmonary nodule in the right upper lobe  likely represents a lymph node. Attention on follow-up.  5. No suspicious FDG uptake in the abdomen or pelvis.  . Additional normal-appearing non-FDG avid intramammary  lymph nodes on the left.  We will refer to endocrinology for evaluation of thyroid nodule.  We will follow follow-up with CT chest in the future and pulmonary nodule.  Left breast and axillary lymph node biopsy is planned for March 29.    2. Young age at breast cancer diagnosis-she had blood drawn on March 19, 2021 and tested negative  for mutations in the FLEX, BRCA1, BRCA2, BRIP1, CDH1, CHEK2, EPCAM, MLH1, MSH2, MSH6, NBN, NF1, PALB2, PMS2, PTEN, RAD51C, RAD51D, STK11, and TP53 genes.    3. Thyroid nodules noted on thyroid ultrasound. These were first incidentally discovered on PET/CT in March 2021.    4. 7 mm pulmonary nodule in the right upper lobe-incidentally noted on PET/CT in March 2021    5. Premenopausal status- She has been menstruating regularly at diagnosis.  She has 5 children ages 5-16.  She is not planning to have any more children.   She offers no other health related complaints.    History Of Present Illness:  Ms. Martinez is a 40 year old premenopausal female,  originally from [place], who presents for follow-up of R sided breast cancer,  [id number].  She proceeded to undergo with bilateral mastectomy and left axillary sentinel lymph node biopsy on April 5, 2021.  She had immediate reconstruction with Dr. Diggs.    Pathology from surgery demonstrated 23 mm left breast invasive ductal carcinoma, Grand Chain grade 3, with associated DCIS as well as LCIS.  Micrometastasis in 1 of 4 left axillary sentinel lymph nodes.  Pathology from right breast demonstrated 4 mm focus of high-grade DCIS, [id number]. Oncotype DX was obtained on her core needle biopsy ([id number]) and returned at the score of 23 correlating with 9% risk of distant disease recurrence at 9 years with use of an aromatase inhibitor or tamoxifen alone. However since patient was young at the age of diagnosis (40-year-old), and the Oncotype DX recurrence score was in the intermediate range, there was about 6.5% benefit of adjuvant chemotherapy, and likely even higher given micrometastatic disease in one of the axillary sentinel lymph nodes.  We recommended that she proceed with adjuvant Taxotere/Cytoxan every 3 weeks for 4 cycles. She proceeded with cycle 1 day 1 on April 30, 2021.  She has completed 4 cycles of chemotherapy on July 2, 2021.  Her chemotherapy was complicated by LFTs elevation requiring dose reduction in docetaxel by 20% with cycles 3 and 4.    CT chest 7/19/2021 showed:  Postsurgical changes of bilateral mastectomy and left-sided seminal  lymph node biopsy. No evidence of residual or recurrent soft tissue  mass in the left chest wall.  2. Stable 7 mm perifissural pulmonary nodule in the right upper lobe.  Recommend continued attention on follow-up.  3. Stable appearance of hypodense left lobe thyroid nodule which did  not demonstrate any suspicious features on ultrasound dated 4/12/2021.  I have reviewed pulmonary nodule images.  She met with Dr. Burden from radiation oncology and adjuvant postmastectomy  radiation was recommended to her left chest wall and regional lymphatics. She has met with the nutritionist and is trying to diet and exercise.  She lost about 4 pounds in the last months.  She is still fatigued from chemotherapy and gets short of breath when she climbs the stairs.  She is still alopecic.  She is here by herself.  History is obtained with the help of  who was on ipad for this visit.   In addition, a complete 12 point  review of systems is negative.    Past Medical/Surgical History:  Past Medical History:   Diagnosis Date     Varicose veins of legs      Past Surgical History:   Procedure Laterality Date     INSERT PORT VASCULAR ACCESS N/A 4/5/2021    Procedure: PORT PLACEMENT;  Surgeon: Una Fonseca MD;  Location: [facility] OR     MASTECTOMY SIMPLE BILATERAL, SENTINEL NODE BILATERAL, COMBINED N/A 4/5/2021    Procedure: BILATERAL SKIN SPARING MASTECTOMY WITH LEFT SENTINEL LYMPH NODE BIOPSY;  Surgeon: Una Fonseca MD;  Location:  OR     NO HISTORY OF SURGERY       RECONSTRUCT BREAST, INSERT TISSUE EXPANDER, COMBINED Bilateral 4/5/2021    Procedure: BILATERAL FIRST STAGE BREAST RECONSTRUCTION WITH TISSUE EXPANDERS; AND ACELLULAR DERMAL MATRIX;  Surgeon: Kervin Fu MD;  Location:  OR     Allergies:  Allergies as of 07/29/2021     (No Known Allergies)     Current Medications:  Current Outpatient Medications   Medication Sig Dispense Refill     hydroquinone (JOSE ANGEL) 4 % external cream Apply to hyperpigmented areas twice a day until skin reaches desired color then stop. (Patient not taking: Reported on 7/19/2021) 30 g 1          Physical Exam:  Wt Readings from Last 5 Encounters:   07/29/21 80 kg (176 lb 6.4 oz)   07/19/21 81.3 kg (179 lb 4.8 oz)   07/02/21 80.9 kg (178 lb 6.4 oz)   07/01/21 81.6 kg (180 lb)   06/29/21 81.6 kg (180 lb)       Constitutional: alert and in no distress  Eyes: No redness or discharge  Respiratory: No cough or labored  breathing.  Musculoskeletal: Full range of motion in extremities.  Skin: no visible skin lesions or discoloration  Neurological: No tremors and denies headache.  Psychiatric: Mentation appears normal and affect is normal as well.  Alert and oriented x3.  The rest the comprehensive physical examination is deferred due to public health emergency video visit restrictions.        Laboratory/Imaging Studies  CBC with differential counts reviewed.  WBC 4.5.  Hemoglobin mildly low at 11.5 g/dL and platelet count is normal.  Absolute differential counts are within normal limits.  She did receive Neulasta on July 2.   AST improved down to 80, ALT improved 147.  ASSESSMENT/PLAN:  Ghazal is a very pleasant 40-year-old Frisian-speaking woman, originally from [place], with [id number] ER positive OR positive HER-2/brayden negative by FISH left-sided breast cancer, with intermediate Oncotype DX recurrence score of 23, status post bilateral mastectomy and axillary sentinel lymph node biopsy on April 5, 2021.  She also underwent immediate reconstruction by Dr. Diggs.  She has completed 4 cycles of adjuvant Taxotere/Cytoxan on July 2, 2021, as above.    1.  [id number] ER positive OR positive HER-2/brayden negative by FISH left-sided breast cancer-plan is to proceed with left postmastectomy radiation, to start in the next couple of weeks.  Today we have discussed the need for adjuvant systemic endocrine therapy.  I would recommend adjuvant tamoxifen.  I would like for the patient to return after completion of of radiation to discuss the side effects in more detail.  We had some connection difficulties with  today and I would like her to come back in the face the same face visit to see our NP for more detailed discussion of potential side effects of tamoxifen and initiation of tamoxifen systemic adjuvant therapy.  She was premenopausal prior to initiation of chemotherapy.     2. Pulmonary nodule noted incidentally on PET/CT in 03/2021-   Perifissural solid 7 mm pulmonary nodule in the right upper lobe-this is stable on CT imaging in July 2021.  Recommend follow-up CT chest in 6 months (due late January 2021).    3. Thyroid nodule noted incidentally on PET/CT in 03/2021-seen by endocrinology.  F/up with Dr. Gaming and thyroid US in 04/2022.    4. Obesity-continue diet and exercise.    5. LFTs elevated secondary to chemotherapy-improved since completion of chemotherapy.  Repeat hepatic panel on return visit.    6.  Mild anemia-likely secondary to chemotherapy.  She is and will continue on oral iron supplementation.  We will plan to recheck her CBC on return visit.   All of Ghazal's questions were answered.  At the end of our visit patient verbalized understanding and concurred with the plan.    40 minutes spent on the date of the encounter doing chart review, review of test results, interpretation of tests, patient visit and documentation.

## 2021-07-29 ENCOUNTER — LAB (OUTPATIENT)
Dept: ONCOLOGY | Facility: CLINIC | Age: 41
End: 2021-07-29
Attending: NURSE PRACTITIONER
Payer: COMMERCIAL

## 2021-07-29 VITALS
DIASTOLIC BLOOD PRESSURE: 84 MMHG | BODY MASS INDEX: 31.75 KG/M2 | RESPIRATION RATE: 16 BRPM | WEIGHT: 176.4 LBS | SYSTOLIC BLOOD PRESSURE: 124 MMHG | OXYGEN SATURATION: 98 % | HEART RATE: 66 BPM

## 2021-07-29 DIAGNOSIS — Z17.0 MALIGNANT NEOPLASM OF LEFT BREAST IN FEMALE, ESTROGEN RECEPTOR POSITIVE, UNSPECIFIED SITE OF BREAST (H): ICD-10-CM

## 2021-07-29 DIAGNOSIS — C50.912 MALIGNANT NEOPLASM OF LEFT BREAST IN FEMALE, ESTROGEN RECEPTOR POSITIVE, UNSPECIFIED SITE OF BREAST (H): ICD-10-CM

## 2021-07-29 DIAGNOSIS — Z17.0 MALIGNANT NEOPLASM OF CENTRAL PORTION OF LEFT BREAST IN FEMALE, ESTROGEN RECEPTOR POSITIVE (H): ICD-10-CM

## 2021-07-29 DIAGNOSIS — C50.112 MALIGNANT NEOPLASM OF CENTRAL PORTION OF LEFT BREAST IN FEMALE, ESTROGEN RECEPTOR POSITIVE (H): ICD-10-CM

## 2021-07-29 DIAGNOSIS — L60.8 CHANGE IN NAIL APPEARANCE: ICD-10-CM

## 2021-07-29 DIAGNOSIS — R91.8 PULMONARY NODULES: ICD-10-CM

## 2021-07-29 DIAGNOSIS — R74.8 ELEVATED LIVER ENZYMES: ICD-10-CM

## 2021-07-29 LAB
ALBUMIN SERPL-MCNC: 3.3 G/DL (ref 3.4–5)
ALP SERPL-CCNC: 85 U/L (ref 40–150)
ALT SERPL W P-5'-P-CCNC: 147 U/L (ref 0–50)
AST SERPL W P-5'-P-CCNC: 80 U/L (ref 0–45)
BASOPHILS # BLD AUTO: 0 10E3/UL (ref 0–0.2)
BASOPHILS NFR BLD AUTO: 1 %
BILIRUB DIRECT SERPL-MCNC: 0.1 MG/DL (ref 0–0.2)
BILIRUB SERPL-MCNC: 0.4 MG/DL (ref 0.2–1.3)
EOSINOPHIL # BLD AUTO: 0.2 10E3/UL (ref 0–0.7)
EOSINOPHIL NFR BLD AUTO: 4 %
ERYTHROCYTE [DISTWIDTH] IN BLOOD BY AUTOMATED COUNT: 16.3 % (ref 10–15)
HCT VFR BLD AUTO: 35.8 % (ref 35–47)
HGB BLD-MCNC: 11.5 G/DL (ref 11.7–15.7)
IMM GRANULOCYTES # BLD: 0 10E3/UL
IMM GRANULOCYTES NFR BLD: 0 %
LYMPHOCYTES # BLD AUTO: 1 10E3/UL (ref 0.8–5.3)
LYMPHOCYTES NFR BLD AUTO: 23 %
MCH RBC QN AUTO: 28.6 PG (ref 26.5–33)
MCHC RBC AUTO-ENTMCNC: 32.1 G/DL (ref 31.5–36.5)
MCV RBC AUTO: 89 FL (ref 78–100)
MONOCYTES # BLD AUTO: 0.4 10E3/UL (ref 0–1.3)
MONOCYTES NFR BLD AUTO: 9 %
NEUTROPHILS # BLD AUTO: 2.9 10E3/UL (ref 1.6–8.3)
NEUTROPHILS NFR BLD AUTO: 63 %
NRBC # BLD AUTO: 0 10E3/UL
NRBC BLD AUTO-RTO: 0 /100
PLATELET # BLD AUTO: 248 10E3/UL (ref 150–450)
PROT SERPL-MCNC: 6.4 G/DL (ref 6.8–8.8)
RBC # BLD AUTO: 4.02 10E6/UL (ref 3.8–5.2)
WBC # BLD AUTO: 4.5 10E3/UL (ref 4–11)

## 2021-07-29 PROCEDURE — 99215 OFFICE O/P EST HI 40 MIN: CPT | Mod: GT | Performed by: INTERNAL MEDICINE

## 2021-07-29 PROCEDURE — 80076 HEPATIC FUNCTION PANEL: CPT | Performed by: INTERNAL MEDICINE

## 2021-07-29 PROCEDURE — 85025 COMPLETE CBC W/AUTO DIFF WBC: CPT | Performed by: INTERNAL MEDICINE

## 2021-07-29 RX ORDER — HEPARIN SODIUM (PORCINE) LOCK FLUSH IV SOLN 100 UNIT/ML 100 UNIT/ML
5 SOLUTION INTRAVENOUS EVERY 8 HOURS
Status: DISCONTINUED | OUTPATIENT
Start: 2021-07-29 | End: 2021-07-29 | Stop reason: HOSPADM

## 2021-07-29 RX ADMIN — HEPARIN SODIUM (PORCINE) LOCK FLUSH IV SOLN 100 UNIT/ML 5 ML: 100 SOLUTION at 11:15

## 2021-07-29 NOTE — LETTER
"    7/29/2021         RE: Ghazal Martinez  [address]  [place] [address]        Dear Colleague,    Thank you for referring your patient, Ghazal Martinez, to the [facility]. Please see a copy of my visit note below.    This patient  is being evaluated via a billable video visit.      The patient has been notified of following:     \"This video visit will be conducted via a call between you and your physician/provider. We have found that certain health care needs can be provided without the need for an in-person physical exam.  This service lets us provide the care you need with a video conversation.  If a prescription is necessary we can send it directly to your pharmacy.  If lab work is needed we can place an order for that and you can then stop by our lab to have the test done at a later time.    Video visits are billed at different rates depending on your insurance coverage.  Please reach out to your insurance provider with any questions.    If during the course of the call the physician/provider feels a video visit is not appropriate, you will not be charged for this service.\"    Patient has given verbal consent for Video visit? yes  Video-Visit Details    Type of service:  Video Visit    Video visit duration:   min  Originating Location (pt. Location): [facility]   Distant Location (provider location):  [facility]     Platform used for Video Visit: Arabella Fine MD      Oncology follow-up visit:  Date on this visit: Jul 29, 2021  PCP: Eun Patton  Breast surgeon: Dr.Sara Fonseca   Plastic Surgeon: Dr. Diggs    Diagnosis:  [id number] ER positive MI positive HER-2/brayden negative by FISH left-sided breast cancer, with intermediate Oncotype DX recurrence score of 23, status post bilateral mastectomy and axillary sentinel lymph node biopsy on April 5, 2021.    Oncologic history:    1.  " Breast cancer- She presented in Feb 2021 with L breast lump and itching x 1 month.  B/l diagnostic mammogram with tomosynthesis on 2/12/2021 showed in the area of left breast lump, there is an irregular mass measuringabout 1.5 cm. No suspicious mammographic findings in the right breast. L breast US showed in the left breast at 10:00, 3 cm from the nipple, there was an irregular hypoechoic mass with angular margins measuring 2.2 x 1.4 x 1 cm. There was no lymphadenopathy in the left axilla. Contrast enhanced mammogram on 2/23/2021 showed a mass at the 10:00 position in left breast anterior depth measuring 1.7 cm. US guided core needle biopsy on 2/23/2021 showed no decompensating invasive ductal carcinoma, estrogen receptor positive (95%, strong) and progesterone receptor positive by immunohistochemistry (70%). By FISH HER2/MIO 17 ratio:  1.7, IHC 2+, additional 20 cells from areas corresponding to the most intense IHC staining were evaluated by FISH. The results obtained from these 20   additional cells also fall into Group 4. Taken together, the FISH and IHC   results are interpreted as HER2 negative.  FISH and IHC results ultimately interpreted as HER2 negative. OncotypeDx returned at 23, c/w intermediate risk, 9 percent of distant disease recurrence with the use of systemic endocrine therapy and  6.5%  benefit of adjuvant chemotherapy given young age, premenopausal.  She proceeded to undergo b/l breast MRI on 3/12/2021 which showed:  The index cancer noted at 10:00 3 cm from the nipple on the  left on ultrasound exam 2/23/2021 has a longest diameter of 22 mm.   2 potential satellite lesions are both present in the same quadrant at  11:00. One is posteriorly situated has a longest diameter of 8 mm on  sagittal imaging, the other is at the junction of the mid and  posterior breast measuring 7 mm in longest diameter on sagittal  imaging.   Distance from anterior aspect of the index lesion to the posterior  aspect of  the closer potential satellite lesion is 7 cm, and to the  more distant potential satellite lesion is 10 cm.   As far as regional lymph nodes, there was a single internal mammary  chain node that's mildly enlarged between the first and second left  costal cartilages. As far as left axillary lymph nodes, at 2:00  posteriorly, there are 2 equivocally abnormal nodes.  The right breast was unremarkable.  PET CT scan on March 22, 2021 showed:  1. FDG avid mass in the medial upper quadrant of the left breast  measuring 1.7 x 1.1 cm and SUV max 8.3, corresponding to the  previously biopsied lesion.   1a. Additional mildly FDG avid 0.8 cm lesion in the same quadrant.  5  mm nodule superior-medial to this, below the threshold of PET.  2. No suspicious axillary lymphadenopathy or hypermetabolic lesions in  the right breast.  3. Mildly FDG avid hypoattenuating nodule in the left thyroid lobe  measuring 1.2 x 0.7 cm and SUV max 4.6. Recommend thyroid ultrasound  to further evaluate.  4. Perifissural solid 7 mm pulmonary nodule in the right upper lobe  likely represents a lymph node. Attention on follow-up.  5. No suspicious FDG uptake in the abdomen or pelvis.  . Additional normal-appearing non-FDG avid intramammary  lymph nodes on the left.  We will refer to endocrinology for evaluation of thyroid nodule.  We will follow follow-up with CT chest in the future and pulmonary nodule.  Left breast and axillary lymph node biopsy is planned for March 29.    2. Young age at breast cancer diagnosis-she had blood drawn on March 19, 2021 and tested negative  for mutations in the FLEX, BRCA1, BRCA2, BRIP1, CDH1, CHEK2, EPCAM, MLH1, MSH2, MSH6, NBN, NF1, PALB2, PMS2, PTEN, RAD51C, RAD51D, STK11, and TP53 genes.    3. Thyroid nodules noted on thyroid ultrasound. These were first incidentally discovered on PET/CT in March 2021.    4. 7 mm pulmonary nodule in the right upper lobe-incidentally noted on PET/CT in March 2021    5. Premenopausal  status- She has been menstruating regularly at diagnosis.  She has 5 children ages 5-16.  She is not planning to have any more children.   She offers no other health related complaints.    History Of Present Illness:  Ms. Martinez is a 40 year old premenopausal female, originally from [place], who presents for follow-up of R sided breast cancer,  [id number].  She proceeded to undergo with bilateral mastectomy and left axillary sentinel lymph node biopsy on April 5, 2021.  She had immediate reconstruction with Dr. Diggs.    Pathology from surgery demonstrated 23 mm left breast invasive ductal carcinoma, Eligio grade 3, with associated DCIS as well as LCIS.  Micrometastasis in 1 of 4 left axillary sentinel lymph nodes.  Pathology from right breast demonstrated 4 mm focus of high-grade DCIS, [id number]. Oncotype DX was obtained on her core needle biopsy ([id number]) and returned at the score of 23 correlating with 9% risk of distant disease recurrence at 9 years with use of an aromatase inhibitor or tamoxifen alone. However since patient was young at the age of diagnosis (40-year-old), and the Oncotype DX recurrence score was in the intermediate range, there was about 6.5% benefit of adjuvant chemotherapy, and likely even higher given micrometastatic disease in one of the axillary sentinel lymph nodes.  We recommended that she proceed with adjuvant Taxotere/Cytoxan every 3 weeks for 4 cycles. She proceeded with cycle 1 day 1 on April 30, 2021.  She has completed 4 cycles of chemotherapy on July 2, 2021.  Her chemotherapy was complicated by LFTs elevation requiring dose reduction in docetaxel by 20% with cycles 3 and 4.    CT chest 7/19/2021 showed:  Postsurgical changes of bilateral mastectomy and left-sided seminal  lymph node biopsy. No evidence of residual or recurrent soft tissue  mass in the left chest wall.  2. Stable 7 mm perifissural pulmonary nodule in the right upper lobe.  Recommend continued attention on  follow-up.  3. Stable appearance of hypodense left lobe thyroid nodule which did  not demonstrate any suspicious features on ultrasound dated 4/12/2021.  I have reviewed pulmonary nodule images.  She met with Dr. Burden from radiation oncology and adjuvant postmastectomy radiation was recommended to her left chest wall and regional lymphatics. She has met with the nutritionist and is trying to diet and exercise.  She lost about 4 pounds in the last months.  She is still fatigued from chemotherapy and gets short of breath when she climbs the stairs.  She is still alopecic.  She is here by herself.  History is obtained with the help of  who was on ipad for this visit.   In addition, a complete 12 point  review of systems is negative.    Past Medical/Surgical History:  Past Medical History:   Diagnosis Date     Varicose veins of legs      Past Surgical History:   Procedure Laterality Date     INSERT PORT VASCULAR ACCESS N/A 4/5/2021    Procedure: PORT PLACEMENT;  Surgeon: Una Fonseca MD;  Location:  OR     MASTECTOMY SIMPLE BILATERAL, SENTINEL NODE BILATERAL, COMBINED N/A 4/5/2021    Procedure: BILATERAL SKIN SPARING MASTECTOMY WITH LEFT SENTINEL LYMPH NODE BIOPSY;  Surgeon: Una Fonseca MD;  Location:  OR     NO HISTORY OF SURGERY       RECONSTRUCT BREAST, INSERT TISSUE EXPANDER, COMBINED Bilateral 4/5/2021    Procedure: BILATERAL FIRST STAGE BREAST RECONSTRUCTION WITH TISSUE EXPANDERS; AND ACELLULAR DERMAL MATRIX;  Surgeon: Kervin Fu MD;  Location:  OR     Allergies:  Allergies as of 07/29/2021     (No Known Allergies)     Current Medications:  Current Outpatient Medications   Medication Sig Dispense Refill     hydroquinone (JOSE ANGEL) 4 % external cream Apply to hyperpigmented areas twice a day until skin reaches desired color then stop. (Patient not taking: Reported on 7/19/2021) 30 g 1          Physical Exam:  Wt Readings from Last 5 Encounters:   07/29/21 80 kg (176 lb  6.4 oz)   07/19/21 81.3 kg (179 lb 4.8 oz)   07/02/21 80.9 kg (178 lb 6.4 oz)   07/01/21 81.6 kg (180 lb)   06/29/21 81.6 kg (180 lb)       Constitutional: alert and in no distress  Eyes: No redness or discharge  Respiratory: No cough or labored breathing.  Musculoskeletal: Full range of motion in extremities.  Skin: no visible skin lesions or discoloration  Neurological: No tremors and denies headache.  Psychiatric: Mentation appears normal and affect is normal as well.  Alert and oriented x3.  The rest the comprehensive physical examination is deferred due to public health emergency video visit restrictions.        Laboratory/Imaging Studies  CBC with differential counts reviewed.  WBC 4.5.  Hemoglobin mildly low at 11.5 g/dL and platelet count is normal.  Absolute differential counts are within normal limits.  She did receive Neulasta on July 2.   AST improved down to 80, ALT improved 147.  ASSESSMENT/PLAN:  Ghzaal is a very pleasant 40-year-old Nauruan-speaking woman, originally from [place], with [id number] ER positive HI positive HER-2/brayden negative by FISH left-sided breast cancer, with intermediate Oncotype DX recurrence score of 23, status post bilateral mastectomy and axillary sentinel lymph node biopsy on April 5, 2021.  She also underwent immediate reconstruction by Dr. Diggs.  She has completed 4 cycles of adjuvant Taxotere/Cytoxan on July 2, 2021, as above.    1.  [id number] ER positive HI positive HER-2/brayden negative by FISH left-sided breast cancer-plan is to proceed with left postmastectomy radiation, to start in the next couple of weeks.  Today we have discussed the need for adjuvant systemic endocrine therapy.  I would recommend adjuvant tamoxifen.  I would like for the patient to return after completion of of radiation to discuss the side effects in more detail.  We had some connection difficulties with  today and I would like her to come back in the face the same face visit to see our NP  for more detailed discussion of potential side effects of tamoxifen and initiation of tamoxifen systemic adjuvant therapy.  She was premenopausal prior to initiation of chemotherapy.     2. Pulmonary nodule noted incidentally on PET/CT in 03/2021-  Perifissural solid 7 mm pulmonary nodule in the right upper lobe-this is stable on CT imaging in July 2021.  Recommend follow-up CT chest in 6 months (due late January 2021).    3. Thyroid nodule noted incidentally on PET/CT in 03/2021-seen by endocrinology.  F/up with Dr. Gaming and thyroid US in 04/2022.    4. Obesity-continue diet and exercise.    5. LFTs elevated secondary to chemotherapy-improved since completion of chemotherapy.  Repeat hepatic panel on return visit.    6.  Mild anemia-likely secondary to chemotherapy.  She is and will continue on oral iron supplementation.  We will plan to recheck her CBC on return visit.   All of Ghazal's questions were answered.  At the end of our visit patient verbalized understanding and concurred with the plan.    40 minutes spent on the date of the encounter doing chart review, review of test results, interpretation of tests, patient visit and documentation.        Again, thank you for allowing me to participate in the care of your patient.        Sincerely,        Mallory Fine MD, MD

## 2021-07-29 NOTE — PROGRESS NOTES
Port needle placed for lab draw access, Sterile technique performed and maintained. Patient tolerated procedure well. Tubes drawn in rainbow order: red, green, purple. Gauze dressing placed with use of paper tape.    RN advised patient to leave gauze in place for 20 mins.     Patient demonstrated verbal understanding.     Althea Lin RN  [facility]

## 2021-07-30 DIAGNOSIS — Z17.0 MALIGNANT NEOPLASM OF LEFT BREAST IN FEMALE, ESTROGEN RECEPTOR POSITIVE, UNSPECIFIED SITE OF BREAST (H): Primary | ICD-10-CM

## 2021-07-30 DIAGNOSIS — C50.912 MALIGNANT NEOPLASM OF LEFT BREAST IN FEMALE, ESTROGEN RECEPTOR POSITIVE, UNSPECIFIED SITE OF BREAST (H): Primary | ICD-10-CM

## 2021-08-01 ENCOUNTER — LAB (OUTPATIENT)
Dept: URGENT CARE | Facility: URGENT CARE | Age: 41
End: 2021-08-01
Attending: RADIOLOGY
Payer: COMMERCIAL

## 2021-08-01 DIAGNOSIS — C50.912 MALIGNANT NEOPLASM OF LEFT BREAST IN FEMALE, ESTROGEN RECEPTOR POSITIVE, UNSPECIFIED SITE OF BREAST (H): ICD-10-CM

## 2021-08-01 DIAGNOSIS — Z17.0 MALIGNANT NEOPLASM OF LEFT BREAST IN FEMALE, ESTROGEN RECEPTOR POSITIVE, UNSPECIFIED SITE OF BREAST (H): ICD-10-CM

## 2021-08-01 PROCEDURE — U0003 INFECTIOUS AGENT DETECTION BY NUCLEIC ACID (DNA OR RNA); SEVERE ACUTE RESPIRATORY SYNDROME CORONAVIRUS 2 (SARS-COV-2) (CORONAVIRUS DISEASE [COVID-19]), AMPLIFIED PROBE TECHNIQUE, MAKING USE OF HIGH THROUGHPUT TECHNOLOGIES AS DESCRIBED BY CMS-2020-01-R: HCPCS

## 2021-08-01 PROCEDURE — U0005 INFEC AGEN DETEC AMPLI PROBE: HCPCS

## 2021-08-02 ENCOUNTER — APPOINTMENT (OUTPATIENT)
Dept: RADIATION ONCOLOGY | Facility: CLINIC | Age: 41
End: 2021-08-02
Payer: COMMERCIAL

## 2021-08-02 LAB — SARS-COV-2 RNA RESP QL NAA+PROBE: NEGATIVE

## 2021-08-02 PROCEDURE — 77293 RESPIRATOR MOTION MGMT SIMUL: CPT | Performed by: RADIOLOGY

## 2021-08-02 PROCEDURE — 77295 3-D RADIOTHERAPY PLAN: CPT | Performed by: RADIOLOGY

## 2021-08-02 PROCEDURE — 77334 RADIATION TREATMENT AID(S): CPT | Performed by: RADIOLOGY

## 2021-08-02 PROCEDURE — 77300 RADIATION THERAPY DOSE PLAN: CPT | Performed by: RADIOLOGY

## 2021-08-04 ENCOUNTER — OFFICE VISIT (OUTPATIENT)
Dept: RADIATION ONCOLOGY | Facility: CLINIC | Age: 41
End: 2021-08-04
Payer: COMMERCIAL

## 2021-08-04 DIAGNOSIS — C50.912 MALIGNANT NEOPLASM OF LEFT BREAST IN FEMALE, ESTROGEN RECEPTOR POSITIVE, UNSPECIFIED SITE OF BREAST (H): Primary | ICD-10-CM

## 2021-08-04 DIAGNOSIS — Z17.0 MALIGNANT NEOPLASM OF LEFT BREAST IN FEMALE, ESTROGEN RECEPTOR POSITIVE, UNSPECIFIED SITE OF BREAST (H): Primary | ICD-10-CM

## 2021-08-04 PROCEDURE — 77280 THER RAD SIMULAJ FIELD SMPL: CPT | Performed by: RADIOLOGY

## 2021-08-04 RX ORDER — MOMETASONE FUROATE 1 MG/G
CREAM TOPICAL DAILY
Qty: 50 G | Refills: 1 | Status: SHIPPED | OUTPATIENT
Start: 2021-08-04 | End: 2022-01-27

## 2021-08-04 NOTE — LETTER
8/4/2021         RE: Ghazal Martinez  [address]  [place] [address]        Dear Colleague,    Thank you for referring your patient, Ghazal Martinez, to the [facility] RADIATION [facility]COLOG[facility] [place]. Please see a copy of my visit note below.    Verification Simulation Scan Completed      Again, thank you for allowing me to participate in the care of your patient.        Sincerely,        RAIZA Burden MD

## 2021-08-05 ENCOUNTER — APPOINTMENT (OUTPATIENT)
Dept: RADIATION ONCOLOGY | Facility: CLINIC | Age: 41
End: 2021-08-05
Payer: COMMERCIAL

## 2021-08-05 PROCEDURE — 77387 GUIDANCE FOR RADJ TX DLVR: CPT | Performed by: RADIOLOGY

## 2021-08-05 PROCEDURE — 77331 SPECIAL RADIATION DOSIMETRY: CPT | Performed by: RADIOLOGY

## 2021-08-05 PROCEDURE — 77412 RADIATION TX DELIVERY LVL 3: CPT | Performed by: RADIOLOGY

## 2021-08-06 ENCOUNTER — APPOINTMENT (OUTPATIENT)
Dept: RADIATION ONCOLOGY | Facility: CLINIC | Age: 41
End: 2021-08-06
Payer: COMMERCIAL

## 2021-08-06 PROCEDURE — 77412 RADIATION TX DELIVERY LVL 3: CPT | Performed by: RADIOLOGY

## 2021-08-06 PROCEDURE — 77387 GUIDANCE FOR RADJ TX DLVR: CPT | Performed by: RADIOLOGY

## 2021-08-09 ENCOUNTER — APPOINTMENT (OUTPATIENT)
Dept: RADIATION ONCOLOGY | Facility: CLINIC | Age: 41
End: 2021-08-09
Payer: COMMERCIAL

## 2021-08-09 PROCEDURE — 77387 GUIDANCE FOR RADJ TX DLVR: CPT | Performed by: RADIOLOGY

## 2021-08-09 PROCEDURE — 77412 RADIATION TX DELIVERY LVL 3: CPT | Performed by: RADIOLOGY

## 2021-08-10 ENCOUNTER — APPOINTMENT (OUTPATIENT)
Dept: RADIATION ONCOLOGY | Facility: CLINIC | Age: 41
End: 2021-08-10
Payer: COMMERCIAL

## 2021-08-10 PROCEDURE — 77387 GUIDANCE FOR RADJ TX DLVR: CPT | Performed by: RADIOLOGY

## 2021-08-10 PROCEDURE — 77412 RADIATION TX DELIVERY LVL 3: CPT | Performed by: RADIOLOGY

## 2021-08-11 ENCOUNTER — OFFICE VISIT (OUTPATIENT)
Dept: RADIATION ONCOLOGY | Facility: CLINIC | Age: 41
End: 2021-08-11
Payer: COMMERCIAL

## 2021-08-11 ENCOUNTER — APPOINTMENT (OUTPATIENT)
Dept: RADIATION ONCOLOGY | Facility: CLINIC | Age: 41
End: 2021-08-11
Payer: COMMERCIAL

## 2021-08-11 VITALS
OXYGEN SATURATION: 97 % | DIASTOLIC BLOOD PRESSURE: 80 MMHG | SYSTOLIC BLOOD PRESSURE: 125 MMHG | BODY MASS INDEX: 30.9 KG/M2 | TEMPERATURE: 98 F | HEART RATE: 67 BPM | WEIGHT: 171.7 LBS | RESPIRATION RATE: 16 BRPM

## 2021-08-11 DIAGNOSIS — C50.912 MALIGNANT NEOPLASM OF LEFT BREAST IN FEMALE, ESTROGEN RECEPTOR POSITIVE, UNSPECIFIED SITE OF BREAST (H): Primary | ICD-10-CM

## 2021-08-11 DIAGNOSIS — Z17.0 MALIGNANT NEOPLASM OF LEFT BREAST IN FEMALE, ESTROGEN RECEPTOR POSITIVE, UNSPECIFIED SITE OF BREAST (H): Primary | ICD-10-CM

## 2021-08-11 PROCEDURE — 77336 RADIATION PHYSICS CONSULT: CPT | Performed by: RADIOLOGY

## 2021-08-11 PROCEDURE — 77427 RADIATION TX MANAGEMENT X5: CPT | Performed by: RADIOLOGY

## 2021-08-11 PROCEDURE — 77417 THER RADIOLOGY PORT IMAGE(S): CPT | Performed by: RADIOLOGY

## 2021-08-11 PROCEDURE — 77387 GUIDANCE FOR RADJ TX DLVR: CPT | Performed by: RADIOLOGY

## 2021-08-11 PROCEDURE — 77412 RADIATION TX DELIVERY LVL 3: CPT | Performed by: RADIOLOGY

## 2021-08-11 PROCEDURE — 99207 PR DROP WITH A PROCEDURE: CPT | Performed by: RADIOLOGY

## 2021-08-11 ASSESSMENT — PAIN SCALES - GENERAL: PAINLEVEL: NO PAIN (0)

## 2021-08-11 NOTE — PROGRESS NOTES
[facility] PHYSICIANS  SPECIALIZING IN BREAKTHROUGHS  Radiation Oncology    On Treatment Visit Note      Ghazal Martinez      Date: 2021   MRN: [id number]   : 1980  Diagnosis: L breast ER+      Reason for Visit:  On Radiation Treatment Visit     Treatment Summary to Date  Treatment Site: L CW + scar, L SC Current Dose: 1000/6000, 1000/5000 cGy Fractions: ,       Chemotherapy  Chemo concurrent with radx?: No (Dr. Fine)    Subjective:     Early in treatment doing well.  She has some discomfort from her expander    Nursing ROS:   Nutrition Alteration  Diet Type: Patient's Preference  Skin  Skin Reaction: 0 - No changes  Skin Intervention: mometasone daily, Aquaphor BID  Skin Note: Patient reports some breast swelling/hardness to upper L breast where expander is        Cardiovascular  Respiratory effort: 1 - Normal - without distress        Psychosocial  Pyschosocial Note: Patient denies fatigue  Pain Assessment  0-10 Pain Scale: 0      Objective:   /80 (BP Location: Right arm, Patient Position: Sitting, Cuff Size: Adult Regular)   Pulse 67   Temp 98  F (36.7  C) (Oral)   Resp 16   Wt 77.9 kg (171 lb 11.2 oz)   SpO2 97%   BMI 30.90 kg/m    Gen: Appears well, in no acute distress  Skin: No erythema    Labs:  CBC RESULTS: Recent Labs   Lab Test 21  1113   WBC 4.5   RBC 4.02   HGB 11.5*   HCT 35.8   MCV 89   MCH 28.6   MCHC 32.1   RDW 16.3*        ELECTROLYTES:  Recent Labs   Lab Test 21  1200      POTASSIUM 3.7   CHLORIDE 111*   BILLY 8.8   CO2 23   BUN 9   CR 0.59   *       Assessment:    Tolerating radiation therapy well.  All questions and concerns addressed.  Discomfort is likely due to tissue swelling from radiation and positioning of the expander.  No signs of infection.    Toxicities:  Fatigue: Grade 0: No toxicity  Pain: Grade 1: Mild pain  Dermatitis: Grade 0: No toxicity    Plan:   1. Continue current therapy.    2. Skin care per  above.  3. otc pain medication as prescribed on the bottle for prn pain.      Mosaiq chart and setup information reviewed  Ports checked    Medication Review  Med list reviewed with patient?: Yes    Educational Topic Discussed  Education Instructions: Skin cares, Fatigue- Setswana Intepreter         Nelda Burden MD    Please do not send letter to referring physician.

## 2021-08-11 NOTE — PATIENT INSTRUCTIONS
Please contact [facility] RN with questions or concerns following today's appointment: [phone].    Thank you!

## 2021-08-11 NOTE — LETTER
2021         RE: Ghazal Martinez  [address]  [place] [address]        Dear Colleague,    Thank you for referring your patient, Ghazal Martinez, to the [facility] RADIATION [facility]COL[facility] [place]. Please see a copy of my visit note below.    [facility] PHYSICIANS  SPECIALIZING IN BREAKTHROUGHS  Radiation Oncology    On Treatment Visit Note      Ghazal Martinez      Date: 2021   MRN: [id number]   : 1980  Diagnosis: L breast ER+      Reason for Visit:  On Radiation Treatment Visit     Treatment Summary to Date  Treatment Site: L CW + scar, L SC Current Dose: 1000/6000, 1000/5000 cGy Fractions: ,       Chemotherapy  Chemo concurrent with radx?: No (Dr. Fine)    Subjective:     Early in treatment doing well.  She has some discomfort from her expander    Nursing ROS:   Nutrition Alteration  Diet Type: Patient's Preference  Skin  Skin Reaction: 0 - No changes  Skin Intervention: mometasone daily, Aquaphor BID  Skin Note: Patient reports some breast swelling/hardness to upper L breast where expander is        Cardiovascular  Respiratory effort: 1 - Normal - without distress        Psychosocial  Pyschosocial Note: Patient denies fatigue  Pain Assessment  0-10 Pain Scale: 0      Objective:   /80 (BP Location: Right arm, Patient Position: Sitting, Cuff Size: Adult Regular)   Pulse 67   Temp 98  F (36.7  C) (Oral)   Resp 16   Wt 77.9 kg (171 lb 11.2 oz)   SpO2 97%   BMI 30.90 kg/m    Gen: Appears well, in no acute distress  Skin: No erythema    Labs:  CBC RESULTS: Recent Labs   Lab Test 21  1113   WBC 4.5   RBC 4.02   HGB 11.5*   HCT 35.8   MCV 89   MCH 28.6   MCHC 32.1   RDW 16.3*        ELECTROLYTES:  Recent Labs   Lab Test 21  1200      POTASSIUM 3.7   CHLORIDE 111*   BILLY 8.8   CO2 23   BUN 9   CR 0.59   *       Assessment:    Tolerating radiation therapy well.  All questions and concerns addressed.  Discomfort is  likely due to tissue swelling from radiation and positioning of the expander.  No signs of infection.    Toxicities:  Fatigue: Grade 0: No toxicity  Pain: Grade 1: Mild pain  Dermatitis: Grade 0: No toxicity    Plan:   1. Continue current therapy.    2. Skin care per above.  3. otc pain medication as prescribed on the bottle for prn pain.      Mosaiq chart and setup information reviewed  Ports checked    Medication Review  Med list reviewed with patient?: Yes    Educational Topic Discussed  Education Instructions: Skin cares, Fatigue- Yi Intepreter         Nelda Burden MD    Please do not send letter to referring physician.          Again, thank you for allowing me to participate in the care of your patient.        Sincerely,        RAIZA Burden MD

## 2021-08-12 ENCOUNTER — APPOINTMENT (OUTPATIENT)
Dept: RADIATION ONCOLOGY | Facility: CLINIC | Age: 41
End: 2021-08-12
Payer: COMMERCIAL

## 2021-08-12 PROCEDURE — 77387 GUIDANCE FOR RADJ TX DLVR: CPT | Performed by: RADIOLOGY

## 2021-08-12 PROCEDURE — 77412 RADIATION TX DELIVERY LVL 3: CPT | Performed by: RADIOLOGY

## 2021-08-13 ENCOUNTER — APPOINTMENT (OUTPATIENT)
Dept: RADIATION ONCOLOGY | Facility: CLINIC | Age: 41
End: 2021-08-13
Payer: COMMERCIAL

## 2021-08-13 PROCEDURE — 77387 GUIDANCE FOR RADJ TX DLVR: CPT | Performed by: RADIOLOGY

## 2021-08-13 PROCEDURE — 77412 RADIATION TX DELIVERY LVL 3: CPT | Performed by: RADIOLOGY

## 2021-08-16 ENCOUNTER — APPOINTMENT (OUTPATIENT)
Dept: RADIATION ONCOLOGY | Facility: CLINIC | Age: 41
End: 2021-08-16
Payer: COMMERCIAL

## 2021-08-16 PROCEDURE — 77412 RADIATION TX DELIVERY LVL 3: CPT | Performed by: RADIOLOGY

## 2021-08-16 PROCEDURE — 77387 GUIDANCE FOR RADJ TX DLVR: CPT | Performed by: RADIOLOGY

## 2021-08-17 ENCOUNTER — APPOINTMENT (OUTPATIENT)
Dept: RADIATION ONCOLOGY | Facility: CLINIC | Age: 41
End: 2021-08-17
Payer: COMMERCIAL

## 2021-08-17 PROCEDURE — 77387 GUIDANCE FOR RADJ TX DLVR: CPT | Performed by: RADIOLOGY

## 2021-08-17 PROCEDURE — 77412 RADIATION TX DELIVERY LVL 3: CPT | Performed by: RADIOLOGY

## 2021-08-18 ENCOUNTER — APPOINTMENT (OUTPATIENT)
Dept: RADIATION ONCOLOGY | Facility: CLINIC | Age: 41
End: 2021-08-18
Payer: COMMERCIAL

## 2021-08-18 ENCOUNTER — OFFICE VISIT (OUTPATIENT)
Dept: RADIATION ONCOLOGY | Facility: CLINIC | Age: 41
End: 2021-08-18
Payer: COMMERCIAL

## 2021-08-18 VITALS
DIASTOLIC BLOOD PRESSURE: 77 MMHG | WEIGHT: 171 LBS | RESPIRATION RATE: 16 BRPM | HEART RATE: 58 BPM | SYSTOLIC BLOOD PRESSURE: 115 MMHG | BODY MASS INDEX: 30.78 KG/M2 | OXYGEN SATURATION: 98 % | TEMPERATURE: 98.2 F

## 2021-08-18 DIAGNOSIS — C50.912 MALIGNANT NEOPLASM OF LEFT BREAST IN FEMALE, ESTROGEN RECEPTOR POSITIVE, UNSPECIFIED SITE OF BREAST (H): Primary | ICD-10-CM

## 2021-08-18 DIAGNOSIS — Z17.0 MALIGNANT NEOPLASM OF LEFT BREAST IN FEMALE, ESTROGEN RECEPTOR POSITIVE, UNSPECIFIED SITE OF BREAST (H): Primary | ICD-10-CM

## 2021-08-18 PROCEDURE — 77412 RADIATION TX DELIVERY LVL 3: CPT | Performed by: RADIOLOGY

## 2021-08-18 PROCEDURE — 77427 RADIATION TX MANAGEMENT X5: CPT | Performed by: RADIOLOGY

## 2021-08-18 PROCEDURE — T1013 SIGN LANG/ORAL INTERPRETER: HCPCS | Mod: U3

## 2021-08-18 PROCEDURE — 99207 PR DROP WITH A PROCEDURE: CPT | Performed by: RADIOLOGY

## 2021-08-18 PROCEDURE — 77387 GUIDANCE FOR RADJ TX DLVR: CPT | Performed by: RADIOLOGY

## 2021-08-18 PROCEDURE — 77336 RADIATION PHYSICS CONSULT: CPT | Performed by: RADIOLOGY

## 2021-08-18 ASSESSMENT — PAIN SCALES - GENERAL: PAINLEVEL: NO PAIN (1)

## 2021-08-18 NOTE — PROGRESS NOTES
[facility] PHYSICIANS  SPECIALIZING IN BREAKTHROUGHS  Radiation Oncology    On Treatment Visit Note      Ghazal Martinez      Date: 2021   MRN: [id number]   : 1980  Diagnosis: L breast ER+      Reason for Visit:  On Radiation Treatment Visit     Treatment Summary to Date  Treatment Site: L CW + scar, L SC Current Dose: 2000/6000, 2000/5000 cGy Fractions: 10/30, 10/25      Chemotherapy  Chemo concurrent with radx?: No (Dr. Fine)    Subjective:     Visit today was completed with .  Doing well with treatment    Nursing ROS:   Nutrition Alteration  Diet Type: Patient's Preference  Skin  Skin Reaction: 0 - No changes  Skin Intervention: mometasone BID, Aquaphor 4-5 times per day  Skin Note: Patient reports some breast swelling/hardness to upper L breast where expander is        Cardiovascular  Respiratory effort: 1 - Normal - without distress        Psychosocial  Pyschosocial Note: Patient report slight fatigue  Pain Assessment  0-10 Pain Scale: 0      Objective:   /77 (BP Location: Right arm, Patient Position: Sitting, Cuff Size: Adult Regular)   Pulse 58   Temp 98.2  F (36.8  C) (Oral)   Resp 16   Wt 77.6 kg (171 lb)   SpO2 98%   BMI 30.78 kg/m    Gen: Appears well, in no acute distress  Skin: No erythema    Labs:  CBC RESULTS: Recent Labs   Lab Test 21  1113   WBC 4.5   RBC 4.02   HGB 11.5*   HCT 35.8   MCV 89   MCH 28.6   MCHC 32.1   RDW 16.3*        ELECTROLYTES:  Recent Labs   Lab Test 21  1200      POTASSIUM 3.7   CHLORIDE 111*   BILLY 8.8   CO2 23   BUN 9   CR 0.59   *       Assessment:    Tolerating radiation therapy well.  All questions and concerns addressed.    Toxicities:  Fatigue: Grade 1: Fatigue relieved by rest  Pain: Grade 0: No toxicity  Dermatitis: Grade 0: No toxicity    Plan:   1. Continue current therapy.    2. Skin care per above.      W4iq chart and setup information reviewed  Ports checked    Medication  Review  Med list reviewed with patient?: Yes    Educational Topic Discussed  Additional Instructions:  used  Education Instructions: Skin cares, Fatigue- Georgian Intepreter         Nelda Burden MD    Please do not send letter to referring physician.

## 2021-08-18 NOTE — PATIENT INSTRUCTIONS
Please contact [facility] RN with questions or concerns following today's appointment: [phone].    Thank you!

## 2021-08-18 NOTE — LETTER
2021         RE: Ghazal Martinez  [address]  [place] [address]        Dear Colleague,    Thank you for referring your patient, Ghazal Martinez, to the [facility]IATION [facility]COL[facility] [place]. Please see a copy of my visit note below.    [facility] PHYSICIANS  SPECIALIZING IN BREAKTHROUGHS  Radiation Oncology    On Treatment Visit Note      Ghazal Martinez      Date: 2021   MRN: [id number]   : 1980  Diagnosis: L breast ER+      Reason for Visit:  On Radiation Treatment Visit     Treatment Summary to Date  Treatment Site: L CW + scar, L SC Current Dose: 2000/6000, 2000/5000 cGy Fractions: 10/30, 10/25      Chemotherapy  Chemo concurrent with radx?: No (Dr. Fine)    Subjective:     Visit today was completed with .  Doing well with treatment    Nursing ROS:   Nutrition Alteration  Diet Type: Patient's Preference  Skin  Skin Reaction: 0 - No changes  Skin Intervention: mometasone BID, Aquaphor 4-5 times per day  Skin Note: Patient reports some breast swelling/hardness to upper L breast where expander is        Cardiovascular  Respiratory effort: 1 - Normal - without distress        Psychosocial  Pyschosocial Note: Patient report slight fatigue  Pain Assessment  0-10 Pain Scale: 0      Objective:   /77 (BP Location: Right arm, Patient Position: Sitting, Cuff Size: Adult Regular)   Pulse 58   Temp 98.2  F (36.8  C) (Oral)   Resp 16   Wt 77.6 kg (171 lb)   SpO2 98%   BMI 30.78 kg/m    Gen: Appears well, in no acute distress  Skin: No erythema    Labs:  CBC RESULTS: Recent Labs   Lab Test 21  1113   WBC 4.5   RBC 4.02   HGB 11.5*   HCT 35.8   MCV 89   MCH 28.6   MCHC 32.1   RDW 16.3*        ELECTROLYTES:  Recent Labs   Lab Test 21  1200      POTASSIUM 3.7   CHLORIDE 111*   BILLY 8.8   CO2 23   BUN 9   CR 0.59   *       Assessment:    Tolerating radiation therapy well.  All questions and concerns  addressed.    Toxicities:  Fatigue: Grade 1: Fatigue relieved by rest  Pain: Grade 0: No toxicity  Dermatitis: Grade 0: No toxicity    Plan:   1. Continue current therapy.    2. Skin care per above.      Mosaiq chart and setup information reviewed  Ports checked    Medication Review  Med list reviewed with patient?: Yes    Educational Topic Discussed  Additional Instructions:  used  Education Instructions: Skin cares, Fatigue- Yoruba Intepreter         Nelda Burden MD    Please do not send letter to referring physician.          Again, thank you for allowing me to participate in the care of your patient.        Sincerely,        RAIZA Burden MD

## 2021-08-19 ENCOUNTER — APPOINTMENT (OUTPATIENT)
Dept: RADIATION ONCOLOGY | Facility: CLINIC | Age: 41
End: 2021-08-19
Payer: COMMERCIAL

## 2021-08-19 PROCEDURE — 77387 GUIDANCE FOR RADJ TX DLVR: CPT | Performed by: RADIOLOGY

## 2021-08-19 PROCEDURE — 77412 RADIATION TX DELIVERY LVL 3: CPT | Performed by: RADIOLOGY

## 2021-08-20 ENCOUNTER — APPOINTMENT (OUTPATIENT)
Dept: RADIATION ONCOLOGY | Facility: CLINIC | Age: 41
End: 2021-08-20
Payer: COMMERCIAL

## 2021-08-20 PROCEDURE — 77387 GUIDANCE FOR RADJ TX DLVR: CPT | Performed by: RADIOLOGY

## 2021-08-20 PROCEDURE — 77412 RADIATION TX DELIVERY LVL 3: CPT | Performed by: RADIOLOGY

## 2021-08-20 PROCEDURE — 77417 THER RADIOLOGY PORT IMAGE(S): CPT | Performed by: RADIOLOGY

## 2021-08-23 ENCOUNTER — APPOINTMENT (OUTPATIENT)
Dept: RADIATION ONCOLOGY | Facility: CLINIC | Age: 41
End: 2021-08-23
Payer: COMMERCIAL

## 2021-08-23 PROCEDURE — 77412 RADIATION TX DELIVERY LVL 3: CPT | Performed by: RADIOLOGY

## 2021-08-23 PROCEDURE — 77387 GUIDANCE FOR RADJ TX DLVR: CPT | Performed by: RADIOLOGY

## 2021-08-24 ENCOUNTER — APPOINTMENT (OUTPATIENT)
Dept: RADIATION ONCOLOGY | Facility: CLINIC | Age: 41
End: 2021-08-24
Payer: COMMERCIAL

## 2021-08-24 PROCEDURE — 77387 GUIDANCE FOR RADJ TX DLVR: CPT | Performed by: RADIOLOGY

## 2021-08-24 PROCEDURE — 77412 RADIATION TX DELIVERY LVL 3: CPT | Performed by: RADIOLOGY

## 2021-08-25 ENCOUNTER — OFFICE VISIT (OUTPATIENT)
Dept: RADIATION ONCOLOGY | Facility: CLINIC | Age: 41
End: 2021-08-25
Payer: COMMERCIAL

## 2021-08-25 ENCOUNTER — APPOINTMENT (OUTPATIENT)
Dept: RADIATION ONCOLOGY | Facility: CLINIC | Age: 41
End: 2021-08-25
Payer: COMMERCIAL

## 2021-08-25 VITALS
RESPIRATION RATE: 16 BRPM | TEMPERATURE: 98.1 F | OXYGEN SATURATION: 98 % | SYSTOLIC BLOOD PRESSURE: 115 MMHG | BODY MASS INDEX: 30.33 KG/M2 | DIASTOLIC BLOOD PRESSURE: 79 MMHG | HEART RATE: 63 BPM | WEIGHT: 168.5 LBS

## 2021-08-25 DIAGNOSIS — Z17.0 MALIGNANT NEOPLASM OF LEFT BREAST IN FEMALE, ESTROGEN RECEPTOR POSITIVE, UNSPECIFIED SITE OF BREAST (H): Primary | ICD-10-CM

## 2021-08-25 DIAGNOSIS — C50.912 MALIGNANT NEOPLASM OF LEFT BREAST IN FEMALE, ESTROGEN RECEPTOR POSITIVE, UNSPECIFIED SITE OF BREAST (H): Primary | ICD-10-CM

## 2021-08-25 PROCEDURE — 77387 GUIDANCE FOR RADJ TX DLVR: CPT | Performed by: RADIOLOGY

## 2021-08-25 PROCEDURE — 77336 RADIATION PHYSICS CONSULT: CPT | Performed by: RADIOLOGY

## 2021-08-25 PROCEDURE — 99207 PR DROP WITH A PROCEDURE: CPT | Performed by: RADIOLOGY

## 2021-08-25 PROCEDURE — 77412 RADIATION TX DELIVERY LVL 3: CPT | Performed by: RADIOLOGY

## 2021-08-25 PROCEDURE — 77427 RADIATION TX MANAGEMENT X5: CPT | Performed by: RADIOLOGY

## 2021-08-25 ASSESSMENT — PAIN SCALES - GENERAL: PAINLEVEL: MILD PAIN (2)

## 2021-08-25 NOTE — PATIENT INSTRUCTIONS
Please contact [facility] RN with questions or concerns following today's appointment: [phone].    Thank you!

## 2021-08-25 NOTE — LETTER
2021         RE: Ghazal Martinez  [address]  [place] [address]        Dear Colleague,    Thank you for referring your patient, Ghazal Martinez, to the [facility] RADIATION ONCOLOGY [place]. Please see a copy of my visit note below.    [facility] PHYSICIANS  SPECIALIZING IN BREAKTHROUGHS  Radiation Oncology    On Treatment Visit Note      Ghazal Martinez      Date: 2021   MRN: [id number]   : 1980  Diagnosis: L breast ER+      Reason for Visit:  On Radiation Treatment Visit     Treatment Summary to Date  Treatment Site: L CW + scar, L SC Current Dose: 3000/6000, 3000/5000 cGy Fractions: 15/30, 15/25      Chemotherapy  Chemo concurrent with radx?: No (Dr. Fine)    Subjective:     Patient doing well with expected side effects.  Has subtle pain in the left upper reconstructed breast.  It is not bothersome to the point of requiring pain medication.    Nursing ROS:   Nutrition Alteration  Diet Type: Patient's Preference  Skin  Skin Reaction: 0 - No changes  Skin Intervention: Patient to increase mometasone to BID and Aquaphor 4-5 x day  Skin Note: Patient reports some breast swelling/hardness to upper L breast where expander is        Cardiovascular  Respiratory effort: 1 - Normal - without distress        Psychosocial  Pyschosocial Note: Patient report slight fatigue  Pain Assessment  0-10 Pain Scale: 2  Pain Descriptors: Aching  Pain Treatment: Patient declines need for Tylenol or Ibuprofen, instructed to use cool packs to L breast for swelling/aches      Objective:   /79 (BP Location: Right arm, Patient Position: Sitting, Cuff Size: Adult Regular)   Pulse 63   Temp 98.1  F (36.7  C) (Oral)   Resp 16   Wt 76.4 kg (168 lb 8 oz)   SpO2 98%   BMI 30.33 kg/m    Gen: Appears well, in no acute distress  Skin: Minimal diffuse erythema over treatment field    Labs:  CBC RESULTS: Recent Labs   Lab Test 21  1113   WBC 4.5   RBC 4.02   HGB 11.5*   HCT  35.8   MCV 89   MCH 28.6   MCHC 32.1   RDW 16.3*        ELECTROLYTES:  Recent Labs   Lab Test 07/02/21  1200      POTASSIUM 3.7   CHLORIDE 111*   BILLY 8.8   CO2 23   BUN 9   CR 0.59   *       Assessment:    Tolerating radiation therapy well.  All questions and concerns addressed.  Left upper breast pain due to inflammation secondary to radiation therapy.      Toxicities:  Fatigue: Grade 1: Fatigue relieved by rest  Pain: Grade 1: Mild pain  Dermatitis: Grade 1: Faint erythema or dry desquamation    Plan:   1. Continue current therapy.    2. Skin care per above.      Mosaiq chart and setup information reviewed  Ports checked    Medication Review  Med list reviewed with patient?: Yes    Educational Topic Discussed  Additional Instructions:  used  Education Instructions: Skin cares, Fatigue- Belarusian Intepreter         Nelda Burden MD    Please do not send letter to referring physician.          Again, thank you for allowing me to participate in the care of your patient.        Sincerely,        RAIZA Burden MD

## 2021-08-25 NOTE — PROGRESS NOTES
[facility] PHYSICIANS  SPECIALIZING IN BREAKTHROUGHS  Radiation Oncology    On Treatment Visit Note      Ghazal Martinez      Date: 2021   MRN: [id number]   : 1980  Diagnosis: L breast ER+      Reason for Visit:  On Radiation Treatment Visit     Treatment Summary to Date  Treatment Site: L CW + scar, L SC Current Dose: 3000/6000, 3000/5000 cGy Fractions: 15/30, 15/25      Chemotherapy  Chemo concurrent with radx?: No (Dr. Fine)    Subjective:     Patient doing well with expected side effects.  Has subtle pain in the left upper reconstructed breast.  It is not bothersome to the point of requiring pain medication.    Nursing ROS:   Nutrition Alteration  Diet Type: Patient's Preference  Skin  Skin Reaction: 0 - No changes  Skin Intervention: Patient to increase mometasone to BID and Aquaphor 4-5 x day  Skin Note: Patient reports some breast swelling/hardness to upper L breast where expander is        Cardiovascular  Respiratory effort: 1 - Normal - without distress        Psychosocial  Pyschosocial Note: Patient report slight fatigue  Pain Assessment  0-10 Pain Scale: 2  Pain Descriptors: Aching  Pain Treatment: Patient declines need for Tylenol or Ibuprofen, instructed to use cool packs to L breast for swelling/aches      Objective:   /79 (BP Location: Right arm, Patient Position: Sitting, Cuff Size: Adult Regular)   Pulse 63   Temp 98.1  F (36.7  C) (Oral)   Resp 16   Wt 76.4 kg (168 lb 8 oz)   SpO2 98%   BMI 30.33 kg/m    Gen: Appears well, in no acute distress  Skin: Minimal diffuse erythema over treatment field    Labs:  CBC RESULTS: Recent Labs   Lab Test 21  1113   WBC 4.5   RBC 4.02   HGB 11.5*   HCT 35.8   MCV 89   MCH 28.6   MCHC 32.1   RDW 16.3*        ELECTROLYTES:  Recent Labs   Lab Test 21  1200      POTASSIUM 3.7   CHLORIDE 111*   BILLY 8.8   CO2 23   BUN 9   CR 0.59   *       Assessment:    Tolerating radiation therapy well.  All  questions and concerns addressed.  Left upper breast pain due to inflammation secondary to radiation therapy.      Toxicities:  Fatigue: Grade 1: Fatigue relieved by rest  Pain: Grade 1: Mild pain  Dermatitis: Grade 1: Faint erythema or dry desquamation    Plan:   1. Continue current therapy.    2. Skin care per above.      Mosaiq chart and setup information reviewed  Ports checked    Medication Review  Med list reviewed with patient?: Yes    Educational Topic Discussed  Additional Instructions:  used  Education Instructions: Skin cares, Fatigue- Setswana Intepreter         Nelda Burden MD    Please do not send letter to referring physician.

## 2021-08-26 ENCOUNTER — APPOINTMENT (OUTPATIENT)
Dept: RADIATION ONCOLOGY | Facility: CLINIC | Age: 41
End: 2021-08-26
Payer: COMMERCIAL

## 2021-08-26 PROCEDURE — 77412 RADIATION TX DELIVERY LVL 3: CPT | Performed by: RADIOLOGY

## 2021-08-26 PROCEDURE — 77387 GUIDANCE FOR RADJ TX DLVR: CPT | Performed by: RADIOLOGY

## 2021-08-27 ENCOUNTER — APPOINTMENT (OUTPATIENT)
Dept: RADIATION ONCOLOGY | Facility: CLINIC | Age: 41
End: 2021-08-27
Payer: COMMERCIAL

## 2021-08-27 PROCEDURE — 77417 THER RADIOLOGY PORT IMAGE(S): CPT | Performed by: RADIOLOGY

## 2021-08-27 PROCEDURE — 77387 GUIDANCE FOR RADJ TX DLVR: CPT | Performed by: RADIOLOGY

## 2021-08-27 PROCEDURE — 77412 RADIATION TX DELIVERY LVL 3: CPT | Performed by: RADIOLOGY

## 2021-08-30 ENCOUNTER — APPOINTMENT (OUTPATIENT)
Dept: RADIATION ONCOLOGY | Facility: CLINIC | Age: 41
End: 2021-08-30
Payer: COMMERCIAL

## 2021-08-30 PROCEDURE — 77412 RADIATION TX DELIVERY LVL 3: CPT | Performed by: RADIOLOGY

## 2021-08-30 PROCEDURE — 77387 GUIDANCE FOR RADJ TX DLVR: CPT | Performed by: RADIOLOGY

## 2021-08-31 ENCOUNTER — APPOINTMENT (OUTPATIENT)
Dept: RADIATION ONCOLOGY | Facility: CLINIC | Age: 41
End: 2021-08-31
Payer: COMMERCIAL

## 2021-08-31 PROCEDURE — 77387 GUIDANCE FOR RADJ TX DLVR: CPT | Performed by: RADIOLOGY

## 2021-08-31 PROCEDURE — 77412 RADIATION TX DELIVERY LVL 3: CPT | Performed by: RADIOLOGY

## 2021-09-01 ENCOUNTER — OFFICE VISIT (OUTPATIENT)
Dept: RADIATION ONCOLOGY | Facility: CLINIC | Age: 41
End: 2021-09-01
Payer: COMMERCIAL

## 2021-09-01 ENCOUNTER — APPOINTMENT (OUTPATIENT)
Dept: RADIATION ONCOLOGY | Facility: CLINIC | Age: 41
End: 2021-09-01
Payer: COMMERCIAL

## 2021-09-01 VITALS
HEART RATE: 58 BPM | OXYGEN SATURATION: 98 % | SYSTOLIC BLOOD PRESSURE: 115 MMHG | WEIGHT: 168.3 LBS | BODY MASS INDEX: 30.29 KG/M2 | TEMPERATURE: 97.3 F | RESPIRATION RATE: 16 BRPM | DIASTOLIC BLOOD PRESSURE: 80 MMHG

## 2021-09-01 DIAGNOSIS — C50.112 MALIGNANT NEOPLASM OF CENTRAL PORTION OF LEFT BREAST IN FEMALE, ESTROGEN RECEPTOR POSITIVE (H): Primary | ICD-10-CM

## 2021-09-01 DIAGNOSIS — Z17.0 MALIGNANT NEOPLASM OF CENTRAL PORTION OF LEFT BREAST IN FEMALE, ESTROGEN RECEPTOR POSITIVE (H): Primary | ICD-10-CM

## 2021-09-01 PROCEDURE — 77412 RADIATION TX DELIVERY LVL 3: CPT | Performed by: RADIOLOGY

## 2021-09-01 PROCEDURE — 77336 RADIATION PHYSICS CONSULT: CPT | Performed by: RADIOLOGY

## 2021-09-01 PROCEDURE — 77387 GUIDANCE FOR RADJ TX DLVR: CPT | Performed by: RADIOLOGY

## 2021-09-01 PROCEDURE — 77427 RADIATION TX MANAGEMENT X5: CPT | Performed by: RADIOLOGY

## 2021-09-01 PROCEDURE — 99207 PR DROP WITH A PROCEDURE: CPT | Performed by: RADIOLOGY

## 2021-09-01 ASSESSMENT — PAIN SCALES - GENERAL: PAINLEVEL: MILD PAIN (3)

## 2021-09-01 NOTE — PROGRESS NOTES
[facility] PHYSICIANS  SPECIALIZING IN BREAKTHROUGHS  Radiation Oncology    On Treatment Visit Note      Ghazal Martinez      Date: 2021   MRN: [id number]   : 1980  Diagnosis: L breast ER+      Reason for Visit:  On Radiation Treatment Visit     Treatment Summary to Date  Treatment Site: L CW + scar, L SC Current Dose: 4000/6600, 4000/5000 cGy Fractions: 20/30, 20/25      Chemotherapy  Chemo concurrent with radx?: No (Dr. Fine)    Subjective:     Patient doing well with expected side effects.  Has subtle pain in the left upper reconstructed breast which is stable.  It is not bothersome to the point of requiring pain medication.     Nursing ROS:   Nutrition Alteration  Diet Type: Patient's Preference  Skin  Skin Reaction: 1 - Faint erythema or dry desquamation (no desquamation)  Skin Intervention: Mometasone BID and Aquaphor 3-4 x day  Skin Note: Patient reports some breast swelling/hardness to upper L breast where expander is        Cardiovascular  Respiratory effort: 1 - Normal - without distress        Psychosocial  Pyschosocial Note: Patient report slight fatigue  Pain Assessment  0-10 Pain Scale: 3  Pain Descriptors: Aching  Pain Treatment: Patient taking Tylenol daily, may use cool packs PRN      Objective:   /80 (BP Location: Right arm, Patient Position: Sitting, Cuff Size: Adult Regular)   Pulse 58   Temp 97.3  F (36.3  C) (Oral)   Resp 16   Wt 76.3 kg (168 lb 4.8 oz)   SpO2 98%   BMI 30.29 kg/m    Gen: Appears well, in no acute distress  Skin: Moderate diffuse erythema over treatment field    Labs:  CBC RESULTS: Recent Labs   Lab Test 21  1113   WBC 4.5   RBC 4.02   HGB 11.5*   HCT 35.8   MCV 89   MCH 28.6   MCHC 32.1   RDW 16.3*        ELECTROLYTES:  Recent Labs   Lab Test 21  1200      POTASSIUM 3.7   CHLORIDE 111*   BILLY 8.8   CO2 23   BUN 9   CR 0.59   *       Assessment:    Tolerating radiation therapy well.  All questions and  concerns addressed.    Toxicities:  Fatigue: Grade 1: Fatigue relieved by rest  Pain: Grade 1: Mild pain  Dermatitis: Grade 1: Faint erythema or dry desquamation     Plan:   1. Continue current therapy.    2. Skin care per above.    Mosaiq chart and setup information reviewed  Ports checked    Medication Review  Med list reviewed with patient?: Yes    Educational Topic Discussed  Additional Instructions:  used  Education Instructions: Skin cares, Fatigue- Sierra Leonean Intepreter         Nelda Burden MD    Please do not send letter to referring physician.

## 2021-09-01 NOTE — LETTER
2021         RE: Ghazal Martinez  [address]  [place] [address]        Dear Colleague,    Thank you for referring your patient, Ghazal Martinez, to the [facility] RADIATION ONCOLOGY [place]. Please see a copy of my visit note below.    [facility] PHYSICIANS  SPECIALIZING IN BREAKTHROUGHS  Radiation Oncology    On Treatment Visit Note      Ghazal Martinez      Date: 2021   MRN: [id number]   : 1980  Diagnosis: L breast ER+      Reason for Visit:  On Radiation Treatment Visit     Treatment Summary to Date  Treatment Site: L CW + scar, L SC Current Dose: 4000/6600, 4000/5000 cGy Fractions: ,       Chemotherapy  Chemo concurrent with radx?: No (Dr. Fine)    Subjective:     Patient doing well with expected side effects.  Has subtle pain in the left upper reconstructed breast which is stable.  It is not bothersome to the point of requiring pain medication.     Nursing ROS:   Nutrition Alteration  Diet Type: Patient's Preference  Skin  Skin Reaction: 1 - Faint erythema or dry desquamation (no desquamation)  Skin Intervention: Mometasone BID and Aquaphor 3-4 x day  Skin Note: Patient reports some breast swelling/hardness to upper L breast where expander is        Cardiovascular  Respiratory effort: 1 - Normal - without distress        Psychosocial  Pyschosocial Note: Patient report slight fatigue  Pain Assessment  0-10 Pain Scale: 3  Pain Descriptors: Aching  Pain Treatment: Patient taking Tylenol daily, may use cool packs PRN      Objective:   /80 (BP Location: Right arm, Patient Position: Sitting, Cuff Size: Adult Regular)   Pulse 58   Temp 97.3  F (36.3  C) (Oral)   Resp 16   Wt 76.3 kg (168 lb 4.8 oz)   SpO2 98%   BMI 30.29 kg/m    Gen: Appears well, in no acute distress  Skin: Moderate diffuse erythema over treatment field    Labs:  CBC RESULTS: Recent Labs   Lab Test 21  1113   WBC 4.5   RBC 4.02   HGB 11.5*   HCT 35.8   MCV 89    MCH 28.6   MCHC 32.1   RDW 16.3*        ELECTROLYTES:  Recent Labs   Lab Test 07/02/21  1200      POTASSIUM 3.7   CHLORIDE 111*   BILLY 8.8   CO2 23   BUN 9   CR 0.59   *       Assessment:    Tolerating radiation therapy well.  All questions and concerns addressed.    Toxicities:  Fatigue: Grade 1: Fatigue relieved by rest  Pain: Grade 1: Mild pain  Dermatitis: Grade 1: Faint erythema or dry desquamation     Plan:   1. Continue current therapy.    2. Skin care per above.    Mosaiq chart and setup information reviewed  Ports checked    Medication Review  Med list reviewed with patient?: Yes    Educational Topic Discussed  Additional Instructions:  used  Education Instructions: Skin cares, Fatigue- Honduran Intepreter         Nelda Burden MD    Please do not send letter to referring physician.          Again, thank you for allowing me to participate in the care of your patient.        Sincerely,        RAIZA Burden MD

## 2021-09-01 NOTE — PATIENT INSTRUCTIONS
Please contact [facility] RN with questions or concerns following today's appointment: [phone].    Thank you!

## 2021-09-02 ENCOUNTER — APPOINTMENT (OUTPATIENT)
Dept: RADIATION ONCOLOGY | Facility: CLINIC | Age: 41
End: 2021-09-02
Payer: COMMERCIAL

## 2021-09-02 PROCEDURE — 77334 RADIATION TREATMENT AID(S): CPT | Performed by: RADIOLOGY

## 2021-09-02 PROCEDURE — 77387 GUIDANCE FOR RADJ TX DLVR: CPT | Performed by: SURGERY

## 2021-09-02 PROCEDURE — 77412 RADIATION TX DELIVERY LVL 3: CPT | Performed by: SURGERY

## 2021-09-02 PROCEDURE — 77307 TELETHX ISODOSE PLAN CPLX: CPT | Performed by: RADIOLOGY

## 2021-09-03 ENCOUNTER — APPOINTMENT (OUTPATIENT)
Dept: RADIATION ONCOLOGY | Facility: CLINIC | Age: 41
End: 2021-09-03
Payer: COMMERCIAL

## 2021-09-03 PROCEDURE — 77387 GUIDANCE FOR RADJ TX DLVR: CPT | Performed by: RADIOLOGY

## 2021-09-03 PROCEDURE — 77412 RADIATION TX DELIVERY LVL 3: CPT | Performed by: RADIOLOGY

## 2021-09-07 ENCOUNTER — APPOINTMENT (OUTPATIENT)
Dept: RADIATION ONCOLOGY | Facility: CLINIC | Age: 41
End: 2021-09-07
Payer: COMMERCIAL

## 2021-09-07 PROCEDURE — 77280 THER RAD SIMULAJ FIELD SMPL: CPT | Performed by: RADIOLOGY

## 2021-09-07 PROCEDURE — 77412 RADIATION TX DELIVERY LVL 3: CPT | Performed by: RADIOLOGY

## 2021-09-07 PROCEDURE — 77387 GUIDANCE FOR RADJ TX DLVR: CPT | Performed by: RADIOLOGY

## 2021-09-08 ENCOUNTER — APPOINTMENT (OUTPATIENT)
Dept: RADIATION ONCOLOGY | Facility: CLINIC | Age: 41
End: 2021-09-08
Payer: COMMERCIAL

## 2021-09-08 ENCOUNTER — OFFICE VISIT (OUTPATIENT)
Dept: RADIATION ONCOLOGY | Facility: CLINIC | Age: 41
End: 2021-09-08
Payer: COMMERCIAL

## 2021-09-08 VITALS
OXYGEN SATURATION: 100 % | DIASTOLIC BLOOD PRESSURE: 85 MMHG | HEART RATE: 58 BPM | WEIGHT: 165.5 LBS | BODY MASS INDEX: 29.79 KG/M2 | SYSTOLIC BLOOD PRESSURE: 129 MMHG | TEMPERATURE: 97.4 F | RESPIRATION RATE: 16 BRPM

## 2021-09-08 DIAGNOSIS — Z17.0 MALIGNANT NEOPLASM OF LEFT BREAST IN FEMALE, ESTROGEN RECEPTOR POSITIVE, UNSPECIFIED SITE OF BREAST (H): ICD-10-CM

## 2021-09-08 DIAGNOSIS — L58.9 RADIATION DERMATITIS: Primary | ICD-10-CM

## 2021-09-08 DIAGNOSIS — C50.912 MALIGNANT NEOPLASM OF LEFT BREAST IN FEMALE, ESTROGEN RECEPTOR POSITIVE, UNSPECIFIED SITE OF BREAST (H): ICD-10-CM

## 2021-09-08 PROCEDURE — 77412 RADIATION TX DELIVERY LVL 3: CPT | Performed by: RADIOLOGY

## 2021-09-08 PROCEDURE — 77387 GUIDANCE FOR RADJ TX DLVR: CPT | Performed by: RADIOLOGY

## 2021-09-08 PROCEDURE — 99207 PR DROP WITH A PROCEDURE: CPT | Performed by: RADIOLOGY

## 2021-09-08 PROCEDURE — 77417 THER RADIOLOGY PORT IMAGE(S): CPT | Performed by: RADIOLOGY

## 2021-09-08 PROCEDURE — 77280 THER RAD SIMULAJ FIELD SMPL: CPT | Performed by: RADIOLOGY

## 2021-09-08 RX ORDER — SILVER SULFADIAZINE 10 MG/G
CREAM TOPICAL 2 TIMES DAILY
Qty: 50 G | Refills: 1 | Status: SHIPPED | OUTPATIENT
Start: 2021-09-08 | End: 2021-09-21

## 2021-09-08 ASSESSMENT — PAIN SCALES - GENERAL: PAINLEVEL: MILD PAIN (2)

## 2021-09-08 NOTE — PROGRESS NOTES
[facility] PHYSICIANS  SPECIALIZING IN BREAKTHROUGHS  Radiation Oncology    On Treatment Visit Note      Ghazal Martinez      Date: 2021   MRN: [id number]   : 1980  Diagnosis: L breast ER+      Reason for Visit:  On Radiation Treatment Visit     Treatment Summary to Date  Treatment Site: L CW + scar, L SC, IM bst Current Dose: 4800/6000, 4800/5000,  cGy Fractions: /, , 0/8      Chemotherapy  Chemo concurrent with radx?: No (Dr. Fine)    Subjective:     Doing well with expected side effects.    Nursing ROS:   Nutrition Alteration  Diet Type: Patient's Preference  Skin  Skin Reaction: 2 - Moderate to brisk erythema, patchy moist desquamation, mostly confined to skin folds and creases, moderate edema (dry desquamation to L underarm)  Skin Intervention: Mometasone BID, Aquaphor 3-4 x day and Silvadene to peeling area under L arm BID   Skin Note: Radiation Dermatitis noted. Patient reports some breast swelling/hardness to upper L breast where expander is        Cardiovascular  Respiratory effort: 1 - Normal - without distress        Psychosocial  Pyschosocial Note: Patient report slight fatigue  Pain Assessment  0-10 Pain Scale: 2  Pain Descriptors: Aching  Pain Treatment: Patient denies need for Tylenol       Objective:   /85 (BP Location: Right arm, Patient Position: Sitting, Cuff Size: Adult Regular)   Pulse 58   Temp 97.4  F (36.3  C) (Oral)   Resp 16   Wt 75.1 kg (165 lb 8 oz)   SpO2 100%   BMI 29.79 kg/m    Gen: Appears well, in no acute distress  Skin: Moderate diffuse erythema over treatment field with areas of dry desquamation    Labs:  CBC RESULTS: Recent Labs   Lab Test 21  1113   WBC 4.5   RBC 4.02   HGB 11.5*   HCT 35.8   MCV 89   MCH 28.6   MCHC 32.1   RDW 16.3*        ELECTROLYTES:  Recent Labs   Lab Test 21  1200      POTASSIUM 3.7   CHLORIDE 111*   BILLY 8.8   CO2 23   BUN 9   CR 0.59   *       Assessment:     Tolerating radiation therapy well.  All questions and concerns addressed.    Toxicities:  Fatigue: Grade 1: Fatigue relieved by rest  Pain: Grade 1: Mild pain  Dermatitis: Grade 2: Moderate to brisk erythema; patchy moist desquamation, mostly confined to skin folds and creases; moderate erythema    Plan:   1. Continue current therapy.    2. Prescribed SSD cream in anticipation of wet desquamation that will likely happen next week.  Otherwise skin care per above.  3. Over-the-counter pain medication prn      Mosaiq chart and setup information reviewed  Ports checked    Medication Review  Med list reviewed with patient?: Yes    Educational Topic Discussed  Additional Instructions:  used  Education Instructions: Skin cares, Fatigue- Congolese Intepreter         Nelda Burden MD    Please do not send letter to referring physician.

## 2021-09-08 NOTE — LETTER
2021         RE: Ghazal Martinez  [address]  [place] [address]        Dear Colleague,    Thank you for referring your patient, Ghazal Martinez, to the [facility] RADIATION ONCOLOGY [place]. Please see a copy of my visit note below.    [facility] PHYSICIANS  SPECIALIZING IN BREAKTHROUGHS  Radiation Oncology    On Treatment Visit Note      Ghazal Martinez      Date: 2021   MRN: [id number]   : 1980  Diagnosis: L breast ER+      Reason for Visit:  On Radiation Treatment Visit     Treatment Summary to Date  Treatment Site: L CW + scar, L SC, IM bst Current Dose: 4800/6000, 4800/5000,  cGy Fractions: , , 0/8      Chemotherapy  Chemo concurrent with radx?: No (Dr. Fine)    Subjective:     Doing well with expected side effects.    Nursing ROS:   Nutrition Alteration  Diet Type: Patient's Preference  Skin  Skin Reaction: 2 - Moderate to brisk erythema, patchy moist desquamation, mostly confined to skin folds and creases, moderate edema (dry desquamation to L underarm)  Skin Intervention: Mometasone BID, Aquaphor 3-4 x day and Silvadene to peeling area under L arm BID   Skin Note: Radiation Dermatitis noted. Patient reports some breast swelling/hardness to upper L breast where expander is        Cardiovascular  Respiratory effort: 1 - Normal - without distress        Psychosocial  Pyschosocial Note: Patient report slight fatigue  Pain Assessment  0-10 Pain Scale: 2  Pain Descriptors: Aching  Pain Treatment: Patient denies need for Tylenol       Objective:   /85 (BP Location: Right arm, Patient Position: Sitting, Cuff Size: Adult Regular)   Pulse 58   Temp 97.4  F (36.3  C) (Oral)   Resp 16   Wt 75.1 kg (165 lb 8 oz)   SpO2 100%   BMI 29.79 kg/m    Gen: Appears well, in no acute distress  Skin: Moderate diffuse erythema over treatment field with areas of dry desquamation    Labs:  CBC RESULTS: Recent Labs   Lab Test 21  1113   WBC  4.5   RBC 4.02   HGB 11.5*   HCT 35.8   MCV 89   MCH 28.6   MCHC 32.1   RDW 16.3*        ELECTROLYTES:  Recent Labs   Lab Test 07/02/21  1200      POTASSIUM 3.7   CHLORIDE 111*   BILLY 8.8   CO2 23   BUN 9   CR 0.59   *       Assessment:    Tolerating radiation therapy well.  All questions and concerns addressed.    Toxicities:  Fatigue: Grade 1: Fatigue relieved by rest  Pain: Grade 1: Mild pain  Dermatitis: Grade 2: Moderate to brisk erythema; patchy moist desquamation, mostly confined to skin folds and creases; moderate erythema    Plan:   1. Continue current therapy.    2. Prescribed SSD cream in anticipation of wet desquamation that will likely happen next week.  Otherwise skin care per above.  3. Over-the-counter pain medication prn      Mosaiq chart and setup information reviewed  Ports checked    Medication Review  Med list reviewed with patient?: Yes    Educational Topic Discussed  Additional Instructions:  used  Education Instructions: Skin cares, Fatigue- Swedish Intepreter         Nelda Burden MD    Please do not send letter to referring physician.            Again, thank you for allowing me to participate in the care of your patient.        Sincerely,        RAIZA Burden MD

## 2021-09-08 NOTE — PATIENT INSTRUCTIONS
Please contact [facility] RN with questions or concerns following today's appointment: [phone].    Thank you!

## 2021-09-09 ENCOUNTER — APPOINTMENT (OUTPATIENT)
Dept: RADIATION ONCOLOGY | Facility: CLINIC | Age: 41
End: 2021-09-09
Payer: COMMERCIAL

## 2021-09-09 PROCEDURE — 77387 GUIDANCE FOR RADJ TX DLVR: CPT | Performed by: SURGERY

## 2021-09-09 PROCEDURE — 77336 RADIATION PHYSICS CONSULT: CPT | Performed by: SURGERY

## 2021-09-09 PROCEDURE — 77427 RADIATION TX MANAGEMENT X5: CPT | Performed by: RADIOLOGY

## 2021-09-09 PROCEDURE — 77412 RADIATION TX DELIVERY LVL 3: CPT | Performed by: SURGERY

## 2021-09-10 ENCOUNTER — APPOINTMENT (OUTPATIENT)
Dept: RADIATION ONCOLOGY | Facility: CLINIC | Age: 41
End: 2021-09-10
Payer: COMMERCIAL

## 2021-09-10 PROCEDURE — 77412 RADIATION TX DELIVERY LVL 3: CPT | Performed by: SURGERY

## 2021-09-10 PROCEDURE — 77387 GUIDANCE FOR RADJ TX DLVR: CPT | Performed by: SURGERY

## 2021-09-10 PROCEDURE — 77331 SPECIAL RADIATION DOSIMETRY: CPT | Performed by: RADIOLOGY

## 2021-09-13 ENCOUNTER — APPOINTMENT (OUTPATIENT)
Dept: RADIATION ONCOLOGY | Facility: CLINIC | Age: 41
End: 2021-09-13
Payer: COMMERCIAL

## 2021-09-13 PROCEDURE — 77412 RADIATION TX DELIVERY LVL 3: CPT | Performed by: RADIOLOGY

## 2021-09-13 PROCEDURE — 77417 THER RADIOLOGY PORT IMAGE(S): CPT | Performed by: RADIOLOGY

## 2021-09-13 PROCEDURE — 77387 GUIDANCE FOR RADJ TX DLVR: CPT | Performed by: RADIOLOGY

## 2021-09-14 ENCOUNTER — APPOINTMENT (OUTPATIENT)
Dept: RADIATION ONCOLOGY | Facility: CLINIC | Age: 41
End: 2021-09-14
Payer: COMMERCIAL

## 2021-09-14 PROCEDURE — 77387 GUIDANCE FOR RADJ TX DLVR: CPT | Performed by: RADIOLOGY

## 2021-09-14 PROCEDURE — 77412 RADIATION TX DELIVERY LVL 3: CPT | Performed by: RADIOLOGY

## 2021-09-15 ENCOUNTER — OFFICE VISIT (OUTPATIENT)
Dept: RADIATION ONCOLOGY | Facility: CLINIC | Age: 41
End: 2021-09-15
Payer: COMMERCIAL

## 2021-09-15 ENCOUNTER — APPOINTMENT (OUTPATIENT)
Dept: RADIATION ONCOLOGY | Facility: CLINIC | Age: 41
End: 2021-09-15
Payer: COMMERCIAL

## 2021-09-15 VITALS
DIASTOLIC BLOOD PRESSURE: 80 MMHG | TEMPERATURE: 97.5 F | BODY MASS INDEX: 29.23 KG/M2 | OXYGEN SATURATION: 99 % | HEART RATE: 70 BPM | SYSTOLIC BLOOD PRESSURE: 122 MMHG | RESPIRATION RATE: 16 BRPM | WEIGHT: 162.4 LBS

## 2021-09-15 DIAGNOSIS — Z17.0 MALIGNANT NEOPLASM OF LEFT BREAST IN FEMALE, ESTROGEN RECEPTOR POSITIVE, UNSPECIFIED SITE OF BREAST (H): Primary | ICD-10-CM

## 2021-09-15 DIAGNOSIS — L58.9 RADIATION DERMATITIS: ICD-10-CM

## 2021-09-15 DIAGNOSIS — C50.912 MALIGNANT NEOPLASM OF LEFT BREAST IN FEMALE, ESTROGEN RECEPTOR POSITIVE, UNSPECIFIED SITE OF BREAST (H): Primary | ICD-10-CM

## 2021-09-15 PROCEDURE — 77387 GUIDANCE FOR RADJ TX DLVR: CPT | Performed by: RADIOLOGY

## 2021-09-15 PROCEDURE — 99207 PR DROP WITH A PROCEDURE: CPT | Performed by: RADIOLOGY

## 2021-09-15 PROCEDURE — 77412 RADIATION TX DELIVERY LVL 3: CPT | Performed by: RADIOLOGY

## 2021-09-15 PROCEDURE — T1013 SIGN LANG/ORAL INTERPRETER: HCPCS | Mod: GT

## 2021-09-15 RX ORDER — SILVER SULFADIAZINE 10 MG/G
CREAM TOPICAL 3 TIMES DAILY
Qty: 400 G | Refills: 0 | Status: SHIPPED | OUTPATIENT
Start: 2021-09-15 | End: 2022-01-27

## 2021-09-15 RX ORDER — CEPHALEXIN 500 MG/1
500 CAPSULE ORAL 2 TIMES DAILY
Qty: 14 CAPSULE | Refills: 0 | Status: SHIPPED | OUTPATIENT
Start: 2021-09-15 | End: 2021-09-21

## 2021-09-15 ASSESSMENT — PAIN SCALES - GENERAL: PAINLEVEL: MILD PAIN (2)

## 2021-09-15 NOTE — PATIENT INSTRUCTIONS
Please contact [facility] RN with questions or concerns following today's appointment: [phone].    Thank you!

## 2021-09-15 NOTE — LETTER
9/15/2021         RE: Ghazal Martinez  [address]  [place] [address]        Dear Colleague,    Thank you for referring your patient, Ghazal Martinez, to the [facility] RADIATION ONCOLOGY [place]. Please see a copy of my visit note below.    [facility] PHYSICIANS  SPECIALIZING IN BREAKTHROUGHS  Radiation Oncology    On Treatment Visit Note      Ghazal Martinez      Date: 2021   MRN: [id number]   : 1980  Diagnosis: L breast ER+      Reason for Visit:  On Radiation Treatment Visit     Treatment Summary to Date  Treatment Site: L CW + scar, L SC, IM bst Current Dose: 5800/6000, 5000/5000, 800/1600 cGy Fractions: , ,       Chemotherapy  Chemo concurrent with radx?: No (Dr. Fine)    Subjective:     More skin reaction this week with dry peeling areas under left underarm and mid chest.  Pt also is more tired    Nursing ROS:   Nutrition Alteration  Diet Type: Patient's Preference  Skin  Skin Reaction: 2 - Moderate to brisk erythema, patchy moist desquamation, mostly confined to skin folds and creases, moderate edema (dry desquamation to L underarm and mid chest)  Skin Intervention: Patient to apply silvadene 3 x day, Aquaphor 5 x day, stop mometasone, Keflex 2 tablets x 7 days  Skin Note: Radiation Dermatitis noted. Patient reports some breast swelling/hardness to upper L breast where expander is        Cardiovascular  Respiratory effort: 1 - Normal - without distress        Psychosocial  Pyschosocial Note: Patient report slight fatigue  Pain Assessment  0-10 Pain Scale: 3  Pain Descriptors: Aching  Pain Treatment: Patient denies need for Tylenol   Pain Note: Cool packs PRN      Objective:   /80 (BP Location: Right arm, Patient Position: Sitting, Cuff Size: Adult Regular)   Pulse 70   Temp 97.5  F (36.4  C) (Oral)   Resp 16   Wt 73.7 kg (162 lb 6.4 oz)   SpO2 99%   BMI 29.23 kg/m    Gen: Appears well, in no acute distress  Skin: Moderate  diffuse erythema and hyperpigmentation over treatment field    Labs:  CBC RESULTS: Recent Labs   Lab Test 07/29/21  1113   WBC 4.5   RBC 4.02   HGB 11.5*   HCT 35.8   MCV 89   MCH 28.6   MCHC 32.1   RDW 16.3*        ELECTROLYTES:  Recent Labs   Lab Test 07/02/21  1200      POTASSIUM 3.7   CHLORIDE 111*   BILLY 8.8   CO2 23   BUN 9   CR 0.59   *       Assessment:    Tolerating radiation therapy well.  All questions and concerns addressed.    Toxicities:  Fatigue: Grade 1: Fatigue relieved by rest  Pain: Grade 1: Mild pain  Dermatitis: Grade 2: Moderate to brisk erythema; patchy moist desquamation, mostly confined to skin folds and creases; moderate erythema    Plan:   1. Continue current therapy.    2. Skin care per above.      Mosaiq chart and setup information reviewed  Ports checked    Medication Review  Med list reviewed with patient?: Yes    Educational Topic Discussed  Additional Instructions:  used, virtual follow up with Juanita MYERS in 1 and 3.5 months. Follow up with Laura Mccullough NP 9/27/2021  Education Instructions: Skin cares, Fatigue- Nepalese Intepreter         Nelda Burden MD    Please do not send letter to referring physician.          Again, thank you for allowing me to participate in the care of your patient.        Sincerely,        RAIZA Burden MD

## 2021-09-16 ENCOUNTER — ALLIED HEALTH/NURSE VISIT (OUTPATIENT)
Dept: RADIATION ONCOLOGY | Facility: CLINIC | Age: 41
End: 2021-09-16

## 2021-09-16 ENCOUNTER — APPOINTMENT (OUTPATIENT)
Dept: RADIATION ONCOLOGY | Facility: CLINIC | Age: 41
End: 2021-09-16
Payer: COMMERCIAL

## 2021-09-16 DIAGNOSIS — C50.912 MALIGNANT NEOPLASM OF LEFT BREAST IN FEMALE, ESTROGEN RECEPTOR POSITIVE, UNSPECIFIED SITE OF BREAST (H): Primary | ICD-10-CM

## 2021-09-16 DIAGNOSIS — Z17.0 MALIGNANT NEOPLASM OF LEFT BREAST IN FEMALE, ESTROGEN RECEPTOR POSITIVE, UNSPECIFIED SITE OF BREAST (H): Primary | ICD-10-CM

## 2021-09-16 PROCEDURE — 77387 GUIDANCE FOR RADJ TX DLVR: CPT | Performed by: SURGERY

## 2021-09-16 PROCEDURE — 77427 RADIATION TX MANAGEMENT X5: CPT | Performed by: RADIOLOGY

## 2021-09-16 PROCEDURE — 77412 RADIATION TX DELIVERY LVL 3: CPT | Performed by: SURGERY

## 2021-09-16 PROCEDURE — 77336 RADIATION PHYSICS CONSULT: CPT | Performed by: SURGERY

## 2021-09-16 ASSESSMENT — PAIN SCALES - GENERAL: PAINLEVEL: MODERATE PAIN (5)

## 2021-09-16 NOTE — PATIENT INSTRUCTIONS
Please contact [facility] RN with questions or concerns following today's appointment: [phone].    Thank you!

## 2021-09-16 NOTE — NURSING NOTE
"This RN received notification of worsening skin reaction from radiation therapists, met with patient following daily radiation therapy.  Patient has thus far completed 30 fractions with current dose of 6,000 cGy to left chest wall.  Patient evaluated by this RN, diffuse hyperpigmentation and erythema noted throughout left breast mound with half-dollar size dry desquamation in left axilla.  Patient reports pain \"5/10\", however feels more concerned regarding skin cares than pain management.  [employer] interpretor services used via telephone and Turkmen interpretor assist with clinic visit.  Patient reports she is currently applying SSD cream and Aquaphor to her skin, she reports she is no longer using Mometasone cream.  Reviewed continued skin cares with patient, reviewed cleansing of skin with water or normal saline only and avoiding soap to radiation treated skin.  Reviewed application of Silvadene cream three times daily followed by Aquaphor application three times daily to full left breast mound and axilla.  Reviewed use of Aquaphor with Telfa pad to peeling area in left underarm and changing this three times daily with Silvadene and Aquaphor application.  Patient verbalized understanding of all information and reports she has no additional questions at this time.  Patient instructed to contact clinic with any additional questions or concerns.    Julia Choi RN BSN OCN CBCN    "

## 2021-09-17 ENCOUNTER — APPOINTMENT (OUTPATIENT)
Dept: RADIATION ONCOLOGY | Facility: CLINIC | Age: 41
End: 2021-09-17
Payer: COMMERCIAL

## 2021-09-17 PROCEDURE — 77387 GUIDANCE FOR RADJ TX DLVR: CPT | Performed by: SURGERY

## 2021-09-17 PROCEDURE — 77412 RADIATION TX DELIVERY LVL 3: CPT | Performed by: SURGERY

## 2021-09-18 NOTE — PROGRESS NOTES
[facility] PHYSICIANS  SPECIALIZING IN BREAKTHROUGHS  Radiation Oncology    On Treatment Visit Note      Ghazal Martinez      Date: 2021   MRN: [id number]   : 1980  Diagnosis: L breast ER+      Reason for Visit:  On Radiation Treatment Visit     Treatment Summary to Date  Treatment Site: L CW + scar, L SC, IM bst Current Dose: 5800/6000, 5000/5000, 800/1600 cGy Fractions: , /, /8      Chemotherapy  Chemo concurrent with radx?: No (Dr. Fine)    Subjective:     More skin reaction this week with dry peeling areas under left underarm and mid chest.  Pt also is more tired    Nursing ROS:   Nutrition Alteration  Diet Type: Patient's Preference  Skin  Skin Reaction: 2 - Moderate to brisk erythema, patchy moist desquamation, mostly confined to skin folds and creases, moderate edema (dry desquamation to L underarm and mid chest)  Skin Intervention: Patient to apply silvadene 3 x day, Aquaphor 5 x day, stop mometasone, Keflex 2 tablets x 7 days  Skin Note: Radiation Dermatitis noted. Patient reports some breast swelling/hardness to upper L breast where expander is        Cardiovascular  Respiratory effort: 1 - Normal - without distress        Psychosocial  Pyschosocial Note: Patient report slight fatigue  Pain Assessment  0-10 Pain Scale: 3  Pain Descriptors: Aching  Pain Treatment: Patient denies need for Tylenol   Pain Note: Cool packs PRN      Objective:   /80 (BP Location: Right arm, Patient Position: Sitting, Cuff Size: Adult Regular)   Pulse 70   Temp 97.5  F (36.4  C) (Oral)   Resp 16   Wt 73.7 kg (162 lb 6.4 oz)   SpO2 99%   BMI 29.23 kg/m    Gen: Appears well, in no acute distress  Skin: Moderate diffuse erythema and hyperpigmentation over treatment field    Labs:  CBC RESULTS: Recent Labs   Lab Test 21  1113   WBC 4.5   RBC 4.02   HGB 11.5*   HCT 35.8   MCV 89   MCH 28.6   MCHC 32.1   RDW 16.3*        ELECTROLYTES:  Recent Labs   Lab Test  07/02/21  1200      POTASSIUM 3.7   CHLORIDE 111*   BILLY 8.8   CO2 23   BUN 9   CR 0.59   *       Assessment:    Tolerating radiation therapy well.  All questions and concerns addressed.    Toxicities:  Fatigue: Grade 1: Fatigue relieved by rest  Pain: Grade 1: Mild pain  Dermatitis: Grade 2: Moderate to brisk erythema; patchy moist desquamation, mostly confined to skin folds and creases; moderate erythema    Plan:   1. Continue current therapy.    2. Skin care per above.      Mosaiq chart and setup information reviewed  Ports checked    Medication Review  Med list reviewed with patient?: Yes    Educational Topic Discussed  Additional Instructions:  used, virtual follow up with Juanita MYERS in 1 and 3.5 months. Follow up with Laura Mccullough NP 9/27/2021  Education Instructions: Skin cares, Fatigue- Faroese Intepreter         Nelda Burden MD    Please do not send letter to referring physician.

## 2021-09-20 ENCOUNTER — APPOINTMENT (OUTPATIENT)
Dept: RADIATION ONCOLOGY | Facility: CLINIC | Age: 41
End: 2021-09-20
Payer: COMMERCIAL

## 2021-09-20 PROCEDURE — 77387 GUIDANCE FOR RADJ TX DLVR: CPT | Performed by: RADIOLOGY

## 2021-09-20 PROCEDURE — 77412 RADIATION TX DELIVERY LVL 3: CPT | Performed by: RADIOLOGY

## 2021-09-20 PROCEDURE — 77417 THER RADIOLOGY PORT IMAGE(S): CPT | Performed by: RADIOLOGY

## 2021-09-21 ENCOUNTER — APPOINTMENT (OUTPATIENT)
Dept: RADIATION ONCOLOGY | Facility: CLINIC | Age: 41
End: 2021-09-21
Payer: COMMERCIAL

## 2021-09-21 ENCOUNTER — OFFICE VISIT (OUTPATIENT)
Dept: RADIATION ONCOLOGY | Facility: CLINIC | Age: 41
End: 2021-09-21
Payer: COMMERCIAL

## 2021-09-21 VITALS
HEART RATE: 65 BPM | RESPIRATION RATE: 16 BRPM | TEMPERATURE: 98.1 F | DIASTOLIC BLOOD PRESSURE: 79 MMHG | WEIGHT: 162.4 LBS | SYSTOLIC BLOOD PRESSURE: 109 MMHG | BODY MASS INDEX: 29.23 KG/M2 | OXYGEN SATURATION: 100 %

## 2021-09-21 DIAGNOSIS — L58.9 RADIATION DERMATITIS: ICD-10-CM

## 2021-09-21 DIAGNOSIS — Z17.0 MALIGNANT NEOPLASM OF LEFT BREAST IN FEMALE, ESTROGEN RECEPTOR POSITIVE, UNSPECIFIED SITE OF BREAST (H): ICD-10-CM

## 2021-09-21 DIAGNOSIS — C50.912 MALIGNANT NEOPLASM OF LEFT BREAST IN FEMALE, ESTROGEN RECEPTOR POSITIVE, UNSPECIFIED SITE OF BREAST (H): ICD-10-CM

## 2021-09-21 PROCEDURE — 99207 PR DROP WITH A PROCEDURE: CPT | Performed by: RADIOLOGY

## 2021-09-21 PROCEDURE — 77412 RADIATION TX DELIVERY LVL 3: CPT | Performed by: RADIOLOGY

## 2021-09-21 PROCEDURE — 77387 GUIDANCE FOR RADJ TX DLVR: CPT | Performed by: RADIOLOGY

## 2021-09-21 PROCEDURE — 77336 RADIATION PHYSICS CONSULT: CPT | Performed by: RADIOLOGY

## 2021-09-21 PROCEDURE — 77427 RADIATION TX MANAGEMENT X5: CPT | Performed by: RADIOLOGY

## 2021-09-21 RX ORDER — CEPHALEXIN 500 MG/1
500 CAPSULE ORAL 2 TIMES DAILY
Qty: 14 CAPSULE | Refills: 0 | Status: SHIPPED | OUTPATIENT
Start: 2021-09-21 | End: 2021-09-27

## 2021-09-21 ASSESSMENT — PAIN SCALES - GENERAL: PAINLEVEL: MODERATE PAIN (4)

## 2021-09-21 NOTE — LETTER
2021         RE: Ghazal Martinez  [address]  [place] [address]        Dear Colleague,    Thank you for referring your patient, Ghazal Martinez, to the [facility] [place]. Please see a copy of my visit note below.    [facility] PHYSICIANS  SPECIALIZING IN BREAKTHROUGHS  Radiation Oncology    On Treatment Visit Note      Ghazal Martinez      Date: 2021   MRN: [id number]   : 1980  Diagnosis: L breast ER+      Reason for Visit:  On Radiation Treatment Visit     Treatment Summary to Date  Treatment Site: L CW + scar, L SC, IM bst Current Dose: 6000/6000, 5000/5000, 1600/1600 cGy Fractions: 30/30, 25/, /8      Chemotherapy  Chemo concurrent with radx?: No (Dr. Fine)    Subjective:     Skin is healing but darker throughout.  No excess peeling.    Nursing ROS:   Nutrition Alteration  Diet Type: Patient's Preference  Skin  Skin Reaction: 2 - Moderate to brisk erythema, patchy moist desquamation, mostly confined to skin folds and creases, moderate edema (dry desquamation to L underarm and mid chest)  Skin Intervention: Patient to apply silvadene 3 x day, Aquaphor 5 x day, stop mometasone, Keflex 2 tablets x 7 days  Skin Note: Radiation Dermatitis noted. Patient reports some breast swelling/hardness to upper L breast where expander is        Cardiovascular  Respiratory effort: 1 - Normal - without distress        Psychosocial  Pyschosocial Note: Patient report slight fatigue  Pain Assessment  0-10 Pain Scale: 3  Pain Descriptors: Aching  Pain Treatment: Patient denies need for Tylenol   Pain Note: Cool packs PRN      Objective:   /79 (BP Location: Right arm, Patient Position: Sitting, Cuff Size: Adult Regular)   Pulse 65   Temp 98.1  F (36.7  C) (Oral)   Resp 16   Wt 73.7 kg (162 lb 6.4 oz)   SpO2 100%   BMI 29.23 kg/m    Gen: Appears well, in no acute distress  Skin: Moderate diffuse erythema over treatment field with  hyperpigmentation.  Dry desquamation under left axilla.    Labs:  CBC RESULTS: Recent Labs   Lab Test 07/29/21  1113   WBC 4.5   RBC 4.02   HGB 11.5*   HCT 35.8   MCV 89   MCH 28.6   MCHC 32.1   RDW 16.3*        ELECTROLYTES:  Recent Labs   Lab Test 07/02/21  1200      POTASSIUM 3.7   CHLORIDE 111*   BILLY 8.8   CO2 23   BUN 9   CR 0.59   *       Assessment:    Tolerating radiation therapy well.  All questions and concerns addressed.    Toxicities:  Fatigue: Grade 1: Fatigue relieved by rest  Pain: Grade 1: Mild pain  Dermatitis: Grade 2: Moderate to brisk erythema; patchy moist desquamation, mostly confined to skin folds and creases; moderate erythema    Plan:   1. Continue current therapy.    2. Continue p.o. antibiotics with Keflex  3. Continue with Silvadene cream and Aquaphor 3 times a day  4. Patient to follow-up with me Wednesday, September 29, 2021      Mosaiq chart and setup information reviewed  Ports checked    Medication Review  Med list reviewed with patient?: Yes    Educational Topic Discussed  Additional Instructions:  used, Return appointment with Dr. Burden on 9/29/2021 virtual follow up with Juanita MYERS in 1 and 3.5 months. Follow up with Laura Mccullough NP 9/27/2021  Education Instructions: Skin cares, Fatigue- Wol[place] Intepreter         Nelda Burden MD    Please do not send letter to referring physician.          Again, thank you for allowing me to participate in the care of your patient.        Sincerely,        RAIZA Burden MD

## 2021-09-21 NOTE — PATIENT INSTRUCTIONS
Please contact [facility] RN with questions or concerns following today's appointment: [phone].    Thank you!

## 2021-09-21 NOTE — PROGRESS NOTES
[facility] PHYSICIANS  SPECIALIZING IN BREAKTHROUGHS  Radiation Oncology    On Treatment Visit Note      Ghazal Martinez      Date: 2021   MRN: [id number]   : 1980  Diagnosis: L breast ER+      Reason for Visit:  On Radiation Treatment Visit     Treatment Summary to Date  Treatment Site: L CW + scar, L SC, IM bst Current Dose: 6000/6000, 5000/5000, 1600/1600 cGy Fractions: 30/30, 25/25, 8/8      Chemotherapy  Chemo concurrent with radx?: No (Dr. Fine)    Subjective:     Skin is healing but darker throughout.  No excess peeling.    Nursing ROS:   Nutrition Alteration  Diet Type: Patient's Preference  Skin  Skin Reaction: 2 - Moderate to brisk erythema, patchy moist desquamation, mostly confined to skin folds and creases, moderate edema (dry desquamation to L underarm and mid chest)  Skin Intervention: Patient to apply silvadene 3 x day, Aquaphor 5 x day, stop mometasone, Keflex 2 tablets x 7 days  Skin Note: Radiation Dermatitis noted. Patient reports some breast swelling/hardness to upper L breast where expander is        Cardiovascular  Respiratory effort: 1 - Normal - without distress        Psychosocial  Pyschosocial Note: Patient report slight fatigue  Pain Assessment  0-10 Pain Scale: 3  Pain Descriptors: Aching  Pain Treatment: Patient denies need for Tylenol   Pain Note: Cool packs PRN      Objective:   /79 (BP Location: Right arm, Patient Position: Sitting, Cuff Size: Adult Regular)   Pulse 65   Temp 98.1  F (36.7  C) (Oral)   Resp 16   Wt 73.7 kg (162 lb 6.4 oz)   SpO2 100%   BMI 29.23 kg/m    Gen: Appears well, in no acute distress  Skin: Moderate diffuse erythema over treatment field with hyperpigmentation.  Dry desquamation under left axilla.    Labs:  CBC RESULTS: Recent Labs   Lab Test 21  1113   WBC 4.5   RBC 4.02   HGB 11.5*   HCT 35.8   MCV 89   MCH 28.6   MCHC 32.1   RDW 16.3*        ELECTROLYTES:  Recent Labs   Lab Test 21  1200   NA  142   POTASSIUM 3.7   CHLORIDE 111*   BILLY 8.8   CO2 23   BUN 9   CR 0.59   *       Assessment:    Tolerating radiation therapy well.  All questions and concerns addressed.    Toxicities:  Fatigue: Grade 1: Fatigue relieved by rest  Pain: Grade 1: Mild pain  Dermatitis: Grade 2: Moderate to brisk erythema; patchy moist desquamation, mostly confined to skin folds and creases; moderate erythema    Plan:   1. Continue current therapy.    2. Continue p.o. antibiotics with Keflex  3. Continue with Silvadene cream and Aquaphor 3 times a day  4. Patient to follow-up with me Wednesday, September 29, 2021      Mosaiq chart and setup information reviewed  Ports checked    Medication Review  Med list reviewed with patient?: Yes    Educational Topic Discussed  Additional Instructions:  used, Return appointment with Dr. Burden on 9/29/2021 virtual follow up with Juanita MYERS in 1 and 3.5 months. Follow up with Laura Mccullough NP 9/27/2021  Education Instructions: Skin cares, Fatigue- English Intepreter         Nelda Burden MD    Please do not send letter to referring physician.

## 2021-09-27 ENCOUNTER — ONCOLOGY VISIT (OUTPATIENT)
Dept: ONCOLOGY | Facility: CLINIC | Age: 41
End: 2021-09-27
Payer: COMMERCIAL

## 2021-09-27 ENCOUNTER — ONCOLOGY VISIT (OUTPATIENT)
Dept: ONCOLOGY | Facility: CLINIC | Age: 41
End: 2021-09-27
Attending: INTERNAL MEDICINE
Payer: COMMERCIAL

## 2021-09-27 VITALS
BODY MASS INDEX: 27.93 KG/M2 | OXYGEN SATURATION: 98 % | DIASTOLIC BLOOD PRESSURE: 80 MMHG | WEIGHT: 163.6 LBS | HEIGHT: 64 IN | HEART RATE: 62 BPM | SYSTOLIC BLOOD PRESSURE: 118 MMHG

## 2021-09-27 DIAGNOSIS — Z17.0 MALIGNANT NEOPLASM OF LEFT BREAST IN FEMALE, ESTROGEN RECEPTOR POSITIVE, UNSPECIFIED SITE OF BREAST (H): Primary | ICD-10-CM

## 2021-09-27 DIAGNOSIS — R91.8 PULMONARY NODULES: ICD-10-CM

## 2021-09-27 DIAGNOSIS — C50.912 MALIGNANT NEOPLASM OF LEFT BREAST IN FEMALE, ESTROGEN RECEPTOR POSITIVE, UNSPECIFIED SITE OF BREAST (H): Primary | ICD-10-CM

## 2021-09-27 DIAGNOSIS — L58.9 RADIATION DERMATITIS: ICD-10-CM

## 2021-09-27 DIAGNOSIS — E04.1 THYROID NODULE: ICD-10-CM

## 2021-09-27 DIAGNOSIS — C50.912 MALIGNANT NEOPLASM OF LEFT BREAST IN FEMALE, ESTROGEN RECEPTOR POSITIVE, UNSPECIFIED SITE OF BREAST (H): ICD-10-CM

## 2021-09-27 DIAGNOSIS — Z17.0 MALIGNANT NEOPLASM OF LEFT BREAST IN FEMALE, ESTROGEN RECEPTOR POSITIVE, UNSPECIFIED SITE OF BREAST (H): ICD-10-CM

## 2021-09-27 DIAGNOSIS — R74.8 ELEVATED LIVER ENZYMES: ICD-10-CM

## 2021-09-27 LAB
ALBUMIN SERPL-MCNC: 3.4 G/DL (ref 3.4–5)
ALP SERPL-CCNC: 89 U/L (ref 40–150)
ALT SERPL W P-5'-P-CCNC: 70 U/L (ref 0–50)
AST SERPL W P-5'-P-CCNC: 39 U/L (ref 0–45)
BASOPHILS # BLD AUTO: 0 10E3/UL (ref 0–0.2)
BASOPHILS NFR BLD AUTO: 0 %
BILIRUB DIRECT SERPL-MCNC: 0.1 MG/DL (ref 0–0.2)
BILIRUB SERPL-MCNC: 0.3 MG/DL (ref 0.2–1.3)
CREAT SERPL-MCNC: 0.75 MG/DL (ref 0.52–1.04)
EOSINOPHIL # BLD AUTO: 0.1 10E3/UL (ref 0–0.7)
EOSINOPHIL NFR BLD AUTO: 4 %
ERYTHROCYTE [DISTWIDTH] IN BLOOD BY AUTOMATED COUNT: 13.2 % (ref 10–15)
GFR SERPL CREATININE-BSD FRML MDRD: >90 ML/MIN/1.73M2
HCT VFR BLD AUTO: 34.9 % (ref 35–47)
HGB BLD-MCNC: 12 G/DL (ref 11.7–15.7)
IMM GRANULOCYTES # BLD: 0 10E3/UL
IMM GRANULOCYTES NFR BLD: 0 %
LYMPHOCYTES # BLD AUTO: 0.6 10E3/UL (ref 0.8–5.3)
LYMPHOCYTES NFR BLD AUTO: 18 %
MCH RBC QN AUTO: 28.5 PG (ref 26.5–33)
MCHC RBC AUTO-ENTMCNC: 34.4 G/DL (ref 31.5–36.5)
MCV RBC AUTO: 83 FL (ref 78–100)
MONOCYTES # BLD AUTO: 0.3 10E3/UL (ref 0–1.3)
MONOCYTES NFR BLD AUTO: 9 %
NEUTROPHILS # BLD AUTO: 2.4 10E3/UL (ref 1.6–8.3)
NEUTROPHILS NFR BLD AUTO: 69 %
NRBC # BLD AUTO: 0 10E3/UL
NRBC BLD AUTO-RTO: 0 /100
PLATELET # BLD AUTO: 241 10E3/UL (ref 150–450)
PROT SERPL-MCNC: 7 G/DL (ref 6.8–8.8)
RBC # BLD AUTO: 4.21 10E6/UL (ref 3.8–5.2)
WBC # BLD AUTO: 3.5 10E3/UL (ref 4–11)

## 2021-09-27 PROCEDURE — 82565 ASSAY OF CREATININE: CPT | Performed by: INTERNAL MEDICINE

## 2021-09-27 PROCEDURE — 85025 COMPLETE CBC W/AUTO DIFF WBC: CPT | Performed by: INTERNAL MEDICINE

## 2021-09-27 PROCEDURE — 99215 OFFICE O/P EST HI 40 MIN: CPT | Performed by: NURSE PRACTITIONER

## 2021-09-27 PROCEDURE — 80076 HEPATIC FUNCTION PANEL: CPT | Performed by: INTERNAL MEDICINE

## 2021-09-27 PROCEDURE — 99207 PR NO CHARGE LOS: CPT

## 2021-09-27 RX ORDER — HEPARIN SODIUM (PORCINE) LOCK FLUSH IV SOLN 100 UNIT/ML 100 UNIT/ML
5 SOLUTION INTRAVENOUS EVERY 8 HOURS
Status: DISCONTINUED | OUTPATIENT
Start: 2021-09-27 | End: 2021-09-27 | Stop reason: HOSPADM

## 2021-09-27 RX ORDER — TAMOXIFEN CITRATE 20 MG/1
20 TABLET ORAL DAILY
Qty: 30 TABLET | Refills: 2 | Status: SHIPPED | OUTPATIENT
Start: 2021-09-27 | End: 2021-12-09

## 2021-09-27 RX ADMIN — HEPARIN SODIUM (PORCINE) LOCK FLUSH IV SOLN 100 UNIT/ML 5 ML: 100 SOLUTION at 14:58

## 2021-09-27 ASSESSMENT — PAIN SCALES - GENERAL: PAINLEVEL: MILD PAIN (2)

## 2021-09-27 ASSESSMENT — MIFFLIN-ST. JEOR: SCORE: 1392.08

## 2021-09-27 NOTE — NURSING NOTE
"Oncology Rooming Note    September 27, 2021 3:19 PM   Ghazal Martinez is a 40 year old female who presents for:    Chief Complaint   Patient presents with     Oncology Clinic Visit     follow up     Initial Vitals: /80 (BP Location: Right arm, Patient Position: Chair, Cuff Size: Adult Regular)   Pulse 62   Ht 1.626 m (5' 4\")   Wt 74.2 kg (163 lb 9.6 oz)   SpO2 98%   BMI 28.08 kg/m   Estimated body mass index is 28.08 kg/m  as calculated from the following:    Height as of this encounter: 1.626 m (5' 4\").    Weight as of this encounter: 74.2 kg (163 lb 9.6 oz). Body surface area is 1.83 meters squared.  Mild Pain (2) Comment: Data Unavailable   No LMP recorded. (Menstrual status: Chemotherapy).  Allergies reviewed: Yes  Medications reviewed: Yes    Medications: Medication refills not needed today.  Pharmacy name entered into [facility]:    [facility] [place] [facility] [address] [id number] - [facility] [place], [place] - [address] [facility] [place] - [place], [place] - [address]    Clinical concerns: Pain in right foot - noticed when she started chemo     Laura was notified.      Sade Pearson CMA              "

## 2021-09-27 NOTE — LETTER
9/27/2021         RE: Ghazal Martinez  [address]  [place] [address]        Dear Colleague,    Thank you for referring your patient, Ghazal Martinez, to the [facility]. Please see a copy of my visit note below.    Oncology Follow Up Visit: September 27, 2021      Oncologist: Dr Mallory Fine  PCP: No Ref-Primary, Physician    Diagnosis: Left Breast Cancer  Ghazal Martinez is a 39 yo female who presented in 2/2021 with left breast lump x 1 month.   Contrast enhanced mammogram on 2/23/2021 showed a mass at the 10:00 position in left breast anterior depth measuring 1.7 cm. US guided core needle biopsy on 2/23/2021 showed no decompensating invasive ductal carcinoma, estrogen receptor positive (95%, strong) and progesterone receptor positive by immunohistochemistry (70%). By FISH HER2/MIO 17 ratio:  1.7, IHC 2+, additional 20 cells from areas corresponding to the most intense IHC staining were evaluated by FISH. The results obtained from these 20   additional cells also fall into Group 4. Taken together, the FISH and IHC   results are interpreted as HER2 negative.FISH and IHC results ultimately interpreted as HER2 negative.   OncotypeDx returned at 23, c/w intermediate risk, 9 percent of distant disease recurrence with the use of systemic endocrine therapy and  6.5%  benefit of adjuvant chemotherapy given young age, premenopausal.  *3/19/2021Genetic testing negative  for mutations in the FLEX, BRCA1, BRCA2, BRIP1, CDH1, CHEK2, EPCAM, MLH1, MSH2, MSH6, NBN, NF1, PALB2, PMS2, PTEN, RAD51C, RAD51D, STK11, and TP53 genes.  Treatment:   4/5/2021 status post bilateral mastectomy and axillary sentinel lymph node biopsy   4/30/2021 began treatment with Taxotere/Cytoxan-plan for 4 cycles  9/21/2021 completed radiation therapy.   Plan for endocrine therapy.     Interval History: Ms. Martinez is seen today in clinic for follow-up to her breast cancer treatment.  Patient shares she  "completed radiation therapy last week and just finished Bactrim DS antibiotic for prevention of infection to the left breast area.  She is using Aquaphor daily to the breast and feels this is only mildly painful and is slowly healing.  She otherwise has fairly good energy and her hair is growing back which she is happy about.  She feels she is eating well and bowel and bladder are completely normal.  Peripheral neuropathy to the feet still noted and can cause some irritation during the night. she has had no illnesses and is aware that we need to start endocrine therapy.  Rest of comprehensive and complete ROS is reviewed and is negative.   Past Medical History:   Diagnosis Date     Varicose veins of legs      Current Outpatient Medications   Medication     cephALEXin (KEFLEX) 500 MG capsule     hydroquinone (JOSE ANGEL) 4 % external cream     mometasone (ELOCON) 0.1 % external cream     silver sulfADIAZINE (SILVADENE) 1 % external cream     No current facility-administered medications for this visit.     No Known Allergies    Physical Exam:/80 (BP Location: Right arm, Patient Position: Chair, Cuff Size: Adult Regular)   Pulse 62   Ht 1.626 m (5' 4\")   Wt 74.2 kg (163 lb 9.6 oz)   SpO2 98%   BMI 28.08 kg/m     Constitutional: Alert, overweight, and in no distress.   ENT: Eyes bright, No mouth sores  Respiratory: Breathing easy.  No cough  Breasts: Bilaterally reconstructed breasts with the left noted to be quite red skin darkened and peeling.  No warmth to the area that is suspicious for infection and no discharge.  It is well moisturized at this point.  Abdomen: Soft, non-tender, BS normal. No masses or organomegaly  MS: Muscle tone normal, extremities normal with no edema.   Skin: See breasts above.  Hair returning  Neuro: Sensory grossly WNL, gait normal.   Psych: Mentation appears normal and affect normal/bright and smiling    Laboratory Results:   Results for orders placed or performed in visit on " 09/27/21   Creatinine     Status: Normal   Result Value Ref Range    Creatinine 0.75 0.52 - 1.04 mg/dL    GFR Estimate >90 >60 mL/min/1.73m2   Hepatic panel     Status: Abnormal   Result Value Ref Range    Bilirubin Total 0.3 0.2 - 1.3 mg/dL    Bilirubin Direct 0.1 0.0 - 0.2 mg/dL    Protein Total 7.0 6.8 - 8.8 g/dL    Albumin 3.4 3.4 - 5.0 g/dL    Alkaline Phosphatase 89 40 - 150 U/L    AST 39 0 - 45 U/L    ALT 70 (H) 0 - 50 U/L   CBC with platelets and differential     Status: Abnormal   Result Value Ref Range    WBC Count 3.5 (L) 4.0 - 11.0 10e3/uL    RBC Count 4.21 3.80 - 5.20 10e6/uL    Hemoglobin 12.0 11.7 - 15.7 g/dL    Hematocrit 34.9 (L) 35.0 - 47.0 %    MCV 83 78 - 100 fL    MCH 28.5 26.5 - 33.0 pg    MCHC 34.4 31.5 - 36.5 g/dL    RDW 13.2 10.0 - 15.0 %    Platelet Count 241 150 - 450 10e3/uL    % Neutrophils 69 %    % Lymphocytes 18 %    % Monocytes 9 %    % Eosinophils 4 %    % Basophils 0 %    % Immature Granulocytes 0 %    NRBCs per 100 WBC 0 <1 /100    Absolute Neutrophils 2.4 1.6 - 8.3 10e3/uL    Absolute Lymphocytes 0.6 (L) 0.8 - 5.3 10e3/uL    Absolute Monocytes 0.3 0.0 - 1.3 10e3/uL    Absolute Eosinophils 0.1 0.0 - 0.7 10e3/uL    Absolute Basophils 0.0 0.0 - 0.2 10e3/uL    Absolute Immature Granulocytes 0.0 <=0.0 10e3/uL    Absolute NRBCs 0.0 10e3/uL   CBC with platelets differential     Status: Abnormal    Narrative    The following orders were created for panel order CBC with platelets differential.  Procedure                               Abnormality         Status                     ---------                               -----------         ------                     CBC with platelets and d...[[id number]]  Abnormal            Final result                 Please view results for these tests on the individual orders.     Assessment and Plan:   Left Breast Cancer-patient completed bilateral mastectomies with sentinel node biopsy to the left axilla. Due to young age and Oncotype result was  suggested that she complete 4 cycles of Taxotere /Cytoxan.  She completed radiation therapy on 9/21/2021 and she is healing slowly from the therapy noting that she was treated for infection to the left breast which antibiotic was completed yesterday and today shows redness and peeling however no sign of infection at this time.  Today we did spend the significant amount of time talking about endocrine therapy with tamoxifen.  Reviewed administration potential side effects and expected length of time 5 to 10 years.  Patient's questions were answered and she agrees to start treatment tomorrow.  Reviewed follow-up to be every 3 months in the first year every 3 to 6 months in year 2 and follow-up of every 6 months in the last years 3-5.   Patient will return in 3 months for review with Dr. Fine with labs to include hepatic panel  Reviewed genetic testing showing no mutations with her cancer  Elevated liver enzymes-still noted to have an elevated ALT going 70 today.  She has a history of elevation of her liver enzymes.  We will repeat hepatic panel with next visit and after start of tamoxifen.  Peripheral neuropathy- Peripheral neuropathy to the feet still noted and can cause some irritation during the night but does not want to start gabapentin at this time  Thyroid nodule- found on 3/2021 PET CT- FDG avid hypoattenuating nodule in the left thyroid lobe measuring 1.2 x 0.7 cm and SUV max 4.6. Referral made to endocrinology who completed US of the thyroid showing 3 left sided thyroid nodules - Recommendation for US and follow up with endocrinology in 1 year( 4/2022)  Pulmonary Nodule- 7mm to RUL noted on 3/2021 PET CT. 7/2021 imaging shows node is stable. Next due for review in late 1/2022.   Health maintenance-reviewed diet and exercise and attention to that during the rehabilitation phase now that she is completed her radiation therapy.  Highly recommend exercising including walks biking or other activities 3 to 4  days/week and cutting back on her sugars in her diet.  Covid 19 precautions - highly recommend receiving 2 dose Covid 19 vaccinations when chemotherapy is completed. Discussed need and prevention of disease.  The total time of this encounter amounted to 45 minutes. This time included face-to-face time spent with the patient and , prep work, ordering tests, and performing post visit documentation.  Laura Klein Cnp        Again, thank you for allowing me to participate in the care of your patient.        Sincerely,        Laura Klein, [place], APRN CNP

## 2021-09-27 NOTE — PROGRESS NOTES
Oncology Follow Up Visit: September 27, 2021      Oncologist: Dr Mallory Fine  PCP: No Ref-Primary, Physician    Diagnosis: Left Breast Cancer  Ghazal Martinez is a 41 yo female who presented in 2/2021 with left breast lump x 1 month.   Contrast enhanced mammogram on 2/23/2021 showed a mass at the 10:00 position in left breast anterior depth measuring 1.7 cm. US guided core needle biopsy on 2/23/2021 showed no decompensating invasive ductal carcinoma, estrogen receptor positive (95%, strong) and progesterone receptor positive by immunohistochemistry (70%). By FISH HER2/MIO 17 ratio:  1.7, IHC 2+, additional 20 cells from areas corresponding to the most intense IHC staining were evaluated by FISH. The results obtained from these 20   additional cells also fall into Group 4. Taken together, the FISH and IHC   results are interpreted as HER2 negative.FISH and IHC results ultimately interpreted as HER2 negative.   OncotypeDx returned at 23, c/w intermediate risk, 9 percent of distant disease recurrence with the use of systemic endocrine therapy and  6.5%  benefit of adjuvant chemotherapy given young age, premenopausal.  *3/19/2021Genetic testing negative  for mutations in the FLEX, BRCA1, BRCA2, BRIP1, CDH1, CHEK2, EPCAM, MLH1, MSH2, MSH6, NBN, NF1, PALB2, PMS2, PTEN, RAD51C, RAD51D, STK11, and TP53 genes.  Treatment:   4/5/2021 status post bilateral mastectomy and axillary sentinel lymph node biopsy   4/30/2021 began treatment with Taxotere/Cytoxan-plan for 4 cycles  9/21/2021 completed radiation therapy.   Plan for endocrine therapy.     Interval History: Ms. Martinez is seen today in clinic for follow-up to her breast cancer treatment.  Patient shares she completed radiation therapy last week and just finished Bactrim DS antibiotic for prevention of infection to the left breast area.  She is using Aquaphor daily to the breast and feels this is only mildly painful and is slowly healing.  She otherwise has fairly good  "energy and her hair is growing back which she is happy about.  She feels she is eating well and bowel and bladder are completely normal.  Peripheral neuropathy to the feet still noted and can cause some irritation during the night. she has had no illnesses and is aware that we need to start endocrine therapy.  Rest of comprehensive and complete ROS is reviewed and is negative.   Past Medical History:   Diagnosis Date     Varicose veins of legs      Current Outpatient Medications   Medication     cephALEXin (KEFLEX) 500 MG capsule     hydroquinone (JOSE ANGEL) 4 % external cream     mometasone (ELOCON) 0.1 % external cream     silver sulfADIAZINE (SILVADENE) 1 % external cream     No current facility-administered medications for this visit.     No Known Allergies    Physical Exam:/80 (BP Location: Right arm, Patient Position: Chair, Cuff Size: Adult Regular)   Pulse 62   Ht 1.626 m (5' 4\")   Wt 74.2 kg (163 lb 9.6 oz)   SpO2 98%   BMI 28.08 kg/m     Constitutional: Alert, overweight, and in no distress.   ENT: Eyes bright, No mouth sores  Respiratory: Breathing easy.  No cough  Breasts: Bilaterally reconstructed breasts with the left noted to be quite red skin darkened and peeling.  No warmth to the area that is suspicious for infection and no discharge.  It is well moisturized at this point.  Abdomen: Soft, non-tender, BS normal. No masses or organomegaly  MS: Muscle tone normal, extremities normal with no edema.   Skin: See breasts above.  Hair returning  Neuro: Sensory grossly WNL, gait normal.   Psych: Mentation appears normal and affect normal/bright and smiling    Laboratory Results:   Results for orders placed or performed in visit on 09/27/21   Creatinine     Status: Normal   Result Value Ref Range    Creatinine 0.75 0.52 - 1.04 mg/dL    GFR Estimate >90 >60 mL/min/1.73m2   Hepatic panel     Status: Abnormal   Result Value Ref Range    Bilirubin Total 0.3 0.2 - 1.3 mg/dL    Bilirubin Direct 0.1 0.0 - " 0.2 mg/dL    Protein Total 7.0 6.8 - 8.8 g/dL    Albumin 3.4 3.4 - 5.0 g/dL    Alkaline Phosphatase 89 40 - 150 U/L    AST 39 0 - 45 U/L    ALT 70 (H) 0 - 50 U/L   CBC with platelets and differential     Status: Abnormal   Result Value Ref Range    WBC Count 3.5 (L) 4.0 - 11.0 10e3/uL    RBC Count 4.21 3.80 - 5.20 10e6/uL    Hemoglobin 12.0 11.7 - 15.7 g/dL    Hematocrit 34.9 (L) 35.0 - 47.0 %    MCV 83 78 - 100 fL    MCH 28.5 26.5 - 33.0 pg    MCHC 34.4 31.5 - 36.5 g/dL    RDW 13.2 10.0 - 15.0 %    Platelet Count 241 150 - 450 10e3/uL    % Neutrophils 69 %    % Lymphocytes 18 %    % Monocytes 9 %    % Eosinophils 4 %    % Basophils 0 %    % Immature Granulocytes 0 %    NRBCs per 100 WBC 0 <1 /100    Absolute Neutrophils 2.4 1.6 - 8.3 10e3/uL    Absolute Lymphocytes 0.6 (L) 0.8 - 5.3 10e3/uL    Absolute Monocytes 0.3 0.0 - 1.3 10e3/uL    Absolute Eosinophils 0.1 0.0 - 0.7 10e3/uL    Absolute Basophils 0.0 0.0 - 0.2 10e3/uL    Absolute Immature Granulocytes 0.0 <=0.0 10e3/uL    Absolute NRBCs 0.0 10e3/uL   CBC with platelets differential     Status: Abnormal    Narrative    The following orders were created for panel order CBC with platelets differential.  Procedure                               Abnormality         Status                     ---------                               -----------         ------                     CBC with platelets and d...[[id number]]  Abnormal            Final result                 Please view results for these tests on the individual orders.     Assessment and Plan:   Left Breast Cancer-patient completed bilateral mastectomies with sentinel node biopsy to the left axilla. Due to young age and Oncotype result was suggested that she complete 4 cycles of Taxotere /Cytoxan.  She completed radiation therapy on 9/21/2021 and she is healing slowly from the therapy noting that she was treated for infection to the left breast which antibiotic was completed yesterday and today shows redness  and peeling however no sign of infection at this time.  Today we did spend the significant amount of time talking about endocrine therapy with tamoxifen.  Reviewed administration potential side effects and expected length of time 5 to 10 years.  Patient's questions were answered and she agrees to start treatment tomorrow.  Reviewed follow-up to be every 3 months in the first year every 3 to 6 months in year 2 and follow-up of every 6 months in the last years 3-5.   Patient will return in 3 months for review with Dr. Fine with labs to include hepatic panel  Reviewed genetic testing showing no mutations with her cancer  Elevated liver enzymes-still noted to have an elevated ALT going 70 today.  She has a history of elevation of her liver enzymes.  We will repeat hepatic panel with next visit and after start of tamoxifen.  Peripheral neuropathy- Peripheral neuropathy to the feet still noted and can cause some irritation during the night but does not want to start gabapentin at this time  Thyroid nodule- found on 3/2021 PET CT- FDG avid hypoattenuating nodule in the left thyroid lobe measuring 1.2 x 0.7 cm and SUV max 4.6. Referral made to endocrinology who completed US of the thyroid showing 3 left sided thyroid nodules - Recommendation for US and follow up with endocrinology in 1 year( 4/2022)  Pulmonary Nodule- 7mm to RUL noted on 3/2021 PET CT. 7/2021 imaging shows node is stable. Next due for review in late 1/2022.   Health maintenance-reviewed diet and exercise and attention to that during the rehabilitation phase now that she is completed her radiation therapy.  Highly recommend exercising including walks biking or other activities 3 to 4 days/week and cutting back on her sugars in her diet.  Covid 19 precautions - highly recommend receiving 2 dose Covid 19 vaccinations when chemotherapy is completed. Discussed need and prevention of disease.  The total time of this encounter amounted to 45 minutes. This  time included face-to-face time spent with the patient and , prep work, ordering tests, and performing post visit documentation.  Laura Klein,Cnp

## 2021-09-27 NOTE — PROGRESS NOTES
Port needle placed for lab draw access, Sterile technique performed and maintained. Patient tolerated procedure well. Tubes drawn in rainbow order: red, green, purple. Gauze dressing placed with use of paper tape.    RN advised patient to leave gauze in place for 20 mins.     Patient demonstrated verbal understanding.     Althea Lin RN  [facility] Oncology/[facility]

## 2021-09-29 ENCOUNTER — OFFICE VISIT (OUTPATIENT)
Dept: RADIATION ONCOLOGY | Facility: CLINIC | Age: 41
End: 2021-09-29
Payer: COMMERCIAL

## 2021-09-29 VITALS
OXYGEN SATURATION: 98 % | WEIGHT: 164.7 LBS | DIASTOLIC BLOOD PRESSURE: 80 MMHG | RESPIRATION RATE: 16 BRPM | SYSTOLIC BLOOD PRESSURE: 119 MMHG | BODY MASS INDEX: 28.27 KG/M2 | HEART RATE: 66 BPM

## 2021-09-29 DIAGNOSIS — C50.912 MALIGNANT NEOPLASM OF LEFT BREAST IN FEMALE, ESTROGEN RECEPTOR POSITIVE, UNSPECIFIED SITE OF BREAST (H): ICD-10-CM

## 2021-09-29 DIAGNOSIS — Z17.0 MALIGNANT NEOPLASM OF LEFT BREAST IN FEMALE, ESTROGEN RECEPTOR POSITIVE, UNSPECIFIED SITE OF BREAST (H): ICD-10-CM

## 2021-09-29 DIAGNOSIS — L58.9 RADIATION DERMATITIS: Primary | ICD-10-CM

## 2021-09-29 PROCEDURE — 99024 POSTOP FOLLOW-UP VISIT: CPT | Performed by: RADIOLOGY

## 2021-09-29 RX ORDER — CEPHALEXIN 500 MG/1
500 CAPSULE ORAL 2 TIMES DAILY
Qty: 14 CAPSULE | Refills: 0 | Status: SHIPPED | OUTPATIENT
Start: 2021-09-29 | End: 2021-11-09

## 2021-09-29 ASSESSMENT — PAIN SCALES - GENERAL: PAINLEVEL: NO PAIN (0)

## 2021-09-29 NOTE — PROGRESS NOTES
Dear Colleagues,  Today Ghazal Martinez was seen in follow up      IDENTIFICATION: Ghazal Martinez is a 40 year old Polish-speaking premenopausal woman with left UIQ breast cancer and right breast DCIS, status postbilateral mastectomy and left SLNBx followed by immediate reconstruction, revealing in the left breast G3 IDC, [id number] ER+/OH+/H2N- disease and right breast G3 DCIS all excised with negative margins, followed by TC x 4 completed 7/2/21 now s/p PMRT/adjuvant radiation therapy completed last week.   INTERVAL HISTORY:  Ghazal Martinez was last seen in our clinic during her last week of treatment. While on treatment she had complaints of mild fatigue which was relieved by rest (grade 1), pain (grade 1) and developed diffuse skin erythema (grade 2). She was initially treated with mometasone twice daily and Aquaphor throughout the day. After she developed grade 2 skin reaction, mometasone was discontinued and she was placed on SSD cream TID, keflex 500mg PO BID, and aquaphor. She is here 1 week post treatment for skin check. She states that her skin is healing and denies pain. She returns today in follow up and has no new complaints. Specifically denies n/v/ha/sob/cp  REVIEW OF SYSTEMS: As per HPI, a 14-point review of systems is otherwise negative.     Past Medical History:   Diagnosis Date     Varicose veins of legs        Past Surgical History:   Procedure Laterality Date     INSERT PORT VASCULAR ACCESS N/A 4/5/2021    Procedure: PORT PLACEMENT;  Surgeon: Una Fonseca MD;  Location: [facility] OR     MASTECTOMY SIMPLE BILATERAL, SENTINEL NODE BILATERAL, COMBINED N/A 4/5/2021    Procedure: BILATERAL SKIN SPARING MASTECTOMY WITH LEFT SENTINEL LYMPH NODE BIOPSY;  Surgeon: Una Fonseca MD;  Location: [facility] OR     NO HISTORY OF SURGERY       RECONSTRUCT BREAST, INSERT TISSUE EXPANDER, COMBINED Bilateral 4/5/2021    Procedure: BILATERAL FIRST STAGE BREAST RECONSTRUCTION WITH TISSUE EXPANDERS; AND ACELLULAR  DERMAL MATRIX;  Surgeon: Kervin Fu MD;  Location: [facility] OR       Family History   Problem Relation Age of Onset     No Known Problems Mother      No Known Problems Father      Diabetes No family hx of      Coronary Artery Disease No family hx of      Hypertension No family hx of      Hyperlipidemia No family hx of      Cerebrovascular Disease No family hx of      Breast Cancer No family hx of      Colon Cancer No family hx of      Depression No family hx of      Anxiety Disorder No family hx of      Asthma No family hx of      Thyroid Disease No family hx of        Social History     Tobacco Use     Smoking status: Never Smoker     Smokeless tobacco: Never Used   Substance Use Topics     Alcohol use: No       Current Outpatient Medications   Medication     cephALEXin (KEFLEX) 500 MG capsule     silver sulfADIAZINE (SILVADENE) 1 % external cream     hydroquinone (JOSE ANGEL) 4 % external cream     mometasone (ELOCON) 0.1 % external cream     tamoxifen (NOLVADEX) 20 MG tablet     No current facility-administered medications for this visit.        No Known Allergies     PHYSICAL EXAMINATION:  /80 (BP Location: Right arm, Patient Position: Sitting, Cuff Size: Adult Regular)   Pulse 66   Resp 16   Wt 74.7 kg (164 lb 11.2 oz)   SpO2 98%   BMI 28.27 kg/m    GENERAL        Well-appearing woman in no acute distress.  HEENT              Normocephalic, atraumatic.  Sclerae anicteric.  CVR                   Regular rate and rhythm.  No murmurs, rubs, or gallops.  LUNGS             Clear to auscultation bilaterally.  BREASTS         Bilateral breast surgiclaly absent.  CHEST Wall     Bilateral chest wall scars are well healed.   Right reconstructed breast is within normal limits. Left reconstructed breast shows  resolving wet desquamation and erythema. Diffuse hyperpigmentation noted throughout radiation treatment field.    LYMPH             No supraclavicular, infraclavicular, or axillary lymphadenopathy  appreciated bilaterally.  ABDOMEN      Soft.    EXT                    No clubbing or cyanosis or edema.    NEURO             No focal deficits.  MSK                   Good ROM.   SKIN                  Warm and well perfused.   Diffuse alopecia  PSYCH               Alert and oriented x 3       IMPRESSION/PLAN:   Ghazal Martinez is a 40 year old Tajik-speaking premenopausal woman with left UIQ breast cancer and right breast DCIS, status postbilateral mastectomy and left SLNBx followed by immediate reconstruction, revealing in the left breast G3 IDC, [id number] ER+/MN+/H2N- disease and right breast G3 DCIS all excised with negative margins, followed by TC x 4 completed 7/2/21 now s/p PMRT/adjuvant radiation therapy completed last week. She is being seen here in follow up. She has resolving acute radiation induced side effects with residual wet desquamation, erythema and hyperpigmentation within the treatment field.  This is to be expected. She should continue to use SSD cream 3 times daily until Saturday night and then switch to Aquaphor twice daily. Continue Keflex 500 mg p.o. twice daily for 1 more week. Follow-up with Juanita Ferrer NP in 2 weeks.  Additional problem list to be addressed in the following manner:  1) Systemic/hormonal treatment in premenopausal woman: s/p systemic therapy.  Follow up with Med Onc for discussion/management regarding hormonal therapy. Scans per Med Onc.  2) Follow up with Surgery as previously directed.    There was ample time for questions and all were answered to the patient's satisfaction. Thank you for allowing me to participate in the care of this pleasant patient. If you have any questions, please do not hesitate to contact my office.      Sincerely,  Nelda Burden MD  Adjunct   Dept of Radiation Oncology  [facility]

## 2021-09-29 NOTE — LETTER
9/29/2021         RE: Ghazal Martinez  [address]  [place] [address]        Dear Colleague,    Thank you for referring your patient, Ghazal Martinez, to the [facility]ATION [facility]COL[facility] [place]. Please see a copy of my visit note below.    Dear Colleagues,  Today Ghazal Martinez was seen in follow up      IDENTIFICATION: Ghazal Martinez is a 40 year old Romanian-speaking premenopausal woman with left UIQ breast cancer and right breast DCIS, status postbilateral mastectomy and left SLNBx followed by immediate reconstruction, revealing in the left breast G3 IDC, [id number] ER+/ND+/H2N- disease and right breast G3 DCIS all excised with negative margins, followed by TC x 4 completed 7/2/21 now s/p PMRT/adjuvant radiation therapy completed last week.   INTERVAL HISTORY:  Ghazal Martinez was last seen in our clinic during her last week of treatment. While on treatment she had complaints of mild fatigue which was relieved by rest (grade 1), pain (grade 1) and developed diffuse skin erythema (grade 2). She was initially treated with mometasone twice daily and Aquaphor throughout the day. After she developed grade 2 skin reaction, mometasone was discontinued and she was placed on SSD cream TID, keflex 500mg PO BID, and aquaphor. She is here 1 week post treatment for skin check. She states that her skin is healing and denies pain. She returns today in follow up and has no new complaints. Specifically denies n/v/ha/sob/cp  REVIEW OF SYSTEMS: As per HPI, a 14-point review of systems is otherwise negative.     Past Medical History:   Diagnosis Date     Varicose veins of legs        Past Surgical History:   Procedure Laterality Date     INSERT PORT VASCULAR ACCESS N/A 4/5/2021    Procedure: PORT PLACEMENT;  Surgeon: Una Fonseca MD;  Location: [facility]     MASTECTOMY SIMPLE BILATERAL, SENTINEL NODE BILATERAL, COMBINED N/A 4/5/2021    Procedure: BILATERAL SKIN SPARING MASTECTOMY WITH LEFT SENTINEL  LYMPH NODE BIOPSY;  Surgeon: Una Fonseca MD;  Location: [facility]     NO HISTORY OF SURGERY       RECONSTRUCT BREAST, INSERT TISSUE EXPANDER, COMBINED Bilateral 4/5/2021    Procedure: BILATERAL FIRST STAGE BREAST RECONSTRUCTION WITH TISSUE EXPANDERS; AND ACELLULAR DERMAL MATRIX;  Surgeon: Kervin Fu MD;  Location: [facility] OR       Family History   Problem Relation Age of Onset     No Known Problems Mother      No Known Problems Father      Diabetes No family hx of      Coronary Artery Disease No family hx of      Hypertension No family hx of      Hyperlipidemia No family hx of      Cerebrovascular Disease No family hx of      Breast Cancer No family hx of      Colon Cancer No family hx of      Depression No family hx of      Anxiety Disorder No family hx of      Asthma No family hx of      Thyroid Disease No family hx of        Social History     Tobacco Use     Smoking status: Never Smoker     Smokeless tobacco: Never Used   Substance Use Topics     Alcohol use: No       Current Outpatient Medications   Medication     cephALEXin (KEFLEX) 500 MG capsule     silver sulfADIAZINE (SILVADENE) 1 % external cream     hydroquinone (JOSE ANGEL) 4 % external cream     mometasone (ELOCON) 0.1 % external cream     tamoxifen (NOLVADEX) 20 MG tablet     No current facility-administered medications for this visit.        No Known Allergies     PHYSICAL EXAMINATION:  /80 (BP Location: Right arm, Patient Position: Sitting, Cuff Size: Adult Regular)   Pulse 66   Resp 16   Wt 74.7 kg (164 lb 11.2 oz)   SpO2 98%   BMI 28.27 kg/m    GENERAL        Well-appearing woman in no acute distress.  HEENT              Normocephalic, atraumatic.  Sclerae anicteric.  CVR                   Regular rate and rhythm.  No murmurs, rubs, or gallops.  LUNGS             Clear to auscultation bilaterally.  BREASTS         Bilateral breast surgiclaly absent.  CHEST Wall     Bilateral chest wall scars are well healed.   Right reconstructed  breast is within normal limits. Left reconstructed breast shows  resolving wet desquamation and erythema. Diffuse hyperpigmentation noted throughout radiation treatment field.    LYMPH             No supraclavicular, infraclavicular, or axillary lymphadenopathy appreciated bilaterally.  ABDOMEN      Soft.    EXT                    No clubbing or cyanosis or edema.    NEURO             No focal deficits.  MSK                   Good ROM.   SKIN                  Warm and well perfused.   Diffuse alopecia  PSYCH               Alert and oriented x 3       IMPRESSION/PLAN:   Ghazal Martinez is a 40 year old Ukrainian-speaking premenopausal woman with left UIQ breast cancer and right breast DCIS, status postbilateral mastectomy and left SLNBx followed by immediate reconstruction, revealing in the left breast G3 IDC, [id number] ER+/DC+/H2N- disease and right breast G3 DCIS all excised with negative margins, followed by TC x 4 completed 7/2/21 now s/p PMRT/adjuvant radiation therapy completed last week. She is being seen here in follow up. She has resolving acute radiation induced side effects with residual wet desquamation, erythema and hyperpigmentation within the treatment field.  This is to be expected. She should continue to use SSD cream 3 times daily until Saturday night and then switch to Aquaphor twice daily. Continue Keflex 500 mg p.o. twice daily for 1 more week. Follow-up with Juanita Ferrer NP in 2 weeks.  Additional problem list to be addressed in the following manner:  1) Systemic/hormonal treatment in premenopausal woman: s/p systemic therapy.  Follow up with Med Onc for discussion/management regarding hormonal therapy. Scans per Med Onc.  2) Follow up with Surgery as previously directed.    There was ample time for questions and all were answered to the patient's satisfaction. Thank you for allowing me to participate in the care of this pleasant patient. If you have any questions, please do not hesitate to  contact my office.      Sincerely,  Nelda uBrden MD  Adjunct   Dept of Rad[facility]  [facility]        Again, thank you for allowing me to participate in the care of your patient.        Sincerely,        RAIZA Burden MD

## 2021-09-29 NOTE — PATIENT INSTRUCTIONS
Please contact [facility] RN with questions or concerns following today's appointment: [phone].    Thank you!

## 2021-09-29 NOTE — NURSING NOTE
RADIATION ONCOLOGY BREAST FOLLOW-UP VISIT    Patient Name: Ghazal Martinez      : 1980     Age: 40 year old        ______________________________________________________________________________       Chief Complaint   Patient presents with     Radiation Therapy     Return appointment with Dr. Burden     /80 (BP Location: Right arm, Patient Position: Sitting, Cuff Size: Adult Regular)   Pulse 66   Resp 16   Wt 74.7 kg (164 lb 11.2 oz)   SpO2 98%   BMI 28.27 kg/m        History of Radiation Treatment:  Site: L CW + IMN boost, L SCV  Total Dose: 6,000 cGy, 5,000 cGy   Date Completed: 2021  Clinic: [facility]  Physician: Dr. Burden      Pain:  Denies    Labs:  Other Labs: No    Imaging:  None    Fatigue:   Grade 1: Fatigue relieved by rest    Skin:  Concerns (explain) - Skin improving, dry desquamation noted, continues Silvadene TID, Aquaphor BID, finished antibiotic.     Range of Motion:   No Concerns    Lymphedema:  No Concerns  Referral Needed: No      Medical Oncologist: Dr. Fine    Endocrine Therapy: Tamoxifen- patient plans to start today.      Future Appointments:      Dr. Fine  Appointment Date: 2021   Juanita Blunt NP Appointment Date: 10/14/2021    Appointment Date:         Additional Instructions: Patient plans to contact plastic surgeon to schedule a follow up appointment.    Nurse face-to-face time: Level 3:  10 min face to face time,  used.    Vivienne Light RN

## 2021-10-01 ENCOUNTER — DOCUMENTATION ONLY (OUTPATIENT)
Dept: RADIATION ONCOLOGY | Facility: CLINIC | Age: 41
End: 2021-10-01

## 2021-10-01 DIAGNOSIS — Z17.0 MALIGNANT NEOPLASM OF CENTRAL PORTION OF LEFT BREAST IN FEMALE, ESTROGEN RECEPTOR POSITIVE (H): Primary | ICD-10-CM

## 2021-10-01 DIAGNOSIS — C50.112 MALIGNANT NEOPLASM OF CENTRAL PORTION OF LEFT BREAST IN FEMALE, ESTROGEN RECEPTOR POSITIVE (H): Primary | ICD-10-CM

## 2021-10-01 NOTE — PROGRESS NOTES
Radiotherapy Treatment Summary              PATIENT: Ghazal Martinez  MEDICAL RECORD NO: [id number]   : 1980    PATHOLOGY/STAGE:   This is a 40 year old [place]-speaking premenopausal woman with left UIQ breast cancer and right breast DCIS, status postbilateral mastectomy and left SLNBx followed by immediate reconstruction, revealing in the left breast G3 IDC, [id number] ER+/OH+/H2N- disease and right breaast DCIS all excised with negative margins, followed by TC x 4 completed    INTENT OF RADIOTHERAPY: Curative  CONCURRENT SYSTEMIC THERAPY:  None           SITE OF TREATMENT:  Left Breast    DATES  OF TREATMENT:21-21    TOTAL DOSE OF TREATMENT:   5,000 cGy to chest wall and SuC  6,000 cGy to chest wall scar  6,600 cGy to IMN boost      DOSE PER FRACTION OF TREATMENT: 200 cGy for all fields       COMMENT/TOXICITY:  Grade 2 skin reaction managed with moisturizer and topical steroids.  Patient also placed on silvadene cream and oral antibiotics. Patient did have mild fatigue relieved by rest.                PAIN MANAGEMENT:  Patient did not require any pain medications.                            FOLLOW UP PLAN:  Patient will follow up with Juanita Blunt NP in 1 month and in 3.5 months  Patient will continue close follow up with medical oncology.     Nelda Burden MD  Department of Radiation Oncology  [facility]     CC  Patient Care Team:  No Ref-Primary, Physician as PCP - Eun Hart APRN CNP as Assigned PCP  Una Fonseca MD as Assigned Surgical Provider  Salome Goodson RN as Specialty Care Coordinator (Medical Oncology)  Mallory Fine MD as MD (Hematology & Oncology)  Sho Gaming MD as Assigned Endocrinology Provider  Marlee Burden MD as Assigned Cancer Care Provider

## 2021-10-10 ENCOUNTER — HEALTH MAINTENANCE LETTER (OUTPATIENT)
Age: 41
End: 2021-10-10

## 2021-10-14 ENCOUNTER — VIRTUAL VISIT (OUTPATIENT)
Dept: RADIATION ONCOLOGY | Facility: CLINIC | Age: 41
End: 2021-10-14
Payer: COMMERCIAL

## 2021-10-14 DIAGNOSIS — C50.212 MALIGNANT NEOPLASM OF UPPER-INNER QUADRANT OF LEFT BREAST IN FEMALE, ESTROGEN RECEPTOR POSITIVE (H): Primary | ICD-10-CM

## 2021-10-14 DIAGNOSIS — Z17.0 MALIGNANT NEOPLASM OF UPPER-INNER QUADRANT OF LEFT BREAST IN FEMALE, ESTROGEN RECEPTOR POSITIVE (H): Primary | ICD-10-CM

## 2021-10-14 PROCEDURE — 99024 POSTOP FOLLOW-UP VISIT: CPT | Mod: TEL | Performed by: NURSE PRACTITIONER

## 2021-10-14 NOTE — PROGRESS NOTES
October 14, 2021    Today Ghazal Martinez was seen in follow up via phone visit.  Visit was done with  services, 's name is Alejo.      IDENTIFICATION: Ghazal Martinez is a 40 year old French-speaking premenopausal woman with left UIQ breast cancer and right breast DCIS, status postbilateral mastectomy and left SLNBx followed by immediate reconstruction, revealing in the left breast G3 IDC, [id number] ER+/KY+/H2N- disease and right breast G3 DCIS all excised with negative margins, followed by TC x 4 completed 7/2/21 now s/p PMRT/adjuvant radiation therapy completed 9/21/21.  INTERVAL HISTORY:  Ghazal Matrinez was last seen in our clinic during her last week of treatment. While on treatment she had complaints of mild fatigue which was relieved by rest (grade 1), pain (grade 1) and developed diffuse skin erythema (grade 2). She was initially treated with mometasone twice daily and Aquaphor throughout the day. After she developed grade 2 skin reaction, mometasone was discontinued and she was placed on SSD cream TID, keflex 500mg PO BID, and aquaphor. She was seen in clinic 1 weeks out and her skin was healing well.  She states that her skin is well-healed.  She continues to use Aquaphor.  She denies any pain.  Her energy level is much improved.  She did see medical oncology and is now on tamoxifen and tolerating that well.  REVIEW OF SYSTEMS: As per HPI, a 14-point review of systems is otherwise negative.     Past Medical History:   Diagnosis Date     Varicose veins of legs        Past Surgical History:   Procedure Laterality Date     INSERT PORT VASCULAR ACCESS N/A 4/5/2021    Procedure: PORT PLACEMENT;  Surgeon: Una Fonseca MD;  Location: [facility] OR     MASTECTOMY SIMPLE BILATERAL, SENTINEL NODE BILATERAL, COMBINED N/A 4/5/2021    Procedure: BILATERAL SKIN SPARING MASTECTOMY WITH LEFT SENTINEL LYMPH NODE BIOPSY;  Surgeon: Una Fonseca MD;  Location:  OR     NO HISTORY OF SURGERY        RECONSTRUCT BREAST, INSERT TISSUE EXPANDER, COMBINED Bilateral 4/5/2021    Procedure: BILATERAL FIRST STAGE BREAST RECONSTRUCTION WITH TISSUE EXPANDERS; AND ACELLULAR DERMAL MATRIX;  Surgeon: Kervin Fu MD;  Location: [facility] OR       Family History   Problem Relation Age of Onset     No Known Problems Mother      No Known Problems Father      Diabetes No family hx of      Coronary Artery Disease No family hx of      Hypertension No family hx of      Hyperlipidemia No family hx of      Cerebrovascular Disease No family hx of      Breast Cancer No family hx of      Colon Cancer No family hx of      Depression No family hx of      Anxiety Disorder No family hx of      Asthma No family hx of      Thyroid Disease No family hx of        Social History     Tobacco Use     Smoking status: Never Smoker     Smokeless tobacco: Never Used   Substance Use Topics     Alcohol use: No       Current Outpatient Medications   Medication     cephALEXin (KEFLEX) 500 MG capsule     hydroquinone (JOSE ANGEL) 4 % external cream     mometasone (ELOCON) 0.1 % external cream     silver sulfADIAZINE (SILVADENE) 1 % external cream     tamoxifen (NOLVADEX) 20 MG tablet     No current facility-administered medications for this visit.        No Known Allergies     PHYSICAL EXAMINATION:  There were no vitals taken for this visit.  GENERAL        Well-appearing woman in no acute distress.  PSYCH               Alert and oriented x 3       IMPRESSION/PLAN:   Ghazal Martinez is a 40 year old Turkmen-speaking premenopausal woman with left UIQ breast cancer and right breast DCIS, status postbilateral mastectomy and left SLNBx followed by immediate reconstruction, revealing in the left breast G3 IDC, [id number] ER+/[place]+/H2N- disease and right breast G3 DCIS all excised with negative margins, followed by TC x 4 completed 7/2/21 now s/p PMRT/adjuvant radiation therapy completed 9/21/2021.  She has seen resolution of her fatigue and skin reaction.   Discussed continuing to moisturize.  Do stretching exercises.  Be good about sunscreen.  Continue to follow closely with medical oncology.  Patient should also follow-up with surgical oncology.  I will see her back via phone visit in January.    Phone time-12 minutes and total time 25 min.  Juanita Blunt NP  Dept of Radiation Oncology  [facility]

## 2021-11-05 ENCOUNTER — PATIENT OUTREACH (OUTPATIENT)
Dept: ONCOLOGY | Facility: CLINIC | Age: 41
End: 2021-11-05

## 2021-11-05 NOTE — PROGRESS NOTES
Patient came to the clinic with son and asked to talk to a nurse. Son interpreting for patient. Patient had bottle of Tamoxifen with her and asked if the Tamoxifen could be causing pain in legs, burning sensation on back, dizziness and emotional changes. Patient is due for a refill of the Tamoxifen. Patient would like a provider appointment to discuss further.

## 2021-11-08 ENCOUNTER — ONCOLOGY VISIT (OUTPATIENT)
Dept: ONCOLOGY | Facility: CLINIC | Age: 41
End: 2021-11-08
Payer: COMMERCIAL

## 2021-11-08 VITALS
WEIGHT: 165.9 LBS | SYSTOLIC BLOOD PRESSURE: 123 MMHG | BODY MASS INDEX: 28.32 KG/M2 | HEIGHT: 64 IN | DIASTOLIC BLOOD PRESSURE: 85 MMHG | HEART RATE: 68 BPM | OXYGEN SATURATION: 98 %

## 2021-11-08 DIAGNOSIS — Z17.0 MALIGNANT NEOPLASM OF CENTRAL PORTION OF LEFT BREAST IN FEMALE, ESTROGEN RECEPTOR POSITIVE (H): Primary | ICD-10-CM

## 2021-11-08 DIAGNOSIS — E04.1 THYROID NODULE: ICD-10-CM

## 2021-11-08 DIAGNOSIS — T38.6X5A ADVERSE EFFECT OF TAMOXIFEN, INITIAL ENCOUNTER: ICD-10-CM

## 2021-11-08 DIAGNOSIS — R91.8 PULMONARY NODULES: ICD-10-CM

## 2021-11-08 DIAGNOSIS — C50.112 MALIGNANT NEOPLASM OF CENTRAL PORTION OF LEFT BREAST IN FEMALE, ESTROGEN RECEPTOR POSITIVE (H): Primary | ICD-10-CM

## 2021-11-08 PROCEDURE — 99214 OFFICE O/P EST MOD 30 MIN: CPT | Performed by: NURSE PRACTITIONER

## 2021-11-08 ASSESSMENT — MIFFLIN-ST. JEOR: SCORE: 1402.52

## 2021-11-08 ASSESSMENT — PAIN SCALES - GENERAL: PAINLEVEL: NO PAIN (0)

## 2021-11-08 NOTE — NURSING NOTE
"Oncology Rooming Note    November 8, 2021 10:08 AM   Ghazal Martinez is a 40 year old female who presents for:    Chief Complaint   Patient presents with     Oncology Clinic Visit     follow up - side effects     Initial Vitals: /85 (BP Location: Right arm, Patient Position: Chair, Cuff Size: Adult Regular)   Pulse 68   Ht 1.626 m (5' 4\")   Wt 75.3 kg (165 lb 14.4 oz)   SpO2 98%   BMI 28.48 kg/m   Estimated body mass index is 28.48 kg/m  as calculated from the following:    Height as of this encounter: 1.626 m (5' 4\").    Weight as of this encounter: 75.3 kg (165 lb 14.4 oz). Body surface area is 1.84 meters squared.  No Pain (0) Comment: Data Unavailable   No LMP recorded. (Menstrual status: Chemotherapy).  Allergies reviewed: Yes  Medications reviewed: Yes    Medications: Medication refills not needed today.  Pharmacy name entered into [facility]:    [facility] - [place], [place] - [address] [id number] - [place], [place] - [address] [facility] [place] - [place], [place] - [address] [address] [place] [address], [id number]    Clinical concerns: dizziness every half hour throughout the day, sleepy, emotional, knees and feet pain, back feels like it is burning. Pain goes away after some walking and moving around Adriana was notified.      Sade Pearson CMA              "

## 2021-11-08 NOTE — LETTER
11/8/2021         RE: Ghazal Martinez  [address]  [place] [address]      Hematology/Oncology Visit    Care Team:  - Oncologist: Dr. Fine  - PCP: Physician No Ref-Primary    Reason for visit: address tamoxifen side effects    Diagnosis: Invasive ductal carcinoma left breast, ER/[place]+, HER2-    Cancer and Treatment Hx:  Feb 2021: presented with a left breast lump. Mammogram showed a mass at the 10:00 position in left breast measuring 1.7 cm. Biopsy showed invasive ductal carcinoma, ER/MN+, HER2-. OncotypeDx score 23 (intermediate risk) 9 percent of distant disease recurrence with the use of systemic endocrine therapy and 6.5% benefit of adjuvant chemotherapy given young age/premenopausal. Genetic testing negative for mutations.  April 5, 2021: bilateral mastectomy and axillary SLNB, left breast with invasive ductal carcinoma (23mm) and surrounding DCIS, right breast with DCIS, micrometastasis in 1 of 2 sentinel lymph nodes, 2 additional lymph nodes negative. [id number].  April-July 2021: 4 cycles Taxotere + Cytoxan  Sept 2021: completed radiation therapy to left chest/regional nodes/internal mammary nodes and started tamoxifen    Interval History:  Ghazal was interviewed with the assistance of an ipad  today. She has been feeling leg joint discomfort when she gets up in the morning but this resolves in about 5 minutes when she starts moving. She has also noticed a burning sensation in her mid back when she is sitting down but this also resolves within minutes of being active. She sometimes notes dizziness when she gets up to do chores that resolves within a few moments. No falls or near falls. Happens a few times each day. She has been having emotional changes and about once per day or every other day she feels like crying. She is otherwise not feeling depressed or anxious, just emotional. She is wondering if this is from breast cancer, chemotherapy, surgery, or maybe tamoxifen.  "All symptoms started 2 weeks after starting tamoxifen. We discussed at length how tamoxifen works and common side effects including menopausal symptoms. We also discussed that symptoms tend to be worse when first starting the medication given all of the hormonal changes. She feels that current symptoms are manageable and doesn't feel the need to take a break from the medication, she is glad to know what is likely causing the symptoms. She denies any new breast/chest pains or masses, unintentional weight loss, or other bone pains. She is wondering about COVID vaccination timing.    Medications:  Discussed pertinent medications.     Physical Exam:   GEN: pleasantly conversant female no acute distress  SKIN: generally intact, no visible rash, bruising, or sores   ENT: eyes non-icteric, no notable oral sores  BREAST: deferred given performed 1 month ago  RESP: clear to auscultation bilaterally, on room air  CARDS: regular rate and rhythm, no notable murmurs   GI: abd soft without notable hepatosplenomegaly, non-tender to palpation  MSK: no notable lower extremity edema  NEURO: alert and oriented without obvious focal deficit, stable gait    /85 (BP Location: Right arm, Patient Position: Chair, Cuff Size: Adult Regular)   Pulse 68   Ht 1.626 m (5' 4\")   Wt 75.3 kg (165 lb 14.4 oz)   SpO2 98%   BMI 28.48 kg/m       Wt Readings from Last 4 Encounters:   11/08/21 75.3 kg (165 lb 14.4 oz)   09/29/21 74.7 kg (164 lb 11.2 oz)   09/27/21 74.2 kg (163 lb 9.6 oz)   09/21/21 73.7 kg (162 lb 6.4 oz)       Assessment and Plan:  Invasive ductal carcinoma left breast, ER/[place]+  - Status post bilateral mastectomy, 4 cycles TC, radiation, and started tamoxifen Sept 2021  - Has been experiencing fleeting symptoms of leg joint pain, mid back burning\" and dizziness in the mornings that resolves usually within 5 minutes. Feeling more emotional as well and intermittently tearful. Symptoms started 2 weeks after starting tamoxifen and " overall she feels they are tolerable she just wanted to know what was causing it. We discussed how tamoxifen works and typically symptoms. We discussed monitoring symptoms closely and taking ibuprofen if joint pain was lasting longer.  - Breast cancer 3 month follow up already scheduled with Dr. Fine 12/9, symptoms can be reassessed at that time, sooner if indicated    Thyroid nodule  - Seen on March 2021 PET, Endo completed a US with 3 left sided nodules  - Follow up with Endocrinology and repeat ultrasound in 1 year (April 2022)    Pulmonary Nodule  - 7mm RUL nodule seen on March 2021 PET, repeat imaging July 2021 showed stable size  - Repeat imaging in 6 months, around Jan 2022    COVID vaccination  - She is wondering if she can have a letter excusing her from vaccination d/t planned additional reconstruction in March and upcoming travel that requires vaccination. We discussed that surgery in March would not prevent her from receiving a COVID vaccine and that it is recommended she receive Pfizer or Moderna given higher risk for clots with Gokul & Gokul and tamoxifen also putting her at slightly higher risk for blood clot.         Future Appointments   Date Time Provider Department Center   11/8/2021 10:00 AM Adriana Moore APRN CNP [facility] [place]   12/6/2021  4:30 PM [facility]   12/9/2021  4:30 PM Mallory Fine MD [facility] [place]   1/17/2022  3:00 PM Jenniges, Juanita Linnea, APRN CNP [facility] [place]   4/11/2022 11:00 AM [facility] [place]   4/12/2022  3:00 PM Sho Gaming MD [facility] [place]   The total time of this encounter amounted to 30 minutes today. This time includes face-to-face time spent with the patient, prep work, ordering tests, and performing post-visit documentation.    SATHISH Mendenhall APRN CNP

## 2021-11-08 NOTE — PROGRESS NOTES
Hematology/Oncology Visit    Care Team:  - Oncologist: Dr. Fine  - PCP: Physician No Ref-Primary    Reason for visit: address tamoxifen side effects    Diagnosis: Invasive ductal carcinoma left breast, ER/MN+, HER2-    Cancer and Treatment Hx:  Feb 2021: presented with a left breast lump. Mammogram showed a mass at the 10:00 position in left breast measuring 1.7 cm. Biopsy showed invasive ductal carcinoma, ER/MN+, HER2-. OncotypeDx score 23 (intermediate risk) 9 percent of distant disease recurrence with the use of systemic endocrine therapy and 6.5% benefit of adjuvant chemotherapy given young age/premenopausal. Genetic testing negative for mutations.  April 5, 2021: bilateral mastectomy and axillary SLNB, left breast with invasive ductal carcinoma (23mm) and surrounding DCIS, right breast with DCIS, micrometastasis in 1 of 2 sentinel lymph nodes, 2 additional lymph nodes negative. [id number].  April-July 2021: 4 cycles Taxotere + Cytoxan  Sept 2021: completed radiation therapy to left chest/regional nodes/internal mammary nodes and started tamoxifen    Interval History:  Ghazal was interviewed with the assistance of an ipad  today. She has been feeling leg joint discomfort when she gets up in the morning but this resolves in about 5 minutes when she starts moving. She has also noticed a burning sensation in her mid back when she is sitting down but this also resolves within minutes of being active. She sometimes notes dizziness when she gets up to do chores that resolves within a few moments. No falls or near falls. Happens a few times each day. She has been having emotional changes and about once per day or every other day she feels like crying. She is otherwise not feeling depressed or anxious, just emotional. She is wondering if this is from breast cancer, chemotherapy, surgery, or maybe tamoxifen. All symptoms started 2 weeks after starting tamoxifen. We discussed at length how  "tamoxifen works and common side effects including menopausal symptoms. We also discussed that symptoms tend to be worse when first starting the medication given all of the hormonal changes. She feels that current symptoms are manageable and doesn't feel the need to take a break from the medication, she is glad to know what is likely causing the symptoms. She denies any new breast/chest pains or masses, unintentional weight loss, or other bone pains. She is wondering about COVID vaccination timing.    Medications:  Discussed pertinent medications.     Physical Exam:   GEN: pleasantly conversant female no acute distress  SKIN: generally intact, no visible rash, bruising, or sores   ENT: eyes non-icteric, no notable oral sores  BREAST: deferred given performed 1 month ago  RESP: clear to auscultation bilaterally, on room air  CARDS: regular rate and rhythm, no notable murmurs   GI: abd soft without notable hepatosplenomegaly, non-tender to palpation  MSK: no notable lower extremity edema  NEURO: alert and oriented without obvious focal deficit, stable gait    /85 (BP Location: Right arm, Patient Position: Chair, Cuff Size: Adult Regular)   Pulse 68   Ht 1.626 m (5' 4\")   Wt 75.3 kg (165 lb 14.4 oz)   SpO2 98%   BMI 28.48 kg/m       Wt Readings from Last 4 Encounters:   11/08/21 75.3 kg (165 lb 14.4 oz)   09/29/21 74.7 kg (164 lb 11.2 oz)   09/27/21 74.2 kg (163 lb 9.6 oz)   09/21/21 73.7 kg (162 lb 6.4 oz)       Assessment and Plan:  Invasive ductal carcinoma left breast, ER/[place]+  - Status post bilateral mastectomy, 4 cycles TC, radiation, and started tamoxifen Sept 2021  - Has been experiencing fleeting symptoms of leg joint pain, mid back burning\" and dizziness in the mornings that resolves usually within 5 minutes. Feeling more emotional as well and intermittently tearful. Symptoms started 2 weeks after starting tamoxifen and overall she feels they are tolerable she just wanted to know what was causing it. " We discussed how tamoxifen works and typically symptoms. We discussed monitoring symptoms closely and taking ibuprofen if joint pain was lasting longer.  - Breast cancer 3 month follow up already scheduled with Dr. Fine 12/9, symptoms can be reassessed at that time, sooner if indicated    Thyroid nodule  - Seen on March 2021 PET, Endo completed a US with 3 left sided nodules  - Follow up with Endocrinology and repeat ultrasound in 1 year (April 2022)    Pulmonary Nodule  - 7mm RUL nodule seen on March 2021 PET, repeat imaging July 2021 showed stable size  - Repeat imaging in 6 months, around Jan 2022    COVID vaccination  - She is wondering if she can have a letter excusing her from vaccination d/t planned additional reconstruction in March and upcoming travel that requires vaccination. We discussed that surgery in March would not prevent her from receiving a COVID vaccine and that it is recommended she receive Pfizer or Moderna given higher risk for clots with Gokul & Gokul and tamoxifen also putting her at slightly higher risk for blood clot.         Future Appointments   Date Time Provider Department Center   11/8/2021 10:00 AM Adriana Moore APRN CNP [facility] [place]   12/6/2021  4:30 PM [facility]   12/9/2021  4:30 PM [facility]   1/17/2022  3:00 PM [facility] [place]   4/11/2022 11:00 AM [facility] [place]   4/12/2022  3:00 PM Sho Gaming MD [facility] [place]   The total time of this encounter amounted to 30 minutes today. This time includes face-to-face time spent with the patient, prep work, ordering tests, and performing post-visit documentation.    Adriana Moore CNP

## 2021-11-09 ENCOUNTER — VIRTUAL VISIT (OUTPATIENT)
Dept: FAMILY MEDICINE | Facility: CLINIC | Age: 41
End: 2021-11-09
Payer: COMMERCIAL

## 2021-11-09 DIAGNOSIS — R63.4 WEIGHT LOSS: ICD-10-CM

## 2021-11-09 DIAGNOSIS — Z17.0 MALIGNANT NEOPLASM OF LEFT BREAST IN FEMALE, ESTROGEN RECEPTOR POSITIVE, UNSPECIFIED SITE OF BREAST (H): Primary | ICD-10-CM

## 2021-11-09 DIAGNOSIS — F43.21 ADJUSTMENT DISORDER WITH DEPRESSED MOOD: ICD-10-CM

## 2021-11-09 DIAGNOSIS — R42 DIZZINESS: ICD-10-CM

## 2021-11-09 DIAGNOSIS — C50.912 MALIGNANT NEOPLASM OF LEFT BREAST IN FEMALE, ESTROGEN RECEPTOR POSITIVE, UNSPECIFIED SITE OF BREAST (H): Primary | ICD-10-CM

## 2021-11-09 DIAGNOSIS — M25.562 PAIN IN BOTH KNEES, UNSPECIFIED CHRONICITY: ICD-10-CM

## 2021-11-09 DIAGNOSIS — M25.561 PAIN IN BOTH KNEES, UNSPECIFIED CHRONICITY: ICD-10-CM

## 2021-11-09 PROBLEM — E66.811 CLASS 1 OBESITY DUE TO EXCESS CALORIES WITHOUT SERIOUS COMORBIDITY WITH BODY MASS INDEX (BMI) OF 31.0 TO 31.9 IN ADULT: Status: RESOLVED | Noted: 2018-01-29 | Resolved: 2021-11-09

## 2021-11-09 PROBLEM — E66.09 CLASS 1 OBESITY DUE TO EXCESS CALORIES WITHOUT SERIOUS COMORBIDITY WITH BODY MASS INDEX (BMI) OF 31.0 TO 31.9 IN ADULT: Status: RESOLVED | Noted: 2018-01-29 | Resolved: 2021-11-09

## 2021-11-09 PROCEDURE — 99215 OFFICE O/P EST HI 40 MIN: CPT | Mod: TEL | Performed by: NURSE PRACTITIONER

## 2021-11-09 NOTE — PATIENT INSTRUCTIONS
[email or web address] por informacion sobre la comida.    We are continuing to schedule all people age 12 and older for COVID-19 vaccines (patients age 12-17 can only use the Pfizer vaccine).     We are offering third doses of the Pfizer and Moderna COVID-19 vaccines to moderately and severely immunocompromised patients.       [facility] is offering booster doses of Covid-19 vaccination to anyone age 18 and up who got the Gokul and Gokul vaccine. We are also offering boosters to those who got the Moderna COVID-19 vaccine or Pfizer COVID-19 vaccine and has one of the following risk factors:    People 65 years and older.    Residents in long-term care settings.    People ages 50 to 64 with certain underlying medical conditions (refer to CDC: People with Certain Medical Conditions).    People ages 18 to 49 who are at high risk for severe COVID-19 due to certain underlying medical conditions (refer to CDC: People with Certain Medical Conditions).    People ages 18 to 64 who are at increased risk for COVID-19 exposure and transmission because of where they live or work.    Beginning Monday, November 8th, [facility] will begin offering Pfizer Covid -19 vaccination to all patients age 5 and up.     To schedule any COVID-19 vaccination appointment for Moderna of Pfizer, please log in to [employer] using this link to see when and where we have openings.  Gokul & Gokul is only offered at our retail pharmacies (and they also offer Moderna and Pfizer) - please call your local [facility] to schedule with them.      If you have technical difficulty using [employer], call [phone], option 1 for assistance.     More information about vaccine effectiveness at reducing spread of disease, hospitalizations, and death as well as vaccine safety and answers to other questions can be found on our website: [email or web address].           Patient Education     Coping with Anorexia (Poor  "Appetite) and Weight Loss  What is anorexia?  This is a loss of appetite that can lead to weight loss. You may not feel like eating because of illness, emotional problems or treatment side effects, such as:    Nausea (feeling sick to your stomach) and vomiting (throwing up)    Dry mouth    Changes in taste and smell.  Some people lose their appetite for only a day or two. For others, it can last much longer.  How is it treated?  Your care team may give you medicine to improve your appetite, such as:    Megace (megestrol)    Marinol (dronabinol)    Low-dose steroids.  Your care team will tell you if medicine is right for you. And they may refer you to a dietitian.  What else can I do to increase my appetite and prevent further weight loss?    Try to eat at least five or six small meals a day. Use small dinner plates so you do not feel overwhelmed by the amount of food.    Stop drinking fluids 30 to 60 minutes before each meal. Fluids will fill you up.    Limit fluids during meals. Drink what you need to help you swallow.    Keep snacks within easy reach. This makes you more likely to \"graze\" throughout the day.    Try to eat something at bedtime.    Choose high-calorie, high-protein foods like:  ? Eggs and meats  ? Nut butters  ? Cheese and crackers  ? Puddings and custards  ? Cream soups  ? Cereals  ? Ice cream and yogurt  ? Granola bars, snack chips, dried fruits, nuts and seeds (unless you have mouth sores, a dry mouth or a sore throat).    Read nutrition labels. Make sure that what you eat is high in calories (at least 200 calories per serving).    Use plenty of butter, margarine, sour cream, salad dressing and sandwich spread.    Sprinkle powdered milk into soups, scrambled eggs, mashed potatoes, yogurt, milk shakes, cooked cereals, cottage cheese and custards. This will give you more protein.    If you don't feel like eating, try to drink high-calorie liquids like whole milk, milk shakes, juices and cream " soups.    Try nutrition drinks, such as Boost, Sweetser Instant Breakfast or Ensure. You can buy these at the drug store.    If you take pain medicine, take it 30 to 60 minutes before eating.    Weigh yourself and record your weight every day.    Stay as active as you can. Activity may increase your appetite.    Try to make eating more enjoyable. Set the table with nice dishes. Play your favorite music, watch television or eat with family and friends.    If it is okay with your care team, have a small glass of wine or beer with your meals. This may increase your appetite.  When should I call my care team?  Call your care team if:    The steps listed here do not improve your appetite.    You keep losing weight.  Comments:  __________________________________________  __________________________________________  __________________________________________  __________________________________________  __________________________________________  __________________________________________  __________________________________________  __________________________________________  __________________________________________  __________________________________________  For informational purposes only. Not to replace the advice of your health care provider.  Copyright   2006 [facility]. All rights reserved. [employer] 165368 - REV 12/15.           Patient Education     La nutrición y MiPlato: frutas    Al igual que las verduras, las frutas contienen fibra y ellen gran cantidad de vitaminas. Yimi lo mejor de la fruta es prado sabor. Si tiene debilidad por los dulces o simplemente quiere saborear ellen pequeña golosina, la fruta es la mejor manera de darse el gusto. Y es probable que actualmente no esté comiendo la cantidad de fruta que debería. Ellen manzana al día ya no es suficiente. En radha comidas, llene la mitad de prado plato con frutas y verduras.     Opciones nutritivas  La mayoría de las frutas son estacionales. Por lo  tanto, radha opciones cambian según el momento del año. Aproveche las estaciones para renovar radha elecciones de alimentos sanos con frutas frescas. La mayoría de la fruta que usted come debe provenir de frutas enteras. Algunas opciones nutritivas:     Cualquier fruta fresca, congelada (sin endulzantes) o enlatada en prado propio jugo (sin azúcar agregada).    Jugo 100 % de fruta, fausto el jugo de naranja lamin. (Tenga presente que incluso el jugo que es 100 % de frutas contiene muchas calorías, y además aporta menos fibra que la fruta entera. Un vaso pequeño al día es suficiente).   Qué puede reducir el valor nutritivo de las frutas?    El aporte nutricional de las frutas disminuye cuando se les agrega grasa, azúcar y harina procesada. Por ejemplo, en los postres fausto los dulces horneados, los pasteles y el sorbete. En vez de comer esos productos, opte por ensaladas o licuados de frutas.    La fruta enlatada en almíbar espeso contiene azúcar agregada. Revise la etiqueta para averiguar si la fruta viene enlatada en almíbar. Si así es, enjuague la fruta antes de comérsela. No tome el almíbar.    El jugo al que se le ha añadido azúcar (es decir, que no es 100 % de fruta) contiene muchas calorías y muy pocos elementos nutritivos. Quizás ya sepa que las bebidas gaseosas pueden estar repletas de azúcar. Yimi, aunque le cueste creerlo, lo mismo pasa con la mayoría de los jugos. En prado lugar, pruebe el agua con gas con un toque de jugo 100 % de fruta.    La fruta seca puede tener azúcar agregada. También contiene menos vitamina C y más calorías que la fruta fresca. Está hakan comer fruta seca de vez en cuando. Solo recuerde que no es soriano buena para la bo fausto la fruta fresca.    Un pequeño cambio:  La próxima vez que vaya a la reza de alimentos, elija 2 frutas que nunca haya probado.  Tiene darren mejor idea? Anótela aquí: __________________________________   _____________________________________________________________     3870-2710 [employer]. Todos los derechos reservados. Esta información no pretende sustituir la atención médica profesional. Sólo prado médico puede diagnosticar y tratar un problema de bo.           Patient Education     La nutrición y MiPlato: granos     Los granos (llamados también  féculas  o  almidones ) componen alimentos fausto el pan, la pasta, el arroz, los cereales y las tortillas. Los granos aportan darnell, vitamina B y otros nutrientes que el cuerpo necesita para funcionar. Además, son darren jaki de fibra, que facilita la digestión. La fibra ayuda también a controlar el peso porque contiene pocas calorías kelli llena mucho.   Opciones nutritivas  Los granos integrales están repletos de fibra. Además, al no estar muy procesados, estos alimentos conservan la mayoría de radha nutrientes. Por lo menos la mitad de los granos que coma deben ser integrales. Buenas opciones de lácteos:     Cualquier pan o cereal para el desayuno cuya etiqueta indique un grano integral (fausto el kamila integral o la sidney integral en copos) fausto el primer ingrediente. Los ingredientes se enumeran del más abundante al más escaso. Así que si un grano integral aparece blanca, el alimento lo contiene en grandes cantidades.    Alimentos elaborados con granos integrales, fausto la sidney integral, la sopa de cebada, los platos con arroz april y los fideos de alforfón o kamila sarraceno (soba).    Palomitas de maíz, tortillas de maíz.   Qué reduce el valor nutritivo de los granos?    Cuando se procesan (refinan), los granos pierden fibra y sustancias nutritivas. Los granos blancos suelen ser refinados. [place] significa que el pan marquis, el arroz marquis y las tortillas de harina son menos beneficiosos para la bo que las versiones de granos integrales, tales fausto el pan y el arroz integrales o las tortillas de granos integrales.    Las afirmaciones que aparecen en los paquetes de los alimentos pueden ser engañosas. Aunque el  paquete diga que el alimento es un producto de grano integral, revise los ingredientes. A menos que el grano integral aparezca blanca en la lista, el alimento no es tan alisha fausto lo presenta el envase. Marilyn dato puede ser útil: el pan de kamila no es integral, yimi el de kamila 100 % integral sí lo es. Asegúrese de leer las letras pequeñas.    Los granos pierden valor nutritivo cuando se les agrega grasa, sal (sodio) o azúcar. Por ejemplo, las galletas, los pasteles, las donas, los pastelitos, los cereales azucarados, las palomitas de maíz con manteca y mucha giovanni agregada, los pretzels y las galletas saladas hechas con granos refinados... Y la lista sigue.    Un pequeño cambio:  Encuentre un pan integral o cereal de grano integral que le guste.  Tiene darren mejor idea? Anótela aquí: __________________________________   _____________________________________________________________    [id number] [employer]. Todos los derechos reservados. Esta información no pretende sustituir la atención médica profesional. Sólo prado médico puede diagnosticar y tratar un problema de bo.           Patient Education     La nutrición y MiPlato: alimentos proteínicos    Marilyn urvashi incluye alimentos con mucha proteína. Las proteínas ayudan al cuerpo a formar nuevas células y mantener los tejidos saludables. La mayoría de los norteamericanos obtiene suficiente proteína sin ni siquiera intentarlo. Puede ser más difícil para los vegetarianos. Yimi además de la carne, hay un gran número de alimentos que también tienen mucha proteína. Lo ideal es que obtenga radha proteínas de darren diversidad de morales.   Opciones nutritivas  Marilyn urvashi de alimentos lock mucho más que solo carne y frijoles. También incluye los scarlett secos, las semillas y los huevos. Hay toda clase de opciones nutritivas:     El garima y el pavo a los que se les steele quitado la piel    El pescado y los mariscos    Los partida magros de res, cerdo o beck (sin grasa  visible)    Los productos de soya, fausto el tofu, los frijoles edamame, el tempeh y la leche de soya    Los frijoles negros, rojos y pintos, los garbanzos y las lentejas (Nota: los frijoles y los garbanzos cuentan fausto proteínas y fausto verduras)    El cacahuate (maní), las almendras, las nueces, las semillas de ajonjolí y girasol, y productos derivados de estos alimentos (tales fausto la mantequilla de cacahuate o la pasta de ajonjolí tahini)    Los huevos y los alimentos que los contienen (fausto la quiche o la tortilla española)   Qué cosas reducen el valor nutritivo de la carne y las legumbres?    La carne con mucha grasa no es idalmis. Antes de cocinar la carne, recórtele toda la grasa que esté a la vista. La piel del garima y del pavo también contienen mucha grasa, por lo que debe eliminarla antes de cocinarlos.    Los alimentos pierden valor nutritivo si se preparan empanizados o fritos. Algunos platos de toya tipo son el garima y el pescado fritos, y los frijoles refritos.    La salchicha y los embutidos (abdiel frías para sándwich) tienden a contener mucha grasa y sal. Compre versiones bajas en grasa y sodio de estos alimentos.    Un pequeño cambio  Prepare darren comida que incluya darren jaki de proteína distinta de la carne (fausto el tofu, las lentejas o cualquiera de los alimentos mencionados más arriba).  Tiene darren mejor idea? Anótela aquí: __________________________________   _____________________________________________________________    [id number] [employer]. Todos los derechos reservados. Esta información no pretende sustituir la atención médica profesional. Sólo prado médico puede diagnosticar y tratar un problema de bo.           Patient Education     La nutrición y MiPlato: verduras    Las verduras son darren importante jaki de fibra. Además, están llenas de vitaminas necesarias para la bo y el crecimiento. En radha comidas, llene la mitad de prado plato con frutas y verduras.   Opciones  nutritivas  Las verduras frescas, congeladas o enlatadas contienen muchos nutrientes. El color de la cáscara le indica lo que contiene. Si sigue darren dieta muy colorida, obtendrá darren gran diversidad de nutrientes. Algunas elecciones recomendables:     Las verduras de color yessi oscuro fausto la espinaca, la berza, la col rizada y el brócoli.    Las verduras de color schwab vivo y naranja, tales fuasto las zanahorias, los camotes (batatas), los pimientos rojos y los tomates.    Las verduras ricas en almidón (fécula), tales fausto las trent y la calabaza.   Qué cosas reducen el valor nutritivo de las verduras?    Hervir las verduras hace que algunas de radha vitaminas se disuelvan en el agua. Para conservar las vitaminas, cocínelas brevemente al vapor, saltéelas, sofríalas o cocínelas en el horno de microondas. Las verduras pierden radha vitaminas también si se cocinan demasiado, por lo que debe procurar mantenerlas algo crujientes.    Usar darren gran cantidad de margarina, mantequilla o aderezo para ensalada agrega grasas y calorías, yimi no muchos nutrientes. Está hakan usar darren pequeña cantidad de estos aderezos. Yimi recuerde que cuanto más agregue, más grasa estará añadiendo también.    Las verduras congeladas preparadas con salsas de queso u otros condimentos procesados contienen mucha grasa y sal. Es más alisha que usted mismo sazone las verduras congeladas que vienen sin productos añadidos. Pruebe a usar hierbas frescas, ajo, almendras tostadas o semillas de ajonjolí.    Las verduras enlatadas suelen contener mucha sal. Compre variedades con poco sodio o sin sal agregada.    Un pequeño cambio  Incorpore verduras en todas radha comidas. Por ejemplo, agregue zanahorias ralladas a la carne de hamburguesa, o calabacitas a un plato de espagueti con albóndigas.  Ni siquiera lo notará!  Tiene darren mejor idea? Anótela aquí: __________________________________   ________________________________________________________    [id number] [employer]. Todos los derechos reservados. Esta información no pretende sustituir la atención médica profesional. Sólo prado médico puede diagnosticar y tratar un problema de bo.           Patient Education     Trastorno De Ajuste [Adjustment Disorder]  Un Trastorno de Ajuste es darren condición que resulta de la dificultad para sobrellevar las tensiones normales de la cherrie. Usted puede sentirse abrumada/o por radha responsabilidades. Estos sentimientos pueden interferir con radha relaciones en la casa o en el trabajo.  Con esta condición, es común sentir tristeza, culpa, desesperanza e inquietud. Puede ser que la manera en que usted responde normalmente al estrés para sentirse mejor funcione también esta vez. Estos sentimientos pueden seguir arik semanas o meses. Si no ocurren otros factores estresantes importantes, es probable que comience a sentirse mejor dentro de los seis meses.  Cuidado En [place]    Si le recetaron un medicamento, tómelo fausto le indicaron.    Es de mucha ayuda hablar sobre radha sentimientos y pensamientos con familiares o amigos que le comprenden y apoyan.  Seguimiento  con prado médico o terapeuta de acuerdo a lo indicado por nuestro personal. Hágales saber si prado condición se prolonga por más de seis meses sin señales de mejoría. Para mayor información, contacte a la [place] [employer] para la [facility] [phone], o visite [email or web address].  Busque Prontamente Atención Médica  si algo de lo siguiente ocurre:    Depresión o ansiedad que empeoran    Se siente fuera de control    Tiene pensamientos acerca de dañarse o dañar a otras personas    No se siente capaz de cuidarse a sí misma/o    [id number] [employer]. Todos los derechos reservados. Esta información no pretende sustituir la atención médica profesional. Sólo prado médico puede diagnosticar y tratar un problema de bo.           Patient Education     Depresión: consejos para ayudarse a sí mismo    Mientras radha  proveedores de atención médica le yan tratamiento para la depresión, usted también puede ayudarse a sí mismo. Recuerde que tiene darren enfermedad que le afecta en el aspecto físico, emocional, mental y social. Recuperarse por completo llevará tiempo. Cuide el cuerpo y la mente y tenga paciencia consigo mismo mientras se recupera.  Cuidado personal    Infórmese. Lashell sobre las opciones de tratamiento y de medicamentos. Si tiene fuerzas, asista a conferencias locales o a grupos de apoyo. Veronica darren lista de sitios web y libros útiles y úsela cuando la necesite. Esta enfermedad no es culpa suya. No se culpe por la depresión.    Controle los primeros síntomas. Si nota que los síntomas regresan o atraviesa situaciones o identifica otros factores que puedan llevarlo a la depresión, busque ayuda cuanto antes. Pida a prado kelli y amigos de confianza que supervisen prado comportamiento y le avisen si notan algo que les inquiete.    Colabore con prado proveedor. Busque un proveedor que le genere confianza. Sea sincero con esta persona y comparta información sobre prado tratamiento para la depresión y sobre la reacción a los medicamentos.    Esté preparado para darren crisis. Sepa qué hacer si sufre darren crisis. Tenga a mano el número de teléfono de darren línea directa para crisis y sepa dónde están los centros de atención de urgencia y los departamentos de emergencias más cercarnos de la comunidad en la que vive.    Posponga las decisiones importantes. La depresión puede nublarle el juicio así que espere hasta que se sienta mejor antes de harry decisiones importantes, fausto un cambio de trabajo, mudanza, matrimonio o divorcio.    Sea paciente; recuperarse de darren depresión es un proceso que andre prado tiempo. No se desanime si no se siente mejor de inmediato.    No se exija demasiado. La depresión taina energía y dificulta la capacidad de concentración. Por lo tanto, no podrá hacer todas las cosas que hacía antes. Establezca objetivos razonables y  wilda lo que pueda para alcanzarlos.    Júntese con otras personas. No se aísle de los demás. Si lo hace, se sentirá peor. Trate de pasar tiempo en compañía de otras personas y participe en actividades divertidas cuando pueda. Vaya a [place] darren película, un partido, un servicio religioso o un evento social. Hable francamente con personas de confianza. Acepte la ayuda que le ofrezcan.    Cuide prado cuerpo  A menudo, las personas que tienen depresión josé de cuidarse. Eso empeora aún más radha problemas. Arik el tratamiento, y después, es importante que recuerde lo siguiente:    Actividad física. El ejercicio es darrne manera excelente de cuidar prado cuerpo. Los estudios indican que también ayuda a combatir la depresión. Procure hacer 30 minutos de actividad moderada por día. Caminar arik períodos breves (de 5 a 10 minutos) es darren buena manera de empezar, kelli cualquier cosa que lo ponga en movimiento (jardinería, limpiar la casa) cuenta.    No consuma drogas ni alcohol. Es posible que le proporcionen alivio a corto plazo, kelli, con el tiempo, simplemente agravarán radha problemas.    Trate de reducir el estrés. Pida a prado proveedor de atención médica que le recomiende ejercicios y técnicas de relajación para aliviar el estrés. Considere la posibilidad de hacer actividades fausto meditación, yoga o nya chi.    Coma hakan. Darren dieta idalmis y hakan equilibrada ayuda a mantener la bo de prado cuerpo.    Duerma lo suficiente. Trate de dormir 8 horas todas las noches. Dormir demasiado o muy poco pueden causar otros problemas físicos y emocionales.    [id number] [employer]. Todos los derechos reservados. Esta información no pretende sustituir la atención médica profesional. Sólo prado médico puede diagnosticar y tratar un problema de bo.

## 2021-11-09 NOTE — PROGRESS NOTES
Ghazal is a 40 year old who is being evaluated via a billable telephone visit.      What phone number would you like to be contacted at? Per chart  How would you like to obtain your AVS? MyChart    Assessment & Plan     Malignant neoplasm of left breast in female, estrogen receptor positive, unspecified site of breast (H)  Following with oncology.  In remission and now on Tamoxifen.  Discussed side effects of Tamoxifen as below.     Weight loss  Due to low appetite.  Declines nutrition referral as insurance does not cover.  Attached information to her mychart and referred her to [employer].    - Comprehensive metabolic panel (BMP + Alb, Alk Phos, ALT, AST, Total. Bili, TP); Future    Pain in both knees, unspecified chronicity  Recommend sports medicine follow up to determine next steps.    - Orthopedic  Referral; Future    Dizziness  Could be related to not eating well versus side effect of tamoxifen.  She is coming in in December for other labs.  Adding these on.  Discussed symptoms that would warrant sooner follow up.   - CBC with platelets and differential; Future  - Ferritin; Future  - Vitamin D Deficiency; Future    Adjustment disorder   Increase in depressive symptoms since starting Tamoxifen.  Offered medication, therapy.  Patient declines currently.  We discussed lifestyle changes that can help.  She will reach out if would like further treatment.  Denies SI/HI.  Symptoms mild overall.     Ordering of each unique test  Prescription drug management  55 minutes spent on the date of the encounter doing chart review, history and exam, documentation and further activities per the note       See Patient Instructions    Return in about 4 weeks (around 12/7/2021) for Follow up.    KWABENA Head [facility]    Tesha Harman is a 40 year old who presents for the following health issues  accompanied by her .    HPI   Having dizziness, crying,  back pain, foot pain.  Thinks these symptoms are from the pills she's taking, Tamoxifen.  Has been on for a month. Symptoms started two weeks after starting Tamoxifen.    She was told by oncology to take tylenol or ibuprofen for the foot pain.      Is not exercising much right now.      Feeling some feelings of depression; is often tearful.  Denies anxiety.  Her appetite is low, not hungry all day.  No history of depression.  Has lost 20 lbs since treatment for cancer.    In a day eats a couple pieces of fruit, little plate of beans or soup, a couple tortillas.    drinks 2.5 L of water        Review of Systems   Constitutional, HEENT, cardiovascular, pulmonary, GI, , musculoskeletal, neuro, skin, endocrine and psych systems are negative, except as otherwise noted.      Objective             Physical Exam   healthy, alert and no distress  PSYCH: Alert and oriented times 3; coherent speech, normal   rate and volume, able to articulate logical thoughts, able   to abstract reason, no tangential thoughts, no hallucinations   or delusions  Her affect is normal  RESP: No cough, no audible wheezing, able to talk in full sentences  Remainder of exam unable to be completed due to telephone visits              Phone call duration: 40 minutes

## 2021-12-06 ENCOUNTER — LAB (OUTPATIENT)
Dept: LAB | Facility: CLINIC | Age: 41
End: 2021-12-06
Payer: COMMERCIAL

## 2021-12-06 DIAGNOSIS — R63.4 WEIGHT LOSS: ICD-10-CM

## 2021-12-06 DIAGNOSIS — Z17.0 MALIGNANT NEOPLASM OF LEFT BREAST IN FEMALE, ESTROGEN RECEPTOR POSITIVE, UNSPECIFIED SITE OF BREAST (H): ICD-10-CM

## 2021-12-06 DIAGNOSIS — R74.8 ELEVATED LIVER ENZYMES: ICD-10-CM

## 2021-12-06 DIAGNOSIS — C50.912 MALIGNANT NEOPLASM OF LEFT BREAST IN FEMALE, ESTROGEN RECEPTOR POSITIVE, UNSPECIFIED SITE OF BREAST (H): ICD-10-CM

## 2021-12-06 DIAGNOSIS — R42 DIZZINESS: ICD-10-CM

## 2021-12-06 LAB
ALBUMIN SERPL-MCNC: 3.6 G/DL (ref 3.4–5)
ALP SERPL-CCNC: 91 U/L (ref 40–150)
ALT SERPL W P-5'-P-CCNC: 30 U/L (ref 0–50)
ANION GAP SERPL CALCULATED.3IONS-SCNC: 7 MMOL/L (ref 3–14)
AST SERPL W P-5'-P-CCNC: 17 U/L (ref 0–45)
BASOPHILS # BLD AUTO: 0 10E3/UL (ref 0–0.2)
BASOPHILS NFR BLD AUTO: 0 %
BILIRUB DIRECT SERPL-MCNC: 0.1 MG/DL (ref 0–0.2)
BILIRUB SERPL-MCNC: 0.3 MG/DL (ref 0.2–1.3)
BUN SERPL-MCNC: 17 MG/DL (ref 7–30)
CALCIUM SERPL-MCNC: 9.2 MG/DL (ref 8.5–10.1)
CHLORIDE BLD-SCNC: 111 MMOL/L (ref 94–109)
CO2 SERPL-SCNC: 22 MMOL/L (ref 20–32)
CREAT SERPL-MCNC: 0.76 MG/DL (ref 0.52–1.04)
EOSINOPHIL # BLD AUTO: 0.1 10E3/UL (ref 0–0.7)
EOSINOPHIL NFR BLD AUTO: 1 %
ERYTHROCYTE [DISTWIDTH] IN BLOOD BY AUTOMATED COUNT: 15 % (ref 10–15)
FERRITIN SERPL-MCNC: 62 NG/ML (ref 12–150)
GFR SERPL CREATININE-BSD FRML MDRD: >90 ML/MIN/1.73M2
GLUCOSE BLD-MCNC: 83 MG/DL (ref 70–99)
HCT VFR BLD AUTO: 40.8 % (ref 35–47)
HGB BLD-MCNC: 13.8 G/DL (ref 11.7–15.7)
IMM GRANULOCYTES # BLD: 0 10E3/UL
IMM GRANULOCYTES NFR BLD: 0 %
LYMPHOCYTES # BLD AUTO: 1.4 10E3/UL (ref 0.8–5.3)
LYMPHOCYTES NFR BLD AUTO: 22 %
MCH RBC QN AUTO: 28.2 PG (ref 26.5–33)
MCHC RBC AUTO-ENTMCNC: 33.8 G/DL (ref 31.5–36.5)
MCV RBC AUTO: 83 FL (ref 78–100)
MONOCYTES # BLD AUTO: 0.4 10E3/UL (ref 0–1.3)
MONOCYTES NFR BLD AUTO: 6 %
NEUTROPHILS # BLD AUTO: 4.8 10E3/UL (ref 1.6–8.3)
NEUTROPHILS NFR BLD AUTO: 71 %
PLATELET # BLD AUTO: 250 10E3/UL (ref 150–450)
POTASSIUM BLD-SCNC: 4 MMOL/L (ref 3.4–5.3)
PROT SERPL-MCNC: 7.8 G/DL (ref 6.8–8.8)
RBC # BLD AUTO: 4.9 10E6/UL (ref 3.8–5.2)
SODIUM SERPL-SCNC: 140 MMOL/L (ref 133–144)
WBC # BLD AUTO: 6.7 10E3/UL (ref 4–11)

## 2021-12-06 PROCEDURE — 82306 VITAMIN D 25 HYDROXY: CPT

## 2021-12-06 PROCEDURE — 82728 ASSAY OF FERRITIN: CPT

## 2021-12-06 PROCEDURE — 80053 COMPREHEN METABOLIC PANEL: CPT

## 2021-12-06 PROCEDURE — 36415 COLL VENOUS BLD VENIPUNCTURE: CPT

## 2021-12-06 PROCEDURE — 82248 BILIRUBIN DIRECT: CPT

## 2021-12-06 PROCEDURE — 85025 COMPLETE CBC W/AUTO DIFF WBC: CPT

## 2021-12-07 LAB — DEPRECATED CALCIDIOL+CALCIFEROL SERPL-MC: 28 UG/L (ref 20–75)

## 2021-12-09 ENCOUNTER — VIRTUAL VISIT (OUTPATIENT)
Dept: ONCOLOGY | Facility: CLINIC | Age: 41
End: 2021-12-09
Attending: NURSE PRACTITIONER
Payer: COMMERCIAL

## 2021-12-09 DIAGNOSIS — R74.8 ELEVATED LIVER ENZYMES: ICD-10-CM

## 2021-12-09 DIAGNOSIS — C50.912 MALIGNANT NEOPLASM OF LEFT BREAST IN FEMALE, ESTROGEN RECEPTOR POSITIVE, UNSPECIFIED SITE OF BREAST (H): ICD-10-CM

## 2021-12-09 DIAGNOSIS — L58.9 RADIATION DERMATITIS: ICD-10-CM

## 2021-12-09 DIAGNOSIS — R91.8 PULMONARY NODULES: ICD-10-CM

## 2021-12-09 DIAGNOSIS — E04.1 THYROID NODULE: ICD-10-CM

## 2021-12-09 DIAGNOSIS — Z17.0 MALIGNANT NEOPLASM OF LEFT BREAST IN FEMALE, ESTROGEN RECEPTOR POSITIVE, UNSPECIFIED SITE OF BREAST (H): ICD-10-CM

## 2021-12-09 PROCEDURE — 99215 OFFICE O/P EST HI 40 MIN: CPT | Mod: GT | Performed by: INTERNAL MEDICINE

## 2021-12-09 RX ORDER — TAMOXIFEN CITRATE 20 MG/1
20 TABLET ORAL DAILY
Qty: 90 TABLET | Refills: 2 | Status: SHIPPED | OUTPATIENT
Start: 2021-12-09 | End: 2022-04-12

## 2021-12-09 NOTE — PROGRESS NOTES
"This patient  is being evaluated via a billable video visit.      The patient has been notified of following:     \"This video visit will be conducted via a call between you and your physician/provider. We have found that certain health care needs can be provided without the need for an in-person physical exam.  This service lets us provide the care you need with a video conversation.  If a prescription is necessary we can send it directly to your pharmacy.  If lab work is needed we can place an order for that and you can then stop by our lab to have the test done at a later time.    Video visits are billed at different rates depending on your insurance coverage.  Please reach out to your insurance provider with any questions.    If during the course of the call the physician/provider feels a video visit is not appropriate, you will not be charged for this service.\"    Patient has given verbal consent for Video visit? yes  Video-Visit Details    Type of service:  Video Visit    Video visit duration: 17  min  Originating Location (pt. Location): home  Distant Location (provider location):  [facility]     Platform used for Video Visit: Arabella with   Mallory Fine MD      Oncology follow-up visit:  Date on this visit: Dec 9, 2021  PCP: Eun Patton  Breast surgeon: Dr.Sara Fonseca   Plastic Surgeon: Dr. Diggs    Diagnosis:  [id number] ER positive IL positive HER-2/brayden negative by FISH left-sided breast cancer, with intermediate Oncotype DX recurrence score of 23, status post bilateral mastectomy and axillary sentinel lymph node biopsy on April 5, 2021.    Oncologic history:    1.  Breast cancer- She presented in Feb 2021 with L breast lump and itching x 1 month.  B/l diagnostic mammogram with tomosynthesis on 2/12/2021 showed in the area of left breast lump, there is an irregular mass measuringabout 1.5 cm. No suspicious mammographic findings in the right " breast. L breast US showed in the left breast at 10:00, 3 cm from the nipple, there was an irregular hypoechoic mass with angular margins measuring 2.2 x 1.4 x 1 cm. There was no lymphadenopathy in the left axilla. Contrast enhanced mammogram on 2/23/2021 showed a mass at the 10:00 position in left breast anterior depth measuring 1.7 cm. US guided core needle biopsy on 2/23/2021 showed no decompensating invasive ductal carcinoma, estrogen receptor positive (95%, strong) and progesterone receptor positive by immunohistochemistry (70%). By FISH HER2/MIO 17 ratio:  1.7, IHC 2+, additional 20 cells from areas corresponding to the most intense IHC staining were evaluated by FISH. The results obtained from these 20   additional cells also fall into Group 4. Taken together, the FISH and IHC   results are interpreted as HER2 negative.  FISH and IHC results ultimately interpreted as HER2 negative. OncotypeDx returned at 23, c/w intermediate risk, 9 percent of distant disease recurrence with the use of systemic endocrine therapy and  6.5%  benefit of adjuvant chemotherapy given young age, premenopausal.  She proceeded to undergo b/l breast MRI on 3/12/2021 which showed:  The index cancer noted at 10:00 3 cm from the nipple on the  left on ultrasound exam 2/23/2021 has a longest diameter of 22 mm.   2 potential satellite lesions are both present in the same quadrant at  11:00. One is posteriorly situated has a longest diameter of 8 mm on  sagittal imaging, the other is at the junction of the mid and  posterior breast measuring 7 mm in longest diameter on sagittal  imaging.   Distance from anterior aspect of the index lesion to the posterior  aspect of the closer potential satellite lesion is 7 cm, and to the  more distant potential satellite lesion is 10 cm.   As far as regional lymph nodes, there was a single internal mammary  chain node that's mildly enlarged between the first and second left  costal cartilages. As far as  left axillary lymph nodes, at 2:00  posteriorly, there are 2 equivocally abnormal nodes.  The right breast was unremarkable.  PET CT scan on March 22, 2021 showed  FDG avid mass in the medial upper quadrant of the left breast measuring 1.7 x 1.1 cm and SUV max 8.3, corresponding to the  previously biopsied lesion. Additional mildly FDG avid 0.8 cm lesion in the same quadrant.  5  mm nodule superior-medial to this, below the threshold of PET. No suspicious axillary lymphadenopathy or hypermetabolic lesions in the right breast. Mildly FDG avid hypoattenuating nodule in the left thyroid lobe  measuring 1.2 x 0.7 cm and SUV max 4.6. Recommend thyroid ultrasound  to further evaluate. Perifissural solid 7 mm pulmonary nodule in the right upper lobe  likely represents a lymph node. No suspicious FDG uptake in the abdomen or pelvis.  Additional normal-appearing non-FDG avid intramammary  lymph nodes on the left.  She proceeded to undergo with bilateral mastectomy and left axillary sentinel lymph node biopsy on April 5, 2021.  She had immediate reconstruction with Dr. Diggs.    Pathology from surgery demonstrated 23 mm left breast invasive ductal carcinoma, [place] grade 3, with associated DCIS as well as LCIS.  Micrometastasis in 1 of 4 left axillary sentinel lymph nodes.  Pathology from right breast demonstrated 4 mm focus of high-grade DCIS, [id number]. Oncotype DX was obtained on her core needle biopsy ([id number]) and returned at the score of 23 correlating with 9% risk of distant disease recurrence at 9 years with use of an aromatase inhibitor or tamoxifen alone. However since patient was young at the age of diagnosis (40-year-old), and the Oncotype DX recurrence score was in the intermediate range, there was about 6.5% benefit of adjuvant chemotherapy, and likely even higher given micrometastatic disease in one of the axillary sentinel lymph nodes.  We recommended that she proceed with adjuvant Taxotere/Cytoxan  "every 3 weeks for 4 cycles.     2. Young age at breast cancer diagnosis-she had blood drawn on March 19, 2021 and tested negative  for mutations in the FLEX, BRCA1, BRCA2, BRIP1, CDH1, CHEK2, EPCAM, MLH1, MSH2, MSH6, NBN, NF1, PALB2, PMS2, PTEN, RAD51C, RAD51D, STK11, and TP53 genes.    3. Thyroid nodules noted on thyroid ultrasound. These were first incidentally discovered on PET/CT in March 2021. In 07/2021 on CT there was stable appearance of hypodense left lobe thyroid nodule which did not demonstrate any suspicious features on ultrasound dated 4/12/2021.    4. 7 mm pulmonary nodule in the right upper lobe-incidentally noted on PET/CT in March 2021. CT chest 7/19/2021 showed stable 7 mm perifissural pulmonary nodule in the right upper lobe.      5. Premenopausal status- She has been menstruating regularly at diagnosis.  She has 5 children ages 5-16.  She is not planning to have any more children.   She offers no other health related complaints.    History Of Present Illness:  Ms. Martinez is a 41 year old premenopausal female, originally from [place], who presents for follow-up of R sided breast cancer,  [id number].  She proceeded with cycle 1 day 1 on April 30, 2021.  She has completed 4 cycles of chemotherapy on July 2, 2021.  Her chemotherapy was complicated by LFTs elevation requiring dose reduction in docetaxel by 20% with cycles 3 and 4.   She has not had menstrual periods return since chemotherapy.   She met with Dr. Corley proceeded to undergo adjuvant postmastectomy radiation to her left chest wall and regional lymphatics, completed in 09/2021. Subsequently, she started on adjuvant Tamoxifen. She has generally tolerated it well, but has had periods of sadness and crying, which resolve spontaneously. These happen occasionally, every other day. She denies feeling \"depressed\" and is not interested in trying an antidepressant.    History is obtained with the help of  who was on the phone for " this visit, and the patient was on video call.      Past Medical/Surgical History:  Past Medical History:   Diagnosis Date     Varicose veins of legs      Past Surgical History:   Procedure Laterality Date     INSERT PORT VASCULAR ACCESS N/A 4/5/2021    Procedure: PORT PLACEMENT;  Surgeon: Una Fonseca MD;  Location: [facility] OR     MASTECTOMY SIMPLE BILATERAL, SENTINEL NODE BILATERAL, COMBINED N/A 4/5/2021    Procedure: BILATERAL SKIN SPARING MASTECTOMY WITH LEFT SENTINEL LYMPH NODE BIOPSY;  Surgeon: Una Fonseca MD;  Location: [facility] OR     NO HISTORY OF SURGERY       RECONSTRUCT BREAST, INSERT TISSUE EXPANDER, COMBINED Bilateral 4/5/2021    Procedure: BILATERAL FIRST STAGE BREAST RECONSTRUCTION WITH TISSUE EXPANDERS; AND ACELLULAR DERMAL MATRIX;  Surgeon: Kervin Fu MD;  Location: [facility] OR     Allergies:  Allergies as of 12/09/2021     (No Known Allergies)     Current Medications:  Current Outpatient Medications   Medication Sig Dispense Refill     hydroquinone (JOSE ANGEL) 4 % external cream Apply to hyperpigmented areas twice a day until skin reaches desired color then stop. 30 g 1     mometasone (ELOCON) 0.1 % external cream Apply topically daily to radiation treatment field each morning.  Stop application on last day of treatment. 50 g 1     silver sulfADIAZINE (SILVADENE) 1 % external cream Apply topically 3 times daily to radiation treatment field. 400 g 0     tamoxifen (NOLVADEX) 20 MG tablet Take 1 tablet (20 mg) by mouth daily 30 tablet 2          Physical Exam:  Wt Readings from Last 5 Encounters:   11/08/21 75.3 kg (165 lb 14.4 oz)   09/29/21 74.7 kg (164 lb 11.2 oz)   09/27/21 74.2 kg (163 lb 9.6 oz)   09/21/21 73.7 kg (162 lb 6.4 oz)   09/15/21 73.7 kg (162 lb 6.4 oz)       Constitutional: alert and in no distress  Eyes: No redness or discharge  Respiratory: No cough or labored breathing.  Musculoskeletal: Full range of motion in extremities.  Skin: no visible skin lesions or  discoloration  Neurological: No tremors and denies headache.  Psychiatric: Mentation appears normal and affect is normal as well.  Alert and oriented x3.  The rest the comprehensive physical examination is deferred due to public health emergency video visit restrictions.        Laboratory/Imaging Studies  Labs from December 6, 2021 reviewed.  CMP was unremarkable.  WBC 6.7 hemoglobin 13.8 MCV 83 platelet count normal at 250,000/mcL.  Absolute differential counts within normal limits.  Vitamin D level low normal.  Ferritin 62 (improved from 3610 months ago).  Direct bilirubin within normal limits.  ASSESSMENT/PLAN:  Ghazal is a very pleasant 41-year-old Mongolian-speaking woman, originally from [place], with [id number] ER positive LA positive HER-2/brayden negative by FISH left-sided breast cancer, with intermediate Oncotype DX recurrence score of 23, status post bilateral mastectomy and axillary sentinel lymph node biopsy on April 5, 2021.  She also underwent immediate reconstruction by Dr. Diggs.  She has completed 4 cycles of adjuvant Taxotere/Cytoxan on July 2, 2021, as above. She has completed postmastectomy radiation to L chest wall and regional lymphatics in 09/2021. She has been on adjuvant Tamoxifen since.    1.  [id number] ER positive LA positive HER-2/brayden negative by FISH left-sided breast cancer- continues on Tamoxifen but experiencing emotional lability on the medication. She is not on an antidepressant and is not interested in trying one.   We'll follow-up on her in one month to see how she is doing emotionally. See our NP at that time. If her symptoms do not improve, would recommend starting an SSRI antidepressant.     2. Pulmonary nodule noted incidentally on PET/CT in 03/2021-  Perifissural solid 7 mm pulmonary nodule in the right upper lobe-this is stable on CT imaging in July 2021.  Recommend follow-up CT chest in 6 months (due late January 2022).  CT chest ordered. We'll inform the patient of the results  and recommendations and  f/up plan based on the results.     3. Thyroid nodule noted incidentally on PET/CT in 03/2021-seen by endocrinology.  F/up with Dr. Gaming and thyroid US in 04/2022.    4.  LFTs normalized.    5.  Mild anemia-resolved with iron supplementation and was likely also secondary to chemotherapy in the past.  Continue oral iron supplementation.  MCV low normal but ferritin improving, up to 62.  Follow CBC with differential counts annually.     All of Ghazal's questions were answered.  At the end of our visit patient verbalized understanding and concurred with the plan.    40 minutes spent on the date of the encounter doing chart review, review of test results, interpretation of tests, patient visit and documentation.    CT chest on 1/27/2022:  1. Stable postoperative changes of bilateral mastectomy with bilateral  breast implants. Radiation changes in the left upper lobe.  2. No new or enlarging pulmonary nodule. Decreased size of the major  fissural lymph node along the right minor fissure currently measuring  4 mm. Additional benign-appearing 4 mm intrafissural lymph node along  the right major fissure.  3. Unchanged subcentimeter hypodense thyroid nodules.     Recommend another CT chest in one year

## 2021-12-09 NOTE — LETTER
"    12/9/2021         RE: Ghazal Martinez  [address]  [place] [address]        Dear Colleague,    Thank you for referring your patient, Ghazal Martinez, to the [facility]. Please see a copy of my visit note below.    This patient  is being evaluated via a billable video visit.      The patient has been notified of following:     \"This video visit will be conducted via a call between you and your physician/provider. We have found that certain health care needs can be provided without the need for an in-person physical exam.  This service lets us provide the care you need with a video conversation.  If a prescription is necessary we can send it directly to your pharmacy.  If lab work is needed we can place an order for that and you can then stop by our lab to have the test done at a later time.    Video visits are billed at different rates depending on your insurance coverage.  Please reach out to your insurance provider with any questions.    If during the course of the call the physician/provider feels a video visit is not appropriate, you will not be charged for this service.\"    Patient has given verbal consent for Video visit? yes  Video-Visit Details    Type of service:  Video Visit    Video visit duration: 17  min  Originating Location (pt. Location): home  Distant Location (provider location):  [facility]     Platform used for Video Visit: Arabella with   Mallory Fine MD      Oncology follow-up visit:  Date on this visit: Dec 9, 2021  PCP: Eun Patton  Breast surgeon: Dr.Sara Fonseca   Plastic Surgeon: Dr. Diggs    Diagnosis:  [id number] ER positive [place] positive HER-2/brayden negative by FISH left-sided breast cancer, with intermediate Oncotype DX recurrence score of 23, status post bilateral mastectomy and axillary sentinel lymph node biopsy on April 5, 2021.    Oncologic history:    1.  Breast cancer- She " presented in Feb 2021 with L breast lump and itching x 1 month.  B/l diagnostic mammogram with tomosynthesis on 2/12/2021 showed in the area of left breast lump, there is an irregular mass measuringabout 1.5 cm. No suspicious mammographic findings in the right breast. L breast US showed in the left breast at 10:00, 3 cm from the nipple, there was an irregular hypoechoic mass with angular margins measuring 2.2 x 1.4 x 1 cm. There was no lymphadenopathy in the left axilla. Contrast enhanced mammogram on 2/23/2021 showed a mass at the 10:00 position in left breast anterior depth measuring 1.7 cm. US guided core needle biopsy on 2/23/2021 showed no decompensating invasive ductal carcinoma, estrogen receptor positive (95%, strong) and progesterone receptor positive by immunohistochemistry (70%). By FISH HER2/MIO 17 ratio:  1.7, IHC 2+, additional 20 cells from areas corresponding to the most intense IHC staining were evaluated by FISH. The results obtained from these 20   additional cells also fall into Group 4. Taken together, the FISH and IHC   results are interpreted as HER2 negative.  FISH and IHC results ultimately interpreted as HER2 negative. OncotypeDx returned at 23, c/w intermediate risk, 9 percent of distant disease recurrence with the use of systemic endocrine therapy and  6.5%  benefit of adjuvant chemotherapy given young age, premenopausal.  She proceeded to undergo b/l breast MRI on 3/12/2021 which showed:  The index cancer noted at 10:00 3 cm from the nipple on the  left on ultrasound exam 2/23/2021 has a longest diameter of 22 mm.   2 potential satellite lesions are both present in the same quadrant at  11:00. One is posteriorly situated has a longest diameter of 8 mm on  sagittal imaging, the other is at the junction of the mid and  posterior breast measuring 7 mm in longest diameter on sagittal  imaging.   Distance from anterior aspect of the index lesion to the posterior  aspect of the closer  potential satellite lesion is 7 cm, and to the  more distant potential satellite lesion is 10 cm.   As far as regional lymph nodes, there was a single internal mammary  chain node that's mildly enlarged between the first and second left  costal cartilages. As far as left axillary lymph nodes, at 2:00  posteriorly, there are 2 equivocally abnormal nodes.  The right breast was unremarkable.  PET CT scan on March 22, 2021 showed  FDG avid mass in the medial upper quadrant of the left breast measuring 1.7 x 1.1 cm and SUV max 8.3, corresponding to the  previously biopsied lesion. Additional mildly FDG avid 0.8 cm lesion in the same quadrant.  5  mm nodule superior-medial to this, below the threshold of PET. No suspicious axillary lymphadenopathy or hypermetabolic lesions in the right breast. Mildly FDG avid hypoattenuating nodule in the left thyroid lobe  measuring 1.2 x 0.7 cm and SUV max 4.6. Recommend thyroid ultrasound  to further evaluate. Perifissural solid 7 mm pulmonary nodule in the right upper lobe  likely represents a lymph node. No suspicious FDG uptake in the abdomen or pelvis.  Additional normal-appearing non-FDG avid intramammary  lymph nodes on the left.  She proceeded to undergo with bilateral mastectomy and left axillary sentinel lymph node biopsy on April 5, 2021.  She had immediate reconstruction with Dr. Diggs.    Pathology from surgery demonstrated 23 mm left breast invasive ductal carcinoma, Eligio grade 3, with associated DCIS as well as LCIS.  Micrometastasis in 1 of 4 left axillary sentinel lymph nodes.  Pathology from right breast demonstrated 4 mm focus of high-grade DCIS, [id number]. Oncotype DX was obtained on her core needle biopsy ([id number]) and returned at the score of 23 correlating with 9% risk of distant disease recurrence at 9 years with use of an aromatase inhibitor or tamoxifen alone. However since patient was young at the age of diagnosis (40-year-old), and the Oncotype DX  recurrence score was in the intermediate range, there was about 6.5% benefit of adjuvant chemotherapy, and likely even higher given micrometastatic disease in one of the axillary sentinel lymph nodes.  We recommended that she proceed with adjuvant Taxotere/Cytoxan every 3 weeks for 4 cycles.     2. Young age at breast cancer diagnosis-she had blood drawn on March 19, 2021 and tested negative  for mutations in the FLEX, BRCA1, BRCA2, BRIP1, CDH1, CHEK2, EPCAM, MLH1, MSH2, MSH6, NBN, NF1, PALB2, PMS2, PTEN, RAD51C, RAD51D, STK11, and TP53 genes.    3. Thyroid nodules noted on thyroid ultrasound. These were first incidentally discovered on PET/CT in March 2021. In 07/2021 on CT there was stable appearance of hypodense left lobe thyroid nodule which did not demonstrate any suspicious features on ultrasound dated 4/12/2021.    4. 7 mm pulmonary nodule in the right upper lobe-incidentally noted on PET/CT in March 2021. CT chest 7/19/2021 showed stable 7 mm perifissural pulmonary nodule in the right upper lobe.      5. Premenopausal status- She has been menstruating regularly at diagnosis.  She has 5 children ages 5-16.  She is not planning to have any more children.   She offers no other health related complaints.    History Of Present Illness:  Ms. Martinez is a 41 year old premenopausal female, originally from [place], who presents for follow-up of R sided breast cancer,  [id number].  She proceeded with cycle 1 day 1 on April 30, 2021.  She has completed 4 cycles of chemotherapy on July 2, 2021.  Her chemotherapy was complicated by LFTs elevation requiring dose reduction in docetaxel by 20% with cycles 3 and 4.   She has not had menstrual periods return since chemotherapy.   She met with Dr. Corley proceeded to undergo adjuvant postmastectomy radiation to her left chest wall and regional lymphatics, completed in 09/2021. Subsequently, she started on adjuvant Tamoxifen. She has generally tolerated it well, but has had periods  "of sadness and crying, which resolve spontaneously. These happen occasionally, every other day. She denies feeling \"depressed\" and is not interested in trying an antidepressant.    History is obtained with the help of  who was on the phone for this visit, and the patient was on video call.      Past Medical/Surgical History:  Past Medical History:   Diagnosis Date     Varicose veins of legs      Past Surgical History:   Procedure Laterality Date     INSERT PORT VASCULAR ACCESS N/A 4/5/2021    Procedure: PORT PLACEMENT;  Surgeon: Una Fonseca MD;  Location: [facility] OR     MASTECTOMY SIMPLE BILATERAL, SENTINEL NODE BILATERAL, COMBINED N/A 4/5/2021    Procedure: BILATERAL SKIN SPARING MASTECTOMY WITH LEFT SENTINEL LYMPH NODE BIOPSY;  Surgeon: Una Fonseca MD;  Location: [facility] OR     NO HISTORY OF SURGERY       RECONSTRUCT BREAST, INSERT TISSUE EXPANDER, COMBINED Bilateral 4/5/2021    Procedure: BILATERAL FIRST STAGE BREAST RECONSTRUCTION WITH TISSUE EXPANDERS; AND ACELLULAR DERMAL MATRIX;  Surgeon: Kervin Fu MD;  Location: [facility] OR     Allergies:  Allergies as of 12/09/2021     (No Known Allergies)     Current Medications:  Current Outpatient Medications   Medication Sig Dispense Refill     hydroquinone (JOSE ANGEL) 4 % external cream Apply to hyperpigmented areas twice a day until skin reaches desired color then stop. 30 g 1     mometasone (ELOCON) 0.1 % external cream Apply topically daily to radiation treatment field each morning.  Stop application on last day of treatment. 50 g 1     silver sulfADIAZINE (SILVADENE) 1 % external cream Apply topically 3 times daily to radiation treatment field. 400 g 0     tamoxifen (NOLVADEX) 20 MG tablet Take 1 tablet (20 mg) by mouth daily 30 tablet 2          Physical Exam:  Wt Readings from Last 5 Encounters:   11/08/21 75.3 kg (165 lb 14.4 oz)   09/29/21 74.7 kg (164 lb 11.2 oz)   09/27/21 74.2 kg (163 lb 9.6 oz)   09/21/21 73.7 kg (162 lb 6.4 oz) "   09/15/21 73.7 kg (162 lb 6.4 oz)       Constitutional: alert and in no distress  Eyes: No redness or discharge  Respiratory: No cough or labored breathing.  Musculoskeletal: Full range of motion in extremities.  Skin: no visible skin lesions or discoloration  Neurological: No tremors and denies headache.  Psychiatric: Mentation appears normal and affect is normal as well.  Alert and oriented x3.  The rest the comprehensive physical examination is deferred due to public health emergency video visit restrictions.        Laboratory/Imaging Studies  Labs from December 6, 2021 reviewed.  CMP was unremarkable.  WBC 6.7 hemoglobin 13.8 MCV 83 platelet count normal at 250,000/mcL.  Absolute differential counts within normal limits.  Vitamin D level low normal.  Ferritin 62 (improved from 3610 months ago).  Direct bilirubin within normal limits.  ASSESSMENT/PLAN:  Ghazal is a very pleasant 41-year-old [place] citizen-speaking woman, originally from [place], with [id number] ER positive [place] positive HER-2/brayden negative by FISH left-sided breast cancer, with intermediate Oncotype DX recurrence score of 23, status post bilateral mastectomy and axillary sentinel lymph node biopsy on April 5, 2021.  She also underwent immediate reconstruction by Dr. Diggs.  She has completed 4 cycles of adjuvant Taxotere/Cytoxan on July 2, 2021, as above. She has completed postmastectomy radiation to L chest wall and regional lymphatics in 09/2021. She has been on adjuvant Tamoxifen since.    1.  [id number] ER positive [place] positive HER-2/brayden negative by FISH left-sided breast cancer- continues on Tamoxifen but experiencing emotional lability on the medication. She is not on an antidepressant and is not interested in trying one.   We'll follow-up on her in one month to see how she is doing emotionally. See our NP at that time. If her symptoms do not improve, would recommend starting an SSRI antidepressant.     2. Pulmonary nodule noted incidentally on PET/CT  in 03/2021-  Perifissural solid 7 mm pulmonary nodule in the right upper lobe-this is stable on CT imaging in July 2021.  Recommend follow-up CT chest in 6 months (due late January 2022).  CT chest ordered. We'll inform the patient of the results and recommendations and  f/up plan based on the results.     3. Thyroid nodule noted incidentally on PET/CT in 03/2021-seen by endocrinology.  F/up with Dr. Gaming and thyroid US in 04/2022.    4.  LFTs normalized.    5.  Mild anemia-resolved with iron supplementation and was likely also secondary to chemotherapy in the past.  Continue oral iron supplementation.  MCV low normal but ferritin improving, up to 62.  Follow CBC with differential counts annually.     All of Ghazal's questions were answered.  At the end of our visit patient verbalized understanding and concurred with the plan.    40 minutes spent on the date of the encounter doing chart review, review of test results, interpretation of tests, patient visit and documentation.        Again, thank you for allowing me to participate in the care of your patient.        Sincerely,        Mallory Fine MD, MD

## 2021-12-16 ENCOUNTER — APPOINTMENT (OUTPATIENT)
Dept: INTERPRETER SERVICES | Facility: CLINIC | Age: 41
End: 2021-12-16
Payer: COMMERCIAL

## 2022-01-27 ENCOUNTER — ONCOLOGY VISIT (OUTPATIENT)
Dept: ONCOLOGY | Facility: CLINIC | Age: 42
End: 2022-01-27
Attending: INTERNAL MEDICINE
Payer: COMMERCIAL

## 2022-01-27 ENCOUNTER — ANCILLARY PROCEDURE (OUTPATIENT)
Dept: CT IMAGING | Facility: CLINIC | Age: 42
End: 2022-01-27
Attending: INTERNAL MEDICINE
Payer: COMMERCIAL

## 2022-01-27 VITALS
BODY MASS INDEX: 27.66 KG/M2 | HEART RATE: 69 BPM | WEIGHT: 162 LBS | DIASTOLIC BLOOD PRESSURE: 78 MMHG | OXYGEN SATURATION: 97 % | HEIGHT: 64 IN | RESPIRATION RATE: 16 BRPM | SYSTOLIC BLOOD PRESSURE: 119 MMHG

## 2022-01-27 DIAGNOSIS — R91.8 PULMONARY NODULES: ICD-10-CM

## 2022-01-27 DIAGNOSIS — E04.1 THYROID NODULE: ICD-10-CM

## 2022-01-27 DIAGNOSIS — Z17.0 MALIGNANT NEOPLASM OF LEFT BREAST IN FEMALE, ESTROGEN RECEPTOR POSITIVE, UNSPECIFIED SITE OF BREAST (H): ICD-10-CM

## 2022-01-27 DIAGNOSIS — C50.912 MALIGNANT NEOPLASM OF LEFT BREAST IN FEMALE, ESTROGEN RECEPTOR POSITIVE, UNSPECIFIED SITE OF BREAST (H): ICD-10-CM

## 2022-01-27 DIAGNOSIS — R74.8 ELEVATED LIVER ENZYMES: Primary | ICD-10-CM

## 2022-01-27 PROCEDURE — 99214 OFFICE O/P EST MOD 30 MIN: CPT | Performed by: NURSE PRACTITIONER

## 2022-01-27 PROCEDURE — 71250 CT THORAX DX C-: CPT | Mod: GC | Performed by: RADIOLOGY

## 2022-01-27 RX ORDER — TAMOXIFEN CITRATE 20 MG/1
20 TABLET ORAL DAILY
Qty: 90 TABLET | Refills: 2 | Status: CANCELLED | OUTPATIENT
Start: 2022-01-27

## 2022-01-27 ASSESSMENT — MIFFLIN-ST. JEOR: SCORE: 1380.08

## 2022-01-27 ASSESSMENT — PAIN SCALES - GENERAL: PAINLEVEL: NO PAIN (0)

## 2022-01-27 NOTE — LETTER
January 27, 2022      Ghazal Martinez                                                                [address]  [place] [address]          Dear Ghazal,    The results of your recent imaging of the chest showed good improvement with nodule of suspicion. No sign of cancer or new concerns found.     We will still follow up in 1 year with another CT of the chest.      Results for orders placed or performed in visit on 01/27/22   CT Chest w/o contrast     Status: None    Narrative    EXAMINATION: Chest CT  1/27/2022 1:38 PM    CLINICAL HISTORY: Lung nodule, 6-8mm; Pulmonary nodules    COMPARISON: Chest CT 7/19/2021, whole-body PET/CT 3/22/2021.    TECHNIQUE: CT imaging obtained through the chest without contrast.  Axial, coronal, and sagittal reconstructions and axial MIP reformatted  images are reviewed.     FINDINGS:  Support devices: Right chest wall Port-A-Cath with tip at the superior  cavoatrial junction.    Lungs: The trachea and central airways are patent. No pneumothorax or  pleural effusion. Postradiation changes to the anterolateral left  upper lobe with history of left breast cancer. No focal airspace  opacity. No new or enlarging pulmonary nodule. Decreased 4 mm solid  nodule along the right minor fissure likely representing a benign  intrafissural lymph node (series 6, image 123). Stable 4 mm solid  nodule along the right major fissure (series 6, image 120).    Mediastinum: Heart size is within normal limits. No pericardial  effusion. Ascending aorta and main pulmonary artery diameters are  within normal limits. Abnormal configuration of the great vessels off  of the aortic arch. No suspicious mediastinal, hilar, or axillary  lymph nodes. Postoperative changes of left axillary sentinel node  biopsy.    Scattered subcentimeter hypodense nodules throughout the thyroid.    Bones and soft tissues: No suspicious bone findings. Postoperative  changes of bilateral mastectomy. Bilateral saline breast  implants.    Upper Abdomen: Unremarkable appearance of the adrenal glands. Small  splenule.      Impression    IMPRESSION:   1. Stable postoperative changes of bilateral mastectomy with bilateral  breast implants. Radiation changes in the left upper lobe.  2. No new or enlarging pulmonary nodule. Decreased size of the major  fissural lymph node along the right minor fissure currently measuring  4 mm. Additional benign-appearing 4 mm intrafissural lymph node along  the right major fissure.  3. Unchanged subcentimeter hypodense thyroid nodules.    I have personally reviewed the examination and initial interpretation  and I agree with the findings.    RENITA MIGUEL MD         SYSTEM ID:  [id number]       Please note that test explanations are brief and do not reflect all diagnostic uses.    Please make a follow-up appointment if you have additional questions.    Sincerely,       Laura Klein, CNP

## 2022-01-27 NOTE — NURSING NOTE
"Oncology Rooming Note    January 27, 2022 1:06 PM   Ghazal Martinez is a 41 year old female who presents for:    Chief Complaint   Patient presents with     Oncology Clinic Visit     1 month follow up     Initial Vitals: /78 (BP Location: Right arm)   Pulse 69   Resp 16   Ht 1.626 m (5' 4.02\")   Wt 73.5 kg (162 lb)   SpO2 97%   BMI 27.79 kg/m   Estimated body mass index is 27.79 kg/m  as calculated from the following:    Height as of this encounter: 1.626 m (5' 4.02\").    Weight as of this encounter: 73.5 kg (162 lb). Body surface area is 1.82 meters squared.  No Pain (0) Comment: Data Unavailable   No LMP recorded. (Menstrual status: Chemotherapy).  Allergies reviewed: Yes  Medications reviewed: Yes    Medications: MEDICATION REFILLS NEEDED TODAY. Provider was notified.  Pharmacy name entered into [facility]:    [facility] [place], [place] - [address] [id number] - [place], [place] - [address] [facility] [address] [facility] [place], [place] - [id number]    Clinical concerns: itchy back, and fatigue       Gaviota Quinn LPN              "

## 2022-01-27 NOTE — LETTER
1/27/2022         RE: Ghazal Martinez  [address]  [place] [address]        Dear Colleague,    Thank you for referring your patient, Ghazal Martinez, to the [facility]. Please see a copy of my visit note below.    Oncology Follow Up Visit: January 27, 2022    Oncologist: Dr Mallory Fine  PCP: No Ref-Primary, Physician    Diagnosis: Left Breast Cancer  Ghazal Martinez is a 40 yo female who presented in 2/2021 with left breast lump x 1 month.   Contrast enhanced mammogram on 2/23/2021 showed a mass at the 10:00 position in left breast anterior depth measuring 1.7 cm. US guided core needle biopsy on 2/23/2021 showed no decompensating invasive ductal carcinoma, estrogen receptor positive (95%, strong) and progesterone receptor positive by immunohistochemistry (70%). By FISH HER2/MIO 17 ratio:  1.7, IHC 2+, additional 20 cells from areas corresponding to the most intense IHC staining were evaluated by FISH. The results obtained from these 20 additional cells also fall into Group 4. Taken together, the FISH and IHC   results are interpreted as HER2 negative.FISH and IHC results ultimately interpreted as HER2 negative.   OncotypeDx returned at 23, c/w intermediate risk, 9 percent of distant disease recurrence with the use of systemic endocrine therapy and  6.5%  benefit of adjuvant chemotherapy given young age, premenopausal.  *3/19/2021Genetic testing negative  for mutations in the FLEX, BRCA1, BRCA2, BRIP1, CDH1, CHEK2, EPCAM, MLH1, MSH2, MSH6, NBN, NF1, PALB2, PMS2, PTEN, RAD51C, RAD51D, STK11, and TP53 genes.  Treatment:   4/5/2021 status post bilateral mastectomy and axillary sentinel lymph node biopsy   4/30-7/2021 treatment with Taxotere/Cytoxan x 4 cycles  9/21/2021 completed radiation therapy.   9/2021 began Tamoxifen    Interval History: Ms. Martinez is seen today with  on Ipad in clinic for follow-up to her breast cancer treatment.  Patient completed  "radiation therapy 9/2021 and began Tamoxifen.Today pt shares she is tolerating the Tamoxifen well with few hot flashes and no added discharge, aches or signs of clotting. She has good energy and is eating well with good appetite and normal bowel and bladder. Feels no new breast or chest issue and has healed well from radiation.    Rest of comprehensive and complete ROS is reviewed and is negative.   Past Medical History:   Diagnosis Date     Varicose veins of legs      Current Outpatient Medications   Medication     tamoxifen (NOLVADEX) 20 MG tablet     No current facility-administered medications for this visit.     No Known Allergies    Physical Exam:/78 (BP Location: Right arm)   Pulse 69   Resp 16   Ht 1.626 m (5' 4.02\")   Wt 73.5 kg (162 lb)   SpO2 97%   BMI 27.79 kg/m     Constitutional: Alert, healthy, and in no distress.   ENT: Eyes bright, No mouth sores  Respiratory: Breathing easy.  No cough  Breasts: Bilaterally reconstructed breasts with skin healed well after radiation.   MS: Muscle tone normal, extremities normal with no edema.   Skin: See breasts above.  Hair returning  Neuro: Sensory grossly WNL, gait normal.   Psych: Mentation appears normal and affect normal/bright with good conversation.    Laboratory Results:   Results for orders placed or performed in visit on 01/27/22   CT Chest w/o contrast     Status: None    Narrative    EXAMINATION: Chest CT  1/27/2022 1:38 PM    CLINICAL HISTORY: Lung nodule, 6-8mm; Pulmonary nodules    COMPARISON: Chest CT 7/19/2021, whole-body PET/CT 3/22/2021.    TECHNIQUE: CT imaging obtained through the chest without contrast.  Axial, coronal, and sagittal reconstructions and axial MIP reformatted  images are reviewed.     FINDINGS:  Support devices: Right chest wall Port-A-Cath with tip at the superior  cavoatrial junction.    Lungs: The trachea and central airways are patent. No pneumothorax or  pleural effusion. Postradiation changes to the " anterolateral left  upper lobe with history of left breast cancer. No focal airspace  opacity. No new or enlarging pulmonary nodule. Decreased 4 mm solid  nodule along the right minor fissure likely representing a benign  intrafissural lymph node (series 6, image 123). Stable 4 mm solid  nodule along the right major fissure (series 6, image 120).    Mediastinum: Heart size is within normal limits. No pericardial  effusion. Ascending aorta and main pulmonary artery diameters are  within normal limits. Abnormal configuration of the great vessels off  of the aortic arch. No suspicious mediastinal, hilar, or axillary  lymph nodes. Postoperative changes of left axillary sentinel node  biopsy.    Scattered subcentimeter hypodense nodules throughout the thyroid.    Bones and soft tissues: No suspicious bone findings. Postoperative  changes of bilateral mastectomy. Bilateral saline breast implants.    Upper Abdomen: Unremarkable appearance of the adrenal glands. Small  splenule.      Impression    IMPRESSION:   1. Stable postoperative changes of bilateral mastectomy with bilateral  breast implants. Radiation changes in the left upper lobe.  2. No new or enlarging pulmonary nodule. Decreased size of the major  fissural lymph node along the right minor fissure currently measuring  4 mm. Additional benign-appearing 4 mm intrafissural lymph node along  the right major fissure.  3. Unchanged subcentimeter hypodense thyroid nodules.    I have personally reviewed the examination and initial interpretation  and I agree with the findings.    RENITA MIGUEL MD         SYSTEM ID:  [id number]     Assessment and Plan:   Left Breast Cancer-patient completed bilateral mastectomies with sentinel node biopsy to the left axilla. Due to young age and Oncotype result was suggested that she complete 4 cycles of Taxotere /Cytoxan.  She completed radiation therapy on 9/21/2021 and began use of tamoxifen.   She is tolerating Tamoxifen well with  no complaints. Again reviewed potential side effects of the drug.  Follow-up to be every 3 months in the first year every 3 to 6 months in year 2 and follow-up of every 6 months in the last years 3-5.   Patient will return in 3 months for review with provider with labs to include hepatic panel  Reviewed genetic testing showing no mutations with her cancer  Elevated liver enzymes-resolved  Peripheral neuropathy- neuropathy to feet is much improved. Should continue to improve with time.   Thyroid nodule- found on 3/2021 PET CT- FDG avid hypoattenuating nodule in the left thyroid lobe measuring 1.2 x 0.7 cm and SUV max 4.6. Referral made to endocrinology who completed US of the thyroid showing 3 left sided thyroid nodules - Recommendation for US and follow up with endocrinology in 1 year( 4/2022)- already scheduled  Pulmonary Nodule- 7mm to RUL noted on 3/2021 PET CT. 7/2021 imaging shows node is stable. Todays CT showing size reduction in nodule- will repeat in 1 year.    Covid 19 precautions - Discussed need and prevention of disease but pt is not interested at this time. .  The total time of this encounter amounted to 30 minutes. This time included face-to-face time spent with the patient and , prep work, ordering tests, and performing post visit documentation.  Laura Klein Cnp        Again, thank you for allowing me to participate in the care of your patient.        Sincerely,        Laura Klein NP, APRN CNP

## 2022-01-27 NOTE — PROGRESS NOTES
Oncology Follow Up Visit: January 27, 2022    Oncologist: Dr Mallory Fine  PCP: No Ref-Primary, Physician    Diagnosis: Left Breast Cancer  Ghazal Martinez is a 42 yo female who presented in 2/2021 with left breast lump x 1 month.   Contrast enhanced mammogram on 2/23/2021 showed a mass at the 10:00 position in left breast anterior depth measuring 1.7 cm. US guided core needle biopsy on 2/23/2021 showed no decompensating invasive ductal carcinoma, estrogen receptor positive (95%, strong) and progesterone receptor positive by immunohistochemistry (70%). By FISH HER2/MIO 17 ratio:  1.7, IHC 2+, additional 20 cells from areas corresponding to the most intense IHC staining were evaluated by FISH. The results obtained from these 20 additional cells also fall into Group 4. Taken together, the FISH and IHC   results are interpreted as HER2 negative.FISH and IHC results ultimately interpreted as HER2 negative.   OncotypeDx returned at 23, c/w intermediate risk, 9 percent of distant disease recurrence with the use of systemic endocrine therapy and  6.5%  benefit of adjuvant chemotherapy given young age, premenopausal.  *3/19/2021Genetic testing negative  for mutations in the FLEX, BRCA1, BRCA2, BRIP1, CDH1, CHEK2, EPCAM, MLH1, MSH2, MSH6, NBN, NF1, PALB2, PMS2, PTEN, RAD51C, RAD51D, STK11, and TP53 genes.  Treatment:   4/5/2021 status post bilateral mastectomy and axillary sentinel lymph node biopsy   4/30-7/2021 treatment with Taxotere/Cytoxan x 4 cycles  9/21/2021 completed radiation therapy.   9/2021 began Tamoxifen    Interval History: Ms. Martinez is seen today with  on Ipad in clinic for follow-up to her breast cancer treatment.  Patient completed radiation therapy 9/2021 and began Tamoxifen.Today pt shares she is tolerating the Tamoxifen well with few hot flashes and no added discharge, aches or signs of clotting. She has good energy and is eating well with good appetite and normal bowel and bladder. Feels  "no new breast or chest issue and has healed well from radiation.    Rest of comprehensive and complete ROS is reviewed and is negative.   Past Medical History:   Diagnosis Date     Varicose veins of legs      Current Outpatient Medications   Medication     tamoxifen (NOLVADEX) 20 MG tablet     No current facility-administered medications for this visit.     No Known Allergies    Physical Exam:/78 (BP Location: Right arm)   Pulse 69   Resp 16   Ht 1.626 m (5' 4.02\")   Wt 73.5 kg (162 lb)   SpO2 97%   BMI 27.79 kg/m     Constitutional: Alert, healthy, and in no distress.   ENT: Eyes bright, No mouth sores  Respiratory: Breathing easy.  No cough  Breasts: Bilaterally reconstructed breasts with skin healed well after radiation.   MS: Muscle tone normal, extremities normal with no edema.   Skin: See breasts above.  Hair returning  Neuro: Sensory grossly WNL, gait normal.   Psych: Mentation appears normal and affect normal/bright with good conversation.    Laboratory Results:   Results for orders placed or performed in visit on 01/27/22   CT Chest w/o contrast     Status: None    Narrative    EXAMINATION: Chest CT  1/27/2022 1:38 PM    CLINICAL HISTORY: Lung nodule, 6-8mm; Pulmonary nodules    COMPARISON: Chest CT 7/19/2021, whole-body PET/CT 3/22/2021.    TECHNIQUE: CT imaging obtained through the chest without contrast.  Axial, coronal, and sagittal reconstructions and axial MIP reformatted  images are reviewed.     FINDINGS:  Support devices: Right chest wall Port-A-Cath with tip at the superior  cavoatrial junction.    Lungs: The trachea and central airways are patent. No pneumothorax or  pleural effusion. Postradiation changes to the anterolateral left  upper lobe with history of left breast cancer. No focal airspace  opacity. No new or enlarging pulmonary nodule. Decreased 4 mm solid  nodule along the right minor fissure likely representing a benign  intrafissural lymph node (series 6, image 123). Stable " 4 mm solid  nodule along the right major fissure (series 6, image 120).    Mediastinum: Heart size is within normal limits. No pericardial  effusion. Ascending aorta and main pulmonary artery diameters are  within normal limits. Abnormal configuration of the great vessels off  of the aortic arch. No suspicious mediastinal, hilar, or axillary  lymph nodes. Postoperative changes of left axillary sentinel node  biopsy.    Scattered subcentimeter hypodense nodules throughout the thyroid.    Bones and soft tissues: No suspicious bone findings. Postoperative  changes of bilateral mastectomy. Bilateral saline breast implants.    Upper Abdomen: Unremarkable appearance of the adrenal glands. Small  splenule.      Impression    IMPRESSION:   1. Stable postoperative changes of bilateral mastectomy with bilateral  breast implants. Radiation changes in the left upper lobe.  2. No new or enlarging pulmonary nodule. Decreased size of the major  fissural lymph node along the right minor fissure currently measuring  4 mm. Additional benign-appearing 4 mm intrafissural lymph node along  the right major fissure.  3. Unchanged subcentimeter hypodense thyroid nodules.    I have personally reviewed the examination and initial interpretation  and I agree with the findings.    RENITA MIGUEL MD         SYSTEM ID:  [id number]     Assessment and Plan:   Left Breast Cancer-patient completed bilateral mastectomies with sentinel node biopsy to the left axilla. Due to young age and Oncotype result was suggested that she complete 4 cycles of Taxotere /Cytoxan.  She completed radiation therapy on 9/21/2021 and began use of tamoxifen.   She is tolerating Tamoxifen well with no complaints. Again reviewed potential side effects of the drug.  Follow-up to be every 3 months in the first year every 3 to 6 months in year 2 and follow-up of every 6 months in the last years 3-5.   Patient will return in 3 months for review with provider with labs to  include hepatic panel  Reviewed genetic testing showing no mutations with her cancer  Elevated liver enzymes-resolved  Peripheral neuropathy- neuropathy to feet is much improved. Should continue to improve with time.   Thyroid nodule- found on 3/2021 PET CT- FDG avid hypoattenuating nodule in the left thyroid lobe measuring 1.2 x 0.7 cm and SUV max 4.6. Referral made to endocrinology who completed US of the thyroid showing 3 left sided thyroid nodules - Recommendation for US and follow up with endocrinology in 1 year( 4/2022)- already scheduled  Pulmonary Nodule- 7mm to RUL noted on 3/2021 PET CT. 7/2021 imaging shows node is stable. Todays CT showing size reduction in nodule- will repeat in 1 year.    Covid 19 precautions - Discussed need and prevention of disease but pt is not interested at this time. .  The total time of this encounter amounted to 30 minutes. This time included face-to-face time spent with the patient and , prep work, ordering tests, and performing post visit documentation.  Laura Klein,Cnp

## 2022-03-01 ENCOUNTER — OFFICE VISIT (OUTPATIENT)
Dept: FAMILY MEDICINE | Facility: CLINIC | Age: 42
End: 2022-03-01
Payer: COMMERCIAL

## 2022-03-01 ENCOUNTER — TELEPHONE (OUTPATIENT)
Dept: FAMILY MEDICINE | Facility: CLINIC | Age: 42
End: 2022-03-01

## 2022-03-01 VITALS
RESPIRATION RATE: 16 BRPM | WEIGHT: 156.8 LBS | BODY MASS INDEX: 28.85 KG/M2 | DIASTOLIC BLOOD PRESSURE: 87 MMHG | OXYGEN SATURATION: 98 % | HEIGHT: 62 IN | SYSTOLIC BLOOD PRESSURE: 133 MMHG | TEMPERATURE: 97.3 F | HEART RATE: 64 BPM

## 2022-03-01 DIAGNOSIS — Z01.818 PREOP GENERAL PHYSICAL EXAM: Primary | ICD-10-CM

## 2022-03-01 DIAGNOSIS — C50.919: ICD-10-CM

## 2022-03-01 DIAGNOSIS — C77.3: ICD-10-CM

## 2022-03-01 DIAGNOSIS — C77.1: ICD-10-CM

## 2022-03-01 DIAGNOSIS — L81.9 HYPERPIGMENTATION OF SKIN OF CHEEK: ICD-10-CM

## 2022-03-01 LAB
ALBUMIN SERPL-MCNC: 3.5 G/DL (ref 3.4–5)
ALP SERPL-CCNC: 80 U/L (ref 40–150)
ALT SERPL W P-5'-P-CCNC: 41 U/L (ref 0–50)
ANION GAP SERPL CALCULATED.3IONS-SCNC: 4 MMOL/L (ref 3–14)
AST SERPL W P-5'-P-CCNC: 21 U/L (ref 0–45)
BASOPHILS # BLD AUTO: 0 10E3/UL (ref 0–0.2)
BASOPHILS NFR BLD AUTO: 0 %
BILIRUB SERPL-MCNC: 0.3 MG/DL (ref 0.2–1.3)
BUN SERPL-MCNC: 15 MG/DL (ref 7–30)
CALCIUM SERPL-MCNC: 8.9 MG/DL (ref 8.5–10.1)
CHLORIDE BLD-SCNC: 112 MMOL/L (ref 94–109)
CO2 SERPL-SCNC: 24 MMOL/L (ref 20–32)
CREAT SERPL-MCNC: 0.68 MG/DL (ref 0.52–1.04)
EOSINOPHIL # BLD AUTO: 0.1 10E3/UL (ref 0–0.7)
EOSINOPHIL NFR BLD AUTO: 3 %
ERYTHROCYTE [DISTWIDTH] IN BLOOD BY AUTOMATED COUNT: 12.5 % (ref 10–15)
GFR SERPL CREATININE-BSD FRML MDRD: >90 ML/MIN/1.73M2
GLUCOSE BLD-MCNC: 82 MG/DL (ref 70–99)
HCG UR QL: NEGATIVE
HCT VFR BLD AUTO: 38.6 % (ref 35–47)
HGB BLD-MCNC: 13 G/DL (ref 11.7–15.7)
IMM GRANULOCYTES # BLD: 0 10E3/UL
IMM GRANULOCYTES NFR BLD: 0 %
LYMPHOCYTES # BLD AUTO: 1.3 10E3/UL (ref 0.8–5.3)
LYMPHOCYTES NFR BLD AUTO: 28 %
MCH RBC QN AUTO: 29.5 PG (ref 26.5–33)
MCHC RBC AUTO-ENTMCNC: 33.7 G/DL (ref 31.5–36.5)
MCV RBC AUTO: 88 FL (ref 78–100)
MONOCYTES # BLD AUTO: 0.3 10E3/UL (ref 0–1.3)
MONOCYTES NFR BLD AUTO: 7 %
NEUTROPHILS # BLD AUTO: 2.9 10E3/UL (ref 1.6–8.3)
NEUTROPHILS NFR BLD AUTO: 62 %
PLATELET # BLD AUTO: 203 10E3/UL (ref 150–450)
POTASSIUM BLD-SCNC: 3.9 MMOL/L (ref 3.4–5.3)
PROT SERPL-MCNC: 7.3 G/DL (ref 6.8–8.8)
RBC # BLD AUTO: 4.4 10E6/UL (ref 3.8–5.2)
SODIUM SERPL-SCNC: 140 MMOL/L (ref 133–144)
WBC # BLD AUTO: 4.6 10E3/UL (ref 4–11)

## 2022-03-01 PROCEDURE — U0003 INFECTIOUS AGENT DETECTION BY NUCLEIC ACID (DNA OR RNA); SEVERE ACUTE RESPIRATORY SYNDROME CORONAVIRUS 2 (SARS-COV-2) (CORONAVIRUS DISEASE [COVID-19]), AMPLIFIED PROBE TECHNIQUE, MAKING USE OF HIGH THROUGHPUT TECHNOLOGIES AS DESCRIBED BY CMS-2020-01-R: HCPCS | Performed by: NURSE PRACTITIONER

## 2022-03-01 PROCEDURE — 85025 COMPLETE CBC W/AUTO DIFF WBC: CPT | Performed by: NURSE PRACTITIONER

## 2022-03-01 PROCEDURE — 36415 COLL VENOUS BLD VENIPUNCTURE: CPT | Performed by: NURSE PRACTITIONER

## 2022-03-01 PROCEDURE — 99214 OFFICE O/P EST MOD 30 MIN: CPT | Performed by: NURSE PRACTITIONER

## 2022-03-01 PROCEDURE — 81025 URINE PREGNANCY TEST: CPT | Performed by: NURSE PRACTITIONER

## 2022-03-01 PROCEDURE — 80053 COMPREHEN METABOLIC PANEL: CPT | Performed by: NURSE PRACTITIONER

## 2022-03-01 PROCEDURE — U0005 INFEC AGEN DETEC AMPLI PROBE: HCPCS | Performed by: NURSE PRACTITIONER

## 2022-03-01 PROCEDURE — 93000 ELECTROCARDIOGRAM COMPLETE: CPT | Performed by: NURSE PRACTITIONER

## 2022-03-01 NOTE — Clinical Note
Hello, patient is cleared for surgery.  Please fax to surgery center.  Labs already in note.  Please attach her EKG.  Thanks!  Eun

## 2022-03-01 NOTE — PROGRESS NOTES
[address] [address]  Phone: [phone]  Primary Provider: No Ref-Primary, Physician  Pre-op Performing Provider:    TEODORA POP  VIDEO,       PREOPERATIVE EVALUATION:  Today's date: 3/1/2022    Ghazal Martinez is a 41 year old female who presents for a preoperative evaluation.    Surgical Information:  Surgery/Procedure: Breast Surgery  Surgery Location: [facility]  Surgeon: Dr. Kervin Collins  Surgery Date: 3/24/2022  Time of Surgery: 12:00 am   Where patient plans to recover: At home with family  Fax number for surgical facility: [phone]  Phone: [phone]    Type of Anesthesia Anticipated: General    Assessment & Plan     The proposed surgical procedure is considered INTERMEDIATE risk.    Preop general physical exam  - Comprehensive metabolic panel (BMP + Alb, Alk Phos, ALT, AST, Total. Bili, TP); Future  - CBC with platelets and differential; Future  - EKG 12-lead complete w/read - Clinics  - Asymptomatic COVID-19 Virus (Coronavirus) by PCR  - HCG Qual, Urine ([id number]); Future  - CBC with platelets and differential  - Comprehensive metabolic panel (BMP + Alb, Alk Phos, ALT, AST, Total. Bili, TP)  - HCG Qual, Urine ([id number])    Neoplasm of breast, regional lymph node staging category N3b: metastasis in ipsilateral internal mammary lymph node and axillary lymph node, unspecified laterality (H)      Hyperpigmentation of skin of cheek  - Adult Dermatology Referral; Future         Risks and Recommendations:  The patient has the following additional risks and recommendations for perioperative complications:   - No identified additional risk factors other than previously addressed    Medication Instructions:  Patient is to take all scheduled medications on the day of surgery    RECOMMENDATION:  APPROVAL GIVEN to proceed with proposed procedure, without further diagnostic evaluation.    20  minutes spent on the date of the encounter doing chart review, history and exam, documentation and further activities per the note        Subjective     HPI related to upcoming procedure: breast reconstruction.      Preop Questions 3/1/2022   1. Have you ever had a heart attack or stroke? No   2. Have you ever had surgery on your heart or blood vessels, such as a stent placement, a coronary artery bypass, or surgery on an artery in your head, neck, heart, or legs? No   3. Do you have chest pain with activity? No   4. Do you have a history of  heart failure? No   5. Do you currently have a cold, bronchitis or symptoms of other infection? No   6. Do you have a cough, shortness of breath, or wheezing? No   7. Do you or anyone in your family have previous history of blood clots? No   8. Do you or does anyone in your family have a serious bleeding problem such as prolonged bleeding following surgeries or cuts? No   9. Have you ever had problems with anemia or been told to take iron pills? YES - recent labs WNL   10. Have you had any abnormal blood loss such as black, tarry or bloody stools, or abnormal vaginal bleeding? No   11. Have you ever had a blood transfusion? No   12. Are you willing to have a blood transfusion if it is medically needed before, during, or after your surgery? Yes   13. Have you or any of your relatives ever had problems with anesthesia? No   14. Do you have sleep apnea, excessive snoring or daytime drowsiness? No   15. Do you have any artifical heart valves or other implanted medical devices like a pacemaker, defibrillator, or continuous glucose monitor? No   16. Do you have artificial joints? No   17. Are you allergic to latex? No   18. Is there any chance that you may be pregnant? No     Health Care Directive:  Patient does not have a Health Care Directive or Living Will: Patient states has Advance Directive and will bring in a copy to clinic.    Preoperative Review of :   reviewed - no  record of controlled substances prescribed.      Status of Chronic Conditions:  See problem list for active medical problems.  Problems all longstanding and stable, except as noted/documented.  See ROS for pertinent symptoms related to these conditions.      Review of Systems  CONSTITUTIONAL: NEGATIVE for fever, chills, change in weight  INTEGUMENTARY/SKIN: NEGATIVE for worrisome rashes, moles or lesions  EYES: NEGATIVE for vision changes or irritation  ENT/MOUTH: NEGATIVE for ear, mouth and throat problems  RESP: NEGATIVE for significant cough or SOB  CV: NEGATIVE for chest pain, palpitations or peripheral edema  GI: NEGATIVE for nausea, abdominal pain, heartburn, or change in bowel habits  : NEGATIVE for frequency, dysuria, or hematuria  MUSCULOSKELETAL: NEGATIVE for significant arthralgias or myalgia  NEURO: NEGATIVE for weakness, dizziness or paresthesias  ENDOCRINE: NEGATIVE for temperature intolerance, skin/hair changes  HEME: NEGATIVE for bleeding problems  PSYCHIATRIC: NEGATIVE for changes in mood or affect    Patient Active Problem List    Diagnosis Date Noted     Adjustment disorder with depressed mood 11/09/2021     Priority: Medium     Thyroid nodule 11/08/2021     Priority: Medium     Adverse effect of tamoxifen, initial encounter 11/08/2021     Priority: Medium     Pulmonary nodules 11/08/2021     Priority: Medium     Melasma 06/29/2021     Priority: Medium     Malignant neoplasm of left breast (H) 03/24/2021     Priority: Medium     SCP ready for delivery       Malignant neoplasm of central portion of left breast in female, estrogen receptor positive (H) 03/11/2021     Priority: Medium     Added automatically from request for surgery [id number]       Menorrhagia with regular cycle 01/27/2020     Priority: Medium     Intramural leiomyoma of uterus 01/27/2020     Priority: Medium     Vitamin D deficiency 01/21/2020     Priority: Medium     Iron deficiency anemia due to chronic blood loss 01/20/2020      Priority: Medium     Prediabetes 01/20/2020     Priority: Medium     Cervical cancer screening      Priority: Medium     1/29/18 NIL pap, neg HR HPV. Per visit notes, no hx of abnormal pap. Plan: pap in 3 years.  6/29/21 NIL pap, Neg HPV. Plan pending provider review.        Varicose veins of legs 01/29/2018     Priority: Medium      Past Medical History:   Diagnosis Date     Varicose veins of legs      Past Surgical History:   Procedure Laterality Date     INSERT PORT VASCULAR ACCESS N/A 4/5/2021    Procedure: PORT PLACEMENT;  Surgeon: Una Fonseca MD;  Location: [facility] OR     MASTECTOMY SIMPLE BILATERAL, SENTINEL NODE BILATERAL, COMBINED N/A 4/5/2021    Procedure: BILATERAL SKIN SPARING MASTECTOMY WITH LEFT SENTINEL LYMPH NODE BIOPSY;  Surgeon: Una Fonseca MD;  Location:  OR     NO HISTORY OF SURGERY       RECONSTRUCT BREAST, INSERT TISSUE EXPANDER, COMBINED Bilateral 4/5/2021    Procedure: BILATERAL FIRST STAGE BREAST RECONSTRUCTION WITH TISSUE EXPANDERS; AND ACELLULAR DERMAL MATRIX;  Surgeon: Kervin Fu MD;  Location:  OR     Current Outpatient Medications   Medication Sig Dispense Refill     tamoxifen (NOLVADEX) 20 MG tablet Take 1 tablet (20 mg) by mouth daily 90 tablet 2       No Known Allergies     Social History     Tobacco Use     Smoking status: Never Smoker     Smokeless tobacco: Never Used   Substance Use Topics     Alcohol use: No     Family History   Problem Relation Age of Onset     No Known Problems Mother      No Known Problems Father      Diabetes No family hx of      Coronary Artery Disease No family hx of      Hypertension No family hx of      Hyperlipidemia No family hx of      Cerebrovascular Disease No family hx of      Breast Cancer No family hx of      Colon Cancer No family hx of      Depression No family hx of      Anxiety Disorder No family hx of      Asthma No family hx of      Thyroid Disease No family hx of      History   Drug Use No         Objective     BP  "133/87 (BP Location: Right arm, Patient Position: Sitting, Cuff Size: Adult Regular)   Pulse 64   Temp 97.3  F (36.3  C) (Tympanic)   Resp 16   Ht 1.567 m (5' 1.69\")   Wt 71.1 kg (156 lb 12.8 oz)   SpO2 98%   Breastfeeding No   BMI 28.97 kg/m      Physical Exam    GENERAL APPEARANCE: healthy, alert and no distress     EYES: EOMI, PERRL     HENT: ear canals and TM's normal and nose and mouth without ulcers or lesions     NECK: no adenopathy, no asymmetry, masses, or scars and thyroid normal to palpation     RESP: lungs clear to auscultation - no rales, rhonchi or wheezes     CV: regular rates and rhythm, normal S1 S2, no S3 or S4 and no murmur, click or rub     ABDOMEN:  soft, nontender, no HSM or masses and bowel sounds normal     MS: extremities normal- no gross deformities noted, no evidence of inflammation in joints, FROM in all extremities.     SKIN: no suspicious lesions or rashes     NEURO: Normal strength and tone, sensory exam grossly normal, mentation intact and speech normal     PSYCH: mentation appears normal. and affect normal/bright     LYMPHATICS: No cervical adenopathy    Recent Labs   Lab Test 12/06/21  1618 09/27/21  1458 07/29/21  1113 07/02/21  1200   HGB 13.8 12.0   < > 12.0    241   < > 276     --   --  142   POTASSIUM 4.0  --   --  3.7   CR 0.76 0.75  --  0.59    < > = values in this interval not displayed.        Diagnostics:  Component      Latest Ref Rng & Units 3/1/2022   WBC      4.0 - 11.0 10e3/uL 4.6   RBC Count      3.80 - 5.20 10e6/uL 4.40   Hemoglobin      11.7 - 15.7 g/dL 13.0   Hematocrit      35.0 - 47.0 % 38.6   MCV      78 - 100 fL 88   MCH      26.5 - 33.0 pg 29.5   MCHC      31.5 - 36.5 g/dL 33.7   RDW      10.0 - 15.0 % 12.5   Platelet Count      150 - 450 10e3/uL 203   % Neutrophils      % 62   % Lymphocytes      % 28   % Monocytes      % 7   % Eosinophils      % 3   % Basophils      % 0   % Immature Granulocytes      % 0   Absolute Neutrophils      1.6 - " 8.3 10e3/uL 2.9   Absolute Lymphocytes      0.8 - 5.3 10e3/uL 1.3   Absolute Monocytes      0.0 - 1.3 10e3/uL 0.3   Absolute Eosinophils      0.0 - 0.7 10e3/uL 0.1   Absolute Basophils      0.0 - 0.2 10e3/uL 0.0   Absolute Immature Granulocytes      <=0.4 10e3/uL 0.0   Sodium      133 - 144 mmol/L 140   Potassium      3.4 - 5.3 mmol/L 3.9   Chloride      94 - 109 mmol/L 112 (H)   Carbon Dioxide      20 - 32 mmol/L 24   Anion Gap      3 - 14 mmol/L 4   Urea Nitrogen      7 - 30 mg/dL 15   Creatinine      0.52 - 1.04 mg/dL 0.68   Calcium      8.5 - 10.1 mg/dL 8.9   Glucose      70 - 99 mg/dL 82   Alkaline Phosphatase      40 - 150 U/L 80   AST      0 - 45 U/L 21   ALT      0 - 50 U/L 41   Protein Total      6.8 - 8.8 g/dL 7.3   Albumin      3.4 - 5.0 g/dL 3.5   Bilirubin Total      0.2 - 1.3 mg/dL 0.3   GFR Estimate      >60 mL/min/1.73m2 >90   HCG Qual Urine      Negative Negative      EKG: there are no prior tracings available  Sinus  Bradycardia   -Left atrial enlargement.     Revised Cardiac Risk Index (RCRI):  The patient has the following serious cardiovascular risks for perioperative complications:   - No serious cardiac risks = 0 points     RCRI Interpretation: 0 points: Class I (very low risk - 0.4% complication rate)           Signed Electronically by: KWABENA Head CNP  Copy of this evaluation report is provided to requesting physician.

## 2022-03-01 NOTE — TELEPHONE ENCOUNTER
Patient updated on the below with  Kim [id number], no questions at this time, verbalized understanding.    Ladan Kapadia RN

## 2022-03-01 NOTE — PATIENT INSTRUCTIONS
Take 2000 international unit(s) of Vitamin D 3 daily  Try Simethicone over the counter for gas.  See Dermatology for the skin discoloration  Preparing for Your Surgery  Getting started  A nurse will call you to review your health history and instructions. They will give you an arrival time based on your scheduled surgery time. Please be ready to share:    Your doctor's clinic name and phone number    Your medical, surgical and anesthesia history    A list of allergies and sensitivities    A list of medicines, including herbal treatments and over-the-counter drugs    Whether the patient has a legal guardian (ask how to send us the papers in advance)  Please tell us if you're pregnant--or if there's any chance you might be pregnant. Some surgeries may injure a fetus (unborn baby), so they require a pregnancy test. Surgeries that are safe for a fetus don't always need a test, and you can choose whether to have one.   If you have a child who's having surgery, please ask for a copy of Preparing for Your Child's Surgery.    Preparing for surgery    Within 30 days of surgery: Have a pre-op exam (sometimes called an H&P, or History and Physical). This can be done at a clinic or pre-operative center.  ? If you're having a , you may not need this exam. Talk to your care team.    At your pre-op exam, talk to your care team about all medicines you take. If you need to stop any medicines before surgery, ask when to start taking them again.  ? We do this for your safety. Many medicines can make you bleed too much during surgery. Some change how well surgery (anesthesia) drugs work.    Call your insurance company to let them know you're having surgery. (If you don't have insurance, call [phone].)    Call your clinic if there's any change in your health. This includes signs of a cold or flu (sore throat, runny nose, cough, rash, fever). It also includes a scrape or scratch near the surgery site.    If you have  questions on the day of surgery, call your hospital or surgery center.  COVID testing  You may need to be tested for COVID-19 before having surgery. If so, your surgical team will give you instructions for scheduling this test, separate from your preoperative history and physical.  Eating and drinking guidelines  For your safety: Unless your surgeon tells you otherwise, follow the guidelines below.    Eat and drink as usual until 8 hours before surgery. After that, no food or milk.    Drink clear liquids until 2 hours before surgery. These are liquids you can see through, like water, Gatorade and Propel Water. You may also have black coffee and tea (no cream or milk).    Nothing by mouth within 2 hours of surgery. This includes gum, candy and breath mints.    If you drink alcohol: Stop drinking it the night before surgery.    If your care team tells you to take medicine on the morning of surgery, it's okay to take it with a sip of water.  Preventing infection    Shower or bathe the night before and morning of your surgery. Follow the instructions your clinic gave you. (If no instructions, use regular soap.)    Don't shave or clip hair near your surgery site. We'll remove the hair if needed.    Don't smoke or vape the morning of surgery. You may chew nicotine gum up to 2 hours before surgery. A nicotine patch is okay.  ? Note: Some surgeries require you to completely quit smoking and nicotine. Check with your surgeon.    Your care team will make every effort to keep you safe from infection. We will:  ? Clean our hands often with soap and water (or an alcohol-based hand rub).  ? Clean the skin at your surgery site with a special soap that kills germs.  ? Give you a special gown to keep you warm. (Cold raises the risk of infection.)  ? Wear special hair covers, masks, gowns and gloves during surgery.  ? Give antibiotic medicine, if prescribed. Not all surgeries need antibiotics.  What to bring on the day of  surgery    Photo ID and insurance card    Copy of your health care directive, if you have one    Glasses and hearing aides (bring cases)  ? You can't wear contacts during surgery    Inhaler and eye drops, if you use them (tell us about these when you arrive)    CPAP machine or breathing device, if you use them    A few personal items, if spending the night    If you have . . .  ? A pacemaker, ICD (cardiac defibrillator) or other implant: Bring the ID card.  ? An implanted stimulator: Bring the remote control.  ? A legal guardian: Bring a copy of the certified (court-stamped) guardianship papers.  Please remove any jewelry, including body piercings. Leave jewelry and other valuables at home.  If you're going home the day of surgery    You must have a responsible adult drive you home. They should stay with you overnight as well.    If you don't have someone to stay with you, and you aren't safe to go home alone, we may keep you overnight. Insurance often won't pay for this.  After surgery  If it's hard to control your pain or you need more pain medicine, please call your surgeon's office.  Questions?   If you have any questions for your care team, list them here: _________________________________________________________________________________________________________________________________________________________________________ ____________________________________ ____________________________________ ____________________________________  For informational purposes only. Not to replace the advice of your health care provider. Copyright   2003, 2019 [facility]. All rights reserved. Clinically reviewed by Babita Young MD. [employer] [id number] - REV 07/21.

## 2022-03-01 NOTE — TELEPHONE ENCOUNTER
Please call patient.  We had a discussion today about her menses.  I reached out to her oncologist about this.  Patients can still ovulate while on Tamoxifen meaning pregnancy is possible, so they do recommend contraception.  Non-hormonal is preferred.  So we could do a Paragard/copper IUD or she could look into have a tubal ligation.  The other option (though less reliable and with low efficacy) is for her to use condoms consistently every time with intercourse.      For more information on Paragard she can go to [email or web address].  They have information in both English and [place] citizen.      Thanks,  Eun

## 2022-03-01 NOTE — Clinical Note
Hello there,  Question about this patient.  She was asking today if her menses would return.  I know a lot of women with tamoxifen do not get their menses. However, does she need to be concerned about pregnancy.  From what I was reading, she can still be ovulating so would need to have some sort of contraception.  Ff she does need contraception, would you recommend only nonhormonal or is the Kyleena IUD an option?  I appreciate any input.  Thanks,  KWABENA Head CNP

## 2022-03-02 LAB — SARS-COV-2 RNA RESP QL NAA+PROBE: NEGATIVE

## 2022-03-03 ENCOUNTER — TELEPHONE (OUTPATIENT)
Dept: ONCOLOGY | Facility: CLINIC | Age: 42
End: 2022-03-03
Payer: COMMERCIAL

## 2022-03-03 NOTE — TELEPHONE ENCOUNTER
patient's son left a voicemail message,  just left a callback number ([phone]), with no reason for the call.    Returned call to son, patient would like to have port removed and is requesting our input.  Upcoming plan explained and patient will move forward with port removal.     Carla Hodgson RN

## 2022-03-21 ENCOUNTER — LAB (OUTPATIENT)
Dept: URGENT CARE | Facility: URGENT CARE | Age: 42
End: 2022-03-21
Payer: COMMERCIAL

## 2022-03-21 DIAGNOSIS — Z20.822 ENCOUNTER FOR LABORATORY TESTING FOR COVID-19 VIRUS: ICD-10-CM

## 2022-03-21 LAB — SARS-COV-2 RNA RESP QL NAA+PROBE: NEGATIVE

## 2022-03-21 PROCEDURE — U0003 INFECTIOUS AGENT DETECTION BY NUCLEIC ACID (DNA OR RNA); SEVERE ACUTE RESPIRATORY SYNDROME CORONAVIRUS 2 (SARS-COV-2) (CORONAVIRUS DISEASE [COVID-19]), AMPLIFIED PROBE TECHNIQUE, MAKING USE OF HIGH THROUGHPUT TECHNOLOGIES AS DESCRIBED BY CMS-2020-01-R: HCPCS

## 2022-03-21 PROCEDURE — U0005 INFEC AGEN DETEC AMPLI PROBE: HCPCS

## 2022-04-11 ENCOUNTER — LAB (OUTPATIENT)
Dept: LAB | Facility: CLINIC | Age: 42
End: 2022-04-11
Payer: COMMERCIAL

## 2022-04-11 ENCOUNTER — ANCILLARY PROCEDURE (OUTPATIENT)
Dept: ULTRASOUND IMAGING | Facility: CLINIC | Age: 42
End: 2022-04-11
Attending: INTERNAL MEDICINE
Payer: COMMERCIAL

## 2022-04-11 DIAGNOSIS — E04.1 THYROID NODULE: ICD-10-CM

## 2022-04-11 LAB — TSH SERPL DL<=0.005 MIU/L-ACNC: 0.92 MU/L (ref 0.4–4)

## 2022-04-11 PROCEDURE — 36415 COLL VENOUS BLD VENIPUNCTURE: CPT

## 2022-04-11 PROCEDURE — 76536 US EXAM OF HEAD AND NECK: CPT | Performed by: RADIOLOGY

## 2022-04-11 PROCEDURE — 84443 ASSAY THYROID STIM HORMONE: CPT

## 2022-04-12 ENCOUNTER — ONCOLOGY VISIT (OUTPATIENT)
Dept: ONCOLOGY | Facility: CLINIC | Age: 42
End: 2022-04-12
Attending: INTERNAL MEDICINE
Payer: COMMERCIAL

## 2022-04-12 ENCOUNTER — OFFICE VISIT (OUTPATIENT)
Dept: ENDOCRINOLOGY | Facility: CLINIC | Age: 42
End: 2022-04-12
Payer: COMMERCIAL

## 2022-04-12 VITALS
WEIGHT: 160 LBS | SYSTOLIC BLOOD PRESSURE: 124 MMHG | HEART RATE: 68 BPM | OXYGEN SATURATION: 99 % | BODY MASS INDEX: 29.56 KG/M2 | DIASTOLIC BLOOD PRESSURE: 81 MMHG

## 2022-04-12 VITALS
OXYGEN SATURATION: 99 % | TEMPERATURE: 97.9 F | HEART RATE: 74 BPM | DIASTOLIC BLOOD PRESSURE: 84 MMHG | WEIGHT: 160.2 LBS | BODY MASS INDEX: 29.59 KG/M2 | SYSTOLIC BLOOD PRESSURE: 125 MMHG

## 2022-04-12 DIAGNOSIS — Z17.0 MALIGNANT NEOPLASM OF LEFT BREAST IN FEMALE, ESTROGEN RECEPTOR POSITIVE, UNSPECIFIED SITE OF BREAST (H): ICD-10-CM

## 2022-04-12 DIAGNOSIS — C50.912 MALIGNANT NEOPLASM OF LEFT BREAST IN FEMALE, ESTROGEN RECEPTOR POSITIVE, UNSPECIFIED SITE OF BREAST (H): ICD-10-CM

## 2022-04-12 DIAGNOSIS — E04.1 THYROID NODULE: Primary | ICD-10-CM

## 2022-04-12 PROCEDURE — 99214 OFFICE O/P EST MOD 30 MIN: CPT | Performed by: INTERNAL MEDICINE

## 2022-04-12 RX ORDER — TAMOXIFEN CITRATE 20 MG/1
20 TABLET ORAL DAILY
Qty: 90 TABLET | Refills: 3 | Status: SHIPPED | OUTPATIENT
Start: 2022-04-12 | End: 2023-03-21

## 2022-04-12 ASSESSMENT — PAIN SCALES - GENERAL: PAINLEVEL: NO PAIN (0)

## 2022-04-12 NOTE — PROGRESS NOTES
Endocrinology virtual Visit    Chief Complaint: RECHECK     Information obtained from:Patient  used for this visit.      Assessment/Treatment Plan:      Thyroid nodule:  I have personally reviewed the thyroid ultrasound from 4/11/2022 and CT/PET thyroid from 3/2021. Agree with interpretation.  3 left-sided nodules measuring 0.9 x 0.7 x 0.5 cm, 0.5 x 0.5 x 0.3 cm and 1.3 x 1.3 x 1.2 cm.  These nodules are mixed cystic and solid with hyperechoic echogenicity.  There is no high risk features including wider than tall, irregular margins, echogenic foci or any other high risk features.  Given the FDG uptake on PET scan; decision was made to proceed with FNA.   TSH has been checked and within the normal limit.  Discussed findings and next steps with patient in detail using .  Patient is agreeable with plan.    Decision making previously.    FDG avid thyroid nodules are believed to have high risk of malignancy.  However, when we will look at studies which compared FDG avidity with ultrasound findings and final diagnosis of biopsy; we find that thyroid nodules with features on ultrasound that are suspicious and FDG avid; are the groups with high risk for malignancy.  Studies have shown that FDG avid nodules; with no suspicious features on ultrasound; have  low malignancy risk (as in this case). Therefore, will obtain a follow up ultrasound in 6-12 months rather than FNA at this time. (Henrry P et al. Ultrasound stratification of the FDG-avid thyroid nodule. Clin Radiol 2016;71:164-9. Epub December 10, 2015.)     I will contact the patient with the test results.  Return to clinic in 12 months.    Test and/or medications prescribed today:  Orders Placed This Encounter   Procedures     US Biopsy Thyroid Fine Needle Aspiration         Sho Gaming MD  Staff Endocrinologist    Division of Endocrinology and Diabetes      Subjective:         HPI: Ghazal Martinez is a 41 year old female  "with history of breast cancer who is here for a follow up of thyroid nodule.    Incidental finding of thyroid nodule on PET/CT which was done for follow-up of breast cancer.  The PET findings are documented below.  \" Mildly FDG avid hypoattenuating nodule in the left thyroid lobe measuring 1.2 x 0.7 cm and SUV max 4.6. Recommend thyroid ultrasound to further evaluate.\"    \"Nodule 1:  Location: Left inferior. This correlates with PET/CT findings.  Size: 1.3 x 1.3 x 1.2 cm now: Size: 1.1 x 1.1 x 1.5 cm  Composition: Mixed cystic and solid (1 point)  Echogenicity: Hyperechoic or isoechoic (1 point)  Shape: Wider than tall (0 points)  Margin: Smooth (0 points)  Nodule 2:  Location: Left inferior  Size: 0.6 x 0.4 x 0.5 cm, previously 0.9 x 0.7 x 0.5 cm  Composition: Mixed cystic and solid (1 point)  Echogenicity: Hyperechoic or isoechoic (1 point)  Shape: Wider than tall (0 points)  Margin: Smooth (0 points)  Echogenic Foci: None or large comet tail artifact (0 points)  Stability: Smaller  TIRADS: TR2 (1-2 points) Not suspicious     Nodule 3:  Location: Left, inferior, superficial  Size: 0.2 x 0.2 x 0.4 cm, previously 0.5 x 0.5 x 0.3 cm  Composition: Mixed cystic and solid (1 point)  Echogenicity: Hyperechoic or isoechoic (1 point)  Shape: Wider than tall (0 points)  Margin: Smooth (0 points)  Echogenic Foci: None or large comet tail artifact (0 points)  Stability: Smaller  TIRADS: TR2 (1-2 points) Not suspicious    No family history of thyroid cancer.  Cousin and an aunt with a thyroid disease.  Review of systems negative today other than pain at mastectomy surgical site and developed back pain.  TSH done previously and within the normal limit.     No Known Allergies    Current Outpatient Medications   Medication Sig Dispense Refill     tamoxifen (NOLVADEX) 20 MG tablet Take 1 tablet (20 mg) by mouth daily 90 tablet 3       Review of Systems     as per HPI above  Family history, past medical history, past surgical " "history and social history reviewed in epic.        Objective:   /81 (BP Location: Left arm, Patient Position: Sitting, Cuff Size: Adult Regular)   Pulse 68   Wt 72.6 kg (160 lb)   SpO2 99%   BMI 29.56 kg/m      Constitutional: Pleasant no acute cardiopulmonary distress.   EYES: anicteric, normal extra-ocular movements, no lid lag or retraction.  HEENT: Mouth/Throat: Mucous membrane is moist. Oropharynx is clear.  Thyroid examination: left sided nodule palpable.   Cardiovascular: RRR, S1, S2 normal.   Pulmonary/Chest: CTAB. No wheezing or rales.   Abdominal: +BS. Non tender to palpation.     Neurological: Alert and oriented.  No tremor and reflexes are symmetrical bilaterally and within the normal limits. Muscle strength 5/5.   Extremities: No edema.  Psychological: appropriate mood and affect   in House Labs:     TSH   Date Value Ref Range Status   04/11/2022 0.92 0.40 - 4.00 mU/L Final   01/25/2021 0.54 0.40 - 4.00 mU/L Final   08/16/2020 0.77 0.40 - 4.00 mU/L Final   01/20/2020 1.10 0.40 - 4.00 mU/L Final         \"FINDINGS:   Thyroid parenchyma: Homogeneous  The right lobe of the thyroid measures: 5.1 x 1.5 x 1.6 cm  The left lobe of the thyroid measures: 5.2 x 1.5 x 1.9 cm  The thyroid isthmus measures: 0.2 cm     Nodule 1:  Location: Left inferior.  Size: 1.1 x 1.1 x 1.5 cm, previously 1.3 x 1.3 x 1.2 cm  Composition: Mixed cystic and solid (1 point)  Echogenicity: Hyperechoic or isoechoic (1 point)  Shape: Wider than tall (0 points)  Margin: Smooth (0 points)  Echogenic Foci: None or large comet tail artifact (0 points)  Stability: No significant change in size  TIRADS: TR2 (1-2 points) Not suspicious     Nodule 2:  Location: Left inferior  Size: 0.6 x 0.4 x 0.5 cm, previously 0.9 x 0.7 x 0.5 cm  Composition: Mixed cystic and solid (1 point)  Echogenicity: Hyperechoic or isoechoic (1 point)  Shape: Wider than tall (0 points)  Margin: Smooth (0 points)  Echogenic Foci: None or large comet tail " "artifact (0 points)  Stability: Smaller  TIRADS: TR2 (1-2 points) Not suspicious     Nodule 3:  Location: Left, inferior, superficial  Size: 0.2 x 0.2 x 0.4 cm, previously 0.5 x 0.5 x 0.3 cm  Composition: Mixed cystic and solid (1 point)  Echogenicity: Hyperechoic or isoechoic (1 point)  Shape: Wider than tall (0 points)  Margin: Smooth (0 points)  Echogenic Foci: None or large comet tail artifact (0 points)  Stability: Smaller  TIRADS: TR2 (1-2 points) Not suspicious                                                                      Impression:  The left thyroid nodule corresponding to prior PET/CT findings is  stable in size. The others measure smaller today.\"      "

## 2022-04-12 NOTE — PROGRESS NOTES
Oncology Follow Up Visit: 04/12/22    PCP: No Ref-Primary, Physician    Diagnosis: Left Breast Cancer  Ghazal Martinez is a 42 yo female who presented in 2/2021 with left breast lump x 1 month.   Contrast enhanced mammogram on 2/23/2021 showed a mass at the 10:00 position in left breast anterior depth measuring 1.7 cm. US guided core needle biopsy on 2/23/2021 showed no decompensating invasive ductal carcinoma, estrogen receptor positive (95%, strong) and progesterone receptor positive by immunohistochemistry (70%). By FISH HER2/MIO 17 ratio:  1.7, IHC 2+, additional 20 cells from areas corresponding to the most intense IHC staining were evaluated by FISH. The results obtained from these 20 additional cells also fall into Group 4. Taken together, the FISH and IHC   results are interpreted as HER2 negative.FISH and IHC results ultimately interpreted as HER2 negative.   OncotypeDx returned at 23, c/w intermediate risk, 9 percent of distant disease recurrence with the use of systemic endocrine therapy and  6.5%  benefit of adjuvant chemotherapy given young age, premenopausal.  *3/19/2021Genetic testing negative  for mutations in the FLEX, BRCA1, BRCA2, BRIP1, CDH1, CHEK2, EPCAM, MLH1, MSH2, MSH6, NBN, NF1, PALB2, PMS2, PTEN, RAD51C, RAD51D, STK11, and TP53 genes.    Treatment:   4/5/2021 status post bilateral mastectomy and axillary sentinel lymph node biopsy   4/30-7/2021 treatment with Taxotere/Cytoxan x 4 cycles  9/21/2021 completed radiation therapy.   9/2021 began Tamoxifen    Interval History:   Ms. Martinez is here for routine followup. The profession  is on the ipad. She continues to take her tamoxifen and needs a refill of this today. She is tolerating it well and has occasional night sweats but really otherwise no side effects. She has no new aches, pains, nausea, vomiting or other issues. She is feeling well.     Rest of comprehensive and complete ROS is reviewed and is negative.   Past  Medical History:   Diagnosis Date     Varicose veins of legs      Current Outpatient Medications   Medication     tamoxifen (NOLVADEX) 20 MG tablet     No current facility-administered medications for this visit.     No Known Allergies    Physical Exam:/84   Pulse 74   Temp 97.9  F (36.6  C) (Oral)   Wt 72.7 kg (160 lb 3.2 oz)   SpO2 99%   BMI 29.59 kg/m     Physical Exam  Vitals reviewed.   Constitutional:       Appearance: Normal appearance. She is normal weight.   HENT:      Head: Normocephalic and atraumatic.   Eyes:      Extraocular Movements: Extraocular movements intact.      Conjunctiva/sclera: Conjunctivae normal.      Pupils: Pupils are equal, round, and reactive to light.   Cardiovascular:      Rate and Rhythm: Normal rate and regular rhythm.   Pulmonary:      Effort: Pulmonary effort is normal.      Breath sounds: Normal breath sounds.   Abdominal:      General: Bowel sounds are normal.      Palpations: Abdomen is soft.   Musculoskeletal:      Cervical back: Normal range of motion and neck supple.   Skin:     General: Skin is warm.   Neurological:      General: No focal deficit present.      Mental Status: She is alert and oriented to person, place, and time. Mental status is at baseline.   Psychiatric:         Mood and Affect: Mood normal.         Behavior: Behavior normal.         Thought Content: Thought content normal.         Judgment: Judgment normal.     breast exam: breast bilaterally surgically absent no evidence of local regional skins, mild skin changes to the left post radiation, axillae benign.    Laboratory Results:   Recent Labs   Lab Test 03/01/22  1201 12/06/21  1618 09/27/21  1458 07/02/21  1200 06/11/21  0933 05/20/21  1105    140  --  142 141 142   POTASSIUM 3.9 4.0  --  3.7 3.8 3.7   CHLORIDE 112* 111*  --  111* 109 112*   CO2 24 22  --  23 24 25   ANIONGAP 4 7  --  8 8 5   BUN 15 17  --  9 9 12   CR 0.68 0.76 0.75 0.59 0.61 0.62   GLC 82 83  --  140* 116* 117*   BILLY  8.9 9.2  --  8.8 8.7 8.7     No results for input(s): MAG, PHOS in the last 51692 hours.  Recent Labs   Lab Test 03/01/22  1201 12/06/21  1618 09/27/21  1458 07/29/21  1113 07/02/21  1200   WBC 4.6 6.7 3.5* 4.5 4.6   HGB 13.0 13.8 12.0 11.5* 12.0    250 241 248 276   MCV 88 83 83 89 88   NEUTROPHIL 62 71 69 63 61.8     Recent Labs   Lab Test 03/01/22  1201 12/06/21  1618 09/27/21  1458   BILITOTAL 0.3 0.3 0.3   ALKPHOS 80 91 89   ALT 41 30 70*   AST 21 17 39   ALBUMIN 3.5 3.6 3.4     TSH   Date Value Ref Range Status   04/11/2022 0.92 0.40 - 4.00 mU/L Final   01/25/2021 0.54 0.40 - 4.00 mU/L Final   08/16/2020 0.77 0.40 - 4.00 mU/L Final   01/20/2020 1.10 0.40 - 4.00 mU/L Final     No results for input(s): CEA in the last 33427 hours.  Results for orders placed or performed in visit on 04/11/22   US Thyroid    Narrative    US THYROID 4/11/2022 11:24 AM    COMPARISON: Thyroid ultrasound 4/12/2021    HISTORY: Left thyroid nodules.     FINDINGS:   Thyroid parenchyma: Homogeneous  The right lobe of the thyroid measures: 5.1 x 1.5 x 1.6 cm  The left lobe of the thyroid measures: 5.2 x 1.5 x 1.9 cm  The thyroid isthmus measures: 0.2 cm    Nodule 1:  Location: Left inferior.  Size: 1.1 x 1.1 x 1.5 cm, previously 1.3 x 1.3 x 1.2 cm  Composition: Mixed cystic and solid (1 point)  Echogenicity: Hyperechoic or isoechoic (1 point)  Shape: Wider than tall (0 points)  Margin: Smooth (0 points)  Echogenic Foci: None or large comet tail artifact (0 points)  Stability: No significant change in size  TIRADS: TR2 (1-2 points) Not suspicious    Nodule 2:  Location: Left inferior  Size: 0.6 x 0.4 x 0.5 cm, previously 0.9 x 0.7 x 0.5 cm  Composition: Mixed cystic and solid (1 point)  Echogenicity: Hyperechoic or isoechoic (1 point)  Shape: Wider than tall (0 points)  Margin: Smooth (0 points)  Echogenic Foci: None or large comet tail artifact (0 points)  Stability: Smaller  TIRADS: TR2 (1-2 points) Not suspicious    Nodule  3:  Location: Left, inferior, superficial  Size: 0.2 x 0.2 x 0.4 cm, previously 0.5 x 0.5 x 0.3 cm  Composition: Mixed cystic and solid (1 point)  Echogenicity: Hyperechoic or isoechoic (1 point)  Shape: Wider than tall (0 points)  Margin: Smooth (0 points)  Echogenic Foci: None or large comet tail artifact (0 points)  Stability: Smaller  TIRADS: TR2 (1-2 points) Not suspicious      Impression    Impression:  The left thyroid nodule corresponding to prior PET/CT findings is  stable in size. The others measure smaller today.    ACR TI-RADS recommendations  TR2 (2 points) & TR1 (0 points): No FNA or follow-up  TR3 (3 points): FNA if ? 2.5cm; follow-up if 1.5 -2.4 cm in 1, 3 and 5  years  TR4 (4-6 points): FNA if ? 1.5cm; follow-up if 1 -1.4 cm in 1, 2, 3  and 5 years  TR5 (?7 points): FNA if ? 1cm; follow-up if 0.5 -0.9 cm every year for  5 years     JUAN ALBERTO FAULKNER MD         SYSTEM ID:  [id number]         Assessment and Plan:   1. [id number]+ND+Her2 negative IDC left s/p mastectomy with right prophylactic mastectomy   2. Peripheral neuropathy- neuropathy to feet is much improved. Should continue to improve with time.   3.Thyroid nodule-  Has follow-up with endocrine in near future scheduled  4. Pulmonary Nodule- 7mm to RUL noted on 3/2021 PET CT. Follow-up January 2023.    Plan  1. Continue tamoxifen, refill done  2. Keep follow-up with endocrine  3. CT Chest to follow-up pulmonary nodule January 2023  4. RTC for exam in 6 months,    Constance Jaimes MD on 4/12/2022 at 3:48 PM

## 2022-04-12 NOTE — NURSING NOTE
Ghazal Martinez's goals for this visit include:   Chief Complaint   Patient presents with     RECHECK     She requests these members of her care team be copied on today's visit information: Yes    PCP: No Ref-Primary, Physician    Referring Provider:  No referring provider defined for this encounter.    /81 (BP Location: Left arm, Patient Position: Sitting, Cuff Size: Adult Regular)   Pulse 68   Wt 72.6 kg (160 lb)   SpO2 99%   BMI 29.56 kg/m      Do you need any medication refills at today's visit? No    Ian Mata [facility]  Adult Endocrinology  [facility]

## 2022-04-12 NOTE — LETTER
4/12/2022         RE: Ghazal Martinez  [address]  [place] [address]        Dear Colleague,    Thank you for referring your patient, Ghazal Martinez, to the [facility]. Please see a copy of my visit note below.    Endocrinology virtual Visit    Chief Complaint: RECHECK     Information obtained from:Patient  used for this visit.      Assessment/Treatment Plan:      Thyroid nodule:  I have personally reviewed the thyroid ultrasound from 4/11/2022 and CT/PET thyroid from 3/2021. Agree with interpretation.  3 left-sided nodules measuring 0.9 x 0.7 x 0.5 cm, 0.5 x 0.5 x 0.3 cm and 1.3 x 1.3 x 1.2 cm.  These nodules are mixed cystic and solid with hyperechoic echogenicity.  There is no high risk features including wider than tall, irregular margins, echogenic foci or any other high risk features.  Given the FDG uptake on PET scan; decision was made to proceed with FNA.   TSH has been checked and within the normal limit.  Discussed findings and next steps with patient in detail using .  Patient is agreeable with plan.    Decision making previously.    FDG avid thyroid nodules are believed to have high risk of malignancy.  However, when we will look at studies which compared FDG avidity with ultrasound findings and final diagnosis of biopsy; we find that thyroid nodules with features on ultrasound that are suspicious and FDG avid; are the groups with high risk for malignancy.  Studies have shown that FDG avid nodules; with no suspicious features on ultrasound; have  low malignancy risk (as in this case). Therefore, will obtain a follow up ultrasound in 6-12 months rather than FNA at this time. (Henrry P et al. Ultrasound stratification of the FDG-avid thyroid nodule. Clin Radiol 2016;71:164-9. Epub December 10, 2015.)     I will contact the patient with the test results.  Return to clinic in 12 months.    Test and/or medications prescribed  "today:  Orders Placed This Encounter   Procedures     US Biopsy Thyroid Fine Needle Aspiration         Sho Gaming MD  Staff Endocrinologist    Division of Endocrinology and Diabetes      Subjective:         HPI: Ghazal Martinez is a 41 year old female with history of breast cancer who is here for a follow up of thyroid nodule.    Incidental finding of thyroid nodule on PET/CT which was done for follow-up of breast cancer.  The PET findings are documented below.  \" Mildly FDG avid hypoattenuating nodule in the left thyroid lobe measuring 1.2 x 0.7 cm and SUV max 4.6. Recommend thyroid ultrasound to further evaluate.\"    \"Nodule 1:  Location: Left inferior. This correlates with PET/CT findings.  Size: 1.3 x 1.3 x 1.2 cm now: Size: 1.1 x 1.1 x 1.5 cm  Composition: Mixed cystic and solid (1 point)  Echogenicity: Hyperechoic or isoechoic (1 point)  Shape: Wider than tall (0 points)  Margin: Smooth (0 points)  Nodule 2:  Location: Left inferior  Size: 0.6 x 0.4 x 0.5 cm, previously 0.9 x 0.7 x 0.5 cm  Composition: Mixed cystic and solid (1 point)  Echogenicity: Hyperechoic or isoechoic (1 point)  Shape: Wider than tall (0 points)  Margin: Smooth (0 points)  Echogenic Foci: None or large comet tail artifact (0 points)  Stability: Smaller  TIRADS: TR2 (1-2 points) Not suspicious     Nodule 3:  Location: Left, inferior, superficial  Size: 0.2 x 0.2 x 0.4 cm, previously 0.5 x 0.5 x 0.3 cm  Composition: Mixed cystic and solid (1 point)  Echogenicity: Hyperechoic or isoechoic (1 point)  Shape: Wider than tall (0 points)  Margin: Smooth (0 points)  Echogenic Foci: None or large comet tail artifact (0 points)  Stability: Smaller  TIRADS: TR2 (1-2 points) Not suspicious    No family history of thyroid cancer.  Cousin and an aunt with a thyroid disease.  Review of systems negative today other than pain at mastectomy surgical site and developed back pain.  TSH done previously and within the normal limit.     No Known " "Allergies    Current Outpatient Medications   Medication Sig Dispense Refill     tamoxifen (NOLVADEX) 20 MG tablet Take 1 tablet (20 mg) by mouth daily 90 tablet 3       Review of Systems     as per HPI above  Family history, past medical history, past surgical history and social history reviewed in epic.        Objective:   /81 (BP Location: Left arm, Patient Position: Sitting, Cuff Size: Adult Regular)   Pulse 68   Wt 72.6 kg (160 lb)   SpO2 99%   BMI 29.56 kg/m      Constitutional: Pleasant no acute cardiopulmonary distress.   EYES: anicteric, normal extra-ocular movements, no lid lag or retraction.  HEENT: Mouth/Throat: Mucous membrane is moist. Oropharynx is clear.  Thyroid examination: left sided nodule palpable.   Cardiovascular: RRR, S1, S2 normal.   Pulmonary/Chest: CTAB. No wheezing or rales.   Abdominal: +BS. Non tender to palpation.     Neurological: Alert and oriented.  No tremor and reflexes are symmetrical bilaterally and within the normal limits. Muscle strength 5/5.   Extremities: No edema.  Psychological: appropriate mood and affect   in House Labs:     TSH   Date Value Ref Range Status   04/11/2022 0.92 0.40 - 4.00 mU/L Final   01/25/2021 0.54 0.40 - 4.00 mU/L Final   08/16/2020 0.77 0.40 - 4.00 mU/L Final   01/20/2020 1.10 0.40 - 4.00 mU/L Final         \"FINDINGS:   Thyroid parenchyma: Homogeneous  The right lobe of the thyroid measures: 5.1 x 1.5 x 1.6 cm  The left lobe of the thyroid measures: 5.2 x 1.5 x 1.9 cm  The thyroid isthmus measures: 0.2 cm     Nodule 1:  Location: Left inferior.  Size: 1.1 x 1.1 x 1.5 cm, previously 1.3 x 1.3 x 1.2 cm  Composition: Mixed cystic and solid (1 point)  Echogenicity: Hyperechoic or isoechoic (1 point)  Shape: Wider than tall (0 points)  Margin: Smooth (0 points)  Echogenic Foci: None or large comet tail artifact (0 points)  Stability: No significant change in size  TIRADS: TR2 (1-2 points) Not suspicious     Nodule 2:  Location: Left " "inferior  Size: 0.6 x 0.4 x 0.5 cm, previously 0.9 x 0.7 x 0.5 cm  Composition: Mixed cystic and solid (1 point)  Echogenicity: Hyperechoic or isoechoic (1 point)  Shape: Wider than tall (0 points)  Margin: Smooth (0 points)  Echogenic Foci: None or large comet tail artifact (0 points)  Stability: Smaller  TIRADS: TR2 (1-2 points) Not suspicious     Nodule 3:  Location: Left, inferior, superficial  Size: 0.2 x 0.2 x 0.4 cm, previously 0.5 x 0.5 x 0.3 cm  Composition: Mixed cystic and solid (1 point)  Echogenicity: Hyperechoic or isoechoic (1 point)  Shape: Wider than tall (0 points)  Margin: Smooth (0 points)  Echogenic Foci: None or large comet tail artifact (0 points)  Stability: Smaller  TIRADS: TR2 (1-2 points) Not suspicious                                                                      Impression:  The left thyroid nodule corresponding to prior PET/CT findings is  stable in size. The others measure smaller today.\"          Again, thank you for allowing me to participate in the care of your patient.        Sincerely,        Sho Gaming MD    "

## 2022-04-12 NOTE — NURSING NOTE
"Oncology Rooming Note    April 12, 2022 2:02 PM   Ghazal Martinez is a 41 year old female who presents for:    Chief Complaint   Patient presents with     Oncology Clinic Visit     Initial Vitals: /84   Pulse 74   Temp 97.9  F (36.6  C) (Oral)   Wt 72.7 kg (160 lb 3.2 oz)   SpO2 99%   BMI 29.59 kg/m   Estimated body mass index is 29.59 kg/m  as calculated from the following:    Height as of 3/1/22: 1.567 m (5' 1.69\").    Weight as of this encounter: 72.7 kg (160 lb 3.2 oz). Body surface area is 1.78 meters squared.  No Pain (0) Comment: Data Unavailable   No LMP recorded. (Menstrual status: Chemotherapy).  Allergies reviewed: Yes  Medications reviewed: Yes    Medications: Medication refills not needed today.  Pharmacy name entered into EPIC:    [facility] - [place], [place] - [address] [id number] - [place], [place] - [address] [facility] - [place], [place] - [address], [id number]          Anabella Orr LPN              "

## 2022-04-12 NOTE — PATIENT INSTRUCTIONS
Ghazal,     The thyroid ultrasound showed three left sided small thyroid nodules.     Thyroid nodules are common, occurring in up to 50% of patients over the age of 50 years.       Once we find thyroid nodules; decision is made to biopsy or not based on size and other criteria.     We will schedule a thyroid biopsy to take a sample from the bigger one.

## 2022-04-12 NOTE — LETTER
4/12/2022         RE: Ghazal Martinez  [address]  [place] [address]        Dear Colleague,    Thank you for referring your patient, Ghazal Martinez, to the [facility]. Please see a copy of my visit note below.    Oncology Follow Up Visit: 04/12/22    PCP: No Ref-Primary, Physician    Diagnosis: Left Breast Cancer  Ghazal Martinez is a 42 yo female who presented in 2/2021 with left breast lump x 1 month.   Contrast enhanced mammogram on 2/23/2021 showed a mass at the 10:00 position in left breast anterior depth measuring 1.7 cm. US guided core needle biopsy on 2/23/2021 showed no decompensating invasive ductal carcinoma, estrogen receptor positive (95%, strong) and progesterone receptor positive by immunohistochemistry (70%). By FISH HER2/MIO 17 ratio:  1.7, IHC 2+, additional 20 cells from areas corresponding to the most intense IHC staining were evaluated by FISH. The results obtained from these 20 additional cells also fall into Group 4. Taken together, the FISH and IHC   results are interpreted as HER2 negative.FISH and IHC results ultimately interpreted as HER2 negative.   OncotypeDx returned at 23, c/w intermediate risk, 9 percent of distant disease recurrence with the use of systemic endocrine therapy and  6.5%  benefit of adjuvant chemotherapy given young age, premenopausal.  *3/19/2021Genetic testing negative  for mutations in the FLEX, BRCA1, BRCA2, BRIP1, CDH1, CHEK2, EPCAM, MLH1, MSH2, MSH6, NBN, NF1, PALB2, PMS2, PTEN, RAD51C, RAD51D, STK11, and TP53 genes.    Treatment:   4/5/2021 status post bilateral mastectomy and axillary sentinel lymph node biopsy   4/30-7/2021 treatment with Taxotere/Cytoxan x 4 cycles  9/21/2021 completed radiation therapy.   9/2021 began Tamoxifen    Interval History:   Ms. Martinez is here for routine followup. The profession  is on the ipad. She continues to take her tamoxifen and needs a refill of this today.  She is tolerating it well and has occasional night sweats but really otherwise no side effects. She has no new aches, pains, nausea, vomiting or other issues. She is feeling well.     Rest of comprehensive and complete ROS is reviewed and is negative.   Past Medical History:   Diagnosis Date     Varicose veins of legs      Current Outpatient Medications   Medication     tamoxifen (NOLVADEX) 20 MG tablet     No current facility-administered medications for this visit.     No Known Allergies    Physical Exam:/84   Pulse 74   Temp 97.9  F (36.6  C) (Oral)   Wt 72.7 kg (160 lb 3.2 oz)   SpO2 99%   BMI 29.59 kg/m     Physical Exam  Vitals reviewed.   Constitutional:       Appearance: Normal appearance. She is normal weight.   HENT:      Head: Normocephalic and atraumatic.   Eyes:      Extraocular Movements: Extraocular movements intact.      Conjunctiva/sclera: Conjunctivae normal.      Pupils: Pupils are equal, round, and reactive to light.   Cardiovascular:      Rate and Rhythm: Normal rate and regular rhythm.   Pulmonary:      Effort: Pulmonary effort is normal.      Breath sounds: Normal breath sounds.   Abdominal:      General: Bowel sounds are normal.      Palpations: Abdomen is soft.   Musculoskeletal:      Cervical back: Normal range of motion and neck supple.   Skin:     General: Skin is warm.   Neurological:      General: No focal deficit present.      Mental Status: She is alert and oriented to person, place, and time. Mental status is at baseline.   Psychiatric:         Mood and Affect: Mood normal.         Behavior: Behavior normal.         Thought Content: Thought content normal.         Judgment: Judgment normal.     breast exam: breast bilaterally surgically absent no evidence of local regional skins, mild skin changes to the left post radiation, axillae benign.    Laboratory Results:   Recent Labs   Lab Test 03/01/22  1201 12/06/21  1618 09/27/21  1458 07/02/21  1200 06/11/21  0933  05/20/21  1105    140  --  142 141 142   POTASSIUM 3.9 4.0  --  3.7 3.8 3.7   CHLORIDE 112* 111*  --  111* 109 112*   CO2 24 22  --  23 24 25   ANIONGAP 4 7  --  8 8 5   BUN 15 17  --  9 9 12   CR 0.68 0.76 0.75 0.59 0.61 0.62   GLC 82 83  --  140* 116* 117*   BILLY 8.9 9.2  --  8.8 8.7 8.7     No results for input(s): MAG, PHOS in the last 97929 hours.  Recent Labs   Lab Test 03/01/22  1201 12/06/21  1618 09/27/21  1458 07/29/21  1113 07/02/21  1200   WBC 4.6 6.7 3.5* 4.5 4.6   HGB 13.0 13.8 12.0 11.5* 12.0    250 241 248 276   MCV 88 83 83 89 88   NEUTROPHIL 62 71 69 63 61.8     Recent Labs   Lab Test 03/01/22  1201 12/06/21  1618 09/27/21  1458   BILITOTAL 0.3 0.3 0.3   ALKPHOS 80 91 89   ALT 41 30 70*   AST 21 17 39   ALBUMIN 3.5 3.6 3.4     TSH   Date Value Ref Range Status   04/11/2022 0.92 0.40 - 4.00 mU/L Final   01/25/2021 0.54 0.40 - 4.00 mU/L Final   08/16/2020 0.77 0.40 - 4.00 mU/L Final   01/20/2020 1.10 0.40 - 4.00 mU/L Final     No results for input(s): CEA in the last 69423 hours.  Results for orders placed or performed in visit on 04/11/22   US Thyroid    Narrative    US THYROID 4/11/2022 11:24 AM    COMPARISON: Thyroid ultrasound 4/12/2021    HISTORY: Left thyroid nodules.     FINDINGS:   Thyroid parenchyma: Homogeneous  The right lobe of the thyroid measures: 5.1 x 1.5 x 1.6 cm  The left lobe of the thyroid measures: 5.2 x 1.5 x 1.9 cm  The thyroid isthmus measures: 0.2 cm    Nodule 1:  Location: Left inferior.  Size: 1.1 x 1.1 x 1.5 cm, previously 1.3 x 1.3 x 1.2 cm  Composition: Mixed cystic and solid (1 point)  Echogenicity: Hyperechoic or isoechoic (1 point)  Shape: Wider than tall (0 points)  Margin: Smooth (0 points)  Echogenic Foci: None or large comet tail artifact (0 points)  Stability: No significant change in size  TIRADS: TR2 (1-2 points) Not suspicious    Nodule 2:  Location: Left inferior  Size: 0.6 x 0.4 x 0.5 cm, previously 0.9 x 0.7 x 0.5 cm  Composition: Mixed cystic  and solid (1 point)  Echogenicity: Hyperechoic or isoechoic (1 point)  Shape: Wider than tall (0 points)  Margin: Smooth (0 points)  Echogenic Foci: None or large comet tail artifact (0 points)  Stability: Smaller  TIRADS: TR2 (1-2 points) Not suspicious    Nodule 3:  Location: Left, inferior, superficial  Size: 0.2 x 0.2 x 0.4 cm, previously 0.5 x 0.5 x 0.3 cm  Composition: Mixed cystic and solid (1 point)  Echogenicity: Hyperechoic or isoechoic (1 point)  Shape: Wider than tall (0 points)  Margin: Smooth (0 points)  Echogenic Foci: None or large comet tail artifact (0 points)  Stability: Smaller  TIRADS: TR2 (1-2 points) Not suspicious      Impression    Impression:  The left thyroid nodule corresponding to prior PET/CT findings is  stable in size. The others measure smaller today.    ACR TI-RADS recommendations  TR2 (2 points) & TR1 (0 points): No FNA or follow-up  TR3 (3 points): FNA if ? 2.5cm; follow-up if 1.5 -2.4 cm in 1, 3 and 5  years  TR4 (4-6 points): FNA if ? 1.5cm; follow-up if 1 -1.4 cm in 1, 2, 3  and 5 years  TR5 (?7 points): FNA if ? 1cm; follow-up if 0.5 -0.9 cm every year for  5 years     JUAN ALBERTO FAULKNER MD         SYSTEM ID:  [id number]         Assessment and Plan:   1. [id number]+MS+Her2 negative IDC left s/p mastectomy with right prophylactic mastectomy   2. Peripheral neuropathy- neuropathy to feet is much improved. Should continue to improve with time.   3.Thyroid nodule-  Has follow-up with endocrine in near future scheduled  4. Pulmonary Nodule- 7mm to RUL noted on 3/2021 PET CT. Follow-up January 2023.    Plan  1. Continue tamoxifen, refill done  2. Keep follow-up with endocrine  3. CT Chest to follow-up pulmonary nodule January 2023  4. RTC for exam in 6 months,    Constance Jaimes MD on 4/12/2022 at 3:48 PM            Again, thank you for allowing me to participate in the care of your patient.        Sincerely,        Constance Jaimes MD

## 2022-04-15 ENCOUNTER — ANCILLARY PROCEDURE (OUTPATIENT)
Dept: ULTRASOUND IMAGING | Facility: CLINIC | Age: 42
End: 2022-04-15
Attending: INTERNAL MEDICINE
Payer: COMMERCIAL

## 2022-04-15 DIAGNOSIS — E04.1 THYROID NODULE: ICD-10-CM

## 2022-04-15 PROCEDURE — 88173 CYTOPATH EVAL FNA REPORT: CPT | Performed by: PATHOLOGY

## 2022-04-15 PROCEDURE — 10005 FNA BX W/US GDN 1ST LES: CPT

## 2022-04-18 LAB
PATH REPORT.COMMENTS IMP SPEC: NORMAL
PATH REPORT.COMMENTS IMP SPEC: NORMAL
PATH REPORT.FINAL DX SPEC: NORMAL
PATH REPORT.GROSS SPEC: NORMAL
PATH REPORT.MICROSCOPIC SPEC OTHER STN: NORMAL
PATH REPORT.RELEVANT HX SPEC: NORMAL

## 2022-04-19 ENCOUNTER — TELEPHONE (OUTPATIENT)
Dept: ENDOCRINOLOGY | Facility: CLINIC | Age: 42
End: 2022-04-19
Payer: COMMERCIAL

## 2022-04-19 NOTE — RESULT ENCOUNTER NOTE
Please inform Ghazal Martinez via interpretor that -  The thyroid biopsy result is benign.   Follow up thyroid ultrasound in two years and follow up after that.   Can cancel; follow up planned for one year.     Thank you,   Sho Gaming MD

## 2022-04-19 NOTE — TELEPHONE ENCOUNTER
Received result note from Dr. Gaming as follows:      Please inform Ghazal Martinez via interpretor that -   The thyroid biopsy result is benign.   Follow up thyroid ultrasound in two years and follow up after that.   Can cancel; follow up planned for one year.     Thank you,   Sho Gaming MD       Writer contacted patient on 3 way call with .       Patient advised of results and recommendations. Patient verbalizes understanding and agrees to plan. Writer will cancelled 1 year follow-up office visit and will send to clinic coordinator to place patient in recall for 2 years follow-up with Dr. Gaming along with thyroid ultrasound.    Patient will call back in the meantime with any questions or concerns.       Adriana Torres RN  Endocrine Care Coordinator  [facility]

## 2022-09-07 ENCOUNTER — OFFICE VISIT (OUTPATIENT)
Dept: OPTOMETRY | Facility: CLINIC | Age: 42
End: 2022-09-07
Payer: COMMERCIAL

## 2022-09-07 DIAGNOSIS — H52.223 REGULAR ASTIGMATISM OF BOTH EYES: Primary | ICD-10-CM

## 2022-09-07 PROCEDURE — 92015 DETERMINE REFRACTIVE STATE: CPT | Performed by: OPTOMETRIST

## 2022-09-07 PROCEDURE — 92004 COMPRE OPH EXAM NEW PT 1/>: CPT | Performed by: OPTOMETRIST

## 2022-09-07 RX ORDER — CEPHALEXIN 500 MG/1
CAPSULE ORAL
COMMUNITY
Start: 2022-03-24 | End: 2023-10-03

## 2022-09-07 ASSESSMENT — REFRACTION_MANIFEST
METHOD_AUTOREFRACTION: 1
OS_AXIS: 088
OD_SPHERE: -1.00
OS_SPHERE: 0.00
OS_CYLINDER: +1.25
OD_AXIS: 092
OS_AXIS: 088
OS_CYLINDER: +1.25
OS_SPHERE: -0.50
OD_SPHERE: -0.75
OD_CYLINDER: +1.75
OD_CYLINDER: +1.50
OD_AXIS: 092

## 2022-09-07 ASSESSMENT — VISUAL ACUITY
OD_SC: 20/40
OS_SC: 20/40
METHOD: SNELLEN - LINEAR

## 2022-09-07 ASSESSMENT — EXTERNAL EXAM - LEFT EYE: OS_EXAM: NORMAL

## 2022-09-07 ASSESSMENT — KERATOMETRY
OD_AXISANGLE_DEGREES: 90
OS_K2POWER_DIOPTERS: 45.50
OD_AXISANGLE2_DEGREES: 180
OD_K1POWER_DIOPTERS: 43.75
OD_K2POWER_DIOPTERS: 45.75
OS_AXISANGLE_DEGREES: 87
OS_K1POWER_DIOPTERS: 43.00
OS_AXISANGLE2_DEGREES: 177

## 2022-09-07 ASSESSMENT — CUP TO DISC RATIO
OS_RATIO: 0.3
OD_RATIO: 0.3

## 2022-09-07 ASSESSMENT — EXTERNAL EXAM - RIGHT EYE: OD_EXAM: NORMAL

## 2022-09-07 ASSESSMENT — SLIT LAMP EXAM - LIDS
COMMENTS: NORMAL
COMMENTS: NORMAL

## 2022-09-07 ASSESSMENT — TONOMETRY
OS_IOP_MMHG: 16
OD_IOP_MMHG: 17
IOP_METHOD: APPLANATION

## 2022-09-07 ASSESSMENT — CONF VISUAL FIELD
OD_NORMAL: 1
OS_NORMAL: 1

## 2022-09-07 NOTE — PROGRESS NOTES
Chief Complaint   Patient presents with     Annual Eye Exam      Accompanied by   Last Eye Exam: First eye exam  Dilated Previously: No, side effects of dilation explained today    What are you currently using to see?  does not use glasses or contacts       Distance Vision Acuity: Noticed gradual change in both eyes    Near Vision Acuity: Not satisfied     Eye Comfort: good  Do you use eye drops? 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       OBJECTIVE: See Ophthalmology exam    ASSESSMENT:    ICD-10-CM    1. Regular astigmatism of both eyes - Both Eyes  H52.223        PLAN:     Patient Instructions   Astigmatism results from curvature differential in the cornea and crystalline lens which can cause a distorted image, as light rays are prevented from meeting at a common focus.    Eyeglass prescription given.    The affects of the dilating drops last for 4- 6 hours.  You will be more sensitive to light and vision will be blurry up close.  Do not drive if you do not feel comfortable.  Mydriatic sunglasses were given if needed.    Recommend annual eye exams.    Brenda Hodgsno O.D.  [address] [address]    [phone]

## 2022-09-07 NOTE — PATIENT INSTRUCTIONS
Astigmatism results from curvature differential in the cornea and crystalline lens which can cause a distorted image, as light rays are prevented from meeting at a common focus.    Eyeglass prescription given.    The affects of the dilating drops last for 4- 6 hours.  You will be more sensitive to light and vision will be blurry up close.  Do not drive if you do not feel comfortable.  Mydriatic sunglasses were given if needed.    Recommend annual eye exams.    Brenda Hodgson O.D.  [address] [address] [phone]

## 2022-09-07 NOTE — LETTER
9/7/2022         RE: Ghazal Martinez  [address]  [place] [address]        Dear Colleague,    Thank you for referring your patient, Ghazal Martinez, to the [facility]. Please see a copy of my visit note below.    Chief Complaint   Patient presents with     Annual Eye Exam      Accompanied by   Last Eye Exam: First eye exam  Dilated Previously: No, side effects of dilation explained today    What are you currently using to see?  does not use glasses or contacts       Distance Vision Acuity: Noticed gradual change in both eyes    Near Vision Acuity: Not satisfied     Eye Comfort: good  Do you use eye drops? : No  Occupation or Hobbies: works at [employer] - [employer]          Medical, surgical and family histories reviewed and updated 9/7/2022.       OBJECTIVE: See Ophthalmology exam    ASSESSMENT:    ICD-10-CM    1. Regular astigmatism of both eyes - Both Eyes  H52.223        PLAN:     Patient Instructions   Astigmatism results from curvature differential in the cornea and crystalline lens which can cause a distorted image, as light rays are prevented from meeting at a common focus.    Eyeglass prescription given.    The affects of the dilating drops last for 4- 6 hours.  You will be more sensitive to light and vision will be blurry up close.  Do not drive if you do not feel comfortable.  Mydriatic sunglasses were given if needed.    Recommend annual eye exams.    Brenda Hodgson O.D.  [facility]   [address] [place] [address]    [phone]           Again, thank you for allowing me to participate in the care of your patient.        Sincerely,        Brenda Hodgson OD

## 2022-09-13 ENCOUNTER — OFFICE VISIT (OUTPATIENT)
Dept: FAMILY MEDICINE | Facility: CLINIC | Age: 42
End: 2022-09-13
Payer: COMMERCIAL

## 2022-09-13 VITALS
HEART RATE: 58 BPM | OXYGEN SATURATION: 100 % | BODY MASS INDEX: 29.3 KG/M2 | SYSTOLIC BLOOD PRESSURE: 114 MMHG | DIASTOLIC BLOOD PRESSURE: 77 MMHG | TEMPERATURE: 97.8 F | HEIGHT: 62 IN | WEIGHT: 159.2 LBS | RESPIRATION RATE: 16 BRPM

## 2022-09-13 DIAGNOSIS — E55.9 VITAMIN D DEFICIENCY: ICD-10-CM

## 2022-09-13 DIAGNOSIS — Z28.21 VACCINATION NOT CARRIED OUT BECAUSE OF PATIENT REFUSAL: ICD-10-CM

## 2022-09-13 DIAGNOSIS — Z11.59 NEED FOR HEPATITIS C SCREENING TEST: ICD-10-CM

## 2022-09-13 DIAGNOSIS — Z13.220 SCREENING FOR LIPID DISORDERS: ICD-10-CM

## 2022-09-13 DIAGNOSIS — Z00.00 ROUTINE GENERAL MEDICAL EXAMINATION AT A HEALTH CARE FACILITY: Primary | ICD-10-CM

## 2022-09-13 LAB
CHOLEST SERPL-MCNC: 193 MG/DL
FASTING STATUS PATIENT QL REPORTED: NORMAL
HDLC SERPL-MCNC: 72 MG/DL
LDLC SERPL CALC-MCNC: 93 MG/DL
NONHDLC SERPL-MCNC: 121 MG/DL
TRIGL SERPL-MCNC: 140 MG/DL

## 2022-09-13 PROCEDURE — 82306 VITAMIN D 25 HYDROXY: CPT | Performed by: FAMILY MEDICINE

## 2022-09-13 PROCEDURE — 99396 PREV VISIT EST AGE 40-64: CPT | Performed by: FAMILY MEDICINE

## 2022-09-13 PROCEDURE — 87902 NFCT AGT GNTYP ALYS HEP C: CPT | Performed by: FAMILY MEDICINE

## 2022-09-13 PROCEDURE — 86803 HEPATITIS C AB TEST: CPT | Performed by: FAMILY MEDICINE

## 2022-09-13 PROCEDURE — 36415 COLL VENOUS BLD VENIPUNCTURE: CPT | Performed by: FAMILY MEDICINE

## 2022-09-13 PROCEDURE — 80061 LIPID PANEL: CPT | Performed by: FAMILY MEDICINE

## 2022-09-13 ASSESSMENT — PAIN SCALES - GENERAL: PAINLEVEL: NO PAIN (0)

## 2022-09-13 ASSESSMENT — ENCOUNTER SYMPTOMS
PALPITATIONS: 0
COUGH: 0
FREQUENCY: 0
ARTHRALGIAS: 0
DYSURIA: 0
DIZZINESS: 0
HEMATOCHEZIA: 0
HEADACHES: 0
BREAST MASS: 0
NERVOUS/ANXIOUS: 0
ABDOMINAL PAIN: 0
HEMATURIA: 0
CHILLS: 0
MYALGIAS: 0
WEAKNESS: 0
FEVER: 0
PARESTHESIAS: 0
DIARRHEA: 0
EYE PAIN: 0
SHORTNESS OF BREATH: 0
HEARTBURN: 0
NAUSEA: 0
JOINT SWELLING: 0
CONSTIPATION: 0
SORE THROAT: 0

## 2022-09-13 NOTE — PROGRESS NOTES
SUBJECTIVE:   CC: Ghazal Martinez is an 41 year old woman who presents for preventive health visit.       Patient has been advised of split billing requirements and indicates understanding: Yes  Healthy Habits:     Getting at least 3 servings of Calcium per day:  Yes    Bi-annual eye exam:  Yes    Dental care twice a year:  Yes    Sleep apnea or symptoms of sleep apnea:  None    Diet:  Regular (no restrictions)    Frequency of exercise:  4-5 days/week    Duration of exercise:  N/A    Taking medications regularly:  Yes    Medication side effects:  None    PHQ-2 Total Score: 0    Additional concerns today:  No              Today's PHQ-2 Score:   PHQ-2 ( 1999 Pf[employer]) 9/13/2022   Q1: Little interest or pleasure in doing things 0   Q2: Feeling down, depressed or hopeless 0   PHQ-2 Score 0   PHQ-2 Total Score (12-17 Years)- Positive if 3 or more points; Administer PHQ-A if positive -   Q1: Little interest or pleasure in doing things Not at all   Q2: Feeling down, depressed or hopeless Not at all   PHQ-2 Score 0       Abuse: Current or Past (Physical, Sexual or Emotional) - No  Do you feel safe in your environment? Yes        Social History     Tobacco Use     Smoking status: Never     Smokeless tobacco: Never   Substance Use Topics     Alcohol use: No     If you drink alcohol do you typically have >3 drinks per day or >7 drinks per week? Not applicable    PAP / HPV Latest Ref Rng & Units 6/29/2021 1/29/2018   PAP (Historical) - NIL NIL   HPV16 NEG:Negative Negative Negative   HPV18 NEG:Negative Negative Negative   HRHPV NEG:Negative Negative Negative     Past medical, family, and social histories, medications, and allergies are reviewed and updated in [facility].     Review of Systems   Constitutional: Negative for chills and fever.   HENT: Negative for congestion, ear pain, hearing loss and sore throat.    Eyes: Negative for pain and visual disturbance.   Respiratory: Negative for cough and shortness of breath.   "  Cardiovascular: Negative for chest pain, palpitations and peripheral edema.   Gastrointestinal: Negative for abdominal pain, constipation, diarrhea, heartburn, hematochezia and nausea.   Breasts:  Negative for tenderness, breast mass and discharge.   Genitourinary: Negative for dysuria, frequency, genital sores, hematuria, pelvic pain, urgency, vaginal bleeding and vaginal discharge.   Musculoskeletal: Negative for arthralgias, joint swelling and myalgias.   Skin: Negative for rash.   Neurological: Negative for dizziness, weakness, headaches and paresthesias.   Psychiatric/Behavioral: Negative for mood changes. The patient is not nervous/anxious.           OBJECTIVE:   /77 (BP Location: Left arm, Patient Position: Sitting, Cuff Size: Adult Regular)   Pulse 58   Temp 97.8  F (36.6  C) (Oral)   Resp 16   Ht 1.565 m (5' 1.61\")   Wt 72.2 kg (159 lb 3.2 oz)   SpO2 100%   BMI 29.48 kg/m    Physical Exam  GENERAL APPEARANCE: healthy, alert and no distress  EYES: Eyes grossly normal to inspection, PERRL and conjunctivae and sclerae normal  HENT: ear canals and TM's normal, nose and mouth without ulcers or lesions, oropharynx clear and oral mucous membranes moist  NECK: no adenopathy, no asymmetry, masses, or scars and thyroid normal to palpation  RESP: lungs clear to auscultation - no rales, rhonchi or wheezes  BREAST: normal without masses, tenderness or nipple discharge and no palpable axillary masses or adenopathy  CV: regular rate and rhythm, normal S1 S2, no S3 or S4, no murmur, click or rub, no peripheral edema and peripheral pulses strong  ABDOMEN: soft, nontender, no hepatosplenomegaly, no masses and bowel sounds normal  MS: no musculoskeletal defects are noted and gait is age appropriate without ataxia  SKIN: no suspicious lesions or rashes  NEURO: Normal strength and tone, sensory exam grossly normal, mentation intact and speech normal  PSYCH: mentation appears normal and affect " "normal/bright      ASSESSMENT/PLAN:   (Z00.00) Routine general medical examination at a health care facility  (primary encounter diagnosis)  Comment: Negative screening exam; up-to-date on preventive services after today.  Plan: Hepatitis C Screen Reflex to HCV RNA Quant and         Genotype, Lipid panel reflex to direct LDL         Non-fasting, Vitamin D Deficiency, Hepatitis C         RNA, Quantitative by PCR with Confirmatory         Reflex to Genotyping        Return in about 1 year (around 9/13/2023) for full physical.      (Z13.220) Screening for lipid disorders  Comment:   Plan: Lipid panel reflex to direct LDL Non-fasting            (Z11.59) Need for hepatitis C screening test  Comment: indications for screening discussed with the patient   Plan: Hepatitis C Screen Reflex to HCV RNA Quant and         Genotype, Hepatitis C RNA, Quantitative by PCR         with Confirmatory Reflex to Genotyping            (Z28.21) Vaccination not carried out because of patient refusal  Comment: COVID-19, influenza  Plan:     (E55.9) Vitamin D deficiency  Comment: this is listed on her problem list, and she has been treated in the past.  Plan: Vitamin D Deficiency        [place] as needed.          COUNSELING:  Reviewed preventive health counseling, as reflected in patient instructions  Special attention given to:        Immunizations    Declined: Influenza and COVID-19 due to her currently being treated for breast cancer, and she wants to clear this with her oncologist first.               Consider Hep C screening for all patients one time for ages 18-79 years    Estimated body mass index is 29.48 kg/m  as calculated from the following:    Height as of this encounter: 1.565 m (5' 1.61\").    Weight as of this encounter: 72.2 kg (159 lb 3.2 oz).        She reports that she has never smoked. She has never used smokeless tobacco.      Counseling Resources:  ATP IV Guidelines  Pooled Cohorts Equation Calculator  Breast Cancer Risk " Calculator  BRCA-Related Cancer Risk Assessment: FHS-7 Tool  FRAX Risk Assessment  ICSI Preventive Guidelines  Dietary Guidelines for Americans, 2010  USDA's MyPlate  ASA Prophylaxis  Lung CA Screening    Jluis Childress MD  [facility]

## 2022-09-14 LAB
DEPRECATED CALCIDIOL+CALCIFEROL SERPL-MC: 23 UG/L (ref 20–75)
HCV AB SERPL QL IA: REACTIVE

## 2022-09-15 LAB — HCV RNA SERPL NAA+PROBE-ACNC: NOT DETECTED IU/ML

## 2022-09-17 ENCOUNTER — HEALTH MAINTENANCE LETTER (OUTPATIENT)
Age: 42
End: 2022-09-17

## 2022-09-20 ENCOUNTER — ONCOLOGY VISIT (OUTPATIENT)
Dept: ONCOLOGY | Facility: CLINIC | Age: 42
End: 2022-09-20
Payer: COMMERCIAL

## 2022-09-20 VITALS
SYSTOLIC BLOOD PRESSURE: 134 MMHG | DIASTOLIC BLOOD PRESSURE: 86 MMHG | OXYGEN SATURATION: 99 % | TEMPERATURE: 98.1 F | BODY MASS INDEX: 29.35 KG/M2 | WEIGHT: 158.5 LBS | HEART RATE: 63 BPM

## 2022-09-20 DIAGNOSIS — M25.512 LEFT SHOULDER PAIN, UNSPECIFIED CHRONICITY: ICD-10-CM

## 2022-09-20 DIAGNOSIS — T45.1X5A HOT FLASHES DUE TO TAMOXIFEN: ICD-10-CM

## 2022-09-20 DIAGNOSIS — R91.8 PULMONARY NODULES: ICD-10-CM

## 2022-09-20 DIAGNOSIS — Z17.0 MALIGNANT NEOPLASM OF LEFT BREAST IN FEMALE, ESTROGEN RECEPTOR POSITIVE, UNSPECIFIED SITE OF BREAST (H): Primary | ICD-10-CM

## 2022-09-20 DIAGNOSIS — R23.2 HOT FLASHES DUE TO TAMOXIFEN: ICD-10-CM

## 2022-09-20 DIAGNOSIS — Z90.13 S/P MASTECTOMY, BILATERAL: ICD-10-CM

## 2022-09-20 DIAGNOSIS — C50.912 MALIGNANT NEOPLASM OF LEFT BREAST IN FEMALE, ESTROGEN RECEPTOR POSITIVE, UNSPECIFIED SITE OF BREAST (H): Primary | ICD-10-CM

## 2022-09-20 PROCEDURE — 99214 OFFICE O/P EST MOD 30 MIN: CPT | Performed by: NURSE PRACTITIONER

## 2022-09-20 RX ORDER — VENLAFAXINE HYDROCHLORIDE 37.5 MG/1
37.5 CAPSULE, EXTENDED RELEASE ORAL DAILY
Qty: 30 CAPSULE | Refills: 2 | Status: SHIPPED | OUTPATIENT
Start: 2022-09-20 | End: 2022-12-22

## 2022-09-20 ASSESSMENT — PAIN SCALES - GENERAL: PAINLEVEL: NO PAIN (0)

## 2022-09-20 NOTE — NURSING NOTE
"Oncology Rooming Note    September 20, 2022 9:51 AM   Ghazal Martinez is a 41 year old female who presents for:    Chief Complaint   Patient presents with     Oncology Clinic Visit     Initial Vitals: /86   Pulse 63   Temp 98.1  F (36.7  C) (Oral)   Wt 71.9 kg (158 lb 8 oz)   SpO2 99%   BMI 29.35 kg/m   Estimated body mass index is 29.35 kg/m  as calculated from the following:    Height as of 9/13/22: 1.565 m (5' 1.61\").    Weight as of this encounter: 71.9 kg (158 lb 8 oz). Body surface area is 1.77 meters squared.  No Pain (0) Comment: Data Unavailable   No LMP recorded. (Menstrual status: Chemotherapy).  Allergies reviewed: Yes  Medications reviewed: Yes    Medications: Medication refills not needed today.  Pharmacy name entered into [facility]:    [facility] - [place], [place] - [address] [id number] - [place], [place] - [address] [facility] [facility] [place] - [place], [place] - [address]    Clinical concerns: feeling sad most days and feels like crying.  Laura was notified.      Anabella Orr LPN              "

## 2022-09-20 NOTE — LETTER
9/20/2022         RE: Ghazal Martinez  [address]  [place] [address]        Dear Colleague,    Thank you for referring your patient, Ghazal Martinez, to the [facility]. Please see a copy of my visit note below.    Oncology Follow Up Visit: September 20, 2022     Oncologist: Dr Jaimes/CALIN  PCP: No Ref-Primary, Physician    Diagnosis: Left Breast Cancer  Ghazal Martinez is a 40 yo female who presented in 2/2021 with left breast lump x 1 month.   Contrast enhanced mammogram on 2/23/2021 showed a mass at the 10:00 position in left breast anterior depth measuring 1.7 cm. US guided core needle biopsy on 2/23/2021 showed no decompensating invasive ductal carcinoma, estrogen receptor positive (95%, strong) and progesterone receptor positive by immunohistochemistry (70%). By FISH HER2/MIO 17 ratio:  1.7, IHC 2+, additional 20 cells from areas corresponding to the most intense IHC staining were evaluated by FISH. The results obtained from these 20 additional cells also fall into Group 4. Taken together, the FISH and IHC   results are interpreted as HER2 negative.FISH and IHC results ultimately interpreted as HER2 negative.   OncotypeDx returned at 23, c/w intermediate risk, 9 percent of distant disease recurrence with the use of systemic endocrine therapy and  6.5%  benefit of adjuvant chemotherapy given young age, premenopausal.  *3/19/2021Genetic testing negative  for mutations in the FLEX, BRCA1, BRCA2, BRIP1, CDH1, CHEK2, EPCAM, MLH1, MSH2, MSH6, NBN, NF1, PALB2, PMS2, PTEN, RAD51C, RAD51D, STK11, and TP53 genes.  Treatment:   4/5/2021 status post bilateral mastectomy and axillary sentinel lymph node biopsy   4/30-7/2021 treatment with Taxotere/Cytoxan x 4 cycles  9/21/2021 completed radiation therapy.   9/2021 began Tamoxifen    Interval History: Ms. Martinez is seen today with  on Ipad in clinic for follow-up to her breast cancer treatment.  Patient continues on  Tamoxifen and reports hot flashes by day and just feeling warm through night -rarely uses covers. Continues with some neuropathy of the feet and notes some feeling of little pinches to the legs and sometimes to the arms. Left shoulder with pain and some loss of ROM. Continues to care for her 5 children. Admits she genaro very easily and most days. Denies wish of self harm. Denies also fevers, headaches, chest pain, SOB or new breast issues post mastectomies.    Rest of comprehensive and complete ROS is reviewed and is negative.   Past Medical History:   Diagnosis Date     Cancer (H)      Varicose veins of legs      Current Outpatient Medications   Medication     tamoxifen (NOLVADEX) 20 MG tablet     cephALEXin (KEFLEX) 500 MG capsule     No current facility-administered medications for this visit.     No Known Allergies    Physical Exam:/86   Pulse 63   Temp 98.1  F (36.7  C) (Oral)   Wt 71.9 kg (158 lb 8 oz)   SpO2 99%   BMI 29.35 kg/m     Constitutional: Alert, healthy, and in no distress.   ENT: Eyes bright, No mouth sores  Neck: Supple, No adenopathy.  Cardiac: Heart rate and rhythm is regular with normal S1 and S2 without murmur  Respiratory: Breathing easy. Lung sounds clear to auscultation  Breasts:bilaterally has mastectomies with reconstruction without new masses but is tender to left shoulder with mild loss of movement. Has 2 small brusies to area with   Abdomen: Soft, non-tender, BS normal. No masses or organomegaly  MS: Muscle tone normal, extremities normal with no edema.   Skin: No suspicious lesions or rashes  Neuro: Sensory grossly WNL, gait normal.   Lymph: Normal ant/post cervical, axillary, supraclavicular nodes  Psych: Mentation appears normal and affect normal/bright and smiling    Laboratory Results:   No results found for any visits on 09/20/22.     Assessment and Plan:   Left Breast Cancer-patient completed bilateral mastectomies with sentinel node biopsy to the left axilla. Due to  young age and Oncotype result, she complete 4 cycles of Taxotere /Cytoxan.  She completed radiation therapy on 9/21/2021 and began use of Tamoxifen.   She is tolerating Tamoxifen well however admits to hot flashes in day and generalized warmth through night.since she also mentions she easily cries she is willing to trial Effexor 37.5 mg daily to see if could improve hot flashes and tendency for teariness but denies depression.  She will continue daily Tamoxifen  Return for 3 month recheck on effexor.  Patient will return in 6 months for review with Dr Jaimes  Peripheral neuropathy- neuropathy to feet is much improved but not gone. Denies need for medication for this concern.reminded of need for shoes for protection.  Thyroid nodule- found on 3/2021 PET CT- FDG avid hypoattenuating nodule in the left thyroid lobe measuring 1.2 x 0.7 cm and SUV max 4.6. Referral made to endocrinology who completed US of the thyroid showing 3 left sided thyroid nodules - Recommendation for US and biopsy- biopsy was benign in 4/2022 and was recommended for return in 2 years( 4/2024).  Pulmonary Nodule- 7mm to RUL noted on 3/2021 PET CT. 1/27/2022 imaging shows node is stable. CT to check pulmonary nodule to be set for January 2023.    Covid 19 precautions - Discussed need and prevention of disease but pt is not interested at this time. .  The total time of this encounter amounted to 30 minutes. This time included face-to-face time spent with the patient and , prep work, ordering tests, and performing post visit documentation.  Laura Klein Cnp        Again, thank you for allowing me to participate in the care of your patient.        Sincerely,        Laura Klein, LOUIS, APRN CNP

## 2022-09-20 NOTE — PROGRESS NOTES
Oncology Follow Up Visit: September 20, 2022     Oncologist: Dr Jaimes/CALIN  PCP: No Ref-Primary, Physician    Diagnosis: Left Breast Cancer  Ghazal Martinez is a 42 yo female who presented in 2/2021 with left breast lump x 1 month.   Contrast enhanced mammogram on 2/23/2021 showed a mass at the 10:00 position in left breast anterior depth measuring 1.7 cm. US guided core needle biopsy on 2/23/2021 showed no decompensating invasive ductal carcinoma, estrogen receptor positive (95%, strong) and progesterone receptor positive by immunohistochemistry (70%). By FISH HER2/MIO 17 ratio:  1.7, IHC 2+, additional 20 cells from areas corresponding to the most intense IHC staining were evaluated by FISH. The results obtained from these 20 additional cells also fall into Group 4. Taken together, the FISH and IHC   results are interpreted as HER2 negative.FISH and IHC results ultimately interpreted as HER2 negative.   OncotypeDx returned at 23, c/w intermediate risk, 9 percent of distant disease recurrence with the use of systemic endocrine therapy and  6.5%  benefit of adjuvant chemotherapy given young age, premenopausal.  *3/19/2021Genetic testing negative  for mutations in the FLEX, BRCA1, BRCA2, BRIP1, CDH1, CHEK2, EPCAM, MLH1, MSH2, MSH6, NBN, NF1, PALB2, PMS2, PTEN, RAD51C, RAD51D, STK11, and TP53 genes.  Treatment:   4/5/2021 status post bilateral mastectomy and axillary sentinel lymph node biopsy   4/30-7/2021 treatment with Taxotere/Cytoxan x 4 cycles  9/21/2021 completed radiation therapy.   9/2021 began Tamoxifen    Interval History: Ms. Martinez is seen today with  on Ipad in clinic for follow-up to her breast cancer treatment.  Patient continues on Tamoxifen and reports hot flashes by day and just feeling warm through night -rarely uses covers. Continues with some neuropathy of the feet and notes some feeling of little pinches to the legs and sometimes to the arms. Left shoulder with pain and some loss of  ROM. Continues to care for her 5 children. Admits she genaro very easily and most days. Denies wish of self harm. Denies also fevers, headaches, chest pain, SOB or new breast issues post mastectomies.    Rest of comprehensive and complete ROS is reviewed and is negative.   Past Medical History:   Diagnosis Date     Cancer (H)      Varicose veins of legs      Current Outpatient Medications   Medication     tamoxifen (NOLVADEX) 20 MG tablet     cephALEXin (KEFLEX) 500 MG capsule     No current facility-administered medications for this visit.     No Known Allergies    Physical Exam:/86   Pulse 63   Temp 98.1  F (36.7  C) (Oral)   Wt 71.9 kg (158 lb 8 oz)   SpO2 99%   BMI 29.35 kg/m     Constitutional: Alert, healthy, and in no distress.   ENT: Eyes bright, No mouth sores  Neck: Supple, No adenopathy.  Cardiac: Heart rate and rhythm is regular with normal S1 and S2 without murmur  Respiratory: Breathing easy. Lung sounds clear to auscultation  Breasts:bilaterally has mastectomies with reconstruction without new masses but is tender to left shoulder with mild loss of movement. Has 2 small brusies to area with   Abdomen: Soft, non-tender, BS normal. No masses or organomegaly  MS: Muscle tone normal, extremities normal with no edema.   Skin: No suspicious lesions or rashes  Neuro: Sensory grossly WNL, gait normal.   Lymph: Normal ant/post cervical, axillary, supraclavicular nodes  Psych: Mentation appears normal and affect normal/bright and smiling    Laboratory Results:   No results found for any visits on 09/20/22.     Assessment and Plan:   Left Breast Cancer-patient completed bilateral mastectomies with sentinel node biopsy to the left axilla. Due to young age and Oncotype result, she complete 4 cycles of Taxotere /Cytoxan.  She completed radiation therapy on 9/21/2021 and began use of Tamoxifen.   She is tolerating Tamoxifen well however admits to hot flashes in day and generalized warmth through night.since  she also mentions she easily cries she is willing to trial Effexor 37.5 mg daily to see if could improve hot flashes and tendency for teariness but denies depression.  She will continue daily Tamoxifen  Return for 3 month recheck on effexor.  Patient will return in 6 months for review with Dr Jaimes  Peripheral neuropathy- neuropathy to feet is much improved but not gone. Denies need for medication for this concern.reminded of need for shoes for protection.  Thyroid nodule- found on 3/2021 PET CT- FDG avid hypoattenuating nodule in the left thyroid lobe measuring 1.2 x 0.7 cm and SUV max 4.6. Referral made to endocrinology who completed US of the thyroid showing 3 left sided thyroid nodules - Recommendation for US and biopsy- biopsy was benign in 4/2022 and was recommended for return in 2 years( 4/2024).  Pulmonary Nodule- 7mm to RUL noted on 3/2021 PET CT. 1/27/2022 imaging shows node is stable. CT to check pulmonary nodule to be set for January 2023.    Covid 19 precautions - Discussed need and prevention of disease but pt is not interested at this time. .  The total time of this encounter amounted to 30 minutes. This time included face-to-face time spent with the patient and , prep work, ordering tests, and performing post visit documentation.  Lauar Klein,Cnp

## 2022-11-30 NOTE — PROGRESS NOTES
December 11, 2022       Dori Navas MD  [address]  [address]  [place] [address]  Via Fax: [phone]      Patient: Kimberly Wild   YOB: 1934   Date of Visit: 11/30/2022       Dear Dr. Navas:    I saw your patient, Kimberly Wild, for an evaluation. Below are my notes for this visit with her.    If you have questions, please do not hesitate to call me.          Sincerely,        Robert Soni Jr., MD        CC: No Recipients  Robert Soni Jr, MD  12/11/2022  4:58 PM  Signed  Date of Service:  11/30/2022    PCP:  Dori Navas MD     Chief Complaint:   Follow up kidney stones, urge incontinence, frequency    History of Present Illness:  The patient is an 87 year old female who was last seen in the office on 05/16/2022.     She has a history of gross hematuria. The bleeding has been present intermittently. The blood was bright red and was seen throughout the stream. A previous KUB was negative for stones. Patient returned in October 2018 with a recurrent episode of gross hematuria. She also reported several episodes of dysuria along with frequency and suprapubic pain. She reported all cultures were negative. Patient was started on Cipro for dysuria. A cystoscopy on 10/31/2018 revealed no abnormal findings. A CT found bilateral, nonobstructing stones. Patient is a previous smoker. There is a family history of calculus disease.      She returned to the office on 06/29/2020 with a KUB from 06/23/2020 which revealed stable, 2 mm stone at the upper pole of the right kidney. Patient denied any gross hematuria or stone episodes.     The patient returned to the office on 08/10/2020 complaining of increased severity of frequency, urgency, and episodes of urge incontinence. She was started on Myrbetriq 25 mg po q day. She denied any side effects. She stated she is now able to go shopping. She has had decreased frequency and urgency. Patient stated she still gets up at night but drinks a fair amount  Please send a letter:    Dear Ghazal Martinez    Here are your cholesterol results:    Your LDL is: Lab Results       Component                Value               Date                       LDL                      95                  09/29/2018          Your LDL goal is to be less than 160  Your HDL is: Lab Results       Component                Value               Date                       HDL                      67                  09/29/2018         Goal HDL is Greater than 40 (for men) or 50 (for women).  Your Triglycerides are: Lab Results       Component                Value               Date                       TRIG                     164                 09/29/2018       Goal TRIGLYCERIDES are less than 150.       Here are some ways to improve your cholesterol without medication:    Try to get at least 45 minutes of aerobic exercise 5-6 days a week  Maintain a healthy body weight  Eat less saturated fats  Buy lean cuts of meat, reduce your portions of red meat or substitute poultry or fish  Avoid fried or fast foods that are high in fat  Eat more fruits and vegetables    Glucose is normal, you do not have diabetes.  Screening test for HIV is negative.  Please let me know if you have any questions and thank you for choosing [facility].    Regards,    Alena Wynne MD MPH   of water. Overall she stated she is doing well.      The patient returned to the office on 06/29/2020 to review KUB results. KUB from 06/23/2020 displayed a 2 mm nonobstructing calculus at the superior pole of the right kidney.        She returned to the office on 08/10/ 2020. In June of 2020 patient complained of increased severity of frequency, urgency, and episodes of urge incontinence. She was started on Myrbetriq 25 mg po q day. She denied any side effects. She stated she is now able to go shopping. She has had decreased frequency and urgency. Patient stated she still gets up at night but drinks a fair amount of water. Overall she stated she is doing well.      The patient returned to the office on 05/17/2021 stating she is still taking Myrbetriq 25 mg once daily. Denies dysuria. Stated voiding is good. No current voiding complaints.      She returned to the office on 11/15/2021 to review KUB results. KUB from 11/03/2021 revealed no acute abnormality. She denied any flank pains.      She reported her voiding symptoms were stable on Myrbetriq 25 mg once daily. Stated she wears depend pads for extra protection during the night. Had urgency in the morning and when she sits for long periods of time. Symptoms exacerbated when increasing fluid for constipation. She denied gross hematuria or recent infections.      The patient returned to the office on 05/16/2022 complaining of occasional urgency on Myrbetriq 25 mg once daily but reported significant improvement of her incontinence. She denied gross hematuria or recent infections.     The patient returns to the office today with a KUB from 11/03/2022 which revealed no urinary tract calculus visualized. States 3 months ago she may have passed a stone after experiencing dysuria but was unsure due to having a bowel movement at the same time. She denies any flank pains. Denies gross hematuria. States her voiding symptoms are good and stable on Myrbetriq 25 mg once daily.  She has no new voiding complaints.     Medications:  Current Outpatient Medications   Medication Sig Dispense Refill   • azithromycin (ZITHROMAX) 250 MG tablet Take 250 mg by mouth daily.     • bisacodyl 5 MG EC tablet Take 5 mg by mouth.     • cephalexin (KEFLEX) 500 MG capsule Take 500 mg by mouth.     • diclofenac (VOLTAREN) 1 % gel      • docusate sodium, DSS, 100 MG Cap Take 100 mg by mouth daily as needed.     • fluticasone (FLONASE) 50 MCG/ACT nasal spray      • glycerin, adult, 2 g suppository Place 1 suppository rectally daily as needed.     • loratadine (CLARITIN) 10 MG tablet Take 1 tablet by mouth daily.     • magnesium hydroxide (MILK OF MAGNESIA) 400 MG/5ML suspension Take 30 mLs by mouth daily as needed.     • Melatonin 10 MG Cap Take 1 capsule by mouth daily as needed.     • polyethylene glycol (MIRALAX) 17 GM/SCOOP powder Take 17 g by mouth daily as needed.     • mirabegron ER (Myrbetriq) 25 MG 24 hour tablet Take 1 tablet by mouth daily. 90 tablet 0   • Acetaminophen 500 MG Cap Take 500 mg by mouth every 4 hours as needed.     • losartan (COZAAR) 100 MG tablet      • omeprazole (PRILOSEC) 20 MG capsule      • Incontinence Supply Disposable (ALWAYS DISCREET) Pads 1 Piece as needed (incontinence). 30 each 6   • Sanitary Napkins & Tampons (ALWAYS PANTILINERS/THONG) Pads 4 inch thick 30 each 3   • chlorthalidone (THALITONE) 25 MG tablet Take 25 mg by mouth daily.     • amLODIPine (NORVASC) 5 MG tablet Take 5 mg by mouth daily.     • HYDROcodone-acetaminophen (NORCO) 5-325 MG per tablet Take 1 tablet by mouth every 6 hours as needed for Pain. 30 tablet 0   • lisinopril (PRINIVIL,ZESTRIL) 40 MG tablet Take 40 mg by mouth daily.     • Multiple Vitamins-Minerals (CENTRUM SILVER) tablet Take 1 tablet by mouth daily.     • Psyllium (METAMUCIL PO) Take  by mouth daily. One teaspoon of metamucil powder     • hydrocortisone (ANUSOL-HC) 2.5 % rectal cream Apply thin layer to affected area as needed. 30 g 2   •  traMADOL (ULTRAM) 50 MG tablet Take 50 mg by mouth every 6 hours as needed.     • Fenofibrate (TRICOR PO) Take 1 tablet by mouth daily.     • ezetimibe (ZETIA) 10 MG tablet Take 10 mg by mouth daily.       No current facility-administered medications for this visit.       Allergies:  ALLERGIES:   Allergen Reactions   • Sulfa Drugs Cross Reactors WEAKNESS   • Corticosteroids SWELLING   • Niaspan [Niacin (Antihyperlipidemic)]      fever   • Statins Other (See Comments)     Fever, elevated liver functions       Past Medical History:   Past Medical History:   Diagnosis Date   • Aneurysm (CMS/HCC)     CNS   • Arthritis     DJD of cervical and lumbar spine   • Constipation    • Diverticulosis    • DJD (degenerative joint disease)     left thumb MP joint, wrist   • Essential (primary) hypertension    • Fracture     right thumb, right toe   • Gastritis    • Gastroesophageal reflux disease    • Herniated disc, cervical    • Hypercalcemia    • Liver hemangioma    • Other and unspecified hyperlipidemia    • Stenosing tenosynovitis of thumb     left   • Tenosynovitis of thumb     left thumb pain   • Thyroid condition    • Varicose veins        Family History:  Family History   Problem Relation Age of Onset   • Stroke Mother    • Hypertension Mother    • Heart disease Father    • Hypertension Father    • Heart disease Sister    • Hypertension Sister        Surgical History:  Past Surgical History:   Procedure Laterality Date   • Back surgery     • Carpal tunnel release  1970    bilateral   • Colonoscopy     • Eye surgery      bilat cataract extraction with ioli   • Gynecologic cryosurgery     • Hand surgery  06/06/2013    left LRTI and MP fusion   • Hysterectomy     • Knee surgery     • Rotator cuff repair  2012    right x2   • Service to gastroenterology     • Tonsillectomy and adenoidectomy     :    Social History:  Social History     Socioeconomic History   • Marital status:      Spouse name: Not on file   • Number of  children: Not on file   • Years of education: Not on file   • Highest education level: Not on file   Occupational History   • Not on file   Tobacco Use   • Smoking status: Former Smoker     Types: Cigarettes   • Smokeless tobacco: Never Used   Substance and Sexual Activity   • Alcohol use: Yes     Comment: Socially   • Drug use: No   • Sexual activity: Not on file   Other Topics Concern   • Not on file   Social History Narrative   • Not on file     Social Determinants of Health     Financial Resource Strain: Not on file   Food Insecurity: Not on file   Transportation Needs: Not on file   Physical Activity: Not on file   Stress: Not on file   Social Connections: Not on file   Intimate Partner Violence: Not on file         Physical Exam:  Visit Vitals  BP (!) 148/76   Pulse 86   Ht 5' 2\" (1.575 m)   Wt 68 kg (150 lb)   BMI 27.44 kg/m²     Constitutional:  Well developed, well nourished, afebrile.    In-Office Testing  Results for orders placed or performed in visit on 11/30/22   POST VOID RES URINE/BL BY US   Result Value    BLDR Scan mLs 17 ml   POCT URINE DIP AUTO   Result Value    POCT Color Yellow    POCT Appearance Clear    POCT Glucose Urine Negative    POCT Bilirubin Negative    POCT Ketones Negative    POCT Specific Gravity 1.020    POCT Occult Blood Negative    POCT pH 7.0    POCT Protein Negative    POCT Urobilinogen 0.2    Urine Nitrite Negative    WBC (Leukocyte) Esterase POC Trace (A)       Radiology Results:  Result Information    Date and Time: Exam End: 11/3/2022 13:40 Status: Final result Provider Status: Reviewed   Priority: Routine              Study Result    Narrative & Impression   XR ABDOMEN 1 VW KUB SUPINE     DATE:  11/7/2022 10:08 AM     INDICATION:  Calculus of kidney.     COMPARISON:  Abdominal radiograph 11/3/2021.     FINDINGS:  No urinary tract calculus is visualized. Overlying gas and stool  limit detail. There are multiple stable bilateral pelvic calcifications  compatible with  phleboliths. The abdominal gas pattern is normal. There is  modest right colonic stool burden. The lung bases demonstrate satisfactory  aeration. Postoperative changes of the lumbar spine are redemonstrated.        IMPRESSION:  No urinary tract calculus is visualized.     Assessment and Plan:    Overactive bladder- Voiding symptoms stable on Myrbetriq 25 mg. Complains of occasional urgency but states it's tolerable. We discussed options.     -Continue Myrbetriq 25 mg once daily      Bilateral renal stones- KUB from 11/03/2021 revealed no acute abnomality.     KUB from 11/03/2022 revealed no urinary tract calculus visualized. States she may have passed a stone 3 months ago. No recent flank pains. We discussed options.     -She is to get a KUB prior to her next visit.      -Follow up in 1 year     History of gross hematuria, dysuria- No recent episodes of gross hematuria.     I had a brief counseling session with the patient concerning the diagnosis and treatment options.     I performed a brief review of the patient's medical records.     I reviewed the data with patient, including diagnosis, prognosis, and treatment.    *The patient's KUB images were reviewed by me in the office today.     Tests reviewed:  PVR, KUB and Urinalysis    Tests ordered:  PVR, KUB and Urinalysis    On 11/30/2022, IMarvin scribed the services personally performed by Robert Soni Jr., MD.    The documentation recorded by the scribe accurately and completely reflects the service(s) I personally performed and the decisions made by me.      Robert Soni Jr., MD [facility]  Department of Urology  [facility]  [phone]

## 2022-12-22 ENCOUNTER — PATIENT OUTREACH (OUTPATIENT)
Dept: ONCOLOGY | Facility: CLINIC | Age: 42
End: 2022-12-22

## 2022-12-22 DIAGNOSIS — T45.1X5A HOT FLASHES DUE TO TAMOXIFEN: ICD-10-CM

## 2022-12-22 DIAGNOSIS — C50.912 MALIGNANT NEOPLASM OF LEFT BREAST IN FEMALE, ESTROGEN RECEPTOR POSITIVE, UNSPECIFIED SITE OF BREAST (H): ICD-10-CM

## 2022-12-22 DIAGNOSIS — R23.2 HOT FLASHES DUE TO TAMOXIFEN: ICD-10-CM

## 2022-12-22 DIAGNOSIS — Z17.0 MALIGNANT NEOPLASM OF LEFT BREAST IN FEMALE, ESTROGEN RECEPTOR POSITIVE, UNSPECIFIED SITE OF BREAST (H): ICD-10-CM

## 2022-12-22 RX ORDER — VENLAFAXINE HYDROCHLORIDE 37.5 MG/1
37.5 CAPSULE, EXTENDED RELEASE ORAL DAILY
Qty: 30 CAPSULE | Refills: 2 | Status: SHIPPED | OUTPATIENT
Start: 2022-12-22 | End: 2023-03-21

## 2022-12-22 NOTE — PROGRESS NOTES
Call back to son, Karl.  He states his mom would like Effexor refilled.  Has two tablets remaining.  Writer reviewed last note from September, 2022, seen by LOUIS Sanchez.  Patient is on Tamoxifen; having some hot flashes and teary episodes.  Prescribed Effexor for trial; wanted recheck in 3 months.  Patient has no recheck scheduled for December but has a 6 month with Dr. Jaimes in April.  Son states patient feels better on Effexor, less teary episodes and hot flashes, thinks it is helping her.  Advised will reach out to Laura for approval on refill.  Needed by tomorrow.

## 2023-03-14 ENCOUNTER — ANCILLARY PROCEDURE (OUTPATIENT)
Dept: CT IMAGING | Facility: CLINIC | Age: 43
End: 2023-03-14
Attending: NURSE PRACTITIONER
Payer: COMMERCIAL

## 2023-03-14 DIAGNOSIS — R91.8 PULMONARY NODULES: ICD-10-CM

## 2023-03-14 PROCEDURE — 71250 CT THORAX DX C-: CPT | Performed by: RADIOLOGY

## 2023-03-21 ENCOUNTER — ONCOLOGY VISIT (OUTPATIENT)
Dept: ONCOLOGY | Facility: CLINIC | Age: 43
End: 2023-03-21
Attending: NURSE PRACTITIONER
Payer: COMMERCIAL

## 2023-03-21 VITALS
HEART RATE: 66 BPM | BODY MASS INDEX: 30.25 KG/M2 | WEIGHT: 164.4 LBS | SYSTOLIC BLOOD PRESSURE: 128 MMHG | RESPIRATION RATE: 16 BRPM | DIASTOLIC BLOOD PRESSURE: 89 MMHG | HEIGHT: 62 IN | OXYGEN SATURATION: 96 %

## 2023-03-21 DIAGNOSIS — R91.8 PULMONARY NODULES: ICD-10-CM

## 2023-03-21 DIAGNOSIS — C50.912 MALIGNANT NEOPLASM OF LEFT BREAST IN FEMALE, ESTROGEN RECEPTOR POSITIVE, UNSPECIFIED SITE OF BREAST (H): ICD-10-CM

## 2023-03-21 DIAGNOSIS — T45.1X5A HOT FLASHES DUE TO TAMOXIFEN: ICD-10-CM

## 2023-03-21 DIAGNOSIS — Z17.0 MALIGNANT NEOPLASM OF LEFT BREAST IN FEMALE, ESTROGEN RECEPTOR POSITIVE, UNSPECIFIED SITE OF BREAST (H): ICD-10-CM

## 2023-03-21 DIAGNOSIS — R23.2 HOT FLASHES DUE TO TAMOXIFEN: ICD-10-CM

## 2023-03-21 DIAGNOSIS — Z90.13 S/P MASTECTOMY, BILATERAL: ICD-10-CM

## 2023-03-21 DIAGNOSIS — M25.512 LEFT SHOULDER PAIN, UNSPECIFIED CHRONICITY: ICD-10-CM

## 2023-03-21 PROCEDURE — 99214 OFFICE O/P EST MOD 30 MIN: CPT | Performed by: INTERNAL MEDICINE

## 2023-03-21 RX ORDER — VENLAFAXINE HYDROCHLORIDE 75 MG/1
75 CAPSULE, EXTENDED RELEASE ORAL DAILY
Qty: 90 CAPSULE | Refills: 3 | Status: SHIPPED | OUTPATIENT
Start: 2023-03-21 | End: 2024-03-21

## 2023-03-21 RX ORDER — TAMOXIFEN CITRATE 20 MG/1
20 TABLET ORAL DAILY
Qty: 90 TABLET | Refills: 3 | Status: SHIPPED | OUTPATIENT
Start: 2023-03-21 | End: 2024-03-21

## 2023-03-21 RX ORDER — VENLAFAXINE HYDROCHLORIDE 37.5 MG/1
37.5 CAPSULE, EXTENDED RELEASE ORAL DAILY
Qty: 30 CAPSULE | Refills: 2 | Status: SHIPPED | OUTPATIENT
Start: 2023-03-21 | End: 2023-03-21

## 2023-03-21 ASSESSMENT — PAIN SCALES - GENERAL: PAINLEVEL: NO PAIN (0)

## 2023-03-21 NOTE — PROGRESS NOTES
"Oncology Follow Up Visit:03/21/23    PCP: No Ref-Primary, Physician    Diagnosis: Left Breast Cancer  Ghazal Martinez is a 42 yo female who presented in 2/2021 with left breast lump x 1 month.   Contrast enhanced mammogram on 2/23/2021 showed a mass at the 10:00 position in left breast anterior depth measuring 1.7 cm. US guided core needle biopsy on 2/23/2021 showed no decompensating invasive ductal carcinoma, estrogen receptor positive (95%, strong) and progesterone receptor positive by immunohistochemistry (70%). By FISH HER2/MIO 17 ratio:  1.7, IHC 2+, additional 20 cells from areas corresponding to the most intense IHC staining were evaluated by FISH. The results obtained from these 20 additional cells also fall into Group 4. Taken together, the FISH and IHC   results are interpreted as HER2 negative.FISH and IHC results ultimately interpreted as HER2 negative.   OncotypeDx returned at 23, c/w intermediate risk, 9 percent of distant disease recurrence with the use of systemic endocrine therapy and  6.5%  benefit of adjuvant chemotherapy given young age, premenopausal.  *3/19/2021Genetic testing negative  for mutations in the FLEX, BRCA1, BRCA2, BRIP1, CDH1, CHEK2, EPCAM, MLH1, MSH2, MSH6, NBN, NF1, PALB2, PMS2, PTEN, RAD51C, RAD51D, STK11, and TP53 genes.    Treatment:   4/5/2021 status post bilateral mastectomy and axillary sentinel lymph node biopsy   4/30-7/2021 treatment with Taxotere/Cytoxan x 4 cycles  9/21/2021 completed radiation therapy.   9/2021 began Tamoxifen    Interval History:   Ms. Martinez is here for routine followup. Her son is translating per her request today. She has been feeling well. She is a little depressed still despite taking effexor however she is only taking 37.5mg a day. She has some hot flashes but not severe. She has itching on her back and is worried about it. She also has noted random \"pinpricks\" that last a few minutues and resolve. They seem to happen anywhere randomly and are " associated with nothing in particular. She is otherwise well.     Rest of comprehensive and complete ROS is reviewed and is negative.   Past Medical History:   Diagnosis Date     Cancer (H)      Varicose veins of legs      Current Outpatient Medications   Medication     cephALEXin (KEFLEX) 500 MG capsule     tamoxifen (NOLVADEX) 20 MG tablet     venlafaxine (EFFEXOR XR) 37.5 MG 24 hr capsule     No current facility-administered medications for this visit.     No Known Allergies    Physical Exam  Vitals reviewed.   Constitutional:       Appearance: Normal appearance. She is normal weight.   HENT:      Head: Normocephalic and atraumatic.   Eyes:      Extraocular Movements: Extraocular movements intact.      Conjunctiva/sclera: Conjunctivae normal.      Pupils: Pupils are equal, round, and reactive to light.   Cardiovascular:      Rate and Rhythm: Normal rate and regular rhythm.   Pulmonary:      Effort: Pulmonary effort is normal.      Breath sounds: Normal breath sounds.   Abdominal:      General: Bowel sounds are normal.      Palpations: Abdomen is soft.   Musculoskeletal:      Cervical back: Normal range of motion and neck supple.   Skin:     General: Skin is warm.   Neurological:      General: No focal deficit present.      Mental Status: She is alert and oriented to person, place, and time. Mental status is at baseline.   Psychiatric:         Mood and Affect: Mood normal.         Behavior: Behavior normal.         Thought Content: Thought content normal.         Judgment: Judgment normal.       Laboratory Results:   Recent Labs   Lab Test 03/01/22  1201 12/06/21  1618 09/27/21  1458 07/02/21  1200 06/11/21  0933 05/20/21  1105    140  --  142 141 142   POTASSIUM 3.9 4.0  --  3.7 3.8 3.7   CHLORIDE 112* 111*  --  111* 109 112*   CO2 24 22  --  23 24 25   ANIONGAP 4 7  --  8 8 5   BUN 15 17  --  9 9 12   CR 0.68 0.76 0.75 0.59 0.61 0.62   GLC 82 83  --  140* 116* 117*   BILLY 8.9 9.2  --  8.8 8.7 8.7     No  results for input(s): MAG, PHOS in the last 39874 hours.  Recent Labs   Lab Test 03/01/22  1201 12/06/21  1618 09/27/21  1458 07/29/21  1113 07/02/21  1200   WBC 4.6 6.7 3.5* 4.5 4.6   HGB 13.0 13.8 12.0 11.5* 12.0    250 241 248 276   MCV 88 83 83 89 88   NEUTROPHIL 62 71 69 63 61.8     Recent Labs   Lab Test 03/01/22  1201 12/06/21  1618 09/27/21  1458   BILITOTAL 0.3 0.3 0.3   ALKPHOS 80 91 89   ALT 41 30 70*   AST 21 17 39   ALBUMIN 3.5 3.6 3.4     TSH   Date Value Ref Range Status   04/11/2022 0.92 0.40 - 4.00 mU/L Final   01/25/2021 0.54 0.40 - 4.00 mU/L Final   08/16/2020 0.77 0.40 - 4.00 mU/L Final   01/20/2020 1.10 0.40 - 4.00 mU/L Final     No results for input(s): CEA in the last 94932 hours.  Results for orders placed or performed in visit on 03/14/23   CT Chest w/o contrast    Narrative    CT chest without contrast    INDICATION: Pulmonary nodules. Breast cancer.    COMPARISON: 1/27/2022    FINDINGS: No contrast. Bilateral mastectomy and reconstructive breast  implants. No enlarged lower neck, internal mammary, axillary,  retropectoral no mediastinal or hilar lymph nodes. Heart size normal.  Somewhat low dense appearance of the liver suggesting possible  steatosis. There is a small accessory splenule. No suspicious upper  abdominal findings. No pleural or pericardial effusion. Calibers of  the thoracic aorta and main pulmonary artery are normal.  Bone detail shows good mineralization throughout without suspicious  sclerotic or lytic/destructive lesion.  Detail of the lungs shows subpleural patchy opacities in the left  upper lobe anterolaterally consistent with radiation associated  fibrotic change. There is a solid nodule along the right minor fissure  (series 3 image 121) measuring 5 mm in greatest dimension not  significantly changed. 2 mm right major fissure nodule also noted  suggestive of benign intrapulmonary lymph node unchanged. 1 mm  lingular nodule (series 3 image 168) unchanged. Major  airways nicely  patent.      Impression    IMPRESSION: No CT evidence of metastatic disease to the chest.  Unchanged small bilateral pulmonary nodules. No adenopathy. Bilateral  mastectomy with reconstructive implants. Radiation-associated fibrotic  change in the left upper lobe. Possible steatosis in the liver.    RENITA MIGUEL MD         SYSTEM ID:  [id number]         Assessment and Plan:   1. [id number]+AL+Her2 negative IDC left s/p mastectomy with right prophylactic mastectomy   2. Peripheral neuropathy- neuropathy to feet is much improved.  3.Thyroid nodule-  Has follow-up with endocrine.  4. Pulmonary Nodule- 7mm to RUL noted on 3/2021 PET CT followup imaging has always been stable and now has been 2 years and can stop.    Plan  1. Continue tamoxifen, refill done  2. She no longer needs to have CT to follow-up the pulmonary nodules as they have been stable for 2 years.   3. Her back was unremarkable and I suspect it is dry skin.   4. She is having a nonspecific pattern to her muscle cramps I would suggest stretching, fluid and relaxation at this point and let us know if they worsen.  5. rtc 6 months for exam.    Constance Jaimes MD on 3/21/2023 at 4:09 PM

## 2023-03-21 NOTE — LETTER
3/21/2023         RE: Ghazal Martinez  [address]  [place] [address]        Dear Colleague,    Thank you for referring your patient, Ghazal Martinez, to the [facility]. Please see a copy of my visit note below.    Oncology Follow Up Visit:03/21/23    PCP: No Ref-Primary, Physician    Diagnosis: Left Breast Cancer  Ghazal Martinez is a 42 yo female who presented in 2/2021 with left breast lump x 1 month.   Contrast enhanced mammogram on 2/23/2021 showed a mass at the 10:00 position in left breast anterior depth measuring 1.7 cm. US guided core needle biopsy on 2/23/2021 showed no decompensating invasive ductal carcinoma, estrogen receptor positive (95%, strong) and progesterone receptor positive by immunohistochemistry (70%). By FISH HER2/MIO 17 ratio:  1.7, IHC 2+, additional 20 cells from areas corresponding to the most intense IHC staining were evaluated by FISH. The results obtained from these 20 additional cells also fall into Group 4. Taken together, the FISH and IHC   results are interpreted as HER2 negative.FISH and IHC results ultimately interpreted as HER2 negative.   OncotypeDx returned at 23, c/w intermediate risk, 9 percent of distant disease recurrence with the use of systemic endocrine therapy and  6.5%  benefit of adjuvant chemotherapy given young age, premenopausal.  *3/19/2021Genetic testing negative  for mutations in the FLEX, BRCA1, BRCA2, BRIP1, CDH1, CHEK2, EPCAM, MLH1, MSH2, MSH6, NBN, NF1, PALB2, PMS2, PTEN, RAD51C, RAD51D, STK11, and TP53 genes.    Treatment:   4/5/2021 status post bilateral mastectomy and axillary sentinel lymph node biopsy   4/30-7/2021 treatment with Taxotere/Cytoxan x 4 cycles  9/21/2021 completed radiation therapy.   9/2021 began Tamoxifen    Interval History:   Ms. Martinez is here for routine followup. Her son is translating per her request today. She has been feeling well. She is a little depressed still despite taking  "effexor however she is only taking 37.5mg a day. She has some hot flashes but not severe. She has itching on her back and is worried about it. She also has noted random \"pinpricks\" that last a few minutues and resolve. They seem to happen anywhere randomly and are associated with nothing in particular. She is otherwise well.     Rest of comprehensive and complete ROS is reviewed and is negative.   Past Medical History:   Diagnosis Date     Cancer (H)      Varicose veins of legs      Current Outpatient Medications   Medication     cephALEXin (KEFLEX) 500 MG capsule     tamoxifen (NOLVADEX) 20 MG tablet     venlafaxine (EFFEXOR XR) 37.5 MG 24 hr capsule     No current facility-administered medications for this visit.     No Known Allergies    Physical Exam  Vitals reviewed.   Constitutional:       Appearance: Normal appearance. She is normal weight.   HENT:      Head: Normocephalic and atraumatic.   Eyes:      Extraocular Movements: Extraocular movements intact.      Conjunctiva/sclera: Conjunctivae normal.      Pupils: Pupils are equal, round, and reactive to light.   Cardiovascular:      Rate and Rhythm: Normal rate and regular rhythm.   Pulmonary:      Effort: Pulmonary effort is normal.      Breath sounds: Normal breath sounds.   Abdominal:      General: Bowel sounds are normal.      Palpations: Abdomen is soft.   Musculoskeletal:      Cervical back: Normal range of motion and neck supple.   Skin:     General: Skin is warm.   Neurological:      General: No focal deficit present.      Mental Status: She is alert and oriented to person, place, and time. Mental status is at baseline.   Psychiatric:         Mood and Affect: Mood normal.         Behavior: Behavior normal.         Thought Content: Thought content normal.         Judgment: Judgment normal.       Laboratory Results:   Recent Labs   Lab Test 03/01/22  1201 12/06/21  1618 09/27/21  1458 07/02/21  1200 06/11/21  0933 05/20/21  1105    140  --  142 141 " 142   POTASSIUM 3.9 4.0  --  3.7 3.8 3.7   CHLORIDE 112* 111*  --  111* 109 112*   CO2 24 22  --  23 24 25   ANIONGAP 4 7  --  8 8 5   BUN 15 17  --  9 9 12   CR 0.68 0.76 0.75 0.59 0.61 0.62   GLC 82 83  --  140* 116* 117*   BILLY 8.9 9.2  --  8.8 8.7 8.7     No results for input(s): MAG, PHOS in the last 36783 hours.  Recent Labs   Lab Test 03/01/22  1201 12/06/21  1618 09/27/21  1458 07/29/21  1113 07/02/21  1200   WBC 4.6 6.7 3.5* 4.5 4.6   HGB 13.0 13.8 12.0 11.5* 12.0    250 241 248 276   MCV 88 83 83 89 88   NEUTROPHIL 62 71 69 63 61.8     Recent Labs   Lab Test 03/01/22  1201 12/06/21  1618 09/27/21  1458   BILITOTAL 0.3 0.3 0.3   ALKPHOS 80 91 89   ALT 41 30 70*   AST 21 17 39   ALBUMIN 3.5 3.6 3.4     TSH   Date Value Ref Range Status   04/11/2022 0.92 0.40 - 4.00 mU/L Final   01/25/2021 0.54 0.40 - 4.00 mU/L Final   08/16/2020 0.77 0.40 - 4.00 mU/L Final   01/20/2020 1.10 0.40 - 4.00 mU/L Final     No results for input(s): CEA in the last 11217 hours.  Results for orders placed or performed in visit on 03/14/23   CT Chest w/o contrast    Narrative    CT chest without contrast    INDICATION: Pulmonary nodules. Breast cancer.    COMPARISON: 1/27/2022    FINDINGS: No contrast. Bilateral mastectomy and reconstructive breast  implants. No enlarged lower neck, internal mammary, axillary,  retropectoral no mediastinal or hilar lymph nodes. Heart size normal.  Somewhat low dense appearance of the liver suggesting possible  steatosis. There is a small accessory splenule. No suspicious upper  abdominal findings. No pleural or pericardial effusion. Calibers of  the thoracic aorta and main pulmonary artery are normal.  Bone detail shows good mineralization throughout without suspicious  sclerotic or lytic/destructive lesion.  Detail of the lungs shows subpleural patchy opacities in the left  upper lobe anterolaterally consistent with radiation associated  fibrotic change. There is a solid nodule along the right  minor fissure  (series 3 image 121) measuring 5 mm in greatest dimension not  significantly changed. 2 mm right major fissure nodule also noted  suggestive of benign intrapulmonary lymph node unchanged. 1 mm  lingular nodule (series 3 image 168) unchanged. Major airways nicely  patent.      Impression    IMPRESSION: No CT evidence of metastatic disease to the chest.  Unchanged small bilateral pulmonary nodules. No adenopathy. Bilateral  mastectomy with reconstructive implants. Radiation-associated fibrotic  change in the left upper lobe. Possible steatosis in the liver.    RENITA MIGUEL MD         SYSTEM ID:  [id number]         Assessment and Plan:   1. [id number]+ID+Her2 negative IDC left s/p mastectomy with right prophylactic mastectomy   2. Peripheral neuropathy- neuropathy to feet is much improved.  3.Thyroid nodule-  Has follow-up with endocrine.  4. Pulmonary Nodule- 7mm to RUL noted on 3/2021 PET CT followup imaging has always been stable and now has been 2 years and can stop.    Plan  1. Continue tamoxifen, refill done  2. She no longer needs to have CT to follow-up the pulmonary nodules as they have been stable for 2 years.   3. Her back was unremarkable and I suspect it is dry skin.   4. She is having a nonspecific pattern to her muscle cramps I would suggest stretching, fluid and relaxation at this point and let us know if they worsen.  5. rtc 6 months for exam.    Constance Jaimes MD on 3/21/2023 at 4:09 PM            Again, thank you for allowing me to participate in the care of your patient.        Sincerely,        Constance Jaimes MD

## 2023-03-21 NOTE — NURSING NOTE
"Oncology Rooming Note    March 21, 2023 3:45 PM   Ghazal Martinez is a 42 year old female who presents for:    Chief Complaint   Patient presents with     Oncology Clinic Visit     3 month follow up     Initial Vitals: /89 (BP Location: Right arm)   Pulse 66   Resp 16   Ht 1.565 m (5' 1.61\")   Wt 74.6 kg (164 lb 6.4 oz)   SpO2 96%   BMI 30.45 kg/m   Estimated body mass index is 30.45 kg/m  as calculated from the following:    Height as of this encounter: 1.565 m (5' 1.61\").    Weight as of this encounter: 74.6 kg (164 lb 6.4 oz). Body surface area is 1.8 meters squared.  No Pain (0) Comment: Data Unavailable   No LMP recorded. (Menstrual status: Chemotherapy).  Allergies reviewed: Yes  Medications reviewed: Yes    Medications: MEDICATION REFILLS NEEDED TODAY. Provider was notified.  Pharmacy name entered into [facility]:    [facility] - [place], [place] - [address] [id number] - [place], [place] - [address] [facility] [place] - [place], [place] - [address] [address] [place] [address], [address]    Clinical concerns: pins and needles feeling all over body. Very itchy back.       Gaviota Quinn LPN            "

## 2023-04-25 ENCOUNTER — OFFICE VISIT (OUTPATIENT)
Dept: FAMILY MEDICINE | Facility: CLINIC | Age: 43
End: 2023-04-25
Payer: COMMERCIAL

## 2023-04-25 VITALS
HEIGHT: 62 IN | WEIGHT: 165 LBS | RESPIRATION RATE: 16 BRPM | SYSTOLIC BLOOD PRESSURE: 115 MMHG | BODY MASS INDEX: 30.36 KG/M2 | DIASTOLIC BLOOD PRESSURE: 79 MMHG | OXYGEN SATURATION: 96 % | HEART RATE: 68 BPM | TEMPERATURE: 97.1 F

## 2023-04-25 DIAGNOSIS — R68.89 FORGETFULNESS: ICD-10-CM

## 2023-04-25 DIAGNOSIS — R61 GENERALIZED HYPERHIDROSIS: ICD-10-CM

## 2023-04-25 DIAGNOSIS — H81.10 BENIGN PAROXYSMAL POSITIONAL VERTIGO, UNSPECIFIED LATERALITY: Primary | ICD-10-CM

## 2023-04-25 LAB
ERYTHROCYTE [DISTWIDTH] IN BLOOD BY AUTOMATED COUNT: 12.5 % (ref 10–15)
HCT VFR BLD AUTO: 37.8 % (ref 35–47)
HGB BLD-MCNC: 13.2 G/DL (ref 11.7–15.7)
MCH RBC QN AUTO: 30.8 PG (ref 26.5–33)
MCHC RBC AUTO-ENTMCNC: 34.9 G/DL (ref 31.5–36.5)
MCV RBC AUTO: 88 FL (ref 78–100)
PLATELET # BLD AUTO: 237 10E3/UL (ref 150–450)
RBC # BLD AUTO: 4.29 10E6/UL (ref 3.8–5.2)
WBC # BLD AUTO: 5.4 10E3/UL (ref 4–11)

## 2023-04-25 PROCEDURE — 93000 ELECTROCARDIOGRAM COMPLETE: CPT | Performed by: FAMILY MEDICINE

## 2023-04-25 PROCEDURE — 85027 COMPLETE CBC AUTOMATED: CPT | Performed by: FAMILY MEDICINE

## 2023-04-25 PROCEDURE — 84443 ASSAY THYROID STIM HORMONE: CPT | Performed by: FAMILY MEDICINE

## 2023-04-25 PROCEDURE — 99214 OFFICE O/P EST MOD 30 MIN: CPT | Performed by: FAMILY MEDICINE

## 2023-04-25 PROCEDURE — 36415 COLL VENOUS BLD VENIPUNCTURE: CPT | Performed by: FAMILY MEDICINE

## 2023-04-25 PROCEDURE — 80048 BASIC METABOLIC PNL TOTAL CA: CPT | Performed by: FAMILY MEDICINE

## 2023-04-25 RX ORDER — MECLIZINE HYDROCHLORIDE 25 MG/1
25 TABLET ORAL 3 TIMES DAILY PRN
Qty: 30 TABLET | Refills: 0 | Status: SHIPPED | OUTPATIENT
Start: 2023-04-25 | End: 2024-09-24

## 2023-04-25 ASSESSMENT — PAIN SCALES - GENERAL: PAINLEVEL: NO PAIN (0)

## 2023-04-25 NOTE — PROGRESS NOTES
"  Assessment & Plan     Used  to help with the visit.    Benign paroxysmal positional vertigo, unspecified laterality  -Ghazal has been having episodes of dizziness once or twice in a week along with vertigo for the past 6 months intermittently.  -She denied any headache/hearing loss/chest pain/palpitations/loss of consciousness/presyncope/nausea/vomiting during those episodes.  Has intermittent episodes of tinnitus.  -Sometimes feels dizzy during postural change  -Each episode lasting for few minutes (3-5 mins) .    -Neurological examination intact.  No sensory/motor deficits.  Pupil equal and reactive to light.  -EKG done in the clinic showed normal sinus rhythm,no ST-T changes.  -Prescribed meclizine.  -Follow-up in 2 months if symptoms still persist and bothersome-recommended to try Epley's maneuver at home.    - CBC with platelets; Future  - Basic metabolic panel  (Ca, Cl, CO2, Creat, Gluc, K, Na, BUN); Future  - TSH with free T4 reflex; Future  - meclizine (ANTIVERT) 25 MG tablet; Take 1 tablet (25 mg) by mouth 3 times daily as needed for dizziness  - EKG 12-lead complete w/read - Clinics    Forgetfulness  -Ghazal has been noticing forgetfulness and mental fogginess for the past few months.  -Her sleep is okay.  She is on venlafaxine to help with depression.  -She preferred neuropsych testing.    - Adult Neuropsychology Referral; Future    Generalized hyperhidrosis  -Generalized sweating especially at night with hot flashes.  -Symptoms could be secondary to female hormonal changes , being close to perimenopausal age    - TSH with free T4 reflex; Future         BMI:   Estimated body mass index is 30.56 kg/m  as calculated from the following:    Height as of this encounter: 1.565 m (5' 1.61\").    Weight as of this encounter: 74.8 kg (165 lb).           Patrizia Muñoz MD  [facility] VANIA Harman is a 42 year old, presenting for the " "following health issues:  Dizziness and Fatigue        4/25/2023    11:30 AM   Additional Questions   Roomed by Tess   Accompanied by None     HPI     Dizziness  Onset/Duration: about 7 months  Description:   Do you feel faint: YES  Does it feel like the surroundings (bed, room) are moving: YES  Unsteady/off balance: YES  Have you passed out or fallen: No  Intensity: moderate  Progression of Symptoms: same and intermittent  Accompanying Signs & Symptoms:  Heart palpitations or chest pain: No  Nausea, vomiting: No  Weakness or lack of coordination in arms or legs: No  Vision or speech changes: YES- Vision blurry  Numbness or tingling: No  Ringing in ears (Tinnitus): YES  Hearing Loss: No  History:   Head trauma/concussion history: No  Previous similar symptoms: No  Recent bleeding history: No  Any new medications (BP?): No  Precipitating factors:   Worse with activity: No  Worse with head movement: No  Alleviating factors:   Does staying in a fixed position give relief: YES  Therapies tried and outcome: None        Once or twice feeling dizzy in a week-when doing chores  Has feeling of room spinning most of the time  Lasts for 3 mins  No headaches/vision changes during those episodes  H/o migraines in the past      Review of Systems   Constitutional, HEENT, cardiovascular, pulmonary, gi and gu systems are negative, except as otherwise noted.      Objective    /79 (BP Location: Left arm, Patient Position: Chair, Cuff Size: Adult Regular)   Pulse 68   Temp 97.1  F (36.2  C) (Tympanic)   Resp 16   Ht 1.565 m (5' 1.61\")   Wt 74.8 kg (165 lb)   SpO2 96%   BMI 30.56 kg/m    Body mass index is 30.56 kg/m .  Physical Exam   GENERAL: healthy, alert and no distress  NECK: no adenopathy, no asymmetry, masses, or scars and thyroid normal to palpation  RESP: lungs clear to auscultation - no rales, rhonchi or wheezes  CV: regular rate and rhythm, normal S1 S2, no S3 or S4, no murmur, click or rub, no peripheral " edema and peripheral pulses strong  ABDOMEN: soft, nontender, no hepatosplenomegaly, no masses and bowel sounds normal  MS: no gross musculoskeletal defects noted, no edema

## 2023-04-25 NOTE — PATIENT INSTRUCTIONS
At [facility], we strive to deliver an exceptional experience to you, every time we see you. If you receive a survey, please complete it as we do value your feedback.  If you have MyChart, you can expect to receive results automatically within 24 hours of their completion.  Your provider will send a note interpreting your results as well.   If you do not have MyChart, you should receive your results in about a week by mail.    Your care team:                            Family Medicine Internal Medicine   MD Kulwinder Vargas MD Shantel Branch-Fleming, MD Srinivasa Vaka, MD Katya Belousova, PAKWABENA Maurice CNP, MD ([facility]   Sukhi Wallace, MD Gissell Sanchez MD Amelia Massimini APRN SATHISH Barrera APRN MD Patrizia Patel MD          Clinic hours: Monday - Thursday 7 am-6 pm; Fridays 7 am-5 pm.   Urgent care: Monday - Friday 10 am- 8 pm; Saturday and Sunday 9 am-5 pm.    Clinic: [phone]       [facility]: Monday - Thursday 8 am - 7 pm; Friday 8 am - 6 pm  [facility]: [phone]

## 2023-04-26 LAB
ANION GAP SERPL CALCULATED.3IONS-SCNC: 5 MMOL/L (ref 3–14)
BUN SERPL-MCNC: 16 MG/DL (ref 7–30)
CALCIUM SERPL-MCNC: 8.7 MG/DL (ref 8.5–10.1)
CHLORIDE BLD-SCNC: 113 MMOL/L (ref 94–109)
CO2 SERPL-SCNC: 22 MMOL/L (ref 20–32)
CREAT SERPL-MCNC: 0.74 MG/DL (ref 0.52–1.04)
GFR SERPL CREATININE-BSD FRML MDRD: >90 ML/MIN/1.73M2
GLUCOSE BLD-MCNC: 80 MG/DL (ref 70–99)
POTASSIUM BLD-SCNC: 3.9 MMOL/L (ref 3.4–5.3)
SODIUM SERPL-SCNC: 140 MMOL/L (ref 133–144)
TSH SERPL DL<=0.005 MIU/L-ACNC: 0.58 MU/L (ref 0.4–4)

## 2023-04-27 ENCOUNTER — TELEPHONE (OUTPATIENT)
Dept: FAMILY MEDICINE | Facility: CLINIC | Age: 43
End: 2023-04-27
Payer: COMMERCIAL

## 2023-04-27 NOTE — TELEPHONE ENCOUNTER
This writer attempted to contact patient on 04/27/23      Reason for call results and left message.      If patient calls back:   Registered Nurse called.          ----- Message from Patrizia Muñoz MD sent at 4/26/2023  7:18 PM CDT -----  Please let pt know via  that her labs are looking very good.    Thanks        Devora Patel RN  [facility]

## 2023-04-28 NOTE — TELEPHONE ENCOUNTER
RN called patient with a , ID [id number].    RN relayed message below to patient and patient denies further questions or concerns at this time.     ALISON MunroeN, RN  [facility]  Nurse Triage, [facility]

## 2023-05-09 ENCOUNTER — TELEPHONE (OUTPATIENT)
Dept: NEUROPSYCHOLOGY | Facility: CLINIC | Age: 43
End: 2023-05-09
Payer: COMMERCIAL

## 2023-05-09 NOTE — TELEPHONE ENCOUNTER
Patient Contacted to schedule the following:    Appointment type: Neuropsych Eval  Provider: Any provider [facility], [place], or   Return date: First available  Specialty phone number: [phone]  Additional appointment(s) needed: N/A  Additonal Notes: In person  needed    Called w/  Lauro ID# [id number]. Spoke w patient, scheduled neuropsych eval 7/5 at 8am with Dr. Espinosa at the [facility]. Mailed itinerary.    Angel Joe on 5/9/2023 at 1:05 PM

## 2023-06-26 ENCOUNTER — TELEPHONE (OUTPATIENT)
Dept: NEUROPSYCHOLOGY | Facility: CLINIC | Age: 43
End: 2023-06-26
Payer: COMMERCIAL

## 2023-06-26 NOTE — TELEPHONE ENCOUNTER
Per Dr. Espinosa he will not be on campus on 7/5. Next opening is on 7/27 at 8am. Per Dr. Paris please schedule this for 360 min instead of 240 min.    Left Voicemail (1st Attempt) and Sent Mychart (1st Attempt) for the patient to call back and schedule the following:    Appointment type: Neuropsych Eval  Provider: Dr. Espinosa at the [facility]  Return date: 7/27 at 8am (add on ok'd by provider)  Specialty phone number: [phone]  Additional appointment(s) needed: N/A  Additonal Notes: Appt length 360 min.    Called w/  Tereso ID #[id number]. Tereso NORTON, writer sent MyC in English. Submitted Turkmen translation request, will send Turkmen translation once received.    Angel Joe on 6/26/2023 at 11:29 AM

## 2023-06-30 ENCOUNTER — APPOINTMENT (OUTPATIENT)
Dept: INTERPRETER SERVICES | Facility: CLINIC | Age: 43
End: 2023-06-30
Payer: COMMERCIAL

## 2023-06-30 NOTE — TELEPHONE ENCOUNTER
Called w/  Margo. Margo NORTON. Cancelled appt. If patient calls back please connect her w/ clinic coordinators & [facility].      Angel Joe on 6/30/2023 at 12:12 PM

## 2023-08-11 ENCOUNTER — TELEPHONE (OUTPATIENT)
Dept: NEUROPSYCHOLOGY | Facility: CLINIC | Age: 43
End: 2023-08-11
Payer: COMMERCIAL

## 2023-08-11 NOTE — TELEPHONE ENCOUNTER
Left Voicemail (1st Attempt) and Sent Mychart (1st Attempt) for the patient to call back and schedule the following:    Appointment type: Neuropsych Eval  Provider: Any Neuropsych provider  Return date: First Avail  Specialty phone number: [phone]  Additonal Notes: Any location , [facility] [place], or [place]    Phoned 2x, LVM via  services. Sent MyC in English.  Stephanie Ruiz on 8/11/2023 at 12:48 PM

## 2023-10-03 ENCOUNTER — ONCOLOGY VISIT (OUTPATIENT)
Dept: ONCOLOGY | Facility: CLINIC | Age: 43
End: 2023-10-03
Payer: COMMERCIAL

## 2023-10-03 VITALS
WEIGHT: 166 LBS | OXYGEN SATURATION: 97 % | BODY MASS INDEX: 30.55 KG/M2 | SYSTOLIC BLOOD PRESSURE: 143 MMHG | HEIGHT: 62 IN | DIASTOLIC BLOOD PRESSURE: 97 MMHG | HEART RATE: 64 BPM

## 2023-10-03 DIAGNOSIS — Z17.0 MALIGNANT NEOPLASM OF CENTRAL PORTION OF LEFT BREAST IN FEMALE, ESTROGEN RECEPTOR POSITIVE (H): Primary | ICD-10-CM

## 2023-10-03 DIAGNOSIS — C50.112 MALIGNANT NEOPLASM OF CENTRAL PORTION OF LEFT BREAST IN FEMALE, ESTROGEN RECEPTOR POSITIVE (H): Primary | ICD-10-CM

## 2023-10-03 PROCEDURE — 99214 OFFICE O/P EST MOD 30 MIN: CPT | Performed by: INTERNAL MEDICINE

## 2023-10-03 NOTE — NURSING NOTE
"Oncology Rooming Note    October 3, 2023 2:34 PM   Ghazal Martinez is a 42 year old female who presents for:    Chief Complaint   Patient presents with    Oncology Clinic Visit     Follow up     Initial Vitals: BP (!) 143/97 (BP Location: Right arm, Patient Position: Chair, Cuff Size: Adult Regular)   Pulse 64   Ht 1.565 m (5' 1.61\")   Wt 75.3 kg (166 lb)   SpO2 97%   BMI 30.75 kg/m   Estimated body mass index is 30.75 kg/m  as calculated from the following:    Height as of this encounter: 1.565 m (5' 1.61\").    Weight as of this encounter: 75.3 kg (166 lb). Body surface area is 1.81 meters squared.  Data Unavailable Comment: Data Unavailable   No LMP recorded. (Menstrual status: Chemotherapy).  Allergies reviewed: Yes  Medications reviewed: Yes    Medications: Medication refills not needed today.  Pharmacy name entered into [facility]:    [facility], [place] - [address] [id number] - [place], [place] - [address] [facility] [place], [place] - [address], [address]    Clinical concerns: NO  Dr. Jaimes was notified.      Sade Pearson CMA              "

## 2023-10-03 NOTE — LETTER
10/3/2023         RE: Ghazal Martinez  [address]  [place] [address]        Dear Colleague,    Thank you for referring your patient, Ghazal Martinez, to the [facility]. Please see a copy of my visit note below.    Oncology Follow Up Visit:10/03/23    PCP: No Ref-Primary, Physician    Diagnosis: Left Breast Cancer  Ghazal Martinez is a 42 yo female who presented in 2/2021 with left breast lump x 1 month.   Contrast enhanced mammogram on 2/23/2021 showed a mass at the 10:00 position in left breast anterior depth measuring 1.7 cm. US guided core needle biopsy on 2/23/2021 showed no decompensating invasive ductal carcinoma, estrogen receptor positive (95%, strong) and progesterone receptor positive by immunohistochemistry (70%). By FISH HER2/MIO 17 ratio:  1.7, IHC 2+, additional 20 cells from areas corresponding to the most intense IHC staining were evaluated by FISH. The results obtained from these 20 additional cells also fall into Group 4. Taken together, the FISH and IHC   results are interpreted as HER2 negative.FISH and IHC results ultimately interpreted as HER2 negative.   OncotypeDx returned at 23, c/w intermediate risk, 9 percent of distant disease recurrence with the use of systemic endocrine therapy and  6.5%  benefit of adjuvant chemotherapy given young age, premenopausal.  *3/19/2021Genetic testing negative  for mutations in the FLEX, BRCA1, BRCA2, BRIP1, CDH1, CHEK2, EPCAM, MLH1, MSH2, MSH6, NBN, NF1, PALB2, PMS2, PTEN, RAD51C, RAD51D, STK11, and TP53 genes.    Treatment:   4/5/2021 status post bilateral mastectomy and axillary sentinel lymph node biopsy   4/30-7/2021 treatment with Taxotere/Cytoxan x 4 cycles  9/21/2021 completed radiation therapy.   9/2021 began Tamoxifen    Interval History:   Ms. Martinez is here for routine followup. She has a professional  on the Ipad. She continues to do well. She has intermittent pruritus in various areas on  her skin that she notes are dry. She does not use lotion. She is otherwise tolerating her tamoxifen. She has no vaginal bleeding. No n/v/d/c. No other new issues. She continues to have some hot flashes.     Rest of comprehensive and complete ROS is reviewed and is negative.   Past Medical History:   Diagnosis Date     Cancer (H)      Varicose veins of legs      Current Outpatient Medications   Medication     meclizine (ANTIVERT) 25 MG tablet     tamoxifen (NOLVADEX) 20 MG tablet     venlafaxine (EFFEXOR XR) 75 MG 24 hr capsule     No current facility-administered medications for this visit.     No Known Allergies    Physical Exam  Vitals reviewed.   Constitutional:       Appearance: Normal appearance. She is normal weight.   HENT:      Head: Normocephalic and atraumatic.   Eyes:      Extraocular Movements: Extraocular movements intact.      Conjunctiva/sclera: Conjunctivae normal.      Pupils: Pupils are equal, round, and reactive to light.   Cardiovascular:      Rate and Rhythm: Normal rate and regular rhythm.   Pulmonary:      Effort: Pulmonary effort is normal.      Breath sounds: Normal breath sounds.   Abdominal:      General: Bowel sounds are normal.      Palpations: Abdomen is soft.   Musculoskeletal:      Cervical back: Normal range of motion and neck supple.   Skin:     General: Skin is warm.   Neurological:      General: No focal deficit present.      Mental Status: She is alert and oriented to person, place, and time. Mental status is at baseline.   Psychiatric:         Mood and Affect: Mood normal.         Behavior: Behavior normal.         Thought Content: Thought content normal.         Judgment: Judgment normal.     Breasts are bilaterally surgically absent with no evidence of disease. Reconstruction is noted.    Laboratory Results:   Recent Labs   Lab Test 04/25/23  1305 03/01/22  1201 12/06/21  1618 09/27/21  1458 07/02/21  1200 06/11/21  0933    140 140  --  142 141   POTASSIUM 3.9 3.9 4.0   --  3.7 3.8   CHLORIDE 113* 112* 111*  --  111* 109   CO2 22 24 22  --  23 24   ANIONGAP 5 4 7  --  8 8   BUN 16 15 17  --  9 9   CR 0.74 0.68 0.76 0.75 0.59 0.61   GLC 80 82 83  --  140* 116*   BILLY 8.7 8.9 9.2  --  8.8 8.7     No results for input(s): MAG, PHOS in the last 28560 hours.  Recent Labs   Lab Test 04/25/23  1305 03/01/22  1201 12/06/21  1618 09/27/21  1458 07/29/21  1113 07/02/21  1200   WBC 5.4 4.6 6.7 3.5* 4.5 4.6   HGB 13.2 13.0 13.8 12.0 11.5* 12.0    203 250 241 248 276   MCV 88 88 83 83 89 88   NEUTROPHIL  --  62 71 69 63 61.8     Recent Labs   Lab Test 03/01/22  1201 12/06/21  1618 09/27/21  1458   BILITOTAL 0.3 0.3 0.3   ALKPHOS 80 91 89   ALT 41 30 70*   AST 21 17 39   ALBUMIN 3.5 3.6 3.4     TSH   Date Value Ref Range Status   04/25/2023 0.58 0.40 - 4.00 mU/L Final   04/11/2022 0.92 0.40 - 4.00 mU/L Final   01/25/2021 0.54 0.40 - 4.00 mU/L Final   08/16/2020 0.77 0.40 - 4.00 mU/L Final   01/20/2020 1.10 0.40 - 4.00 mU/L Final     No results for input(s): CEA in the last 14899 hours.  Results for orders placed or performed in visit on 03/14/23   CT Chest w/o contrast    Narrative    CT chest without contrast    INDICATION: Pulmonary nodules. Breast cancer.    COMPARISON: 1/27/2022    FINDINGS: No contrast. Bilateral mastectomy and reconstructive breast  implants. No enlarged lower neck, internal mammary, axillary,  retropectoral no mediastinal or hilar lymph nodes. Heart size normal.  Somewhat low dense appearance of the liver suggesting possible  steatosis. There is a small accessory splenule. No suspicious upper  abdominal findings. No pleural or pericardial effusion. Calibers of  the thoracic aorta and main pulmonary artery are normal.  Bone detail shows good mineralization throughout without suspicious  sclerotic or lytic/destructive lesion.  Detail of the lungs shows subpleural patchy opacities in the left  upper lobe anterolaterally consistent with radiation associated  fibrotic  change. There is a solid nodule along the right minor fissure  (series 3 image 121) measuring 5 mm in greatest dimension not  significantly changed. 2 mm right major fissure nodule also noted  suggestive of benign intrapulmonary lymph node unchanged. 1 mm  lingular nodule (series 3 image 168) unchanged. Major airways nicely  patent.      Impression    IMPRESSION: No CT evidence of metastatic disease to the chest.  Unchanged small bilateral pulmonary nodules. No adenopathy. Bilateral  mastectomy with reconstructive implants. Radiation-associated fibrotic  change in the left upper lobe. Possible steatosis in the liver.    RENITA MIGUEL MD         SYSTEM ID:  [id number]       Assessment and Plan:   1. [id number]+MS+Her2 negative IDC left s/p mastectomy with right prophylactic mastectomy   2. Peripheral neuropathy- neuropathy to feet is much improved.  3.Thyroid nodule-  Has follow-up with endocrine.  4. Pulmonary Nodule- 7mm to RUL noted on 3/2021 PET CT followup imaging has always been stable and now has been 2 years and can stop.    Plan  1. Continue tamoxifen  2. We discussed using lotion on the areas of skin that bother her.  3. No evidence if disease  4. RTC 6 months for exam.    Constance Jaimes MD on 10/3/2023 at 3:25 PM            Again, thank you for allowing me to participate in the care of your patient.        Sincerely,        Constance Jaimes MD

## 2023-10-03 NOTE — PROGRESS NOTES
Oncology Follow Up Visit:10/03/23    PCP: No Ref-Primary, Physician    Diagnosis: Left Breast Cancer  Ghazal Martinez is a 42 yo female who presented in 2/2021 with left breast lump x 1 month.   Contrast enhanced mammogram on 2/23/2021 showed a mass at the 10:00 position in left breast anterior depth measuring 1.7 cm. US guided core needle biopsy on 2/23/2021 showed no decompensating invasive ductal carcinoma, estrogen receptor positive (95%, strong) and progesterone receptor positive by immunohistochemistry (70%). By FISH HER2/MIO 17 ratio:  1.7, IHC 2+, additional 20 cells from areas corresponding to the most intense IHC staining were evaluated by FISH. The results obtained from these 20 additional cells also fall into Group 4. Taken together, the FISH and IHC   results are interpreted as HER2 negative.FISH and IHC results ultimately interpreted as HER2 negative.   OncotypeDx returned at 23, c/w intermediate risk, 9 percent of distant disease recurrence with the use of systemic endocrine therapy and  6.5%  benefit of adjuvant chemotherapy given young age, premenopausal.  *3/19/2021Genetic testing negative  for mutations in the FLEX, BRCA1, BRCA2, BRIP1, CDH1, CHEK2, EPCAM, MLH1, MSH2, MSH6, NBN, NF1, PALB2, PMS2, PTEN, RAD51C, RAD51D, STK11, and TP53 genes.    Treatment:   4/5/2021 status post bilateral mastectomy and axillary sentinel lymph node biopsy   4/30-7/2021 treatment with Taxotere/Cytoxan x 4 cycles  9/21/2021 completed radiation therapy.   9/2021 began Tamoxifen    Interval History:   Ms. Martinez is here for routine followup. She has a professional  on the Ipad. She continues to do well. She has intermittent pruritus in various areas on her skin that she notes are dry. She does not use lotion. She is otherwise tolerating her tamoxifen. She has no vaginal bleeding. No n/v/d/c. No other new issues. She continues to have some hot flashes.     Rest of comprehensive and complete ROS is reviewed and  is negative.   Past Medical History:   Diagnosis Date    Cancer (H)     Varicose veins of legs      Current Outpatient Medications   Medication    meclizine (ANTIVERT) 25 MG tablet    tamoxifen (NOLVADEX) 20 MG tablet    venlafaxine (EFFEXOR XR) 75 MG 24 hr capsule     No current facility-administered medications for this visit.     No Known Allergies    Physical Exam  Vitals reviewed.   Constitutional:       Appearance: Normal appearance. She is normal weight.   HENT:      Head: Normocephalic and atraumatic.   Eyes:      Extraocular Movements: Extraocular movements intact.      Conjunctiva/sclera: Conjunctivae normal.      Pupils: Pupils are equal, round, and reactive to light.   Cardiovascular:      Rate and Rhythm: Normal rate and regular rhythm.   Pulmonary:      Effort: Pulmonary effort is normal.      Breath sounds: Normal breath sounds.   Abdominal:      General: Bowel sounds are normal.      Palpations: Abdomen is soft.   Musculoskeletal:      Cervical back: Normal range of motion and neck supple.   Skin:     General: Skin is warm.   Neurological:      General: No focal deficit present.      Mental Status: She is alert and oriented to person, place, and time. Mental status is at baseline.   Psychiatric:         Mood and Affect: Mood normal.         Behavior: Behavior normal.         Thought Content: Thought content normal.         Judgment: Judgment normal.     Breasts are bilaterally surgically absent with no evidence of disease. Reconstruction is noted.    Laboratory Results:   Recent Labs   Lab Test 04/25/23  1305 03/01/22  1201 12/06/21  1618 09/27/21  1458 07/02/21  1200 06/11/21  0933    140 140  --  142 141   POTASSIUM 3.9 3.9 4.0  --  3.7 3.8   CHLORIDE 113* 112* 111*  --  111* 109   CO2 22 24 22  --  23 24   ANIONGAP 5 4 7  --  8 8   BUN 16 15 17  --  9 9   CR 0.74 0.68 0.76 0.75 0.59 0.61   GLC 80 82 83  --  140* 116*   BILLY 8.7 8.9 9.2  --  8.8 8.7     No results for input(s): MAG, PHOS in  the last 99344 hours.  Recent Labs   Lab Test 04/25/23  1305 03/01/22  1201 12/06/21  1618 09/27/21  1458 07/29/21  1113 07/02/21  1200   WBC 5.4 4.6 6.7 3.5* 4.5 4.6   HGB 13.2 13.0 13.8 12.0 11.5* 12.0    203 250 241 248 276   MCV 88 88 83 83 89 88   NEUTROPHIL  --  62 71 69 63 61.8     Recent Labs   Lab Test 03/01/22  1201 12/06/21  1618 09/27/21  1458   BILITOTAL 0.3 0.3 0.3   ALKPHOS 80 91 89   ALT 41 30 70*   AST 21 17 39   ALBUMIN 3.5 3.6 3.4     TSH   Date Value Ref Range Status   04/25/2023 0.58 0.40 - 4.00 mU/L Final   04/11/2022 0.92 0.40 - 4.00 mU/L Final   01/25/2021 0.54 0.40 - 4.00 mU/L Final   08/16/2020 0.77 0.40 - 4.00 mU/L Final   01/20/2020 1.10 0.40 - 4.00 mU/L Final     No results for input(s): CEA in the last 83841 hours.  Results for orders placed or performed in visit on 03/14/23   CT Chest w/o contrast    Narrative    CT chest without contrast    INDICATION: Pulmonary nodules. Breast cancer.    COMPARISON: 1/27/2022    FINDINGS: No contrast. Bilateral mastectomy and reconstructive breast  implants. No enlarged lower neck, internal mammary, axillary,  retropectoral no mediastinal or hilar lymph nodes. Heart size normal.  Somewhat low dense appearance of the liver suggesting possible  steatosis. There is a small accessory splenule. No suspicious upper  abdominal findings. No pleural or pericardial effusion. Calibers of  the thoracic aorta and main pulmonary artery are normal.  Bone detail shows good mineralization throughout without suspicious  sclerotic or lytic/destructive lesion.  Detail of the lungs shows subpleural patchy opacities in the left  upper lobe anterolaterally consistent with radiation associated  fibrotic change. There is a solid nodule along the right minor fissure  (series 3 image 121) measuring 5 mm in greatest dimension not  significantly changed. 2 mm right major fissure nodule also noted  suggestive of benign intrapulmonary lymph node unchanged. 1 mm  lingular  nodule (series 3 image 168) unchanged. Major airways nicely  patent.      Impression    IMPRESSION: No CT evidence of metastatic disease to the chest.  Unchanged small bilateral pulmonary nodules. No adenopathy. Bilateral  mastectomy with reconstructive implants. Radiation-associated fibrotic  change in the left upper lobe. Possible steatosis in the liver.    RENITA MIGUEL MD         SYSTEM ID:  [id number]       Assessment and Plan:   1. [id number]+CO+Her2 negative IDC left s/p mastectomy with right prophylactic mastectomy   2. Peripheral neuropathy- neuropathy to feet is much improved.  3.Thyroid nodule-  Has follow-up with endocrine.  4. Pulmonary Nodule- 7mm to RUL noted on 3/2021 PET CT followup imaging has always been stable and now has been 2 years and can stop.    Plan  1. Continue tamoxifen  2. We discussed using lotion on the areas of skin that bother her.  3. No evidence if disease  4. RTC 6 months for exam.    Constance Jaimes MD on 10/3/2023 at 3:25 PM

## 2023-12-17 ENCOUNTER — HEALTH MAINTENANCE LETTER (OUTPATIENT)
Age: 43
End: 2023-12-17

## 2024-03-21 DIAGNOSIS — T45.1X5A HOT FLASHES DUE TO TAMOXIFEN: ICD-10-CM

## 2024-03-21 DIAGNOSIS — Z17.0 MALIGNANT NEOPLASM OF LEFT BREAST IN FEMALE, ESTROGEN RECEPTOR POSITIVE, UNSPECIFIED SITE OF BREAST (H): Primary | ICD-10-CM

## 2024-03-21 DIAGNOSIS — C50.912 MALIGNANT NEOPLASM OF LEFT BREAST IN FEMALE, ESTROGEN RECEPTOR POSITIVE, UNSPECIFIED SITE OF BREAST (H): Primary | ICD-10-CM

## 2024-03-21 DIAGNOSIS — R23.2 HOT FLASHES DUE TO TAMOXIFEN: ICD-10-CM

## 2024-03-21 RX ORDER — TAMOXIFEN CITRATE 20 MG/1
20 TABLET ORAL DAILY
Qty: 90 TABLET | Refills: 3 | Status: SHIPPED | OUTPATIENT
Start: 2024-03-21

## 2024-03-21 RX ORDER — VENLAFAXINE HYDROCHLORIDE 75 MG/1
75 CAPSULE, EXTENDED RELEASE ORAL DAILY
Qty: 90 CAPSULE | Refills: 3 | Status: SHIPPED | OUTPATIENT
Start: 2024-03-21

## 2024-03-21 NOTE — TELEPHONE ENCOUNTER
SUBJECTIVE/OBJECTIVE:                                                    Refill request receive for:  Tamoxifen 20 mg and Venlafaxine 75 mg    Last refill per fax: 3/21/23  Date of LOV r/t Medication: 10/3/23  Future appt scheduled? Yes: 4/1/24 with Laura       RECENT LABS/VITALS                                                      Recent Labs   Lab Test 09/13/22  1219 08/16/20  1230   CHOL 193 190   HDL 72 64   LDL 93 83   TRIG 140 216*     Lab Results   Component Value Date    AST 21 03/01/2022     07/02/2021     Lab Results   Component Value Date    ALT 41 03/01/2022     07/02/2021     Lab Results   Component Value Date    A1C 5.8 01/20/2020     Creatinine   Date Value Ref Range Status   04/25/2023 0.74 0.52 - 1.04 mg/dL Final   07/02/2021 0.59 0.52 - 1.04 mg/dL Final   ]  Potassium   Date Value Ref Range Status   04/25/2023 3.9 3.4 - 5.3 mmol/L Final   07/02/2021 3.7 3.4 - 5.3 mmol/L Final   ]  TSH   Date Value Ref Range Status   04/25/2023 0.58 0.40 - 4.00 mU/L Final   04/11/2022 0.92 0.40 - 4.00 mU/L Final   01/25/2021 0.54 0.40 - 4.00 mU/L Final   08/16/2020 0.77 0.40 - 4.00 mU/L Final   01/20/2020 1.10 0.40 - 4.00 mU/L Final     BP Readings from Last 4 Encounters:   10/03/23 (!) 143/97   04/25/23 115/79   03/21/23 128/89   09/20/22 134/86       PLAN:                                                      Sent to provider to advise.

## 2024-03-25 ENCOUNTER — OFFICE VISIT (OUTPATIENT)
Dept: FAMILY MEDICINE | Facility: CLINIC | Age: 44
End: 2024-03-25
Payer: COMMERCIAL

## 2024-03-25 VITALS
RESPIRATION RATE: 18 BRPM | HEIGHT: 62 IN | OXYGEN SATURATION: 100 % | WEIGHT: 165 LBS | BODY MASS INDEX: 30.36 KG/M2 | DIASTOLIC BLOOD PRESSURE: 85 MMHG | SYSTOLIC BLOOD PRESSURE: 128 MMHG | HEART RATE: 65 BPM | TEMPERATURE: 97.9 F

## 2024-03-25 DIAGNOSIS — C50.112 MALIGNANT NEOPLASM OF CENTRAL PORTION OF LEFT BREAST IN FEMALE, ESTROGEN RECEPTOR POSITIVE (H): ICD-10-CM

## 2024-03-25 DIAGNOSIS — Z17.0 MALIGNANT NEOPLASM OF CENTRAL PORTION OF LEFT BREAST IN FEMALE, ESTROGEN RECEPTOR POSITIVE (H): ICD-10-CM

## 2024-03-25 DIAGNOSIS — R42 VERTIGO: Primary | ICD-10-CM

## 2024-03-25 DIAGNOSIS — R73.03 PREDIABETES: ICD-10-CM

## 2024-03-25 DIAGNOSIS — E04.1 THYROID NODULE: ICD-10-CM

## 2024-03-25 LAB
BASOPHILS # BLD AUTO: 0 10E3/UL (ref 0–0.2)
BASOPHILS NFR BLD AUTO: 0 %
EOSINOPHIL # BLD AUTO: 0.2 10E3/UL (ref 0–0.7)
EOSINOPHIL NFR BLD AUTO: 2 %
ERYTHROCYTE [DISTWIDTH] IN BLOOD BY AUTOMATED COUNT: 12.5 % (ref 10–15)
HCT VFR BLD AUTO: 40.2 % (ref 35–47)
HGB BLD-MCNC: 13.8 G/DL (ref 11.7–15.7)
IMM GRANULOCYTES # BLD: 0 10E3/UL
IMM GRANULOCYTES NFR BLD: 0 %
LYMPHOCYTES # BLD AUTO: 2.1 10E3/UL (ref 0.8–5.3)
LYMPHOCYTES NFR BLD AUTO: 34 %
MCH RBC QN AUTO: 30.5 PG (ref 26.5–33)
MCHC RBC AUTO-ENTMCNC: 34.3 G/DL (ref 31.5–36.5)
MCV RBC AUTO: 89 FL (ref 78–100)
MONOCYTES # BLD AUTO: 0.5 10E3/UL (ref 0–1.3)
MONOCYTES NFR BLD AUTO: 8 %
NEUTROPHILS # BLD AUTO: 3.4 10E3/UL (ref 1.6–8.3)
NEUTROPHILS NFR BLD AUTO: 56 %
PLATELET # BLD AUTO: 240 10E3/UL (ref 150–450)
RBC # BLD AUTO: 4.52 10E6/UL (ref 3.8–5.2)
WBC # BLD AUTO: 6.2 10E3/UL (ref 4–11)

## 2024-03-25 PROCEDURE — 36415 COLL VENOUS BLD VENIPUNCTURE: CPT | Performed by: PHYSICIAN ASSISTANT

## 2024-03-25 PROCEDURE — 83735 ASSAY OF MAGNESIUM: CPT | Performed by: PHYSICIAN ASSISTANT

## 2024-03-25 PROCEDURE — 2894A CBC WITH PLATELETS AND DIFFERENTIAL: CPT | Performed by: PHYSICIAN ASSISTANT

## 2024-03-25 PROCEDURE — 2894A COMPREHENSIVE METABOLIC PANEL: CPT | Performed by: PHYSICIAN ASSISTANT

## 2024-03-25 PROCEDURE — 80050 GENERAL HEALTH PANEL: CPT | Performed by: PHYSICIAN ASSISTANT

## 2024-03-25 PROCEDURE — 99214 OFFICE O/P EST MOD 30 MIN: CPT | Performed by: PHYSICIAN ASSISTANT

## 2024-03-25 RX ORDER — MECLIZINE HYDROCHLORIDE 25 MG/1
25 TABLET ORAL 3 TIMES DAILY PRN
Qty: 30 TABLET | Refills: 0 | Status: SHIPPED | OUTPATIENT
Start: 2024-03-25

## 2024-03-25 ASSESSMENT — PAIN SCALES - GENERAL: PAINLEVEL: NO PAIN (0)

## 2024-03-25 NOTE — PATIENT INSTRUCTIONS
At [facility], we strive to deliver an exceptional experience to you, every time we see you. If you receive a survey, please complete it as we do value your feedback.  If you have MyChart, you can expect to receive results automatically within 24 hours of their completion.  Your provider will send a note interpreting your results as well.   If you do not have MyChart, you should receive your results in about a week by mail.    Your care team:                            Family Medicine Internal Medicine   MD Kulwinder Vargas, MD Yesica Cui, MD Radha Hazel, PA-C    Bruce Childs, MD Pediatrics   Sukhi Wallace, PA-C  Alena Wynne, MD Ronda Casey APRKWABENA Moore CNP, CNP, MD Patrizia Muñoz, MD Taryn Medina, CNP  Same-Day (No follow up visit)   Modesto Perez, SATHISH Bernard, PA-C    Corrina Mcgee PA-C     Clinic hours: Monday - Thursday 7 am-6 pm; Fridays 7 am-5 pm.   Urgent care: Monday - Friday 10 am- 8 pm; Saturday and Sunday 9 am-5 pm.    Clinic: [phone]       [facility]: Monday - Thursday 8 am - 7 pm; Friday 8 am - 6 pm  [facility]: [phone]

## 2024-03-25 NOTE — PROGRESS NOTES
"  Assessment & Plan     Vertigo  Has been pretty persistent, triggered by movement but never really resolves, not BPPV.  Potential for labyrinthitis, but not quite as severe.  No abnormal exam findings to suggest central etiology.  Will trial meclizine.  Lab work today, will be called with results.  Consider ENT follow-up for ongoing symptoms and referral placed today.  - meclizine (ANTIVERT) 25 MG tablet; Take 1 tablet (25 mg) by mouth 3 times daily as needed for dizziness  - TSH with free T4 reflex; Future  - Comprehensive metabolic panel (BMP + Alb, Alk Phos, ALT, AST, Total. Bili, TP); Future  - Magnesium; Future  - CBC with platelets and differential; Future  - Adult ENT  Referral; Future  - TSH with free T4 reflex  - Comprehensive metabolic panel (BMP + Alb, Alk Phos, ALT, AST, Total. Bili, TP)  - Magnesium  - CBC with platelets and differential    Malignant neoplasm of central portion of left breast in female, estrogen receptor positive (H)  Follows with oncology, on tamoxifen    Thyroid nodule  Has noticed some more brittle hair, will recheck today, last TSH normal.  Nodule was biopsied 2022 and unremarkable.    Prediabetes  Last random blood sugars have been within normal range            BMI  Estimated body mass index is 30.56 kg/m  as calculated from the following:    Height as of this encounter: 1.565 m (5' 1.61\").    Weight as of this encounter: 74.8 kg (165 lb).             Tesha Harman is a 43 year old, presenting for the following health issues:  Dizziness        3/25/2024     2:47 PM   Additional Questions   Roomed by Tess   Accompanied by Daughter     Declines phone , prefers daughter.    History of Present Illness       Reason for visit:  Feeling dizzy    She eats 4 or more servings of fruits and vegetables daily.She consumes 0 sweetened beverage(s) daily.She exercises with enough effort to increase her heart rate 9 or less minutes per day.  She exercises with enough " "effort to increase her heart rate 3 or less days per week.   She is taking medications regularly.         Dizziness  Onset/Duration: about 4 days  Description:   Do you feel faint: YES  Does it feel like the surroundings (bed, room) are moving: YES  Unsteady/off balance: YES- sometimes  Have you passed out or fallen: No  Intensity: severe  Progression of Symptoms: worsening  Accompanying Signs & Symptoms:  Heart palpitations or chest pain: No  Nausea, vomiting: No  Weakness or lack of coordination in arms or legs: Yes & Feels fatigue when going up stairs  Vision or speech changes: YES- Vision feels her eyes are shaking    Numbness or tingling: No  Ringing in ears (Tinnitus): YES  Hearing Loss: No  History:   Head trauma/concussion history: No  Previous similar symptoms: No  Recent bleeding history: No  Any new medications (BP?): No  Precipitating factors:   Worse with activity: No  Worse with head movement: No  Alleviating factors:   Does staying in a fixed position give relief: no   Therapies tried and outcome: None      Symptoms seem to start fairly suddenly.  Feels like her vision is shaky and she is having spinning dizziness.  Has been present constantly but certain movements including turning her head and standing up makes them worse.  Does feel more tired.  Feels like her balance is off.  Has not had a fall. Gets ringing in her ears periodically but denies any hearing change.  Does feel some cramping to her lower legs and some itching to her chest and back.  Has a dry patch on her right hand, unsure if related.  No nausea or vomiting.    She has had dizziness in the past but never this persistent or severe.  No known trigger.  No recent changes.  No fall or head injury.  No recent illness or cold.            Objective    /85 (BP Location: Left arm, Patient Position: Chair, Cuff Size: Adult Large)   Pulse 65   Temp 97.9  F (36.6  C) (Tympanic)   Resp 18   Ht 1.565 m (5' 1.61\")   Wt 74.8 kg (165 lb)  "  SpO2 100%   BMI 30.56 kg/m    Body mass index is 30.56 kg/m .  Physical Exam   GENERAL: alert and no distress  EYES: Eyes grossly normal to inspection, PERRL and conjunctivae and sclerae normal  HENT: ear canals and TM's normal, nose and mouth without ulcers or lesions  NECK: no adenopathy, no asymmetry, masses, or scars  RESP: lungs clear to auscultation - no rales, rhonchi or wheezes  CV: regular rate and rhythm, normal S1 S2, no S3 or S4, no murmur, click or rub, no peripheral edema   MS: no gross musculoskeletal defects noted, no edema  SKIN: Right palm with dry patch of skin, no open areas, erythema.  NEURO: Normal strength and tone, sensory exam grossly normal, mentation intact, cranial nerves 2-12 intact, and gait normal, negative pronator drift, normal finger-to-nose            Signed Electronically by: Corrina Mcgee PA-C

## 2024-03-26 ENCOUNTER — TELEPHONE (OUTPATIENT)
Dept: FAMILY MEDICINE | Facility: CLINIC | Age: 44
End: 2024-03-26
Payer: COMMERCIAL

## 2024-03-26 DIAGNOSIS — R74.01 ELEVATED AST (SGOT): Primary | ICD-10-CM

## 2024-03-26 DIAGNOSIS — R74.01 ELEVATED ALT MEASUREMENT: ICD-10-CM

## 2024-03-26 LAB
ALBUMIN SERPL BCG-MCNC: 4.2 G/DL (ref 3.5–5.2)
ALP SERPL-CCNC: 94 U/L (ref 40–150)
ALT SERPL W P-5'-P-CCNC: 169 U/L (ref 0–50)
ANION GAP SERPL CALCULATED.3IONS-SCNC: 12 MMOL/L (ref 7–15)
AST SERPL W P-5'-P-CCNC: 115 U/L (ref 0–45)
BILIRUB SERPL-MCNC: 0.3 MG/DL
BUN SERPL-MCNC: 14.9 MG/DL (ref 6–20)
CALCIUM SERPL-MCNC: 9.2 MG/DL (ref 8.6–10)
CHLORIDE SERPL-SCNC: 107 MMOL/L (ref 98–107)
CREAT SERPL-MCNC: 0.74 MG/DL (ref 0.51–0.95)
DEPRECATED HCO3 PLAS-SCNC: 19 MMOL/L (ref 22–29)
EGFRCR SERPLBLD CKD-EPI 2021: >90 ML/MIN/1.73M2
GLUCOSE SERPL-MCNC: 79 MG/DL (ref 70–99)
MAGNESIUM SERPL-MCNC: 2.1 MG/DL (ref 1.7–2.3)
POTASSIUM SERPL-SCNC: 3.9 MMOL/L (ref 3.4–5.3)
PROT SERPL-MCNC: 7.3 G/DL (ref 6.4–8.3)
SODIUM SERPL-SCNC: 138 MMOL/L (ref 135–145)
TSH SERPL DL<=0.005 MIU/L-ACNC: 1.58 UIU/ML (ref 0.3–4.2)

## 2024-03-26 NOTE — TELEPHONE ENCOUNTER
RN spoke with pt, via , regarding provider message below. Pt is agreeable with plan. Pt has already scheduled US for 4/1, although she was unsure what it was for when she was scheduling. RN reiterated purpose of US. RN assisted in scheduling lab appointment for 3/29 at [facility]. Future labs in chart already.     Blacna Maria RN

## 2024-03-26 NOTE — TELEPHONE ENCOUNTER
This writer attempted to contact patient via [facility] on 03/26/24      Reason for call results and left message.      If patient calls back:   Registered Nurse called. Relay provider message below. Lab orders in chart- please set up lap only appointment.    imaging # [phone] to schedule Ultrasound.        Kim Romero RN          ----- Message from Corrina Mcgee PA-C sent at 3/26/2024 10:41 AM CDT -----  Please call patient via . Her labs were normal besides her liver enzymes. These were mildly elevated, which can sometimes be from supplements, tylenol, alcohol, but with her symptoms she needs a few follow up lab tests done to rule out underlying causes as well as an ultrasound.

## 2024-03-29 ENCOUNTER — LAB (OUTPATIENT)
Dept: LAB | Facility: CLINIC | Age: 44
End: 2024-03-29
Payer: COMMERCIAL

## 2024-03-29 DIAGNOSIS — R74.01 ELEVATED AST (SGOT): ICD-10-CM

## 2024-03-29 DIAGNOSIS — R74.01 ELEVATED ALT MEASUREMENT: ICD-10-CM

## 2024-03-29 PROCEDURE — 86704 HEP B CORE ANTIBODY TOTAL: CPT

## 2024-03-29 PROCEDURE — 83550 IRON BINDING TEST: CPT

## 2024-03-29 PROCEDURE — 86803 HEPATITIS C AB TEST: CPT

## 2024-03-29 PROCEDURE — 83540 ASSAY OF IRON: CPT

## 2024-03-29 PROCEDURE — 86706 HEP B SURFACE ANTIBODY: CPT

## 2024-03-29 PROCEDURE — 87340 HEPATITIS B SURFACE AG IA: CPT

## 2024-03-29 PROCEDURE — 36415 COLL VENOUS BLD VENIPUNCTURE: CPT

## 2024-03-29 PROCEDURE — 80076 HEPATIC FUNCTION PANEL: CPT

## 2024-03-30 LAB
ALBUMIN SERPL BCG-MCNC: 3.9 G/DL (ref 3.5–5.2)
ALP SERPL-CCNC: 100 U/L (ref 40–150)
ALT SERPL W P-5'-P-CCNC: 173 U/L (ref 0–50)
AST SERPL W P-5'-P-CCNC: 112 U/L (ref 0–45)
BILIRUB DIRECT SERPL-MCNC: <0.2 MG/DL (ref 0–0.3)
BILIRUB SERPL-MCNC: <0.2 MG/DL
HBV CORE AB SERPL QL IA: NONREACTIVE
HBV SURFACE AB SERPL IA-ACNC: <3.5 M[IU]/ML
HBV SURFACE AB SERPL IA-ACNC: NONREACTIVE M[IU]/ML
HBV SURFACE AG SERPL QL IA: NONREACTIVE
HCV AB SERPL QL IA: NONREACTIVE
IRON BINDING CAPACITY (ROCHE): 303 UG/DL (ref 240–430)
IRON SATN MFR SERPL: 20 % (ref 15–46)
IRON SERPL-MCNC: 61 UG/DL (ref 37–145)
PROT SERPL-MCNC: 6.9 G/DL (ref 6.4–8.3)

## 2024-04-01 ENCOUNTER — ONCOLOGY VISIT (OUTPATIENT)
Dept: ONCOLOGY | Facility: CLINIC | Age: 44
End: 2024-04-01
Attending: NURSE PRACTITIONER
Payer: COMMERCIAL

## 2024-04-01 ENCOUNTER — TELEPHONE (OUTPATIENT)
Dept: FAMILY MEDICINE | Facility: CLINIC | Age: 44
End: 2024-04-01

## 2024-04-01 ENCOUNTER — ANCILLARY PROCEDURE (OUTPATIENT)
Dept: ULTRASOUND IMAGING | Facility: CLINIC | Age: 44
End: 2024-04-01
Attending: PHYSICIAN ASSISTANT
Payer: COMMERCIAL

## 2024-04-01 VITALS
SYSTOLIC BLOOD PRESSURE: 124 MMHG | DIASTOLIC BLOOD PRESSURE: 85 MMHG | HEART RATE: 67 BPM | BODY MASS INDEX: 30.95 KG/M2 | RESPIRATION RATE: 16 BRPM | WEIGHT: 168.2 LBS | HEIGHT: 62 IN | OXYGEN SATURATION: 95 % | TEMPERATURE: 97.5 F

## 2024-04-01 DIAGNOSIS — R74.01 ELEVATED ALT MEASUREMENT: ICD-10-CM

## 2024-04-01 DIAGNOSIS — E04.1 THYROID NODULE: ICD-10-CM

## 2024-04-01 DIAGNOSIS — Z90.13 S/P MASTECTOMY, BILATERAL: ICD-10-CM

## 2024-04-01 DIAGNOSIS — R91.8 PULMONARY NODULES: ICD-10-CM

## 2024-04-01 DIAGNOSIS — K76.0 FATTY LIVER: Primary | ICD-10-CM

## 2024-04-01 DIAGNOSIS — R23.2 HOT FLASHES DUE TO TAMOXIFEN: ICD-10-CM

## 2024-04-01 DIAGNOSIS — R74.01 ELEVATED AST (SGOT): ICD-10-CM

## 2024-04-01 DIAGNOSIS — Z17.0 MALIGNANT NEOPLASM OF LEFT BREAST IN FEMALE, ESTROGEN RECEPTOR POSITIVE, UNSPECIFIED SITE OF BREAST (H): Primary | ICD-10-CM

## 2024-04-01 DIAGNOSIS — C50.912 MALIGNANT NEOPLASM OF LEFT BREAST IN FEMALE, ESTROGEN RECEPTOR POSITIVE, UNSPECIFIED SITE OF BREAST (H): Primary | ICD-10-CM

## 2024-04-01 DIAGNOSIS — T45.1X5A HOT FLASHES DUE TO TAMOXIFEN: ICD-10-CM

## 2024-04-01 PROCEDURE — 76705 ECHO EXAM OF ABDOMEN: CPT | Performed by: RADIOLOGY

## 2024-04-01 PROCEDURE — 99214 OFFICE O/P EST MOD 30 MIN: CPT | Performed by: NURSE PRACTITIONER

## 2024-04-01 PROCEDURE — 99215 OFFICE O/P EST HI 40 MIN: CPT | Performed by: NURSE PRACTITIONER

## 2024-04-01 ASSESSMENT — PAIN SCALES - GENERAL: PAINLEVEL: NO PAIN (0)

## 2024-04-01 NOTE — PROGRESS NOTES
Oncology Follow Up Visit: April 1, 2024     Oncologist: Dr Jaimes/[facility]  PCP: No Ref-Primary, Physician    Diagnosis: Left Breast Cancer  Ghazal Martinez is a 42yo female who presented in 2/2021 with left breast lump x 1 month.   Contrast enhanced mammogram on 2/23/2021 showed a mass at the 10:00 position in left breast anterior depth measuring 1.7 cm. US guided core needle biopsy on 2/23/2021 showed no decompensating invasive ductal carcinoma, estrogen receptor positive (95%, strong) and progesterone receptor positive by immunohistochemistry (70%). By FISH HER2/MIO 17 ratio:  1.7, IHC 2+, additional 20 cells from areas corresponding to the most intense IHC staining were evaluated by FISH. The results obtained from these 20 additional cells also fall into Group 4. Taken together, the FISH and IHC   results are interpreted as HER2 negative.FISH and IHC results ultimately interpreted as HER2 negative.   OncotypeDx returned at 23, c/w intermediate risk, 9 percent of distant disease recurrence with the use of systemic endocrine therapy and  6.5%  benefit of adjuvant chemotherapy given young age, premenopausal.  *3/19/2021Genetic testing negative  for mutations in the FLEX, BRCA1, BRCA2, BRIP1, CDH1, CHEK2, EPCAM, MLH1, MSH2, MSH6, NBN, NF1, PALB2, PMS2, PTEN, RAD51C, RAD51D, STK11, and TP53 genes.  Treatment:   4/5/2021 status post bilateral mastectomy and axillary sentinel lymph node biopsy   4/30-7/2021 treatment with Taxotere/Cytoxan x 4 cycles  9/21/2021 completed radiation therapy.   9/2021 began Tamoxifen    Interval History: Ms. Martinez is seen today with daughter as  for follow up to her breast cancer treatment with Tamoxifen. She has been using since 9/2021 with some hot flashes being treated with Effexor 75 mg- feels this is tolerable. Recently complaining of skin dryness and hair being more fragile and has been using aquafor but not effective. She is being worked up for elevated liver enzymes by PCP  "and states she has fatty liver disease and wonders how to treatment this.   No periods. Stays active but no regular exercise plan.    Rest of comprehensive and complete ROS is reviewed and is negative.   Past Medical History:   Diagnosis Date    Cancer (H)     Varicose veins of legs      Current Outpatient Medications   Medication    meclizine (ANTIVERT) 25 MG tablet    tamoxifen (NOLVADEX) 20 MG tablet    venlafaxine (EFFEXOR XR) 75 MG 24 hr capsule    meclizine (ANTIVERT) 25 MG tablet     No current facility-administered medications for this visit.     No Known Allergies    Physical Exam:/85 (BP Location: Right arm)   Pulse 67   Temp 97.5  F (36.4  C) (Oral)   Resp 16   Ht 1.565 m (5' 1.61\")   Wt 76.3 kg (168 lb 3.2 oz)   SpO2 95%   BMI 31.15 kg/m     Constitutional: Alert, healthy, and in no distress.   ENT: Eyes bright, No mouth sores  Neck: Supple, No adenopathy.  Cardiac: Heart rate and rhythm is regular with normal S1 and S2 without murmur  Respiratory: Breathing easy. Lung sounds clear to auscultation  Breasts:bilaterally has mastectomies with reconstruction without new masses but is tender to left shoulder with mild loss of movement. Has 2 small brusies to area with   Abdomen: Soft, non-tender, BS normal. No masses or organomegaly  MS: Muscle tone normal, extremities normal with no edema.   Skin: No suspicious lesions or rashes  Neuro: Sensory grossly WNL, gait normal.   Lymph: Normal ant/post cervical, axillary, supraclavicular nodes  Psych: Mentation appears normal and affect normal/bright and smiling    Laboratory Results:   Results for orders placed or performed in visit on 04/01/24   US Abdomen Limited     Status: None    Narrative    EXAMINATION: US ABDOMEN LIMITED, 4/1/2024 8:47 AM     COMPARISON: None.    HISTORY:  Elevated AST (SGOT); Elevated ALT measurement    TECHNIQUE: The abdomen was scanned in standard fashion with  specialized ultrasound transducer(s) using both gray-scale and " limited  color Doppler techniques.    FINDINGS:  Liver: Diffuse increased echogenicity.  No focal solid mass.The main  portal vein is patent with antegrade flow, measuring 0.8 cm.  Gallbladder: No wall thickening, pericholecystic fluid, positive  sonographic Carlson's sign. Mobile gallstones.  Bile Ducts: Both the intra- and extrahepatic biliary system are of  normal caliber.  The common bile duct measures 3 mm in diameter.  Pancreas: Secured by bowel gas.  Kidneys: No hydronephrosis.  The craniocaudal dimensions are: right-  10.7 cm.   Aorta and IVC: The visualized portions of the aorta and IVC are not  dilated..      Impression    IMPRESSION:   1.  Diffuse increased liver echogenicity, indicative of parenchymal  hepatic disease, most commonly fatty liver.  2.  Cholelithiasis without findings for acute cholecystitis.    JUAN ALBERTO FAULKNER MD         SYSTEM ID:  [id number]       Assessment and Plan:   Left Breast Cancer-patient completed bilateral mastectomies with sentinel node biopsy to the left axilla. Due to young age and Oncotype result, she complete 4 cycles of Taxotere /Cytoxan.  She completed radiation therapy on 9/21/2021 and began use of Tamoxifen.   She now has completed 2.5 years of use with some hot flashes noted and treated with Effexor 75 mg daily- working well.  - dry skin and fragile hair that is progressive- admits she does not drink milk- Needs to use the moisturizing creams after shower and push fluids but also suggest adding skin hair and nails supplement or vitamin D 2000 international unit(s) daily as could be low vitamin D level after winter.   Plan to continue Tamoxifen and prescription is update for the year.   Return for 6 month  with visit with Dr Jaimes- no labs or imaging necessary.  Thyroid nodule- found on 3/2021 PET CT- FDG avid hypoattenuating nodule in the left thyroid lobe measuring 1.2 x 0.7 cm and SUV max 4.6. Referral made to endocrinology who completed US of the thyroid showing 3 left  sided thyroid nodules - Recommendation for US and biopsy- biopsy was benign in 4/2022 and was recommended for return in 2 years - looks like set of July  Pulmonary Nodule- 7mm to RUL noted on 3/2021 PET CT. 1/27/2022 imaging shows node is stable. CT to check pulmonary nodule in 3/2023 showing stability-= no further imaging necessary.     The total time of this encounter amounted to 40 minutes. This time included face-to-face time spent with the patient and , prep work, ordering tests, and performing post visit documentation.  Laura Klein,Cnp

## 2024-04-01 NOTE — NURSING NOTE
"Oncology Rooming Note    April 1, 2024 3:36 PM   Ghazal Martinez is a 43 year old female who presents for:    Chief Complaint   Patient presents with    Oncology Clinic Visit     6 month follow up     Initial Vitals: /85 (BP Location: Right arm)   Pulse 67   Temp 97.5  F (36.4  C) (Oral)   Resp 16   Ht 1.565 m (5' 1.61\")   Wt 76.3 kg (168 lb 3.2 oz)   SpO2 95%   BMI 31.15 kg/m   Estimated body mass index is 31.15 kg/m  as calculated from the following:    Height as of this encounter: 1.565 m (5' 1.61\").    Weight as of this encounter: 76.3 kg (168 lb 3.2 oz). Body surface area is 1.82 meters squared.  No Pain (0) Comment: Data Unavailable   No LMP recorded. (Menstrual status: Chemotherapy).  Allergies reviewed: Yes  Medications reviewed: Yes    Medications: Medication refills not needed today.  Pharmacy name entered into [facility]:    [facility] [place], [place] - [address] [id number] - [place], [place] - [address] [place] - [place], [place] - [address]    Frailty Screening:   Is the patient here for a new oncology consult visit in cancer care? 2. No      Clinical concerns: oral dryness- new, occasional itching on chest.       Gaviota Quinn LPN              "

## 2024-04-01 NOTE — TELEPHONE ENCOUNTER
Called patient via . Relayed provider message below. RN explained healthy diet and exercise.     Patient had many questions regarding diet. Patient states she would like to have a referral to a dietitian.    Routing to provider to place dietitian referral.      Kim ESPINOZA RN, BSN  [facility]           ----- Message from Corrina Mcgee PA-C sent at 4/1/2024  9:56 AM CDT -----  Please call patient via . Her liver enzymes are still a bit high, but all of her other testing looked great. Her ultrasound showed fatty liver. Regular exercise and weight management can sometimes help with this. Her liver tests can be monitored regularly and can be checked with her next physical (looks like she is due in September). This is likely unrelated to the dizziness she was experiencing.

## 2024-04-01 NOTE — LETTER
4/1/2024         RE: Ghazal Martinez  [address]  [place] [address]        Dear Colleague,    Thank you for referring your patient, Ghazal Martinez, to the [facility]. Please see a copy of my visit note below.    Oncology Follow Up Visit: April 1, 2024     Oncologist: Dr Jaimes/[facility]  PCP: No Ref-Primary, Physician    Diagnosis: Left Breast Cancer  Ghazal Martinez is a 44yo female who presented in 2/2021 with left breast lump x 1 month.   Contrast enhanced mammogram on 2/23/2021 showed a mass at the 10:00 position in left breast anterior depth measuring 1.7 cm. US guided core needle biopsy on 2/23/2021 showed no decompensating invasive ductal carcinoma, estrogen receptor positive (95%, strong) and progesterone receptor positive by immunohistochemistry (70%). By FISH HER2/MIO 17 ratio:  1.7, IHC 2+, additional 20 cells from areas corresponding to the most intense IHC staining were evaluated by FISH. The results obtained from these 20 additional cells also fall into Group 4. Taken together, the FISH and IHC   results are interpreted as HER2 negative.FISH and IHC results ultimately interpreted as HER2 negative.   OncotypeDx returned at 23, c/w intermediate risk, 9 percent of distant disease recurrence with the use of systemic endocrine therapy and  6.5%  benefit of adjuvant chemotherapy given young age, premenopausal.  *3/19/2021Genetic testing negative  for mutations in the FLEX, BRCA1, BRCA2, BRIP1, CDH1, CHEK2, EPCAM, MLH1, MSH2, MSH6, NBN, NF1, PALB2, PMS2, PTEN, RAD51C, RAD51D, STK11, and TP53 genes.  Treatment:   4/5/2021 status post bilateral mastectomy and axillary sentinel lymph node biopsy   4/30-7/2021 treatment with Taxotere/Cytoxan x 4 cycles  9/21/2021 completed radiation therapy.   9/2021 began Tamoxifen    Interval History: Ms. Martinez is seen today with daughter as  for follow up to her breast cancer treatment with Tamoxifen. She has been using  "since 9/2021 with some hot flashes being treated with Effexor 75 mg- feels this is tolerable. Recently complaining of skin dryness and hair being more fragile and has been using aquafor but not effective. She is being worked up for elevated liver enzymes by PCP and states she has fatty liver disease and wonders how to treatment this.   No periods. Stays active but no regular exercise plan.    Rest of comprehensive and complete ROS is reviewed and is negative.   Past Medical History:   Diagnosis Date     Cancer (H)      Varicose veins of legs      Current Outpatient Medications   Medication     meclizine (ANTIVERT) 25 MG tablet     tamoxifen (NOLVADEX) 20 MG tablet     venlafaxine (EFFEXOR XR) 75 MG 24 hr capsule     meclizine (ANTIVERT) 25 MG tablet     No current facility-administered medications for this visit.     No Known Allergies    Physical Exam:/85 (BP Location: Right arm)   Pulse 67   Temp 97.5  F (36.4  C) (Oral)   Resp 16   Ht 1.565 m (5' 1.61\")   Wt 76.3 kg (168 lb 3.2 oz)   SpO2 95%   BMI 31.15 kg/m     Constitutional: Alert, healthy, and in no distress.   ENT: Eyes bright, No mouth sores  Neck: Supple, No adenopathy.  Cardiac: Heart rate and rhythm is regular with normal S1 and S2 without murmur  Respiratory: Breathing easy. Lung sounds clear to auscultation  Breasts:bilaterally has mastectomies with reconstruction without new masses but is tender to left shoulder with mild loss of movement. Has 2 small brusies to area with   Abdomen: Soft, non-tender, BS normal. No masses or organomegaly  MS: Muscle tone normal, extremities normal with no edema.   Skin: No suspicious lesions or rashes  Neuro: Sensory grossly WNL, gait normal.   Lymph: Normal ant/post cervical, axillary, supraclavicular nodes  Psych: Mentation appears normal and affect normal/bright and smiling    Laboratory Results:   Results for orders placed or performed in visit on 04/01/24   US Abdomen Limited     Status: None    " Narrative    EXAMINATION: US ABDOMEN LIMITED, 4/1/2024 8:47 AM     COMPARISON: None.    HISTORY:  Elevated AST (SGOT); Elevated ALT measurement    TECHNIQUE: The abdomen was scanned in standard fashion with  specialized ultrasound transducer(s) using both gray-scale and limited  color Doppler techniques.    FINDINGS:  Liver: Diffuse increased echogenicity.  No focal solid mass.The main  portal vein is patent with antegrade flow, measuring 0.8 cm.  Gallbladder: No wall thickening, pericholecystic fluid, positive  sonographic Carlson's sign. Mobile gallstones.  Bile Ducts: Both the intra- and extrahepatic biliary system are of  normal caliber.  The common bile duct measures 3 mm in diameter.  Pancreas: Secured by bowel gas.  Kidneys: No hydronephrosis.  The craniocaudal dimensions are: right-  10.7 cm.   Aorta and IVC: The visualized portions of the aorta and IVC are not  dilated..      Impression    IMPRESSION:   1.  Diffuse increased liver echogenicity, indicative of parenchymal  hepatic disease, most commonly fatty liver.  2.  Cholelithiasis without findings for acute cholecystitis.    JUAN ALBERTO FAULKNER MD         SYSTEM ID:  [id number]       Assessment and Plan:   Left Breast Cancer-patient completed bilateral mastectomies with sentinel node biopsy to the left axilla. Due to young age and Oncotype result, she complete 4 cycles of Taxotere /Cytoxan.  She completed radiation therapy on 9/21/2021 and began use of Tamoxifen.   She now has completed 2.5 years of use with some hot flashes noted and treated with Effexor 75 mg daily- working well.  - dry skin and fragile hair that is progressive- admits she does not drink milk- Needs to use the moisturizing creams after shower and push fluids but also suggest adding skin hair and nails supplement or vitamin D 2000 international unit(s) daily as could be low vitamin D level after winter.   Plan to continue Tamoxifen and prescription is update for the year.   Return for 6 month  with  visit with Dr Jaimes- no labs or imaging necessary.  Thyroid nodule- found on 3/2021 PET CT- FDG avid hypoattenuating nodule in the left thyroid lobe measuring 1.2 x 0.7 cm and SUV max 4.6. Referral made to endocrinology who completed US of the thyroid showing 3 left sided thyroid nodules - Recommendation for US and biopsy- biopsy was benign in 4/2022 and was recommended for return in 2 years - looks like set of July  Pulmonary Nodule- 7mm to RUL noted on 3/2021 PET CT. 1/27/2022 imaging shows node is stable. CT to check pulmonary nodule in 3/2023 showing stability-= no further imaging necessary.     The total time of this encounter amounted to 40 minutes. This time included face-to-face time spent with the patient and , prep work, ordering tests, and performing post visit documentation.  Laura Klein Cnp      Again, thank you for allowing me to participate in the care of your patient.        Sincerely,        Laura Klein NP, APRN CNP

## 2024-04-01 NOTE — TELEPHONE ENCOUNTER
RN spoke to patient on the phone earlier via [employer] and explained that once the order is placed then they will call you within 2 days. Patient understood.      Kim ESPINOZA RN, BSN  [facility]     What Type Of Note Output Would You Prefer (Optional)?: Standard Output How Severe Is Your Rash?: moderate Is This A New Presentation, Or A Follow-Up?: Rash

## 2024-04-08 ENCOUNTER — DOCUMENTATION ONLY (OUTPATIENT)
Dept: FAMILY MEDICINE | Facility: CLINIC | Age: 44
End: 2024-04-08
Payer: COMMERCIAL

## 2024-04-08 NOTE — PROGRESS NOTES
Ghazal Martinez has an upcoming lab appointment:    Future Appointments   Date Time Provider Department Center   4/12/2024  7:15 AM [facility]   7/31/2024  1:30 PM Titi Hodgson [facility]   7/31/2024  2:20 PM Jake Turner MD [facility]   10/29/2024  5:00 PM Constance Jaimes MD [facility]     Patient is scheduled for the following lab(s): none. Per notes, orders should come from Corrina Mcgee    There is no order available. Please review and place either future orders or HMPO (Review of Health Maintenance Protocol Orders), as appropriate.    There are no preventive care reminders to display for this patient.  Rebeca Booker

## 2024-04-08 NOTE — PROGRESS NOTES
Please call patient with , she has an upcoming lab appointment, but does not need it from primary care (if it's for oncology she can disregard this)     Patient already came back for the future lab order I placed and does not need labs at this time. Her next liver recheck can be with her next physical, which she is due for in September.

## 2024-09-24 ENCOUNTER — OFFICE VISIT (OUTPATIENT)
Dept: FAMILY MEDICINE | Facility: CLINIC | Age: 44
End: 2024-09-24
Payer: COMMERCIAL

## 2024-09-24 VITALS
WEIGHT: 167.8 LBS | DIASTOLIC BLOOD PRESSURE: 82 MMHG | HEIGHT: 62 IN | BODY MASS INDEX: 30.88 KG/M2 | HEART RATE: 60 BPM | TEMPERATURE: 97.8 F | SYSTOLIC BLOOD PRESSURE: 119 MMHG | OXYGEN SATURATION: 100 % | RESPIRATION RATE: 18 BRPM

## 2024-09-24 DIAGNOSIS — R21 RASH: ICD-10-CM

## 2024-09-24 DIAGNOSIS — E04.1 THYROID NODULE: ICD-10-CM

## 2024-09-24 DIAGNOSIS — E66.811 CLASS 1 OBESITY DUE TO EXCESS CALORIES WITHOUT SERIOUS COMORBIDITY WITH BODY MASS INDEX (BMI) OF 30.0 TO 30.9 IN ADULT: ICD-10-CM

## 2024-09-24 DIAGNOSIS — E55.9 VITAMIN D DEFICIENCY: ICD-10-CM

## 2024-09-24 DIAGNOSIS — K59.00 CONSTIPATION, UNSPECIFIED CONSTIPATION TYPE: ICD-10-CM

## 2024-09-24 DIAGNOSIS — Z12.4 CERVICAL CANCER SCREENING: ICD-10-CM

## 2024-09-24 DIAGNOSIS — C50.919: ICD-10-CM

## 2024-09-24 DIAGNOSIS — E66.09 CLASS 1 OBESITY DUE TO EXCESS CALORIES WITHOUT SERIOUS COMORBIDITY WITH BODY MASS INDEX (BMI) OF 30.0 TO 30.9 IN ADULT: ICD-10-CM

## 2024-09-24 DIAGNOSIS — Z23 NEED FOR PNEUMOCOCCAL VACCINATION: ICD-10-CM

## 2024-09-24 DIAGNOSIS — C77.1: ICD-10-CM

## 2024-09-24 DIAGNOSIS — F43.21 ADJUSTMENT DISORDER WITH DEPRESSED MOOD: ICD-10-CM

## 2024-09-24 DIAGNOSIS — C77.3: ICD-10-CM

## 2024-09-24 DIAGNOSIS — Z13.220 LIPID SCREENING: ICD-10-CM

## 2024-09-24 DIAGNOSIS — Z00.00 ROUTINE GENERAL MEDICAL EXAMINATION AT A HEALTH CARE FACILITY: Primary | ICD-10-CM

## 2024-09-24 DIAGNOSIS — R73.03 PREDIABETES: ICD-10-CM

## 2024-09-24 DIAGNOSIS — G62.0 DRUG-INDUCED POLYNEUROPATHY (H): ICD-10-CM

## 2024-09-24 DIAGNOSIS — D50.0 IRON DEFICIENCY ANEMIA DUE TO CHRONIC BLOOD LOSS: ICD-10-CM

## 2024-09-24 LAB
ANION GAP SERPL CALCULATED.3IONS-SCNC: 9 MMOL/L (ref 7–15)
BUN SERPL-MCNC: 16.9 MG/DL (ref 6–20)
CALCIUM SERPL-MCNC: 8.8 MG/DL (ref 8.8–10.4)
CHLORIDE SERPL-SCNC: 111 MMOL/L (ref 98–107)
CHOLEST SERPL-MCNC: 181 MG/DL
CREAT SERPL-MCNC: 0.96 MG/DL (ref 0.51–0.95)
EGFRCR SERPLBLD CKD-EPI 2021: 74 ML/MIN/1.73M2
ERYTHROCYTE [DISTWIDTH] IN BLOOD BY AUTOMATED COUNT: 12.8 % (ref 10–15)
EST. AVERAGE GLUCOSE BLD GHB EST-MCNC: 120 MG/DL
FASTING STATUS PATIENT QL REPORTED: YES
FASTING STATUS PATIENT QL REPORTED: YES
GLUCOSE SERPL-MCNC: 92 MG/DL (ref 70–99)
HBA1C MFR BLD: 5.8 % (ref 0–5.6)
HCO3 SERPL-SCNC: 21 MMOL/L (ref 22–29)
HCT VFR BLD AUTO: 38.6 % (ref 35–47)
HDLC SERPL-MCNC: 55 MG/DL
HGB BLD-MCNC: 13.1 G/DL (ref 11.7–15.7)
HPV HR 12 DNA CVX QL NAA+PROBE: NEGATIVE
HPV16 DNA CVX QL NAA+PROBE: NEGATIVE
HPV18 DNA CVX QL NAA+PROBE: NEGATIVE
HUMAN PAPILLOMA VIRUS FINAL DIAGNOSIS: NORMAL
LDLC SERPL CALC-MCNC: 77 MG/DL
MCH RBC QN AUTO: 30.3 PG (ref 26.5–33)
MCHC RBC AUTO-ENTMCNC: 33.9 G/DL (ref 31.5–36.5)
MCV RBC AUTO: 89 FL (ref 78–100)
NONHDLC SERPL-MCNC: 126 MG/DL
PLATELET # BLD AUTO: 242 10E3/UL (ref 150–450)
POTASSIUM SERPL-SCNC: 4 MMOL/L (ref 3.4–5.3)
RBC # BLD AUTO: 4.33 10E6/UL (ref 3.8–5.2)
SODIUM SERPL-SCNC: 141 MMOL/L (ref 135–145)
TRIGL SERPL-MCNC: 245 MG/DL
TSH SERPL DL<=0.005 MIU/L-ACNC: 0.99 UIU/ML (ref 0.3–4.2)
VIT D+METAB SERPL-MCNC: 21 NG/ML (ref 20–50)
WBC # BLD AUTO: 6.2 10E3/UL (ref 4–11)

## 2024-09-24 PROCEDURE — 99214 OFFICE O/P EST MOD 30 MIN: CPT | Mod: 25 | Performed by: NURSE PRACTITIONER

## 2024-09-24 PROCEDURE — 84443 ASSAY THYROID STIM HORMONE: CPT | Performed by: NURSE PRACTITIONER

## 2024-09-24 PROCEDURE — 90677 PCV20 VACCINE IM: CPT | Performed by: NURSE PRACTITIONER

## 2024-09-24 PROCEDURE — 99396 PREV VISIT EST AGE 40-64: CPT | Mod: 59 | Performed by: NURSE PRACTITIONER

## 2024-09-24 PROCEDURE — 90471 IMMUNIZATION ADMIN: CPT | Performed by: NURSE PRACTITIONER

## 2024-09-24 PROCEDURE — 85027 COMPLETE CBC AUTOMATED: CPT | Performed by: NURSE PRACTITIONER

## 2024-09-24 PROCEDURE — 80061 LIPID PANEL: CPT | Performed by: NURSE PRACTITIONER

## 2024-09-24 PROCEDURE — 82306 VITAMIN D 25 HYDROXY: CPT | Performed by: NURSE PRACTITIONER

## 2024-09-24 PROCEDURE — 83036 HEMOGLOBIN GLYCOSYLATED A1C: CPT | Performed by: NURSE PRACTITIONER

## 2024-09-24 PROCEDURE — 87624 HPV HI-RISK TYP POOLED RSLT: CPT | Performed by: NURSE PRACTITIONER

## 2024-09-24 PROCEDURE — 36415 COLL VENOUS BLD VENIPUNCTURE: CPT | Performed by: NURSE PRACTITIONER

## 2024-09-24 PROCEDURE — 80048 BASIC METABOLIC PNL TOTAL CA: CPT | Performed by: NURSE PRACTITIONER

## 2024-09-24 PROCEDURE — G0145 SCR C/V CYTO,THINLAYER,RESCR: HCPCS | Performed by: NURSE PRACTITIONER

## 2024-09-24 RX ORDER — POLYETHYLENE GLYCOL 3350 17 G/17G
1 POWDER, FOR SOLUTION ORAL DAILY
Qty: 510 G | Refills: 1 | Status: SHIPPED | OUTPATIENT
Start: 2024-09-24

## 2024-09-24 RX ORDER — TRIAMCINOLONE ACETONIDE 1 MG/G
OINTMENT TOPICAL 2 TIMES DAILY
Qty: 30 G | Refills: 0 | Status: SHIPPED | OUTPATIENT
Start: 2024-09-24

## 2024-09-24 ASSESSMENT — PAIN SCALES - GENERAL: PAINLEVEL: NO PAIN (0)

## 2024-09-24 NOTE — PROGRESS NOTES
Preventive Care Visit  [facility] [place]  KWABENA Guevara CNP, Family Medicine  Sep 24, 2024      Assessment & Plan     Routine general medical examination at a health care facility  - PRIMARY CARE FOLLOW-UP SCHEDULING; Future  - Basic metabolic panel  (Ca, Cl, CO2, Creat, Gluc, K, Na, BUN)    Rash  Left wrist.  RTC if worsens   - triamcinolone (KENALOG) 0.1 % external ointment; Apply topically 2 times daily.    Constipation, unspecified constipation type  Discussed trial of Miralax. Once patient has regular bowel movements, start diet with high fiber content. Plenty of fluids.   Return if symptoms fail to improve or worsen.   - polyethylene glycol (MIRALAX) 17 GM/Dose powder; Take 17 g (1 Capful) by mouth daily.    Neoplasm of breast, regional lymph node staging category N3b: metastasis in ipsilateral internal mammary lymph node and axillary lymph node, unspecified laterality (H)  On Tamoxifen, follows with oncology    Adjustment disorder with depressed mood  Not attending psychotherapy.Denies feeling depressed or having little interest in doing things. No suicidal ideations.     Iron deficiency anemia due to chronic blood loss  - CBC with platelets    Vitamin D deficiency  - Vitamin D Deficiency    Drug-induced polyneuropathy (H24)  Aware, patient states symptoms are stable    Prediabetes  - Hemoglobin A1c    Thyroid nodule  - TSH with free T4 reflex    Cervical cancer screening  - HPV and Gynecologic Cytology Panel - Recommended Age 30 - 65 Years    Need for pneumococcal vaccination  - Pneumococcal 20 Valent Conjugate (Prevnar 20)    Lipid screening  - Lipid panel reflex to direct LDL Fasting    Class 1 obesity due to excess calories without serious comorbidity with body mass index (BMI) of 30.0 to 30.9 in adult   -Discussed appropriate BMI, diet modification and physical activity.     Patient has been advised of split billing requirements and indicates understanding:  "Yes    BMI  Estimated body mass index is 30.8 kg/m  as calculated from the following:    Height as of this encounter: 1.572 m (5' 1.89\").    Weight as of this encounter: 76.1 kg (167 lb 12.8 oz).       Counseling  Appropriate preventive services were addressed with this patient via screening, questionnaire, or discussion as appropriate for fall prevention, nutrition, physical activity, Tobacco-use cessation, social engagement, weight loss and cognition.  Checklist reviewing preventive services available has been given to the patient.  Reviewed patient's diet, addressing concerns and/or questions.   Patient is at risk for social isolation and has been provided with information about the benefit of social connection.           Tesha Harman is a 43 year old, presenting for the following:  Physical         Health Care Directive  Patient does not have a Health Care Directive or Living Will: Discussed advance care planning with patient; however, patient declined at this time.    HPI  Presents for an annual visit. C/o rash and constipation            9/24/2024   General Health   How would you rate your overall physical health? Good   Feel stress (tense, anxious, or unable to sleep) Not at all            9/24/2024   Nutrition   Three or more servings of calcium each day? (!) NO   Diet: Regular (no restrictions)   How many servings of fruit and vegetables per day? (!) 2-3   How many sweetened beverages each day? 0-1            9/24/2024   Exercise   Days per week of moderate/strenous exercise 4 days   Average minutes spent exercising at this level 30 min            9/24/2024   Social Factors   Frequency of gathering with friends or relatives Never   Worry food won't last until get money to buy more No   Food not last or not have enough money for food? No   Do you have housing? (Housing is defined as stable permanent housing and does not include staying ouside in a car, in a tent, in an abandoned building, in an " overnight shelter, or couch-surfing.) Yes   Are you worried about losing your housing? No   Lack of transportation? No   Unable to get utilities (heat,electricity)? No      (!) SOCIAL CONNECTIONS CONCERN      9/24/2024   Dental   Dentist two times every year? Yes                 Today's PHQ-2 Score:       3/25/2024     2:38 PM   PHQ-2 ( 1999 [employer])   Q1: Little interest or pleasure in doing things 0   Q2: Feeling down, depressed or hopeless 0   PHQ-2 Score 0   Q1: Little interest or pleasure in doing things Not at all   Q2: Feeling down, depressed or hopeless Not at all   PHQ-2 Score 0         9/24/2024   Substance Use   Alcohol more than 3/day or more than 7/wk No   Do you use any other substances recreationally? No        Social History     Tobacco Use    Smoking status: Never    Smokeless tobacco: Never   Vaping Use    Vaping status: Never Used   Substance Use Topics    Alcohol use: No    Drug use: No                  9/24/2024   STI Screening   New sexual partner(s) since last STI/HIV test? No        History of abnormal Pap smear: No - age 30- 64 PAP with HPV every 5 years recommended        Latest Ref Rng & Units 6/29/2021     3:25 PM 6/29/2021     3:18 PM 1/29/2018     2:25 PM   PAP / HPV   PAP (Historical)   NIL     HPV 16 DNA NEG^Negative Negative   Negative    HPV 18 DNA NEG^Negative Negative   Negative    Other HR HPV NEG^Negative Negative   Negative      ASCVD Risk   The 10-year ASCVD risk score (Analia WHEAT, et al., 2019) is: 0.4%    Values used to calculate the score:      Age: 43 years      Sex: Female      Is Non- : No      Diabetic: No      Tobacco smoker: No      Systolic Blood Pressure: 119 mmHg      Is BP treated: No      HDL Cholesterol: 72 mg/dL      Total Cholesterol: 193 mg/dL        9/24/2024   Contraception/Family Planning   Questions about contraception or family planning No           Reviewed and updated as needed this visit by Provider     Meds  Problems   "                   Review of Systems  Constitutional, HEENT, cardiovascular, pulmonary, GI, , musculoskeletal, neuro, skin, endocrine and psych systems are negative, except as otherwise noted.     Objective    Exam  /82   Pulse 60   Temp 97.8  F (36.6  C) (Temporal)   Resp 18   Ht 1.572 m (5' 1.89\")   Wt 76.1 kg (167 lb 12.8 oz)   SpO2 100%   BMI 30.80 kg/m     Estimated body mass index is 30.8 kg/m  as calculated from the following:    Height as of this encounter: 1.572 m (5' 1.89\").    Weight as of this encounter: 76.1 kg (167 lb 12.8 oz).    Physical Exam  GENERAL: alert and no distress  EYES: Eyes grossly normal to inspection, PERRL and conjunctivae and sclerae normal  HENT: ear canals and TM's normal, nose and mouth without ulcers or lesions  NECK: no adenopathy, no asymmetry, masses, or scars  RESP: lungs clear to auscultation - no rales, rhonchi or wheezes  CV: regular rate and rhythm, normal S1 S2, no S3 or S4, no murmur, click or rub, no peripheral edema  ABDOMEN: soft, nontender, no hepatosplenomegaly, no masses and bowel sounds normal   (female) w/bimanual: normal female external genitalia, normal urethral meatus, normal vaginal mucosa, and normal cervix/adnexa/uterus without masses or discharge  MS: no gross musculoskeletal defects noted, no edema  SKIN: no suspicious lesions or rashes  NEURO: Normal strength and tone, mentation intact and speech normal  PSYCH: mentation appears normal, affect normal/bright    Signed Electronically by: KWABENA Guevara CNP    "

## 2024-09-26 LAB
BKR AP ASSOCIATED HPV REPORT: NORMAL
BKR LAB AP GYN ADEQUACY: NORMAL
BKR LAB AP GYN INTERPRETATION: NORMAL
BKR LAB AP PREVIOUS ABNORMAL: NORMAL
PATH REPORT.COMMENTS IMP SPEC: NORMAL
PATH REPORT.COMMENTS IMP SPEC: NORMAL
PATH REPORT.RELEVANT HX SPEC: NORMAL

## 2024-11-15 ENCOUNTER — APPOINTMENT (OUTPATIENT)
Dept: INTERPRETER SERVICES | Facility: CLINIC | Age: 44
End: 2024-11-15
Payer: COMMERCIAL

## 2025-01-12 NOTE — PROGRESS NOTES
Oncology Follow Up Visit: January 13, 2025     Oncologist: Dr Jaimes/CALIN  PCP: No Ref-Primary, Physician    Diagnosis: Left Breast Cancer  Ghazal Martinez is a 43yo female who presented in 2/2021 with left breast lump x 1 month.   Contrast enhanced mammogram on 2/23/2021 showed a mass at the 10:00 position in left breast anterior depth measuring 1.7 cm. US guided core needle biopsy on 2/23/2021 showed no decompensating invasive ductal carcinoma, estrogen receptor positive (95%, strong) and progesterone receptor positive by immunohistochemistry (70%). By FISH HER2/MIO 17 ratio:  1.7, IHC 2+, additional 20 cells from areas corresponding to the most intense IHC staining were evaluated by FISH. The results obtained from these 20 additional cells also fall into Group 4. Taken together, the FISH and IHC   results are interpreted as HER2 negative.FISH and IHC results ultimately interpreted as HER2 negative.   OncotypeDx returned at 23, c/w intermediate risk, 9 percent of distant disease recurrence with the use of systemic endocrine therapy and  6.5%  benefit of adjuvant chemotherapy given young age, premenopausal.  *3/19/2021Genetic testing negative  for mutations in the FLEX, BRCA1, BRCA2, BRIP1, CDH1, CHEK2, EPCAM, MLH1, MSH2, MSH6, NBN, NF1, PALB2, PMS2, PTEN, RAD51C, RAD51D, STK11, and TP53 genes.  Treatment:   4/5/2021 status post bilateral mastectomy and axillary sentinel lymph node biopsy   4/30-7/2021 treatment with Taxotere/Cytoxan x 4 cycles  9/21/2021 completed radiation therapy.   9/2021 began Tamoxifen    Interval History: Ms. Martinez is seen today with  on ipad for follow up to her breast cancer treatment with Tamoxifen. She is now 3 years into use of Tamoxifen.    hot flashes and Depression being treated with Effexor 75 mg- feels this is tolerable.   She has been healthy since last visit but complains today of dry mouth and teeth breaking off- dry skin also noted.  She is known to have fatty liver  "disease  No periods since chemotherapy.   Has small new growth noted recently to lateral left reconstructed breast- no pain.   Also shares she is bruising easily recently with bruises that appear without cause.   Stays active but no regular exercise plan.    Rest of comprehensive and complete ROS is reviewed and is negative.   Past Medical History:   Diagnosis Date    Cancer (H)     Varicose veins of legs      Current Outpatient Medications   Medication Sig Dispense Refill    meclizine (ANTIVERT) 25 MG tablet Take 1 tablet (25 mg) by mouth 3 times daily as needed for dizziness 30 tablet 0    tamoxifen (NOLVADEX) 20 MG tablet Take 1 tablet (20 mg) by mouth daily 90 tablet 3    venlafaxine (EFFEXOR XR) 75 MG 24 hr capsule Take 1 capsule (75 mg) by mouth daily 90 capsule 3     No current facility-administered medications for this visit.     No Known Allergies    Physical Exam:/79 (BP Location: Right arm)   Pulse 69   Ht 1.565 m (5' 1.61\")   Wt 77.1 kg (170 lb)   SpO2 99%   BMI 31.49 kg/m     Constitutional: Alert, healthy, and in no distress.   ENT: Eyes bright, No mouth sores  Neck: Supple, No adenopathy.  Cardiac: Heart rate and rhythm is regular with normal S1 and S2 without murmur  Respiratory: Breathing easy. Lung sounds clear to auscultation  Breasts:bilaterally has mastectomies with patient noted leteral breast lump that is mobile that appears to be new. - cannot say if in skin or under skin.    Abdomen: Soft, non-tender, BS normal. No masses or organomegaly  MS: Muscle tone normal, extremities normal with no edema.   Skin: No suspicious lesions or rashes- few bruises noted.   Neuro: Sensory grossly WNL, gait normal.   Lymph: Normal ant/post cervical, axillary, supraclavicular nodes  Psych: Mentation appears normal and affect normal/bright and smiling    Laboratory Results:   No results found for any visits on 01/13/25.   - awaiting vit D screening, Estradiol level and FSH.       Assessment and Plan: "   Left Breast Cancer-patient completed bilateral mastectomies with sentinel node biopsy to the left axilla. Due to young age and Oncotype result, she complete 4 cycles of Taxotere /Cytoxan.  She completed radiation therapy on 9/21/2021 and began use of Tamoxifen.   Pt has now been on Tamoxifen x 3 years - hot flashes have subsided with use of  Effexor 75 mg daily- working well for hot flashes and depression- not willing to cut back or discontinue at this time. .  - dry mouth with failing teeth, dry skin and fragile hair that is progressive-recommended vitamin D 2000 international unit(s) daily as could be low vitamin D level after winter- will be screening for deficiency today.  Also suggest screening for Sjogrens disease through PCP if continues.   Needs yearly uterine review for enlargement from PCP due to Tamoxifen.   Discussed testing for menopause status since has not had period since chemo. Check estradiol level and FSH today.   Return for 6 month  with visit - no labs or imaging necessary.    Thyroid nodule- found on 3/2021 PET CT- FDG avid hypoattenuating nodule in the left thyroid lobe measuring 1.2 x 0.7 cm and SUV max 4.6. Endocrinology completed US of the thyroid showing 3 left sided thyroid nodules - Recommendation for US and biopsy- biopsy was benign in 4/2022 and was recommended for return in 2 years - looks like set of July    Pulmonary Nodule- 7mm to RUL noted on 3/2021 PET CT. 1/27/2022 imaging shows node is stable. CT to check pulmonary nodule in 3/2023 showing stability-= no further imaging necessary.     The total time of this encounter amounted to 45 minutes. This time included face-to-face time spent with the patient and , prep work, ordering tests, and performing post visit documentation.  Laura Klein,Cnp

## 2025-01-13 ENCOUNTER — ONCOLOGY VISIT (OUTPATIENT)
Dept: ONCOLOGY | Facility: CLINIC | Age: 45
End: 2025-01-13
Attending: NURSE PRACTITIONER
Payer: COMMERCIAL

## 2025-01-13 VITALS
SYSTOLIC BLOOD PRESSURE: 124 MMHG | HEART RATE: 69 BPM | BODY MASS INDEX: 31.28 KG/M2 | WEIGHT: 170 LBS | OXYGEN SATURATION: 99 % | DIASTOLIC BLOOD PRESSURE: 79 MMHG | HEIGHT: 62 IN

## 2025-01-13 DIAGNOSIS — R23.2 HOT FLASHES DUE TO TAMOXIFEN: ICD-10-CM

## 2025-01-13 DIAGNOSIS — Z90.13 S/P MASTECTOMY, BILATERAL: ICD-10-CM

## 2025-01-13 DIAGNOSIS — E04.1 THYROID NODULE: ICD-10-CM

## 2025-01-13 DIAGNOSIS — R91.8 PULMONARY NODULES: ICD-10-CM

## 2025-01-13 DIAGNOSIS — C50.912 MALIGNANT NEOPLASM OF LEFT BREAST IN FEMALE, ESTROGEN RECEPTOR POSITIVE, UNSPECIFIED SITE OF BREAST (H): ICD-10-CM

## 2025-01-13 DIAGNOSIS — Z17.0 MALIGNANT NEOPLASM OF LEFT BREAST IN FEMALE, ESTROGEN RECEPTOR POSITIVE, UNSPECIFIED SITE OF BREAST (H): ICD-10-CM

## 2025-01-13 DIAGNOSIS — T45.1X5A HOT FLASHES DUE TO TAMOXIFEN: ICD-10-CM

## 2025-01-13 DIAGNOSIS — N63.23 MASS OF LOWER OUTER QUADRANT OF LEFT BREAST: Primary | ICD-10-CM

## 2025-01-13 LAB
ESTRADIOL SERPL-MCNC: 5 PG/ML
FSH SERPL IRP2-ACNC: 24.5 MIU/ML
VIT D+METAB SERPL-MCNC: 25 NG/ML (ref 20–50)

## 2025-01-13 PROCEDURE — 99215 OFFICE O/P EST HI 40 MIN: CPT | Performed by: NURSE PRACTITIONER

## 2025-01-13 PROCEDURE — 99213 OFFICE O/P EST LOW 20 MIN: CPT | Performed by: NURSE PRACTITIONER

## 2025-01-13 PROCEDURE — 82670 ASSAY OF TOTAL ESTRADIOL: CPT | Performed by: NURSE PRACTITIONER

## 2025-01-13 PROCEDURE — 82306 VITAMIN D 25 HYDROXY: CPT | Performed by: NURSE PRACTITIONER

## 2025-01-13 PROCEDURE — 36415 COLL VENOUS BLD VENIPUNCTURE: CPT | Performed by: NURSE PRACTITIONER

## 2025-01-13 PROCEDURE — 83001 ASSAY OF GONADOTROPIN (FSH): CPT | Performed by: NURSE PRACTITIONER

## 2025-01-13 ASSESSMENT — PAIN SCALES - GENERAL: PAINLEVEL_OUTOF10: NO PAIN (0)

## 2025-01-13 NOTE — NURSING NOTE
"Oncology Rooming Note    January 13, 2025 2:13 PM   Ghazal Martinez is a 44 year old female who presents for:    Chief Complaint   Patient presents with    Oncology Clinic Visit     Initial Vitals: /79 (BP Location: Right arm)   Pulse 69   Ht 1.565 m (5' 1.61\")   Wt 77.1 kg (170 lb)   SpO2 99%   BMI 31.49 kg/m   Estimated body mass index is 31.49 kg/m  as calculated from the following:    Height as of this encounter: 1.565 m (5' 1.61\").    Weight as of this encounter: 77.1 kg (170 lb). Body surface area is 1.83 meters squared.  No Pain (0) Comment: Data Unavailable   No LMP recorded. (Menstrual status: Chemotherapy).  Allergies reviewed: Yes  Medications reviewed: Yes    Medications: Medication refills not needed today.  Pharmacy name entered into [facility]:    [facility] [place] - [address] [id number] - [place], [place] - [address] [facility] [place] - [place], [place] - [address]  [facility] [address] [place] [id number] [address] [place] [address]    Frailty Screening:   Is the patient here for a new oncology consult visit in cancer care? 2. No      Clinical concerns: Patient has concerns of bruising on varies parts of her body. Patient will discuss concerns with provider.       Eric Choe MA            "

## 2025-01-13 NOTE — LETTER
1/13/2025      Ghazal Martinez  [address]  [place] [address]      Dear Colleague,    Thank you for referring your patient, Ghazal Martinez, to the [facility]. Please see a copy of my visit note below.    Oncology Follow Up Visit: January 13, 2025     Oncologist: Dr Jaimes/CALIN  PCP: No Ref-Primary, Physician    Diagnosis: Left Breast Cancer  Ghazal Martinez is a 43yo female who presented in 2/2021 with left breast lump x 1 month.   Contrast enhanced mammogram on 2/23/2021 showed a mass at the 10:00 position in left breast anterior depth measuring 1.7 cm. US guided core needle biopsy on 2/23/2021 showed no decompensating invasive ductal carcinoma, estrogen receptor positive (95%, strong) and progesterone receptor positive by immunohistochemistry (70%). By FISH HER2/MIO 17 ratio:  1.7, IHC 2+, additional 20 cells from areas corresponding to the most intense IHC staining were evaluated by FISH. The results obtained from these 20 additional cells also fall into Group 4. Taken together, the FISH and IHC   results are interpreted as HER2 negative.FISH and IHC results ultimately interpreted as HER2 negative.   OncotypeDx returned at 23, c/w intermediate risk, 9 percent of distant disease recurrence with the use of systemic endocrine therapy and  6.5%  benefit of adjuvant chemotherapy given young age, premenopausal.  *3/19/2021Genetic testing negative  for mutations in the FLEX, BRCA1, BRCA2, BRIP1, CDH1, CHEK2, EPCAM, MLH1, MSH2, MSH6, NBN, NF1, PALB2, PMS2, PTEN, RAD51C, RAD51D, STK11, and TP53 genes.  Treatment:   4/5/2021 status post bilateral mastectomy and axillary sentinel lymph node biopsy   4/30-7/2021 treatment with Taxotere/Cytoxan x 4 cycles  9/21/2021 completed radiation therapy.   9/2021 began Tamoxifen    Interval History: Ms. Martinez is seen today with  on ipad for follow up to her breast cancer treatment with Tamoxifen. She is now 3 years into use of  "Tamoxifen.    hot flashes and Depression being treated with Effexor 75 mg- feels this is tolerable.   She has been healthy since last visit but complains today of dry mouth and teeth breaking off- dry skin also noted.  She is known to have fatty liver disease  No periods since chemotherapy.   Has small new growth noted recently to lateral left reconstructed breast- no pain.   Also shares she is bruising easily recently with bruises that appear without cause.   Stays active but no regular exercise plan.    Rest of comprehensive and complete ROS is reviewed and is negative.   Past Medical History:   Diagnosis Date     Cancer (H)      Varicose veins of legs      Current Outpatient Medications   Medication Sig Dispense Refill     meclizine (ANTIVERT) 25 MG tablet Take 1 tablet (25 mg) by mouth 3 times daily as needed for dizziness 30 tablet 0     tamoxifen (NOLVADEX) 20 MG tablet Take 1 tablet (20 mg) by mouth daily 90 tablet 3     venlafaxine (EFFEXOR XR) 75 MG 24 hr capsule Take 1 capsule (75 mg) by mouth daily 90 capsule 3     No current facility-administered medications for this visit.     No Known Allergies    Physical Exam:/79 (BP Location: Right arm)   Pulse 69   Ht 1.565 m (5' 1.61\")   Wt 77.1 kg (170 lb)   SpO2 99%   BMI 31.49 kg/m     Constitutional: Alert, healthy, and in no distress.   ENT: Eyes bright, No mouth sores  Neck: Supple, No adenopathy.  Cardiac: Heart rate and rhythm is regular with normal S1 and S2 without murmur  Respiratory: Breathing easy. Lung sounds clear to auscultation  Breasts:bilaterally has mastectomies with patient noted leteral breast lump that is mobile that appears to be new. - cannot say if in skin or under skin.    Abdomen: Soft, non-tender, BS normal. No masses or organomegaly  MS: Muscle tone normal, extremities normal with no edema.   Skin: No suspicious lesions or rashes- few bruises noted.   Neuro: Sensory grossly WNL, gait normal.   Lymph: Normal ant/post " cervical, axillary, supraclavicular nodes  Psych: Mentation appears normal and affect normal/bright and smiling    Laboratory Results:   No results found for any visits on 01/13/25.   - awaiting vit D screening, Estradiol level and FSH.       Assessment and Plan:   Left Breast Cancer-patient completed bilateral mastectomies with sentinel node biopsy to the left axilla. Due to young age and Oncotype result, she complete 4 cycles of Taxotere /Cytoxan.  She completed radiation therapy on 9/21/2021 and began use of Tamoxifen.   Pt has now been on Tamoxifen x 3 years - hot flashes have subsided with use of  Effexor 75 mg daily- working well for hot flashes and depression- not willing to cut back or discontinue at this time. .  - dry mouth with failing teeth, dry skin and fragile hair that is progressive-recommended vitamin D 2000 international unit(s) daily as could be low vitamin D level after winter- will be screening for deficiency today.  Also suggest screening for Sjogrens disease through PCP if continues.   Needs yearly uterine review for enlargement from PCP due to Tamoxifen.   Discussed testing for menopause status since has not had period since chemo. Check estradiol level and FSH today.   Return for 6 month  with visit - no labs or imaging necessary.    Thyroid nodule- found on 3/2021 PET CT- FDG avid hypoattenuating nodule in the left thyroid lobe measuring 1.2 x 0.7 cm and SUV max 4.6. Endocrinology completed US of the thyroid showing 3 left sided thyroid nodules - Recommendation for US and biopsy- biopsy was benign in 4/2022 and was recommended for return in 2 years - looks like set of July    Pulmonary Nodule- 7mm to RUL noted on 3/2021 PET CT. 1/27/2022 imaging shows node is stable. CT to check pulmonary nodule in 3/2023 showing stability-= no further imaging necessary.     The total time of this encounter amounted to 45 minutes. This time included face-to-face time spent with the patient and ,  prep work, ordering tests, and performing post visit documentation.  Laura Klein Cnp      Again, thank you for allowing me to participate in the care of your patient.        Sincerely,        Laura Klein NP, APRN CNP    Electronically signed

## 2025-03-27 DIAGNOSIS — R23.2 HOT FLASHES DUE TO TAMOXIFEN: ICD-10-CM

## 2025-03-27 DIAGNOSIS — C50.912 MALIGNANT NEOPLASM OF LEFT BREAST IN FEMALE, ESTROGEN RECEPTOR POSITIVE, UNSPECIFIED SITE OF BREAST (H): ICD-10-CM

## 2025-03-27 DIAGNOSIS — Z17.0 MALIGNANT NEOPLASM OF LEFT BREAST IN FEMALE, ESTROGEN RECEPTOR POSITIVE, UNSPECIFIED SITE OF BREAST (H): ICD-10-CM

## 2025-03-27 DIAGNOSIS — T45.1X5A HOT FLASHES DUE TO TAMOXIFEN: ICD-10-CM

## 2025-03-27 RX ORDER — VENLAFAXINE HYDROCHLORIDE 75 MG/1
75 CAPSULE, EXTENDED RELEASE ORAL DAILY
Qty: 90 CAPSULE | Refills: 3 | Status: SHIPPED | OUTPATIENT
Start: 2025-03-27

## 2025-03-27 RX ORDER — TAMOXIFEN CITRATE 20 MG/1
20 TABLET ORAL DAILY
Qty: 90 TABLET | Refills: 3 | Status: SHIPPED | OUTPATIENT
Start: 2025-03-27

## 2025-03-27 NOTE — TELEPHONE ENCOUNTER
Pending Prescriptions:                       Disp   Refills    tamoxifen (NOLVADEX) 20 MG tablet         90 tab*3            Sig: Take 1 tablet (20 mg) by mouth daily.    venlafaxine (EFFEXOR XR) 75 MG 24 hr caps*90 cap*3            Sig: Take 1 capsule (75 mg) by mouth daily.          Last Written Prescription Date:  3/21/2024 for both medications  Last Fill Quantity: 90,   # refills: 3  Last Office Visit: 1/13/2025 with Laura Klein NP   Future Office visit:   due July 2025    Routing refill request to provider for review/approval.  They are requesting refill before the weekend.  Alicia Bundy RN on 3/27/2025 at 2:12 PM

## 2025-03-27 NOTE — TELEPHONE ENCOUNTER
How do I pend so the refills go back to Laura for future, since I will not be covering in the long-term, don't want to get refill requests for patients that I don't follow regularly. I am happy to refill today, just future that need to fix future requests.  Thanks,  Yvette

## 2025-05-06 ENCOUNTER — TELEPHONE (OUTPATIENT)
Dept: FAMILY MEDICINE | Facility: CLINIC | Age: 45
End: 2025-05-06
Payer: COMMERCIAL

## 2025-05-06 NOTE — TELEPHONE ENCOUNTER
Patient Quality Outreach    Patient is due for the following:   Colon Cancer Screening      Topic Date Due    COVID-19 Vaccine (1) Never done    Zoster (Shingles) Vaccine (1 of 2) Never done    Hepatitis B Vaccine (2 of 3 - 19+ 3-dose series) 02/19/2004    Diptheria Tetanus Pertussis (DTAP/TDAP/TD) Vaccine (2 - Td or Tdap) 12/17/2024     PHQ-2    Action(s) Taken:   Schedule a office visit for Colon cancer screening and vaccinations      Type of outreach:    Sent [employer] message.    Questions for provider review:    None         Dat Ben[employer]  Chart routed to None.

## 2025-08-25 ENCOUNTER — PATIENT OUTREACH (OUTPATIENT)
Dept: CARE COORDINATION | Facility: CLINIC | Age: 45
End: 2025-08-25

## (undated) DEVICE — DRSG STERI STRIP 1/2X4" R1547

## (undated) DEVICE — GLOVE PROTEXIS W/NEU-THERA 7.5  2D73TE75

## (undated) DEVICE — DRSG TEGADERM 8X12" 1629

## (undated) DEVICE — KIT SPY ELITE DISP LC3006

## (undated) DEVICE — PACK MAJOR SBA15MAFSI

## (undated) DEVICE — DRSG KERLIX 4 1/2"X4YDS ROLL 6715

## (undated) DEVICE — SUTURE BOOTS 051003PBX

## (undated) DEVICE — NDL 19GA 1.5"

## (undated) DEVICE — SU VICRYL 2-0 SH 27" J317H

## (undated) DEVICE — LINEN TOWEL PACK X5 5464

## (undated) DEVICE — SLEEVE PROTECTIVE BREAST BIOPSY  GMSLV001-10

## (undated) DEVICE — SU SILK 2-0 SH 30" K833H

## (undated) DEVICE — PAD CHUX UNDERPAD 23X24" 7136

## (undated) DEVICE — SOL NACL 0.9% INJ 250ML BAG 2B1322Q

## (undated) DEVICE — ESU PENCIL W/SMOKE EVAC NEPTUNE STRYKER 0703-046-000

## (undated) DEVICE — DRAIN JACKSON PRATT RESERVOIR 100ML SU130-1305

## (undated) DEVICE — GLOVE PROTEXIS BLUE W/NEU-THERA 7.5  2D73EB75

## (undated) DEVICE — SOLUTION WOUND CLEANSING 3/4OZ 10% PVP EA-L3011FB-50

## (undated) DEVICE — SPONGE LAP 18X18" X8435

## (undated) DEVICE — NDL 22GA 1.5"

## (undated) DEVICE — SU VICRYL 3-0 SH 27" J316H

## (undated) DEVICE — SYR 10ML SLIP TIP W/O NDL

## (undated) DEVICE — SU MONOCRYL 4-0 PS-2 18" UND Y496G

## (undated) DEVICE — DECANTER VIAL 2006S

## (undated) DEVICE — SYR 03ML LL W/O NDL 309657

## (undated) DEVICE — SYR 10ML FINGER CONTROL W/O NDL 309695

## (undated) DEVICE — BLADE KNIFE SURG 11 371111

## (undated) DEVICE — SU SILK 2-0 FSL 18" 677G

## (undated) DEVICE — DRAPE BREAST/CHEST 29420

## (undated) DEVICE — SU MONOCRYL 3-0 PS-2 27" Y427H

## (undated) DEVICE — TUBING INFILTRATION SINGLE SPIKE 188" 24-6001-00

## (undated) DEVICE — SOL NACL 0.9% INJ 1000ML BAG 2B1324X

## (undated) DEVICE — SU PROLENE 2-0 CT-2 30" 8411H

## (undated) DEVICE — SUCTION CANISTER MEDIVAC LINER 3000ML W/LID 65651-530

## (undated) DEVICE — DRAPE COVER C-ARM SEAMLESS SNAP-KAP 03-KP26 LATEX FREE

## (undated) DEVICE — DRSG BIOPATCH GERMICIDAL SPLIT SPONGE 7MM LG

## (undated) DEVICE — GLOVE PROTEXIS MICRO 6.0  2D73PM60

## (undated) DEVICE — SU SILK 2-0 SH CR 8X18" C012D

## (undated) DEVICE — DECANTER BAG 2002S

## (undated) DEVICE — ESU ELEC BLADE 2.75" COATED/INSULATED E1455

## (undated) DEVICE — DRAIN JACKSON PRATT 15FR ROUND SIL LF JP-2229

## (undated) DEVICE — SYR 50ML LL W/O NDL 309653

## (undated) DEVICE — SOL WATER IRRIG 1000ML BOTTLE 2F7114

## (undated) DEVICE — SYR 10ML LL W/O NDL

## (undated) DEVICE — GLOVE PROTEXIS BLUE W/NEU-THERA 6.5  2D73EB65

## (undated) DEVICE — SU VICRYL 3-0 TIE 12X18" J904T

## (undated) DEVICE — ADH SKIN CLOSURE PREMIERPRO EXOFIN 1.0ML 3470

## (undated) RX ORDER — LIDOCAINE HYDROCHLORIDE 10 MG/ML
INJECTION, SOLUTION EPIDURAL; INFILTRATION; INTRACAUDAL; PERINEURAL
Status: DISPENSED
Start: 2021-04-05

## (undated) RX ORDER — CELECOXIB 200 MG/1
CAPSULE ORAL
Status: DISPENSED
Start: 2021-04-05

## (undated) RX ORDER — ONDANSETRON 2 MG/ML
INJECTION INTRAMUSCULAR; INTRAVENOUS
Status: DISPENSED
Start: 2021-04-05

## (undated) RX ORDER — CEFAZOLIN SODIUM 1 G/3ML
INJECTION, POWDER, FOR SOLUTION INTRAMUSCULAR; INTRAVENOUS
Status: DISPENSED
Start: 2021-04-05

## (undated) RX ORDER — GLYCOPYRROLATE 0.2 MG/ML
INJECTION, SOLUTION INTRAMUSCULAR; INTRAVENOUS
Status: DISPENSED
Start: 2021-04-05

## (undated) RX ORDER — BUPIVACAINE HYDROCHLORIDE 5 MG/ML
INJECTION, SOLUTION EPIDURAL; INTRACAUDAL
Status: DISPENSED
Start: 2021-04-05

## (undated) RX ORDER — HYDROMORPHONE HYDROCHLORIDE 1 MG/ML
INJECTION, SOLUTION INTRAMUSCULAR; INTRAVENOUS; SUBCUTANEOUS
Status: DISPENSED
Start: 2021-04-05

## (undated) RX ORDER — LIDOCAINE HYDROCHLORIDE 20 MG/ML
INJECTION, SOLUTION EPIDURAL; INFILTRATION; INTRACAUDAL; PERINEURAL
Status: DISPENSED
Start: 2021-04-05

## (undated) RX ORDER — FENTANYL CITRATE 50 UG/ML
INJECTION, SOLUTION INTRAMUSCULAR; INTRAVENOUS
Status: DISPENSED
Start: 2021-04-05

## (undated) RX ORDER — GABAPENTIN 300 MG/1
CAPSULE ORAL
Status: DISPENSED
Start: 2021-04-05

## (undated) RX ORDER — DEXAMETHASONE SODIUM PHOSPHATE 4 MG/ML
INJECTION, SOLUTION INTRA-ARTICULAR; INTRALESIONAL; INTRAMUSCULAR; INTRAVENOUS; SOFT TISSUE
Status: DISPENSED
Start: 2021-04-05

## (undated) RX ORDER — HEPARIN SODIUM 1000 [USP'U]/ML
INJECTION, SOLUTION INTRAVENOUS; SUBCUTANEOUS
Status: DISPENSED
Start: 2021-04-05

## (undated) RX ORDER — PROPOFOL 10 MG/ML
INJECTION, EMULSION INTRAVENOUS
Status: DISPENSED
Start: 2021-04-05

## (undated) RX ORDER — CEFAZOLIN SODIUM 2 G/100ML
INJECTION, SOLUTION INTRAVENOUS
Status: DISPENSED
Start: 2021-04-05

## (undated) RX ORDER — BUPIVACAINE HYDROCHLORIDE AND EPINEPHRINE 2.5; 5 MG/ML; UG/ML
INJECTION, SOLUTION EPIDURAL; INFILTRATION; INTRACAUDAL; PERINEURAL
Status: DISPENSED
Start: 2021-04-05

## (undated) RX ORDER — HEPARIN SODIUM (PORCINE) LOCK FLUSH IV SOLN 100 UNIT/ML 100 UNIT/ML
SOLUTION INTRAVENOUS
Status: DISPENSED
Start: 2021-04-05

## (undated) RX ORDER — NEOSTIGMINE METHYLSULFATE 1 MG/ML
VIAL (ML) INJECTION
Status: DISPENSED
Start: 2021-04-05

## (undated) RX ORDER — OXYCODONE HCL 10 MG/1
TABLET, FILM COATED, EXTENDED RELEASE ORAL
Status: DISPENSED
Start: 2021-04-05